# Patient Record
Sex: FEMALE | Race: OTHER | NOT HISPANIC OR LATINO
[De-identification: names, ages, dates, MRNs, and addresses within clinical notes are randomized per-mention and may not be internally consistent; named-entity substitution may affect disease eponyms.]

---

## 2017-01-12 ENCOUNTER — APPOINTMENT (OUTPATIENT)
Dept: SURGERY | Facility: CLINIC | Age: 77
End: 2017-01-12

## 2017-07-13 ENCOUNTER — APPOINTMENT (OUTPATIENT)
Dept: SURGERY | Facility: CLINIC | Age: 77
End: 2017-07-13

## 2018-02-15 ENCOUNTER — APPOINTMENT (OUTPATIENT)
Dept: SURGERY | Facility: CLINIC | Age: 78
End: 2018-02-15
Payer: MEDICARE

## 2018-02-15 PROCEDURE — 99213 OFFICE O/P EST LOW 20 MIN: CPT

## 2018-08-07 ENCOUNTER — APPOINTMENT (OUTPATIENT)
Dept: SURGERY | Facility: CLINIC | Age: 78
End: 2018-08-07
Payer: MEDICARE

## 2018-08-07 PROCEDURE — 99213 OFFICE O/P EST LOW 20 MIN: CPT

## 2019-02-07 ENCOUNTER — APPOINTMENT (OUTPATIENT)
Dept: SURGERY | Facility: CLINIC | Age: 79
End: 2019-02-07
Payer: MEDICARE

## 2019-02-07 PROCEDURE — 99213 OFFICE O/P EST LOW 20 MIN: CPT

## 2019-02-07 NOTE — HISTORY OF PRESENT ILLNESS
[de-identified] : 20  1/2 years s/p partial glossectomy for malignancy, s/p resection right cheek BCC. denies pain, bleeding, dysphagia, hoarseness or new lesions. no changes medically since last visit

## 2019-02-07 NOTE — ASSESSMENT
[FreeTextEntry1] : will observe.  to return earlier if any change. encouraged to see dermatologist.  no indication for any studies at this time

## 2019-02-07 NOTE — PHYSICAL EXAM
[de-identified] : no palpable thyroid nodules [Laryngoscopy Performed] : laryngoscopy was performed, see procedure section for findings [Midline] : located in midline position [de-identified] : no palpable masses.  changes from prior surgery [Normal] : orientation to person, place, and time: normal [FreeTextEntry2] : no skin lesions noted

## 2019-08-08 ENCOUNTER — APPOINTMENT (OUTPATIENT)
Dept: SURGERY | Facility: CLINIC | Age: 79
End: 2019-08-08
Payer: MEDICARE

## 2019-08-08 PROCEDURE — 99214 OFFICE O/P EST MOD 30 MIN: CPT

## 2019-08-08 NOTE — HISTORY OF PRESENT ILLNESS
[de-identified] : 21  years s/p partial glossectomy for malignancy, s/p resection right cheek BCC. denies pain, bleeding, dysphagia, hoarseness or new lesions. no changes medically since last visit.  new scalp lesion seen by hairdresser - occasional bleeding

## 2019-08-08 NOTE — ASSESSMENT
[FreeTextEntry1] : discussed options for excision of scalp lesion as ambulatory procedure. risks, benefits and alternatives discussed at length. potential for scarring, recurrence, alopecia discussed. to be scheduled at [facility]

## 2019-08-08 NOTE — PHYSICAL EXAM
[de-identified] : no palpable thyroid nodules [Laryngoscopy Performed] : laryngoscopy was performed, see procedure section for findings [Midline] : located in midline position [de-identified] : no palpable masses.  changes from prior surgery [FreeTextEntry2] : well healed right cheek scar.  1.3 cm right vertex raised scalp lesion with elevated borders and small scab posteriorly, mobile [Normal] : orientation to person, place, and time: normal

## 2019-09-10 ENCOUNTER — OUTPATIENT (OUTPATIENT)
Dept: OUTPATIENT SERVICES | Facility: HOSPITAL | Age: 79
LOS: 1 days | End: 2019-09-10
Payer: MEDICARE

## 2019-09-10 VITALS
SYSTOLIC BLOOD PRESSURE: 120 MMHG | OXYGEN SATURATION: 99 % | TEMPERATURE: 96 F | DIASTOLIC BLOOD PRESSURE: 60 MMHG | RESPIRATION RATE: 16 BRPM | HEART RATE: 51 BPM | HEIGHT: 63 IN | WEIGHT: 126.1 LBS

## 2019-09-10 DIAGNOSIS — E11.9 TYPE 2 DIABETES MELLITUS WITHOUT COMPLICATIONS: ICD-10-CM

## 2019-09-10 DIAGNOSIS — C44.91 BASAL CELL CARCINOMA OF SKIN, UNSPECIFIED: ICD-10-CM

## 2019-09-10 DIAGNOSIS — C02.9 MALIGNANT NEOPLASM OF TONGUE, UNSPECIFIED: Chronic | ICD-10-CM

## 2019-09-10 DIAGNOSIS — C44.90 UNSPECIFIED MALIGNANT NEOPLASM OF SKIN, UNSPECIFIED: ICD-10-CM

## 2019-09-10 LAB
ANION GAP SERPL CALC-SCNC: 12 MMO/L — SIGNIFICANT CHANGE UP (ref 7–14)
BUN SERPL-MCNC: 35 MG/DL — HIGH (ref 7–23)
CALCIUM SERPL-MCNC: 10.2 MG/DL — SIGNIFICANT CHANGE UP (ref 8.4–10.5)
CHLORIDE SERPL-SCNC: 110 MMOL/L — HIGH (ref 98–107)
CO2 SERPL-SCNC: 22 MMOL/L — SIGNIFICANT CHANGE UP (ref 22–31)
CREAT SERPL-MCNC: 1.69 MG/DL — HIGH (ref 0.5–1.3)
GLUCOSE SERPL-MCNC: 67 MG/DL — LOW (ref 70–99)
HBA1C BLD-MCNC: 7.4 % — HIGH (ref 4–5.6)
HCT VFR BLD CALC: 31.3 % — LOW (ref 34.5–45)
HGB BLD-MCNC: 9.5 G/DL — LOW (ref 11.5–15.5)
MCHC RBC-ENTMCNC: 30.4 % — LOW (ref 32–36)
MCHC RBC-ENTMCNC: 32.1 PG — SIGNIFICANT CHANGE UP (ref 27–34)
MCV RBC AUTO: 105.7 FL — HIGH (ref 80–100)
NRBC # FLD: 0 K/UL — SIGNIFICANT CHANGE UP (ref 0–0)
PLATELET # BLD AUTO: 272 K/UL — SIGNIFICANT CHANGE UP (ref 150–400)
PMV BLD: 11.1 FL — SIGNIFICANT CHANGE UP (ref 7–13)
POTASSIUM SERPL-MCNC: 4 MMOL/L — SIGNIFICANT CHANGE UP (ref 3.5–5.3)
POTASSIUM SERPL-SCNC: 4 MMOL/L — SIGNIFICANT CHANGE UP (ref 3.5–5.3)
RBC # BLD: 2.96 M/UL — LOW (ref 3.8–5.2)
RBC # FLD: 13.1 % — SIGNIFICANT CHANGE UP (ref 10.3–14.5)
SODIUM SERPL-SCNC: 144 MMOL/L — SIGNIFICANT CHANGE UP (ref 135–145)
WBC # BLD: 8.05 K/UL — SIGNIFICANT CHANGE UP (ref 3.8–10.5)
WBC # FLD AUTO: 8.05 K/UL — SIGNIFICANT CHANGE UP (ref 3.8–10.5)

## 2019-09-10 PROCEDURE — 93010 ELECTROCARDIOGRAM REPORT: CPT

## 2019-09-10 RX ORDER — INSULIN GLARGINE 100 [IU]/ML
35 INJECTION, SOLUTION SUBCUTANEOUS
Qty: 0 | Refills: 0 | DISCHARGE

## 2019-09-10 NOTE — H&P PST ADULT - OTHER CARE PROVIDERS
Dr Adkins endocrinologist                                          DR Landis nephrologist [ [phone]

## 2019-09-10 NOTE — H&P PST ADULT - HISTORY OF PRESENT ILLNESS
Pt is an 79 y.o. female ;h/o tongue cancer ; tx surgically 20 years ago Pt f/u with surgeon every year ;  pt states she recently felt"  something something on her head" ; pt to surgeon ; pt now presents for Excision Skin Cancer Right Vertex Scalp Pt is an 79 y.o. female ;h/o tongue cancer ; tx surgically 20 years ago Pt f/u with surgeon every year ;  pt states she recently felt"  something something on her head" ; pt to surgeon ; pt now presents for Excision Skin Cancer Right Vertex Scalp     Pt is a fair historian

## 2019-09-10 NOTE — H&P PST ADULT - NEGATIVE NEUROLOGICAL SYMPTOMS
no syncope/no focal seizures/no paresthesias/no generalized seizures/no transient paralysis/no weakness

## 2019-09-10 NOTE — H&P PST ADULT - NSICDXPROBLEM_GEN_ALL_CORE_FT
PROBLEM DIAGNOSES  Problem: Skin cancer  Assessment and Plan: Excision Skin cancer Right Vertex Scalp  Pre op instructions reviewed with pt ; pt verbalized good understanding of pre op instructions    Problem: DM (diabetes mellitus)  Assessment and Plan: BMP, HGBA1c done 9/10/19  Pt to decrease Basaglar by 20 % evening prior to surgery  Pt to hold Glimipiride dos  Pt to review with endocrinologist pre op  FS dos PROBLEM DIAGNOSES  Problem: Skin cancer  Assessment and Plan: Excision Skin cancer Right Vertex Scalp  Pre op instructions reviewed with pt ; pt verbalized good understanding of pre op instructions  Pt to Dr Lockhart for pre op medical evaluation   Pt is a fair historian ; multiple attempt to clarify medication and dosage with pt and spouse     Problem: DM (diabetes mellitus)  Assessment and Plan: BMP, HGBA1c done 9/10/19  Pt to decrease Basaglar by 20 % evening prior to surgery  Pt to hold Glimipiride dos  Pt to review with endocrinologist pre op ; nned to clarify medications   FS dos PROBLEM DIAGNOSES  Problem: Skin cancer  Assessment and Plan: Excision Skin cancer Right Vertex Scalp  Pre op instructions reviewed with pt ; pt verbalized good understanding of pre op instructions  Pt to Dr Lockhart for pre op medical evaluation   regarding asa ; pt unclear ; call to surgeon ; message left pt to review with pcp   Pt is a fair historian ; multiple attempt to clarify medication and dosage with pt and spouse     Problem: DM (diabetes mellitus)  Assessment and Plan: BMP, HGBA1c done 9/10/19  Pt to decrease Basaglar by 20 % evening prior to surgery  Pt to hold Glimipiride dos  Pt to review with endocrinologist pre op ; nned to clarify medications   FS dos

## 2019-09-10 NOTE — H&P PST ADULT - NSICDXPASTMEDICALHX_GEN_ALL_CORE_FT
PAST MEDICAL HISTORY:  Anxiety     Chronic kidney disease (CKD)     Depression     Diabetes mellitus     Glaucoma     Hypercholesterolemia     Hypertension     Hyperuricemia     Osteopenia     Tongue cancer tx surgically > 20 years ago

## 2019-09-10 NOTE — H&P PST ADULT - LYMPHATIC
supraclavicular L/supraclavicular R/posterior cervical R/posterior cervical L/anterior cervical R/anterior cervical L

## 2019-09-16 RX ORDER — ASPIRIN/CALCIUM CARB/MAGNESIUM 324 MG
0 TABLET ORAL
Qty: 0 | Refills: 0 | DISCHARGE

## 2019-09-19 ENCOUNTER — TRANSCRIPTION ENCOUNTER (OUTPATIENT)
Age: 79
End: 2019-09-19

## 2019-09-19 PROBLEM — E79.0 HYPERURICEMIA WITHOUT SIGNS OF INFLAMMATORY ARTHRITIS AND TOPHACEOUS DISEASE: Chronic | Status: ACTIVE | Noted: 2019-09-10

## 2019-09-19 PROBLEM — I10 ESSENTIAL (PRIMARY) HYPERTENSION: Chronic | Status: ACTIVE | Noted: 2019-09-10

## 2019-09-19 PROBLEM — E11.9 TYPE 2 DIABETES MELLITUS WITHOUT COMPLICATIONS: Chronic | Status: ACTIVE | Noted: 2019-09-10

## 2019-09-19 PROBLEM — E78.00 PURE HYPERCHOLESTEROLEMIA, UNSPECIFIED: Chronic | Status: ACTIVE | Noted: 2019-09-10

## 2019-09-19 PROBLEM — F32.9 MAJOR DEPRESSIVE DISORDER, SINGLE EPISODE, UNSPECIFIED: Chronic | Status: ACTIVE | Noted: 2019-09-10

## 2019-09-19 PROBLEM — H40.9 UNSPECIFIED GLAUCOMA: Chronic | Status: ACTIVE | Noted: 2019-09-10

## 2019-09-19 PROBLEM — C02.9 MALIGNANT NEOPLASM OF TONGUE, UNSPECIFIED: Chronic | Status: ACTIVE | Noted: 2019-09-10

## 2019-09-19 PROBLEM — N18.9 CHRONIC KIDNEY DISEASE, UNSPECIFIED: Chronic | Status: ACTIVE | Noted: 2019-09-10

## 2019-09-19 PROBLEM — M85.80 OTHER SPECIFIED DISORDERS OF BONE DENSITY AND STRUCTURE, UNSPECIFIED SITE: Chronic | Status: ACTIVE | Noted: 2019-09-10

## 2019-09-19 PROBLEM — F41.9 ANXIETY DISORDER, UNSPECIFIED: Chronic | Status: ACTIVE | Noted: 2019-09-10

## 2019-09-19 RX ORDER — ASPIRIN/CALCIUM CARB/MAGNESIUM 324 MG
0 TABLET ORAL
Qty: 0 | Refills: 0 | DISCHARGE

## 2019-09-20 ENCOUNTER — APPOINTMENT (OUTPATIENT)
Dept: SURGERY | Facility: HOSPITAL | Age: 79
End: 2019-09-20

## 2019-09-20 ENCOUNTER — OTHER (OUTPATIENT)
Age: 79
End: 2019-09-20

## 2019-09-20 ENCOUNTER — RESULT REVIEW (OUTPATIENT)
Age: 79
End: 2019-09-20

## 2019-09-20 ENCOUNTER — OUTPATIENT (OUTPATIENT)
Dept: OUTPATIENT SERVICES | Facility: HOSPITAL | Age: 79
LOS: 1 days | Discharge: HOME | End: 2019-09-20
Payer: MEDICARE

## 2019-09-20 VITALS
OXYGEN SATURATION: 99 % | HEART RATE: 58 BPM | HEIGHT: 63 IN | TEMPERATURE: 98 F | DIASTOLIC BLOOD PRESSURE: 48 MMHG | SYSTOLIC BLOOD PRESSURE: 145 MMHG | WEIGHT: 126.1 LBS | RESPIRATION RATE: 17 BRPM

## 2019-09-20 VITALS
DIASTOLIC BLOOD PRESSURE: 56 MMHG | TEMPERATURE: 98 F | SYSTOLIC BLOOD PRESSURE: 144 MMHG | OXYGEN SATURATION: 100 % | HEART RATE: 58 BPM

## 2019-09-20 DIAGNOSIS — C44.91 BASAL CELL CARCINOMA OF SKIN, UNSPECIFIED: ICD-10-CM

## 2019-09-20 DIAGNOSIS — C02.9 MALIGNANT NEOPLASM OF TONGUE, UNSPECIFIED: Chronic | ICD-10-CM

## 2019-09-20 LAB — GLUCOSE BLDC GLUCOMTR-MCNC: 72 MG/DL — SIGNIFICANT CHANGE UP (ref 70–99)

## 2019-09-20 PROCEDURE — 11623 EXC S/N/H/F/G MAL+MRG 2.1-3: CPT

## 2019-09-20 PROCEDURE — 88305 TISSUE EXAM BY PATHOLOGIST: CPT | Mod: 26

## 2019-09-20 NOTE — BRIEF OPERATIVE NOTE - NSICDXBRIEFPROCEDURE_GEN_ALL_CORE_FT
PROCEDURES:  Excision of malignant lesion of scalp, 1.1 to 1.5cm 20-Sep-2019 11:00:28  Mary Anne Benson

## 2019-09-20 NOTE — ASU DISCHARGE PLAN (ADULT/PEDIATRIC) - CALL YOUR DOCTOR IF YOU HAVE ANY OF THE FOLLOWING:
Swelling that gets worse/Pain not relieved by Medications/Bleeding that does not stop Swelling that gets worse/Fever greater than (need to indicate Fahrenheit or Celsius)/Bleeding that does not stop/Pain not relieved by Medications

## 2019-09-20 NOTE — ASU DISCHARGE PLAN (ADULT/PEDIATRIC) - CARE PROVIDER_API CALL
Miguel Doan)  Plastic Surgery; Surgery  [address], [address]  [place] [address]  Phone: [phone]  Fax: [phone]  Follow Up Time:

## 2019-09-27 LAB — SURGICAL PATHOLOGY STUDY: SIGNIFICANT CHANGE UP

## 2019-10-08 ENCOUNTER — APPOINTMENT (OUTPATIENT)
Dept: SURGERY | Facility: CLINIC | Age: 79
End: 2019-10-08
Payer: MEDICARE

## 2019-10-08 PROCEDURE — 99213 OFFICE O/P EST LOW 20 MIN: CPT

## 2019-10-08 NOTE — HISTORY OF PRESENT ILLNESS
[de-identified] : Pt 2 weeks s/p excision scalp BCC doing well c/o sensitivity of tongue for 1 months

## 2019-10-08 NOTE — ASSESSMENT
[FreeTextEntry1] : s/p excision scalp BCC\par Pt s/w Dr Doan CT scan neck r/o Recurrent Tongue malignancy\par suture removed\par daily care\par f/u 4 months

## 2019-10-09 NOTE — H&P PST ADULT - BP NONINVASIVE SYSTOLIC (MM HG)
You can access the FollowMyHealth Patient Portal offered by [facility] by registering at the following website: [email or web address]. By joining [employer]’s FollowMyHealth portal, you will also be able to view your health information using other applications (apps) compatible with our system.
120

## 2019-10-29 ENCOUNTER — OTHER (OUTPATIENT)
Age: 79
End: 2019-10-29

## 2019-11-14 ENCOUNTER — LABORATORY RESULT (OUTPATIENT)
Age: 79
End: 2019-11-14

## 2019-11-14 ENCOUNTER — APPOINTMENT (OUTPATIENT)
Dept: SURGERY | Facility: CLINIC | Age: 79
End: 2019-11-14
Payer: MEDICARE

## 2019-11-14 PROCEDURE — 40808 BIOPSY OF MOUTH LESION: CPT

## 2019-11-14 PROCEDURE — 99213 OFFICE O/P EST LOW 20 MIN: CPT | Mod: 25

## 2019-11-14 NOTE — PHYSICAL EXAM
[de-identified] : no palpable thyroid nodules [Laryngoscopy Performed] : laryngoscopy was performed, see procedure section for findings [de-identified] : no palpable masses.  changes from prior surgery [Midline] : located in midline position [de-identified] : 5 mm friable right soft palate lesion [FreeTextEntry2] : well healed right cheek scar.  well healed right scalp excision site [Normal] : orientation to person, place, and time: normal

## 2019-11-14 NOTE — HISTORY OF PRESENT ILLNESS
[de-identified] : 21 1/2  years s/p partial glossectomy for malignancy, s/p resection right cheek  and right scalp  BCC. denies pain, bleeding, dysphagia, hoarseness or new lesions. no changes medically since last visit.

## 2019-11-19 ENCOUNTER — OTHER (OUTPATIENT)
Age: 79
End: 2019-11-19

## 2019-11-20 ENCOUNTER — OTHER (OUTPATIENT)
Age: 79
End: 2019-11-20

## 2019-11-21 ENCOUNTER — APPOINTMENT (OUTPATIENT)
Dept: RADIATION ONCOLOGY | Facility: CLINIC | Age: 79
End: 2019-11-21
Payer: MEDICARE

## 2019-11-21 VITALS — WEIGHT: 126 LBS | HEIGHT: 64 IN | RESPIRATION RATE: 16 BRPM | BODY MASS INDEX: 21.51 KG/M2

## 2019-11-21 VITALS — SYSTOLIC BLOOD PRESSURE: 144 MMHG | DIASTOLIC BLOOD PRESSURE: 65 MMHG | HEART RATE: 60 BPM

## 2019-11-21 DIAGNOSIS — I10 ESSENTIAL (PRIMARY) HYPERTENSION: ICD-10-CM

## 2019-11-21 DIAGNOSIS — E78.5 HYPERLIPIDEMIA, UNSPECIFIED: ICD-10-CM

## 2019-11-21 DIAGNOSIS — Z85.810 PERSONAL HISTORY OF MALIGNANT NEOPLASM OF TONGUE: ICD-10-CM

## 2019-11-21 DIAGNOSIS — I25.10 ATHEROSCLEROTIC HEART DISEASE OF NATIVE CORONARY ARTERY W/OUT ANGINA PECTORIS: ICD-10-CM

## 2019-11-21 PROCEDURE — 99205 OFFICE O/P NEW HI 60 MIN: CPT | Mod: 25,GC

## 2019-11-21 RX ORDER — 70%ISOPROPYL ALCOHOL 0.7 ML/ML
70 SWAB TOPICAL
Refills: 0 | Status: ACTIVE | COMMUNITY

## 2019-11-25 NOTE — REVIEW OF SYSTEMS
[Negative] : Allergic/Immunologic [FreeTextEntry5] : CAD, HTN, HLD [FreeTextEntry4] : h/o glossectomy for cancer

## 2019-11-25 NOTE — HISTORY OF PRESENT ILLNESS
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 Other notable medical history includes diabetes, hypertension and hyperlipidemia. \par \par Patient reported tongue sensitivity in 09/2019 and was evaluated by Dr. Doan/GINNA Benson. \par \par CT 10/23/19 showed apparent lesion involving the left palatine tonsil/glossotonsillar sulcus. Multiple thyroid nodules measuring up to 1.3cm were also noted. On physical exam on 11/15/19, a 5mm friable right soft palate lesion was noted. Larynogoscopy showed no other abnormalities.\par \par Biopsy of the right soft palate on 11/14/19 showed severe epithelial dysplasia, adjacent to ulcerated mucosa. Dysplastic change involves salivary exretory duct. Dr. Doan spoke with the patient and thinks surgery or laser therapy would be too morbid\par \par Patient continues to have tongue sensitivity with certain type of foods. Otherwise denies oral pain, xerostomia, dysguesia, or dysphagia. No problems with speech.  She lives with her . She was in a good state of health until summer 2018 when she developed pneumonia. Since her pneumonia she has noticed more problems with balance and walking. She recently had a fall because of imbalance. She had to stop bowling and stop volunteering because of it. Symptoms are stable since a year ago and patient regularly follows with her PCP (Dr. Underwood). No prior history of RT.\par

## 2019-11-25 NOTE — PHYSICAL EXAM
[Abdomen Soft] : soft [Nondistended] : nondistended [Normal] : oriented to person, place and time, the affect was normal, the mood was normal and not anxious [de-identified] : upper dentures. small ulcerated mucosa in the R soft palate/tonsils

## 2019-11-25 NOTE — END OF VISIT
[] : Resident [>50% of Time Spent on Counseling and Coordination of Care for  ___] : Greater than 50% of the encounter time was spent on counseling and coordination of care for [unfilled] [Time Spent: ___ minutes] : I have spent [unfilled] minutes of face to face time with the patient [FreeTextEntry3] : Agree with assessment and plan. WIll plan after TB discussion.

## 2019-12-13 ENCOUNTER — APPOINTMENT (OUTPATIENT)
Dept: RADIATION ONCOLOGY | Facility: CLINIC | Age: 79
End: 2019-12-13
Payer: MEDICARE

## 2019-12-13 VITALS
DIASTOLIC BLOOD PRESSURE: 69 MMHG | RESPIRATION RATE: 12 BRPM | BODY MASS INDEX: 22.19 KG/M2 | OXYGEN SATURATION: 100 % | WEIGHT: 129.96 LBS | SYSTOLIC BLOOD PRESSURE: 165 MMHG | HEART RATE: 59 BPM | TEMPERATURE: 98.24 F | HEIGHT: 64 IN

## 2019-12-13 PROCEDURE — 99214 OFFICE O/P EST MOD 30 MIN: CPT | Mod: 25,GC

## 2019-12-13 NOTE — PHYSICAL EXAM
[Sclera] : the sclera and conjunctiva were normal [Outer Ear] : the ears and nose were normal in appearance [] : no respiratory distress [Arterial Pulses Normal] : the arterial pulses were normal [Abdomen Soft] : soft [Nondistended] : nondistended [Normal] : oriented to person, place and time, the affect was normal, the mood was normal and not anxious

## 2019-12-13 NOTE — HISTORY OF PRESENT ILLNESS
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 Other notable medical history includes diabetes, hypertension and hyperlipidemia. \par \par Patient reported tongue sensitivity in 09/2019 and was evaluated by Dr. Doan/GINNA Benson. \par \par CT 10/23/19 showed apparent lesion involving the left palatine tonsil/glossotonsillar sulcus. Multiple thyroid nodules measuring up to 1.3cm were also noted. On physical exam on 11/15/19, a 5mm friable right soft palate lesion was noted. Larynogoscopy showed no other abnormalities.\par \par Biopsy of the right soft palate on 11/14/19 showed severe epithelial dysplasia, adjacent to ulcerated mucosa. Dysplastic change involves salivary exretory duct. Dr. Doan spoke with the patient and thinks surgery or laser therapy would be too morbid\par \par Patient continues to have tongue sensitivity with certain type of foods. Otherwise denies oral pain, xerostomia, dysguesia, or dysphagia. No problems with speech.  She lives with her . She was in a good state of health until summer 2018 when she developed pneumonia. Since her pneumonia she has noticed more problems with balance and walking. She recently had a fall because of imbalance. She had to stop bowling and stop volunteering because of it. Symptoms are stable since a year ago and patient regularly follows with her PCP (Dr. Underwood). No prior history of RT.\par \par Patient returns for discussion of next steps. She is anxious but doing well.

## 2019-12-13 NOTE — REVIEW OF SYSTEMS
[Negative] : Allergic/Immunologic [Dysphagia: Grade 0] : Dysphagia: Grade 0 [Tinnitus - Grade 0] : Tinnitus - Grade 0 [Mucositis Oral: Grade 0] : Mucositis Oral: Grade 0  [Blurred Vision: Grade 0] : Blurred Vision: Grade 0 [Salivary duct inflammation: Grade 0] : Salivary duct inflammation: Grade 0 [Xerostomia: Grade 0] : Xerostomia: Grade 0 [Oral Pain: Grade 0] : Oral Pain: Grade 0 [Dysgeusia: Grade 0] : Dysgeusia: Grade 0 [Hoarseness: Grade 0] : Hoarseness: Grade 0 [Pruritus: Grade 0] : Pruritus: Grade 0 [Alopecia: Grade 0] : Alopecia: Grade 0 [Skin Hyperpigmentation: Grade 0] : Skin Hyperpigmentation: Grade 0 [Skin Induration: Grade 0] : Skin Induration: Grade 0 [Skin Atrophy: Grade 0] : Skin Atrophy: Grade 0 [Dermatitis Radiation: Grade 0] : Dermatitis Radiation: Grade 0 [FreeTextEntry4] : h/o tongue surgery for cancer

## 2019-12-13 NOTE — VITALS
[Least Pain Intensity: 0/10] : 0/10 [Maximal Pain Intensity: 2/10] : 2/10 [Pain Location: ___] : Pain Location: [unfilled] [80: Normal activity with effort; some signs or symptoms of disease.] : 80: Normal activity with effort; some signs or symptoms of disease.  [ECOG Performance Status: 1 - Restricted in physically strenuous activity but ambulatory and able to carry out work of a light or sedentary nature] : Performance Status: 1 - Restricted in physically strenuous activity but ambulatory and able to carry out work of a light or sedentary nature, e.g., light house work, office work

## 2019-12-17 ENCOUNTER — OTHER (OUTPATIENT)
Age: 79
End: 2019-12-17

## 2020-01-02 ENCOUNTER — APPOINTMENT (OUTPATIENT)
Dept: SURGERY | Facility: CLINIC | Age: 80
End: 2020-01-02
Payer: MEDICARE

## 2020-01-02 ENCOUNTER — OUTPATIENT (OUTPATIENT)
Dept: OUTPATIENT SERVICES | Facility: HOSPITAL | Age: 80
LOS: 1 days | Discharge: HOME | End: 2020-01-02
Payer: MEDICARE

## 2020-01-02 DIAGNOSIS — C02.9 MALIGNANT NEOPLASM OF TONGUE, UNSPECIFIED: Chronic | ICD-10-CM

## 2020-01-02 PROCEDURE — 99214 OFFICE O/P EST MOD 30 MIN: CPT

## 2020-01-02 PROCEDURE — 77263 THER RADIOLOGY TX PLNG CPLX: CPT

## 2020-01-02 NOTE — PHYSICAL EXAM
[de-identified] : no palpable thyroid nodules [Laryngoscopy Performed] : laryngoscopy was performed, see procedure section for findings [Midline] : located in midline position [de-identified] : no palpable masses.  changes from prior surgery [de-identified] : 5 mm  right soft palate lesion and white lesion junction soft palate and tonsillar pillar [Normal] : orientation to person, place, and time: normal [FreeTextEntry2] : well healed right cheek scar.  well healed right scalp excision site

## 2020-01-02 NOTE — HISTORY OF PRESENT ILLNESS
[de-identified] : 21 1/2  years s/p partial glossectomy for malignancy, s/p resection right cheek  and right scalp  BCC. denies pain, bleeding, dysphagia, hoarseness or new lesions. no changes medically since last visit.    has new biopsy proven CIS right soft palate.

## 2020-01-02 NOTE — ASSESSMENT
[FreeTextEntry1] : lengthy discussion regarding options for management including surgery vs RT. still feel that due to indistinct borders, location with surgery would result in worse disability with VPI and for this reason localized RT to area would offer same cure with better function. she and her family concur.  d/w dr garber who concurs. treatment to be delayed due to 's upcoming AAA repair. to return earlier if any change

## 2020-01-10 PROCEDURE — 77334 RADIATION TREATMENT AID(S): CPT | Mod: 26

## 2020-01-23 ENCOUNTER — APPOINTMENT (OUTPATIENT)
Dept: SPEECH THERAPY | Facility: CLINIC | Age: 80
End: 2020-01-23
Payer: MEDICARE

## 2020-01-23 PROCEDURE — 92610 EVALUATE SWALLOWING FUNCTION: CPT | Mod: GN

## 2020-01-30 PROCEDURE — 77300 RADIATION THERAPY DOSE PLAN: CPT | Mod: 26

## 2020-01-30 PROCEDURE — 77338 DESIGN MLC DEVICE FOR IMRT: CPT | Mod: 26

## 2020-01-30 PROCEDURE — 77301 RADIOTHERAPY DOSE PLAN IMRT: CPT | Mod: 26

## 2020-02-18 ENCOUNTER — APPOINTMENT (OUTPATIENT)
Dept: SURGERY | Facility: CLINIC | Age: 80
End: 2020-02-18
Payer: MEDICARE

## 2020-02-18 DIAGNOSIS — K13.79 OTHER LESIONS OF ORAL MUCOSA: ICD-10-CM

## 2020-02-18 PROCEDURE — 99213 OFFICE O/P EST LOW 20 MIN: CPT

## 2020-02-18 NOTE — HISTORY OF PRESENT ILLNESS
[de-identified] : 21 1/2  years s/p partial glossectomy for malignancy, s/p resection right cheek  and right scalp  BCC. denies pain, bleeding, dysphagia, hoarseness or new lesions. no changes medically since last visit.    has new biopsy proven CIS right soft palate  started RT last week

## 2020-02-18 NOTE — PHYSICAL EXAM
[de-identified] : no palpable thyroid nodules [Laryngoscopy Performed] : laryngoscopy was performed, see procedure section for findings [Midline] : located in midline position [de-identified] : no palpable masses.  changes from prior surgery [de-identified] : 1 cm  right soft palate lesion  white lesion junction soft palate and tonsillar pillar [Normal] : orientation to person, place, and time: normal [FreeTextEntry2] : well healed right cheek scar.  well healed right scalp excision site

## 2020-02-19 VITALS
HEIGHT: 64 IN | SYSTOLIC BLOOD PRESSURE: 176 MMHG | BODY MASS INDEX: 22.55 KG/M2 | WEIGHT: 132.06 LBS | OXYGEN SATURATION: 100 % | HEART RATE: 56 BPM | DIASTOLIC BLOOD PRESSURE: 76 MMHG

## 2020-02-19 PROCEDURE — 77427 RADIATION TX MANAGEMENT X5: CPT

## 2020-02-19 PROCEDURE — 77387B: CUSTOM | Mod: 26

## 2020-02-19 NOTE — REVIEW OF SYSTEMS
[Dysphagia: Grade 0] : Dysphagia: Grade 0 [Tinnitus - Grade 0] : Tinnitus - Grade 0 [Mucositis Oral: Grade 0] : Mucositis Oral: Grade 0  [Blurred Vision: Grade 0] : Blurred Vision: Grade 0 [Xerostomia: Grade 0] : Xerostomia: Grade 0 [Salivary duct inflammation: Grade 0] : Salivary duct inflammation: Grade 0 [Oral Pain: Grade 0] : Oral Pain: Grade 0 [Dysgeusia: Grade 0] : Dysgeusia: Grade 0 [Hoarseness: Grade 0] : Hoarseness: Grade 0 [Alopecia: Grade 0] : Alopecia: Grade 0 [Pruritus: Grade 0] : Pruritus: Grade 0 [Skin Atrophy: Grade 0] : Skin Atrophy: Grade 0 [Skin Hyperpigmentation: Grade 0] : Skin Hyperpigmentation: Grade 0 [Dermatitis Radiation: Grade 0] : Dermatitis Radiation: Grade 0 [Skin Induration: Grade 0] : Skin Induration: Grade 0

## 2020-02-20 PROCEDURE — 77387B: CUSTOM | Mod: 26

## 2020-02-21 PROCEDURE — 77387B: CUSTOM | Mod: 26

## 2020-02-24 PROCEDURE — 77014: CPT | Mod: 26

## 2020-02-25 PROCEDURE — 77387B: CUSTOM | Mod: 26

## 2020-02-26 VITALS — RESPIRATION RATE: 16 BRPM | WEIGHT: 131 LBS | HEIGHT: 64 IN | BODY MASS INDEX: 22.36 KG/M2

## 2020-02-26 PROCEDURE — 77387B: CUSTOM | Mod: 26

## 2020-02-26 PROCEDURE — 77427 RADIATION TX MANAGEMENT X5: CPT

## 2020-02-26 NOTE — REVIEW OF SYSTEMS
[Dysphagia: Grade 0] : Dysphagia: Grade 0 [Tinnitus - Grade 0] : Tinnitus - Grade 0 [Blurred Vision: Grade 0] : Blurred Vision: Grade 0 [Mucositis Oral: Grade 0] : Mucositis Oral: Grade 0  [Xerostomia: Grade 0] : Xerostomia: Grade 0 [Oral Pain: Grade 0] : Oral Pain: Grade 0 [Salivary duct inflammation: Grade 0] : Salivary duct inflammation: Grade 0 [Dysgeusia: Grade 0] : Dysgeusia: Grade 0 [Hoarseness: Grade 0] : Hoarseness: Grade 0 [Alopecia: Grade 0] : Alopecia: Grade 0 [Pruritus: Grade 0] : Pruritus: Grade 0 [Skin Atrophy: Grade 0] : Skin Atrophy: Grade 0 [Skin Hyperpigmentation: Grade 0] : Skin Hyperpigmentation: Grade 0 [Skin Induration: Grade 0] : Skin Induration: Grade 0 [Dermatitis Radiation: Grade 0] : Dermatitis Radiation: Grade 0

## 2020-02-27 PROCEDURE — 77387B: CUSTOM | Mod: 26

## 2020-02-27 NOTE — HISTORY OF PRESENT ILLNESS
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 Other notable medical history includes diabetes, hypertension and hyperlipidemia. \par \par 1/35 fractions of radiation treatment today. No dysphagia, dry mouth, throat pain, neck pain, skin changes.

## 2020-02-27 NOTE — DISEASE MANAGEMENT
[Clinical] : TNM Stage: c [I] : I [TTNM] : 1 [NTNM] : 0 [de-identified] : 70 Gy [MTNM] : 0 [de-identified] : soft palate

## 2020-02-28 PROCEDURE — 77387B: CUSTOM | Mod: 26

## 2020-03-02 PROCEDURE — 77014: CPT | Mod: 26

## 2020-03-03 PROCEDURE — 77387B: CUSTOM | Mod: 26

## 2020-03-04 VITALS — HEIGHT: 64 IN | BODY MASS INDEX: 22.2 KG/M2 | WEIGHT: 130 LBS | RESPIRATION RATE: 16 BRPM

## 2020-03-04 LAB
ALBUMIN SERPL ELPH-MCNC: 3.9 G/DL
ALP BLD-CCNC: 88 U/L
ALT SERPL-CCNC: 42 U/L
ANION GAP SERPL CALC-SCNC: 11 MMOL/L
AST SERPL-CCNC: 26 U/L
BASOPHILS # BLD AUTO: 0.08 K/UL
BASOPHILS NFR BLD AUTO: 0.9 %
BILIRUB SERPL-MCNC: 0.4 MG/DL
BUN SERPL-MCNC: 31 MG/DL
CALCIUM SERPL-MCNC: 10.6 MG/DL
CHLORIDE SERPL-SCNC: 103 MMOL/L
CO2 SERPL-SCNC: 29 MMOL/L
CREAT SERPL-MCNC: 1.65 MG/DL
EOSINOPHIL # BLD AUTO: 0.26 K/UL
EOSINOPHIL NFR BLD AUTO: 3.1 %
GLUCOSE SERPL-MCNC: 147 MG/DL
HCT VFR BLD CALC: 33.6 %
HGB BLD-MCNC: 10.7 G/DL
IMM GRANULOCYTES NFR BLD AUTO: 0.4 %
LYMPHOCYTES # BLD AUTO: 1.91 K/UL
LYMPHOCYTES NFR BLD AUTO: 22.5 %
MAN DIFF?: NORMAL
MCHC RBC-ENTMCNC: 31.6 PG
MCHC RBC-ENTMCNC: 31.8 GM/DL
MCV RBC AUTO: 99.1 FL
MONOCYTES # BLD AUTO: 0.73 K/UL
MONOCYTES NFR BLD AUTO: 8.6 %
NEUTROPHILS # BLD AUTO: 5.49 K/UL
NEUTROPHILS NFR BLD AUTO: 64.5 %
PLATELET # BLD AUTO: 274 K/UL
POTASSIUM SERPL-SCNC: 4.9 MMOL/L
PROT SERPL-MCNC: 6.2 G/DL
RBC # BLD: 3.39 M/UL
RBC # FLD: 11.5 %
SODIUM SERPL-SCNC: 144 MMOL/L
WBC # FLD AUTO: 8.5 K/UL

## 2020-03-04 PROCEDURE — 77387B: CUSTOM | Mod: 26

## 2020-03-04 PROCEDURE — 77427 RADIATION TX MANAGEMENT X5: CPT

## 2020-03-04 NOTE — REVIEW OF SYSTEMS
[Dysphagia: Grade 0] : Dysphagia: Grade 0 [Tinnitus - Grade 0] : Tinnitus - Grade 0 [Blurred Vision: Grade 0] : Blurred Vision: Grade 0 [Xerostomia: Grade 0] : Xerostomia: Grade 0 [Mucositis Oral: Grade 0] : Mucositis Oral: Grade 0  [Oral Pain: Grade 0] : Oral Pain: Grade 0 [Salivary duct inflammation: Grade 0] : Salivary duct inflammation: Grade 0 [Dysgeusia: Grade 0] : Dysgeusia: Grade 0 [Alopecia: Grade 0] : Alopecia: Grade 0 [Hoarseness: Grade 0] : Hoarseness: Grade 0 [Skin Atrophy: Grade 0] : Skin Atrophy: Grade 0 [Pruritus: Grade 0] : Pruritus: Grade 0 [Skin Hyperpigmentation: Grade 0] : Skin Hyperpigmentation: Grade 0 [Dermatitis Radiation: Grade 0] : Dermatitis Radiation: Grade 0 [Skin Induration: Grade 0] : Skin Induration: Grade 0

## 2020-03-04 NOTE — HISTORY OF PRESENT ILLNESS
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 Other notable medical history includes diabetes, hypertension and hyperlipidemia. \par \par 11/35 fractions of radiation treatment today. No dysphagia, dry mouth, throat pain, neck pain, skin changes. Blood test done once a week

## 2020-03-04 NOTE — DISEASE MANAGEMENT
[Clinical] : TNM Stage: c [I] : I [TTNM] : 1 [NTNM] : 0 [MTNM] : 0 [de-identified] : 70 Gy [de-identified] : soft palate

## 2020-03-05 PROCEDURE — 77387B: CUSTOM | Mod: 26

## 2020-03-06 PROCEDURE — 77387B: CUSTOM | Mod: 26

## 2020-03-09 PROCEDURE — 77014: CPT | Mod: 26

## 2020-03-09 NOTE — ASSESSMENT
[FreeTextEntry1] : CLINICAL DYSPHAGIA EVALUATION\par  \par  \par Date of Report: 2019\par Date of Evaluation: 2019\par Patient Name: Torrie Burdick\par : 1940\par Primary Diagnosis: Oropharyngeal Dysphagia (R13.12)\par Treatment Diagnosis: Oropharyngeal Dysphagia (R13.12)\par Referring Physician: Dr. Brianna Knowles, Radiation Oncology\par Procedure Code: 52127\par   \par REASON FOR REFERRAL: Torrie Burdick is an 79 year-old female who was seen for a comprehensive Clinical Dysphagia Evaluation at the [facility] upon the referral of her radiation oncologist, Dr. Brianna Knowles. 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 Burdick presented to today’s session accompanied by her . The patient reported history of head and neck cancer consistent with the aforementioned information. The patient reported an upcoming dental appointment in mid- February to have some teeth extracted prior to initiating radiation treatment. Upon further clinician questioning, the patient denied difficulty swallowing, however she did state preference for softer food items due to decreased dentition status. She also reported history of pneumonia approximately one year ago, as well as an approximate 15 pound weight loss last summer which she attributed to depression. \par \par CURRENT NUTRITIONAL INTAKE:\par Solids:  Soft Textures\par Liquids:  Thin Liquids\par  	 \par MEDICAL HISTORY:\par Active Problems\par Abnormal EKG (794.31) (R94.31)\par Basal cell carcinoma of skin (173.91) (C44.91)\par Benign hypertensive heart disease without CHF (402.10) (I11.9)\par Cold hands and feet (782.9) (R20.9)\par Controlled diabetes mellitus (250.00) (E11.9)\par Diminished pulses in lower extremity (785.9) (R09.89)\par Lesion of soft palate (528.9) (K13.79)\par Mild hyperlipidemia (272.4) (E78.5)\par Tongue malignant neoplasm (141.9) (C02.9)\par \par Past Medical History\par History of Cardiovascular disease (429.2) (I25.10)\par History of tongue cancer (V10.01) (Z85.810)\par History of Hyperlipidemia (272.4) (E78.5)\par History of Hypertension (401.9) (I10)\par \par Social History\par Never a smoker\par Never smoker\par No alcohol use\par No drug use\par \par Current Meds\par Alcohol Prep 70 % Pad\par Allopurinol 100 MG Oral Tablet; TAKE 1 TABLET DAILY\par amLODIPine Besy-Benazepril HCl - 5-20 MG Oral Capsule; TAKE 1 CAPSULE DAILY\par Amoxicillin-Pot Clavulanate 875-125 MG Oral Tablet\par Atelvia 35 MG Oral Tablet Delayed Release\par Carvedilol 25 MG Oral Tablet; TAKE 1 TABLET TWICE DAILY\par cloNIDine HCl - 0.1 MG Oral Tablet\par Dorzolamide HCl - 2 % Ophthalmic Solution\par Dorzolamide HCl-Timolol Mal 22.3-6.8 MG/ML Ophthalmic Solution\par Dorzolamide HCl-Timolol Mal PF 22.3-6.8 MG/ML SOLN\par Doxazosin Mesylate 2 MG Oral Tablet\par Furosemide 20 MG Oral Tablet\par Glimepiride 1 MG Oral Tablet\par Glimepiride 2 MG Oral Tablet\par Lantus SoloStar 100 UNIT/ML Subcutaneous Solution Pen-injector\par LORazepam 0.5 MG Oral Tablet\par Lumigan 0.01 % Ophthalmic Solution\par Mirtazapine 15 MG Oral Tablet; TAKE 1 TABLET AT BEDTIME\par Mirtazapine 30 MG Oral Tablet\par Mirtazapine 45 MG Oral Tablet\par OneTouch Ultra Blue In Vitro Strip; TEST BID\par Phospholine Iodide 0.125 % Ophthalmic Solution Reconstituted\par Pravastatin Sodium 20 MG Oral Tablet; TAKE 1 TABLET DAILY\par Preferred Plus Unifine Pentips 31G X 8 MM\par Timolol Maleate 0.5 % Ophthalmic Solution\par Triamterene-HCTZ 37.5-25 MG Oral Tablet\par \par CLINICAL FINDINGS\par Oral Peripheral Assessment:\par Structures:    	WFLs\par Symmetry:    	WFLs\par Dentition:     	Upper dentures; Lower essentially edentulous\par Soft Palate:   	WFLs\par Labial: 	WFLs\par Lingual:	Mildly reduced strength and range\par Mandibular: 	WFLs\par Buccal:	WFLs\par Secretions:  	Adequate\par Volitional Swallow: 	Adequate\par Volitional Cough: 	Adequate\par Voice:		Unremarkable\par Cognition/Communication: WFLs\par Motor Speech: WFLs\par  \par Consistencies Administered: \par Solids:  __X_ Regular   __ Soft   _X_Puree	\par Liquids:  _X__ Thin   __  Nectar Thick   __Honey Thick\par _X   via single sips             _X_ via consecutive sips\par \par Oral Stage: Mrs. Burdick demonstrated a Mild Oral Dysphagia characterized by slow mastication, decreased bolus collection and slow/weak lingual motion with delayed anterior-posterior transfer for regular solid textures which was further exacerbated by decreased dentition status aforementioned. Functional bolus collection, manipulation and transfer were noted for puree, soft solids and thin liquid textures. There was no evidence of anterior loss, buccal pocketing or oral residue noted across all trials. \par  \par Pharyngeal Stage: Mrs. Burdick demonstrated a Functional Pharyngeal Stage characterized by timely initiation of the swallow onset, palpable hyolaryngeal elevation/excursion, and no overt, clinical signs/symptoms of laryngeal penetration and/or aspiration across PO trials of puree, regular solids, and thin liquids. \par \par IMPRESSIONS: Mild Oral Dysphagia and Functional Pharyngeal Stage\par  \par PROGNOSIS: Good with therapeutic intervention\par  \par RECOMMENDATIONS:\par 1)	Continue Soft Solids with Thin Liquids, as tolerated \par 2)	Dysphagia Precautions: Upright position (90 degrees) during/after eating for 30 minutes; Slow rate of intake; Small bites; Single cup sips; No straws.\par 3)	Patient would benefit from Swallow Therapy to maximize the swallow mechanism / prevent further decline in the setting of radiation sequelae\par 4)	Follow up with referring physician as directed\par  \par \par EDUCATION:  The patient and her spouse were educated at length regarding the aforementioned recommendations, as well as potential for developing dysphagia symptoms associated with radiation sequelae. Written swallow exercises were provided. The patient verbalized good understanding and plans to contact this center to schedule swallow therapy after determining treatment start date. Contact information was provided.\par \par \par Alyson Dunn M.S., CCC-SLP\par Speech-Language Pathologist\par [facility]\par [phone]\par

## 2020-03-10 PROCEDURE — 77387B: CUSTOM | Mod: 26

## 2020-03-11 VITALS — BODY MASS INDEX: 22.2 KG/M2 | RESPIRATION RATE: 16 BRPM | WEIGHT: 130 LBS | HEIGHT: 64 IN

## 2020-03-11 LAB
ALBUMIN SERPL ELPH-MCNC: 3.9 G/DL
ALP BLD-CCNC: 86 U/L
ALT SERPL-CCNC: 32 U/L
ANION GAP SERPL CALC-SCNC: 12 MMOL/L
AST SERPL-CCNC: 27 U/L
BASOPHILS # BLD AUTO: 0.08 K/UL
BASOPHILS NFR BLD AUTO: 0.9 %
BILIRUB SERPL-MCNC: 0.4 MG/DL
BUN SERPL-MCNC: 36 MG/DL
CALCIUM SERPL-MCNC: 10.4 MG/DL
CHLORIDE SERPL-SCNC: 103 MMOL/L
CO2 SERPL-SCNC: 29 MMOL/L
CREAT SERPL-MCNC: 1.86 MG/DL
EOSINOPHIL # BLD AUTO: 0.29 K/UL
EOSINOPHIL NFR BLD AUTO: 3.4 %
GLUCOSE SERPL-MCNC: 99 MG/DL
HCT VFR BLD CALC: 34.2 %
HGB BLD-MCNC: 10.8 G/DL
IMM GRANULOCYTES NFR BLD AUTO: 0.1 %
LYMPHOCYTES # BLD AUTO: 1.76 K/UL
LYMPHOCYTES NFR BLD AUTO: 20.8 %
MAN DIFF?: NORMAL
MCHC RBC-ENTMCNC: 31.5 PG
MCHC RBC-ENTMCNC: 31.6 GM/DL
MCV RBC AUTO: 99.7 FL
MONOCYTES # BLD AUTO: 0.89 K/UL
MONOCYTES NFR BLD AUTO: 10.5 %
NEUTROPHILS # BLD AUTO: 5.43 K/UL
NEUTROPHILS NFR BLD AUTO: 64.3 %
PLATELET # BLD AUTO: 293 K/UL
POTASSIUM SERPL-SCNC: 5 MMOL/L
PROT SERPL-MCNC: 6.4 G/DL
RBC # BLD: 3.43 M/UL
RBC # FLD: 11.5 %
SODIUM SERPL-SCNC: 144 MMOL/L
WBC # FLD AUTO: 8.46 K/UL

## 2020-03-11 PROCEDURE — 77427 RADIATION TX MANAGEMENT X5: CPT

## 2020-03-11 PROCEDURE — 77387B: CUSTOM | Mod: 26

## 2020-03-11 NOTE — DISEASE MANAGEMENT
[Clinical] : TNM Stage: c [I] : I [TTNM] : 1 [NTNM] : 0 [MTNM] : 0 [de-identified] : 70 Gy [de-identified] : soft palate

## 2020-03-11 NOTE — REVIEW OF SYSTEMS
[Dysphagia: Grade 0] : Dysphagia: Grade 0 [Tinnitus - Grade 0] : Tinnitus - Grade 0 [Blurred Vision: Grade 0] : Blurred Vision: Grade 0 [Mucositis Oral: Grade 0] : Mucositis Oral: Grade 0  [Xerostomia: Grade 1 - Symptomatic (e.g., dry or thick saliva) without significant dietary alteration; unstimulated saliva flow >0.2 ml/min] : Xerostomia: Grade 1 - Symptomatic (e.g., dry or thick saliva) without significant dietary alteration; unstimulated saliva flow >0.2 ml/min [Oral Pain: Grade 1 - Mild pain] : Oral Pain: Grade 1 - Mild pain [Salivary duct inflammation: Grade 0] : Salivary duct inflammation: Grade 0 [Dysgeusia: Grade 1- Altered taste but no change in diet] : Dysgeusia: Grade 1 - Altered taste but no change in diet [Hoarseness: Grade 0] : Hoarseness: Grade 0 [Alopecia: Grade 0] : Alopecia: Grade 0 [Pruritus: Grade 0] : Pruritus: Grade 0 [Skin Atrophy: Grade 0] : Skin Atrophy: Grade 0 [Skin Hyperpigmentation: Grade 0] : Skin Hyperpigmentation: Grade 0 [Skin Induration: Grade 0] : Skin Induration: Grade 0 [Dermatitis Radiation: Grade 0] : Dermatitis Radiation: Grade 0

## 2020-03-11 NOTE — HISTORY OF PRESENT ILLNESS
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 Other notable medical history includes diabetes, hypertension and hyperlipidemia. \par \par 6/35 fractions of radiation treatment today. No dysphagia, dry mouth, throat pain, neck pain, skin changes. Blood test will be done next week

## 2020-03-12 PROCEDURE — 77387B: CUSTOM | Mod: 26

## 2020-03-13 LAB
ALBUMIN SERPL ELPH-MCNC: 4 G/DL
ALP BLD-CCNC: 93 U/L
ALT SERPL-CCNC: 26 U/L
ANION GAP SERPL CALC-SCNC: 12 MMOL/L
AST SERPL-CCNC: 21 U/L
BASOPHILS # BLD AUTO: 0.07 K/UL
BASOPHILS NFR BLD AUTO: 0.9 %
BILIRUB SERPL-MCNC: 0.4 MG/DL
BUN SERPL-MCNC: 36 MG/DL
CALCIUM SERPL-MCNC: 10.4 MG/DL
CHLORIDE SERPL-SCNC: 102 MMOL/L
CO2 SERPL-SCNC: 28 MMOL/L
CREAT SERPL-MCNC: 1.87 MG/DL
EOSINOPHIL # BLD AUTO: 0.16 K/UL
EOSINOPHIL NFR BLD AUTO: 2 %
GLUCOSE SERPL-MCNC: 292 MG/DL
HCT VFR BLD CALC: 36.9 %
HGB BLD-MCNC: 11.2 G/DL
IMM GRANULOCYTES NFR BLD AUTO: 0.6 %
LYMPHOCYTES # BLD AUTO: 1.31 K/UL
LYMPHOCYTES NFR BLD AUTO: 16.1 %
MAN DIFF?: NORMAL
MCHC RBC-ENTMCNC: 30.4 GM/DL
MCHC RBC-ENTMCNC: 31.2 PG
MCV RBC AUTO: 102.8 FL
MONOCYTES # BLD AUTO: 0.67 K/UL
MONOCYTES NFR BLD AUTO: 8.2 %
NEUTROPHILS # BLD AUTO: 5.89 K/UL
NEUTROPHILS NFR BLD AUTO: 72.2 %
PLATELET # BLD AUTO: 344 K/UL
POTASSIUM SERPL-SCNC: 5.3 MMOL/L
PROT SERPL-MCNC: 6.7 G/DL
RBC # BLD: 3.59 M/UL
RBC # FLD: 11.3 %
SODIUM SERPL-SCNC: 142 MMOL/L
WBC # FLD AUTO: 8.15 K/UL

## 2020-03-13 PROCEDURE — 77387B: CUSTOM | Mod: 26

## 2020-03-16 PROCEDURE — 77014: CPT | Mod: 26

## 2020-03-17 PROCEDURE — 77387B: CUSTOM | Mod: 26

## 2020-03-18 VITALS — HEIGHT: 64 IN | BODY MASS INDEX: 22.02 KG/M2 | WEIGHT: 129 LBS | RESPIRATION RATE: 16 BRPM

## 2020-03-18 PROCEDURE — 77387B: CUSTOM | Mod: 26

## 2020-03-18 PROCEDURE — 77427 RADIATION TX MANAGEMENT X5: CPT

## 2020-03-19 PROCEDURE — 77387B: CUSTOM | Mod: 26

## 2020-03-20 PROCEDURE — 77387B: CUSTOM | Mod: 26

## 2020-03-23 PROCEDURE — 77014: CPT | Mod: 26

## 2020-03-24 PROCEDURE — 77387B: CUSTOM | Mod: 26

## 2020-03-25 VITALS — HEIGHT: 64 IN | RESPIRATION RATE: 18 BRPM | BODY MASS INDEX: 21.85 KG/M2 | WEIGHT: 128 LBS

## 2020-03-25 LAB
ALBUMIN SERPL ELPH-MCNC: 3.8 G/DL
ALP BLD-CCNC: 86 U/L
ALT SERPL-CCNC: 15 U/L
ANION GAP SERPL CALC-SCNC: 13 MMOL/L
AST SERPL-CCNC: 14 U/L
BASOPHILS # BLD AUTO: 0.09 K/UL
BASOPHILS NFR BLD AUTO: 0.9 %
BILIRUB SERPL-MCNC: 0.4 MG/DL
BUN SERPL-MCNC: 34 MG/DL
CALCIUM SERPL-MCNC: 10.3 MG/DL
CHLORIDE SERPL-SCNC: 102 MMOL/L
CO2 SERPL-SCNC: 27 MMOL/L
CREAT SERPL-MCNC: 1.95 MG/DL
EOSINOPHIL # BLD AUTO: 0.32 K/UL
EOSINOPHIL NFR BLD AUTO: 3.1 %
GLUCOSE SERPL-MCNC: 157 MG/DL
HCT VFR BLD CALC: 34.3 %
HGB BLD-MCNC: 10.6 G/DL
IMM GRANULOCYTES NFR BLD AUTO: 0.4 %
LYMPHOCYTES # BLD AUTO: 1.65 K/UL
LYMPHOCYTES NFR BLD AUTO: 16.2 %
MAN DIFF?: NORMAL
MCHC RBC-ENTMCNC: 30.9 GM/DL
MCHC RBC-ENTMCNC: 31.1 PG
MCV RBC AUTO: 100.6 FL
MONOCYTES # BLD AUTO: 0.88 K/UL
MONOCYTES NFR BLD AUTO: 8.6 %
NEUTROPHILS # BLD AUTO: 7.22 K/UL
NEUTROPHILS NFR BLD AUTO: 70.8 %
PLATELET # BLD AUTO: 316 K/UL
POTASSIUM SERPL-SCNC: 4.8 MMOL/L
PROT SERPL-MCNC: 6.5 G/DL
RBC # BLD: 3.41 M/UL
RBC # FLD: 11.2 %
SODIUM SERPL-SCNC: 142 MMOL/L
WBC # FLD AUTO: 10.2 K/UL

## 2020-03-25 PROCEDURE — 77427 RADIATION TX MANAGEMENT X5: CPT

## 2020-03-25 PROCEDURE — 77387B: CUSTOM | Mod: 26

## 2020-03-25 NOTE — VITALS
[Maximal Pain Intensity: 3/10] : 3/10 [Least Pain Intensity: 0/10] : 0/10 [Pain Description/Quality: ___] : Pain description/quality: [unfilled] [Pain Duration: ___] : Pain duration: [unfilled] [Pain Location: ___] : Pain Location: [unfilled] [Pain Interferes with ADLs] : Pain interferes with activities of daily living. [Adjuvant (neuroleptics)] : adjuvant (neuroleptics) [80: Normal activity with effort; some signs or symptoms of disease.] : 80: Normal activity with effort; some signs or symptoms of disease.  [ECOG Performance Status: 1 - Restricted in physically strenuous activity but ambulatory and able to carry out work of a light or sedentary nature] : Performance Status: 1 - Restricted in physically strenuous activity but ambulatory and able to carry out work of a light or sedentary nature, e.g., light house work, office work

## 2020-03-26 PROCEDURE — 77387B: CUSTOM | Mod: 26

## 2020-03-27 PROCEDURE — 77387B: CUSTOM | Mod: 26

## 2020-03-30 PROCEDURE — 77014: CPT | Mod: 26

## 2020-03-31 PROCEDURE — 77387B: CUSTOM | Mod: 26

## 2020-04-01 VITALS — RESPIRATION RATE: 18 BRPM | WEIGHT: 127.43 LBS | BODY MASS INDEX: 21.75 KG/M2 | HEIGHT: 64 IN

## 2020-04-01 PROCEDURE — 77387B: CUSTOM | Mod: 26

## 2020-04-01 PROCEDURE — 77427 RADIATION TX MANAGEMENT X5: CPT

## 2020-04-02 PROCEDURE — 77387B: CUSTOM | Mod: 26

## 2020-04-03 PROCEDURE — 77387B: CUSTOM | Mod: 26

## 2020-04-06 PROCEDURE — 77014: CPT | Mod: 26

## 2020-04-07 PROCEDURE — 77387B: CUSTOM | Mod: 26

## 2020-04-08 LAB
ALBUMIN SERPL ELPH-MCNC: 3.7 G/DL
ALP BLD-CCNC: 78 U/L
ALT SERPL-CCNC: 19 U/L
ANION GAP SERPL CALC-SCNC: 10 MMOL/L
AST SERPL-CCNC: 21 U/L
BASOPHILS # BLD AUTO: 0.1 K/UL
BASOPHILS NFR BLD AUTO: 1.2 %
BILIRUB SERPL-MCNC: 0.4 MG/DL
BUN SERPL-MCNC: 33 MG/DL
CALCIUM SERPL-MCNC: 10.7 MG/DL
CHLORIDE SERPL-SCNC: 103 MMOL/L
CO2 SERPL-SCNC: 29 MMOL/L
CREAT SERPL-MCNC: 1.93 MG/DL
EOSINOPHIL # BLD AUTO: 0.34 K/UL
EOSINOPHIL NFR BLD AUTO: 4.1 %
GLUCOSE SERPL-MCNC: 80 MG/DL
HCT VFR BLD CALC: 33.1 %
HGB BLD-MCNC: 10.5 G/DL
IMM GRANULOCYTES NFR BLD AUTO: 0.1 %
LYMPHOCYTES # BLD AUTO: 1.27 K/UL
LYMPHOCYTES NFR BLD AUTO: 15.3 %
MAN DIFF?: NORMAL
MCHC RBC-ENTMCNC: 31.6 PG
MCHC RBC-ENTMCNC: 31.7 GM/DL
MCV RBC AUTO: 99.7 FL
MONOCYTES # BLD AUTO: 0.78 K/UL
MONOCYTES NFR BLD AUTO: 9.4 %
NEUTROPHILS # BLD AUTO: 5.8 K/UL
NEUTROPHILS NFR BLD AUTO: 69.9 %
PLATELET # BLD AUTO: 308 K/UL
POTASSIUM SERPL-SCNC: 4.9 MMOL/L
PROT SERPL-MCNC: 6.2 G/DL
RBC # BLD: 3.32 M/UL
RBC # FLD: 11.4 %
SODIUM SERPL-SCNC: 143 MMOL/L
WBC # FLD AUTO: 8.3 K/UL

## 2020-04-08 NOTE — HISTORY OF PRESENT ILLNESS
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 Other notable medical history includes diabetes, hypertension and hyperlipidemia. \par \par 26/35 fractions of radiation treatment today. c/o mouth pain.  No dysphagia, neck pain, skin changes. Blood test done once a week. Rx lidociane mouthwash given

## 2020-04-08 NOTE — DISEASE MANAGEMENT
[Clinical] : TNM Stage: c [I] : I [TTNM] : 1 [NTNM] : 0 [MTNM] : 0 [de-identified] : 70 Gy [de-identified] : soft palate

## 2020-04-08 NOTE — HISTORY OF PRESENT ILLNESS
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 Other notable medical history includes diabetes, hypertension and hyperlipidemia. \par \par 31/35 fractions of radiation treatment today. c/o mouth pain.  No dysphagia, neck pain, skin changes. Blood test done once a week. Rx lidociane mouthwash given

## 2020-04-08 NOTE — DISEASE MANAGEMENT
[Clinical] : TNM Stage: c [I] : I [TTNM] : 1 [NTNM] : 0 [MTNM] : 0 [de-identified] : 70 Gy [de-identified] : soft palate

## 2020-04-08 NOTE — DISEASE MANAGEMENT
[Clinical] : TNM Stage: c [I] : I [TTNM] : 1 [NTNM] : 0 [MTNM] : 0 [de-identified] : 70 Gy [de-identified] : soft palate

## 2020-04-08 NOTE — HISTORY OF PRESENT ILLNESS
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 Other notable medical history includes diabetes, hypertension and hyperlipidemia. \par \par 21/35 fractions of radiation treatment today. c/o mouth pain. Saw dentist for gum pain. No dysphagia, throat pain, neck pain, skin changes. Blood test done once a week. Rx lidociane mouthwash given

## 2020-04-08 NOTE — HISTORY OF PRESENT ILLNESS
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 Other notable medical history includes diabetes, hypertension and hyperlipidemia. \par \par 16/35 fractions of radiation treatment today. c/o mouth pain. Saw dentist yesterday for gum pain. No dysphagia, throat pain, neck pain, skin changes. Blood test done once a week. Rx lidociane mouthwash given

## 2020-04-08 NOTE — DISEASE MANAGEMENT
[Clinical] : TNM Stage: c [I] : I [TTNM] : 1 [NTNM] : 0 [MTNM] : 0 [de-identified] : 70 Gy [de-identified] : soft palate

## 2020-04-09 ENCOUNTER — APPOINTMENT (OUTPATIENT)
Dept: SURGERY | Facility: CLINIC | Age: 80
End: 2020-04-09
Payer: MEDICARE

## 2020-04-09 PROCEDURE — 99214 OFFICE O/P EST MOD 30 MIN: CPT

## 2020-04-09 RX ORDER — PEN NEEDLE, DIABETIC 32GX 5/32"
33G X 6 MM NEEDLE, DISPOSABLE MISCELLANEOUS
Qty: 100 | Refills: 0 | Status: ACTIVE | COMMUNITY
Start: 2020-03-06

## 2020-04-09 NOTE — ASSESSMENT
[FreeTextEntry1] : will observe. no indication for any studies at this time.   to return earlier if any change

## 2020-04-09 NOTE — HISTORY OF PRESENT ILLNESS
[de-identified] : 21 1/2  years s/p partial glossectomy for malignancy, s/p resection right cheek  and right scalp  BCC. denies pain, bleeding, dysphagia, hoarseness or new lesions. no changes medically since last visit.   completed RT to new CIS soft palate 2 days ago. weight stable.

## 2020-04-09 NOTE — PHYSICAL EXAM
[de-identified] : no palpable thyroid nodules [Laryngoscopy Performed] : laryngoscopy was performed, see procedure section for findings [Midline] : located in midline position [de-identified] : no palpable masses.  changes from prior surgery [de-identified] : erythema of palate and tonsillar pillar [Normal] : orientation to person, place, and time: normal [FreeTextEntry2] : well healed right cheek scar.  well healed right scalp excision site

## 2020-05-15 ENCOUNTER — APPOINTMENT (OUTPATIENT)
Dept: RADIATION ONCOLOGY | Facility: CLINIC | Age: 80
End: 2020-05-15
Payer: MEDICARE

## 2020-05-15 PROCEDURE — 99024 POSTOP FOLLOW-UP VISIT: CPT | Mod: GC

## 2020-05-15 NOTE — REVIEW OF SYSTEMS
[Dysphagia: Grade 0] : Dysphagia: Grade 0 [Tinnitus - Grade 0] : Tinnitus - Grade 0 [Blurred Vision: Grade 0] : Blurred Vision: Grade 0 [Mucositis Oral: Grade 0] : Mucositis Oral: Grade 0  [Xerostomia: Grade 1 - Symptomatic (e.g., dry or thick saliva) without significant dietary alteration; unstimulated saliva flow >0.2 ml/min] : Xerostomia: Grade 1 - Symptomatic (e.g., dry or thick saliva) without significant dietary alteration; unstimulated saliva flow >0.2 ml/min [Oral Pain: Grade 0] : Oral Pain: Grade 0 [Salivary duct inflammation: Grade 0] : Salivary duct inflammation: Grade 0 [Dysgeusia: Grade 1- Altered taste but no change in diet] : Dysgeusia: Grade 1 - Altered taste but no change in diet [Hoarseness: Grade 0] : Hoarseness: Grade 0 [Alopecia: Grade 0] : Alopecia: Grade 0 [Pruritus: Grade 0] : Pruritus: Grade 0 [Skin Atrophy: Grade 0] : Skin Atrophy: Grade 0 [Skin Hyperpigmentation: Grade 1 - Hyperpigmentation covering <10% BSA; no psychosocial impact] : Skin Hyperpigmentation: Grade 1 - Hyperpigmentation covering <10% BSA; no psychosocial impact [Skin Induration: Grade 0] : Skin Induration: Grade 0 [Dermatitis Radiation: Grade 0] : Dermatitis Radiation: Grade 0

## 2020-05-15 NOTE — VITALS
[Least Pain Intensity: 0/10] : 0/10 [Maximal Pain Intensity: 0/10] : 0/10 [ECOG Performance Status: 2 - Ambulatory and capable of all self care but unable to carry out any work activities] : Performance Status: 2 - Ambulatory and capable of all self care but unable to carry out any work activities. Up and about more than 50% of waking hours [80: Normal activity with effort; some signs or symptoms of disease.] : 80: Normal activity with effort; some signs or symptoms of disease.

## 2020-06-14 NOTE — DISEASE MANAGEMENT
[Clinical] : TNM Stage: c [I] : I [TTNM] : 1 [NTNM] : 0 [MTNM] : 0 [de-identified] : 70 Gy [de-identified] : soft palate

## 2020-06-14 NOTE — HISTORY OF PRESENT ILLNESS
[Verbal consent obtained from patient] : the patient, [unfilled] [Home] : at home, [unfilled] , at the time of the visit. [Medical Office: ([facility])___] : at the medical office located in  [FreeTextEntry1] : 78 yo woman with history of tongue cancer s/p partial glossectomy ~20 years ago, recent excision of scalp basal cell carcinoma on 9/20/18 presenting with in situ SCC of the R soft palate. Completed 70 Gy in 4/2020. \par \par Today the patient is called for PTE.  Today, the patient states the dry mouth has resolved.  Taste is slowly improving, but not back to baseline. The swallowing is slowly improving, but not back to baseline.  The skin has returned to her baseline color.  She has decreased energy level. She endorses unintentional weight loss from not eating enough.\par \par

## 2020-06-19 ENCOUNTER — APPOINTMENT (OUTPATIENT)
Dept: SURGERY | Facility: CLINIC | Age: 80
End: 2020-06-19
Payer: MEDICARE

## 2020-06-19 PROCEDURE — 99214 OFFICE O/P EST MOD 30 MIN: CPT

## 2020-06-19 NOTE — PHYSICAL EXAM
[de-identified] : no palpable thyroid nodules [Laryngoscopy Performed] : laryngoscopy was performed, see procedure section for findings [Midline] : located in midline position [de-identified] : no palpable masses.  changes from prior surgery [de-identified] : no lesions noted [Normal] : orientation to person, place, and time: normal [FreeTextEntry2] : well healed right cheek scar.  well healed right scalp excision site

## 2020-06-19 NOTE — HISTORY OF PRESENT ILLNESS
[de-identified] : 21 1/2  years s/p partial glossectomy for malignancy, s/p resection right cheek  and right scalp  BCC. denies pain, bleeding, dysphagia, hoarseness or new lesions. no changes medically since last visit.   completed RT to new CIS soft palate 2 1/2 months ago. weight stable.

## 2020-08-24 ENCOUNTER — TRANSCRIPTION ENCOUNTER (OUTPATIENT)
Age: 80
End: 2020-08-24

## 2020-08-25 ENCOUNTER — INPATIENT (INPATIENT)
Facility: HOSPITAL | Age: 80
LOS: 6 days | Discharge: HOME HEALTH | End: 2020-09-01
Attending: INTERNAL MEDICINE | Admitting: INTERNAL MEDICINE
Payer: MEDICARE

## 2020-08-25 VITALS
SYSTOLIC BLOOD PRESSURE: 93 MMHG | HEIGHT: 64 IN | RESPIRATION RATE: 16 BRPM | DIASTOLIC BLOOD PRESSURE: 48 MMHG | TEMPERATURE: 98 F | WEIGHT: 110.89 LBS | HEART RATE: 62 BPM | OXYGEN SATURATION: 98 %

## 2020-08-25 DIAGNOSIS — E11.9 TYPE 2 DIABETES MELLITUS WITHOUT COMPLICATIONS: ICD-10-CM

## 2020-08-25 DIAGNOSIS — F41.9 ANXIETY DISORDER, UNSPECIFIED: ICD-10-CM

## 2020-08-25 DIAGNOSIS — C02.9 MALIGNANT NEOPLASM OF TONGUE, UNSPECIFIED: Chronic | ICD-10-CM

## 2020-08-25 DIAGNOSIS — N17.9 ACUTE KIDNEY FAILURE, UNSPECIFIED: ICD-10-CM

## 2020-08-25 DIAGNOSIS — F32.9 MAJOR DEPRESSIVE DISORDER, SINGLE EPISODE, UNSPECIFIED: ICD-10-CM

## 2020-08-25 DIAGNOSIS — E78.00 PURE HYPERCHOLESTEROLEMIA, UNSPECIFIED: ICD-10-CM

## 2020-08-25 DIAGNOSIS — S01.312A LACERATION WITHOUT FOREIGN BODY OF LEFT EAR, INITIAL ENCOUNTER: ICD-10-CM

## 2020-08-25 DIAGNOSIS — K52.9 NONINFECTIVE GASTROENTERITIS AND COLITIS, UNSPECIFIED: ICD-10-CM

## 2020-08-25 DIAGNOSIS — I10 ESSENTIAL (PRIMARY) HYPERTENSION: ICD-10-CM

## 2020-08-25 LAB
ALBUMIN SERPL ELPH-MCNC: 3.1 G/DL — LOW (ref 3.3–5)
ALP SERPL-CCNC: 70 U/L — SIGNIFICANT CHANGE UP (ref 40–120)
ALT FLD-CCNC: 31 U/L — SIGNIFICANT CHANGE UP (ref 12–78)
ANION GAP SERPL CALC-SCNC: 7 MMOL/L — SIGNIFICANT CHANGE UP (ref 5–17)
APPEARANCE UR: CLEAR — SIGNIFICANT CHANGE UP
AST SERPL-CCNC: 43 U/L — HIGH (ref 15–37)
BACTERIA # UR AUTO: ABNORMAL
BASOPHILS # BLD AUTO: 0.07 K/UL — SIGNIFICANT CHANGE UP (ref 0–0.2)
BASOPHILS NFR BLD AUTO: 0.5 % — SIGNIFICANT CHANGE UP (ref 0–2)
BILIRUB SERPL-MCNC: 0.8 MG/DL — SIGNIFICANT CHANGE UP (ref 0.2–1.2)
BILIRUB UR-MCNC: NEGATIVE — SIGNIFICANT CHANGE UP
BUN SERPL-MCNC: 38 MG/DL — HIGH (ref 7–23)
CALCIUM SERPL-MCNC: 9.8 MG/DL — SIGNIFICANT CHANGE UP (ref 8.5–10.1)
CHLORIDE SERPL-SCNC: 113 MMOL/L — HIGH (ref 96–108)
CO2 SERPL-SCNC: 25 MMOL/L — SIGNIFICANT CHANGE UP (ref 22–31)
COLOR SPEC: YELLOW — SIGNIFICANT CHANGE UP
CREAT SERPL-MCNC: 2.29 MG/DL — HIGH (ref 0.5–1.3)
DIFF PNL FLD: ABNORMAL
EOSINOPHIL # BLD AUTO: 0.05 K/UL — SIGNIFICANT CHANGE UP (ref 0–0.5)
EOSINOPHIL NFR BLD AUTO: 0.3 % — SIGNIFICANT CHANGE UP (ref 0–6)
EPI CELLS # UR: SIGNIFICANT CHANGE UP
GLUCOSE BLDC GLUCOMTR-MCNC: 169 MG/DL — HIGH (ref 70–99)
GLUCOSE BLDC GLUCOMTR-MCNC: 56 MG/DL — LOW (ref 70–99)
GLUCOSE BLDC GLUCOMTR-MCNC: 58 MG/DL — LOW (ref 70–99)
GLUCOSE SERPL-MCNC: 88 MG/DL — SIGNIFICANT CHANGE UP (ref 70–99)
GLUCOSE UR QL: 1000 MG/DL
HCT VFR BLD CALC: 35.4 % — SIGNIFICANT CHANGE UP (ref 34.5–45)
HGB BLD-MCNC: 11.4 G/DL — LOW (ref 11.5–15.5)
IMM GRANULOCYTES NFR BLD AUTO: 0.6 % — SIGNIFICANT CHANGE UP (ref 0–1.5)
KETONES UR-MCNC: NEGATIVE — SIGNIFICANT CHANGE UP
LACTATE SERPL-SCNC: 1.7 MMOL/L — SIGNIFICANT CHANGE UP (ref 0.7–2)
LEUKOCYTE ESTERASE UR-ACNC: ABNORMAL
LIDOCAIN IGE QN: 87 U/L — SIGNIFICANT CHANGE UP (ref 73–393)
LYMPHOCYTES # BLD AUTO: 0.6 K/UL — LOW (ref 1–3.3)
LYMPHOCYTES # BLD AUTO: 4.1 % — LOW (ref 13–44)
MCHC RBC-ENTMCNC: 32.2 GM/DL — SIGNIFICANT CHANGE UP (ref 32–36)
MCHC RBC-ENTMCNC: 32.3 PG — SIGNIFICANT CHANGE UP (ref 27–34)
MCV RBC AUTO: 100.3 FL — HIGH (ref 80–100)
MONOCYTES # BLD AUTO: 1.02 K/UL — HIGH (ref 0–0.9)
MONOCYTES NFR BLD AUTO: 6.9 % — SIGNIFICANT CHANGE UP (ref 2–14)
NEUTROPHILS # BLD AUTO: 12.85 K/UL — HIGH (ref 1.8–7.4)
NEUTROPHILS NFR BLD AUTO: 87.6 % — HIGH (ref 43–77)
NITRITE UR-MCNC: NEGATIVE — SIGNIFICANT CHANGE UP
NRBC # BLD: 0 /100 WBCS — SIGNIFICANT CHANGE UP (ref 0–0)
PH UR: 5 — SIGNIFICANT CHANGE UP (ref 5–8)
PLATELET # BLD AUTO: 227 K/UL — SIGNIFICANT CHANGE UP (ref 150–400)
POTASSIUM SERPL-MCNC: 5.7 MMOL/L — HIGH (ref 3.5–5.3)
POTASSIUM SERPL-SCNC: 5.7 MMOL/L — HIGH (ref 3.5–5.3)
PROT SERPL-MCNC: 6.7 GM/DL — SIGNIFICANT CHANGE UP (ref 6–8.3)
PROT UR-MCNC: 15 MG/DL
RBC # BLD: 3.53 M/UL — LOW (ref 3.8–5.2)
RBC # FLD: 11.8 % — SIGNIFICANT CHANGE UP (ref 10.3–14.5)
RBC CASTS # UR COMP ASSIST: SIGNIFICANT CHANGE UP /HPF (ref 0–4)
SARS-COV-2 RNA SPEC QL NAA+PROBE: SIGNIFICANT CHANGE UP
SODIUM SERPL-SCNC: 145 MMOL/L — SIGNIFICANT CHANGE UP (ref 135–145)
SP GR SPEC: 1 — LOW (ref 1.01–1.02)
UROBILINOGEN FLD QL: NEGATIVE MG/DL — SIGNIFICANT CHANGE UP
WBC # BLD: 14.68 K/UL — HIGH (ref 3.8–10.5)
WBC # FLD AUTO: 14.68 K/UL — HIGH (ref 3.8–10.5)
WBC UR QL: SIGNIFICANT CHANGE UP

## 2020-08-25 PROCEDURE — 70450 CT HEAD/BRAIN W/O DYE: CPT | Mod: 26

## 2020-08-25 PROCEDURE — 93010 ELECTROCARDIOGRAM REPORT: CPT

## 2020-08-25 PROCEDURE — 71045 X-RAY EXAM CHEST 1 VIEW: CPT | Mod: 26

## 2020-08-25 PROCEDURE — 99223 1ST HOSP IP/OBS HIGH 75: CPT | Mod: AI

## 2020-08-25 PROCEDURE — 74176 CT ABD & PELVIS W/O CONTRAST: CPT | Mod: 26

## 2020-08-25 PROCEDURE — 99285 EMERGENCY DEPT VISIT HI MDM: CPT | Mod: CS

## 2020-08-25 PROCEDURE — 72125 CT NECK SPINE W/O DYE: CPT | Mod: 26

## 2020-08-25 RX ORDER — SODIUM CHLORIDE 9 MG/ML
1000 INJECTION INTRAMUSCULAR; INTRAVENOUS; SUBCUTANEOUS ONCE
Refills: 0 | Status: COMPLETED | OUTPATIENT
Start: 2020-08-25 | End: 2020-08-25

## 2020-08-25 RX ORDER — MIRTAZAPINE 45 MG/1
30 TABLET, ORALLY DISINTEGRATING ORAL AT BEDTIME
Refills: 0 | Status: DISCONTINUED | OUTPATIENT
Start: 2020-08-25 | End: 2020-09-01

## 2020-08-25 RX ORDER — INSULIN LISPRO 100/ML
VIAL (ML) SUBCUTANEOUS AT BEDTIME
Refills: 0 | Status: DISCONTINUED | OUTPATIENT
Start: 2020-08-25 | End: 2020-09-01

## 2020-08-25 RX ORDER — CIPROFLOXACIN LACTATE 400MG/40ML
400 VIAL (ML) INTRAVENOUS EVERY 12 HOURS
Refills: 0 | Status: DISCONTINUED | OUTPATIENT
Start: 2020-08-25 | End: 2020-09-01

## 2020-08-25 RX ORDER — INSULIN LISPRO 100/ML
VIAL (ML) SUBCUTANEOUS
Refills: 0 | Status: DISCONTINUED | OUTPATIENT
Start: 2020-08-25 | End: 2020-09-01

## 2020-08-25 RX ORDER — ONDANSETRON 8 MG/1
4 TABLET, FILM COATED ORAL EVERY 6 HOURS
Refills: 0 | Status: DISCONTINUED | OUTPATIENT
Start: 2020-08-25 | End: 2020-09-01

## 2020-08-25 RX ORDER — METRONIDAZOLE 500 MG
500 TABLET ORAL EVERY 8 HOURS
Refills: 0 | Status: DISCONTINUED | OUTPATIENT
Start: 2020-08-25 | End: 2020-09-01

## 2020-08-25 RX ORDER — MIRTAZAPINE 45 MG/1
1 TABLET, ORALLY DISINTEGRATING ORAL
Qty: 0 | Refills: 0 | DISCHARGE

## 2020-08-25 RX ORDER — DEXTROSE 50 % IN WATER 50 %
25 SYRINGE (ML) INTRAVENOUS ONCE
Refills: 0 | Status: DISCONTINUED | OUTPATIENT
Start: 2020-08-25 | End: 2020-09-01

## 2020-08-25 RX ORDER — SODIUM CHLORIDE 9 MG/ML
1000 INJECTION, SOLUTION INTRAVENOUS
Refills: 0 | Status: DISCONTINUED | OUTPATIENT
Start: 2020-08-25 | End: 2020-09-01

## 2020-08-25 RX ORDER — GLUCAGON INJECTION, SOLUTION 0.5 MG/.1ML
1 INJECTION, SOLUTION SUBCUTANEOUS ONCE
Refills: 0 | Status: DISCONTINUED | OUTPATIENT
Start: 2020-08-25 | End: 2020-09-01

## 2020-08-25 RX ORDER — CIPROFLOXACIN LACTATE 400MG/40ML
400 VIAL (ML) INTRAVENOUS ONCE
Refills: 0 | Status: COMPLETED | OUTPATIENT
Start: 2020-08-25 | End: 2020-08-25

## 2020-08-25 RX ORDER — DOXAZOSIN MESYLATE 4 MG
2 TABLET ORAL AT BEDTIME
Refills: 0 | Status: DISCONTINUED | OUTPATIENT
Start: 2020-08-25 | End: 2020-08-25

## 2020-08-25 RX ORDER — CIPROFLOXACIN LACTATE 400MG/40ML
200 VIAL (ML) INTRAVENOUS DAILY
Refills: 0 | Status: DISCONTINUED | OUTPATIENT
Start: 2020-08-25 | End: 2020-08-25

## 2020-08-25 RX ORDER — DEXTROSE 50 % IN WATER 50 %
15 SYRINGE (ML) INTRAVENOUS ONCE
Refills: 0 | Status: DISCONTINUED | OUTPATIENT
Start: 2020-08-25 | End: 2020-09-01

## 2020-08-25 RX ORDER — AMLODIPINE BESYLATE 2.5 MG/1
5 TABLET ORAL DAILY
Refills: 0 | Status: DISCONTINUED | OUTPATIENT
Start: 2020-08-25 | End: 2020-09-01

## 2020-08-25 RX ORDER — DEXTROSE 50 % IN WATER 50 %
12.5 SYRINGE (ML) INTRAVENOUS ONCE
Refills: 0 | Status: DISCONTINUED | OUTPATIENT
Start: 2020-08-25 | End: 2020-09-01

## 2020-08-25 RX ORDER — ASPIRIN/CALCIUM CARB/MAGNESIUM 324 MG
81 TABLET ORAL DAILY
Refills: 0 | Status: DISCONTINUED | OUTPATIENT
Start: 2020-08-25 | End: 2020-09-01

## 2020-08-25 RX ORDER — LISINOPRIL 2.5 MG/1
10 TABLET ORAL DAILY
Refills: 0 | Status: DISCONTINUED | OUTPATIENT
Start: 2020-08-25 | End: 2020-08-25

## 2020-08-25 RX ORDER — SODIUM ZIRCONIUM CYCLOSILICATE 10 G/10G
5 POWDER, FOR SUSPENSION ORAL EVERY 8 HOURS
Refills: 0 | Status: COMPLETED | OUTPATIENT
Start: 2020-08-25 | End: 2020-08-26

## 2020-08-25 RX ORDER — METRONIDAZOLE 500 MG
500 TABLET ORAL ONCE
Refills: 0 | Status: COMPLETED | OUTPATIENT
Start: 2020-08-25 | End: 2020-08-25

## 2020-08-25 RX ORDER — TETANUS TOXOID, REDUCED DIPHTHERIA TOXOID AND ACELLULAR PERTUSSIS VACCINE, ADSORBED 5; 2.5; 8; 8; 2.5 [IU]/.5ML; [IU]/.5ML; UG/.5ML; UG/.5ML; UG/.5ML
0.5 SUSPENSION INTRAMUSCULAR ONCE
Refills: 0 | Status: COMPLETED | OUTPATIENT
Start: 2020-08-25 | End: 2020-08-25

## 2020-08-25 RX ORDER — DEXTROSE 50 % IN WATER 50 %
25 SYRINGE (ML) INTRAVENOUS ONCE
Refills: 0 | Status: COMPLETED | OUTPATIENT
Start: 2020-08-25 | End: 2020-08-25

## 2020-08-25 RX ORDER — CARVEDILOL PHOSPHATE 80 MG/1
25 CAPSULE, EXTENDED RELEASE ORAL EVERY 12 HOURS
Refills: 0 | Status: DISCONTINUED | OUTPATIENT
Start: 2020-08-25 | End: 2020-09-01

## 2020-08-25 RX ORDER — SODIUM CHLORIDE 9 MG/ML
1000 INJECTION INTRAMUSCULAR; INTRAVENOUS; SUBCUTANEOUS
Refills: 0 | Status: DISCONTINUED | OUTPATIENT
Start: 2020-08-25 | End: 2020-08-28

## 2020-08-25 RX ADMIN — Medication 1 TABLET(S): at 14:05

## 2020-08-25 RX ADMIN — TETANUS TOXOID, REDUCED DIPHTHERIA TOXOID AND ACELLULAR PERTUSSIS VACCINE, ADSORBED 0.5 MILLILITER(S): 5; 2.5; 8; 8; 2.5 SUSPENSION INTRAMUSCULAR at 13:55

## 2020-08-25 RX ADMIN — SODIUM CHLORIDE 125 MILLILITER(S): 9 INJECTION INTRAMUSCULAR; INTRAVENOUS; SUBCUTANEOUS at 20:41

## 2020-08-25 RX ADMIN — MIRTAZAPINE 30 MILLIGRAM(S): 45 TABLET, ORALLY DISINTEGRATING ORAL at 20:56

## 2020-08-25 RX ADMIN — SODIUM CHLORIDE 125 MILLILITER(S): 9 INJECTION INTRAMUSCULAR; INTRAVENOUS; SUBCUTANEOUS at 16:59

## 2020-08-25 RX ADMIN — Medication 100 MILLIGRAM(S): at 18:13

## 2020-08-25 RX ADMIN — SODIUM CHLORIDE 1000 MILLILITER(S): 9 INJECTION INTRAMUSCULAR; INTRAVENOUS; SUBCUTANEOUS at 13:54

## 2020-08-25 RX ADMIN — CARVEDILOL PHOSPHATE 25 MILLIGRAM(S): 80 CAPSULE, EXTENDED RELEASE ORAL at 20:19

## 2020-08-25 RX ADMIN — Medication 100 MILLIGRAM(S): at 21:44

## 2020-08-25 RX ADMIN — Medication 25 GRAM(S): at 21:34

## 2020-08-25 RX ADMIN — Medication 200 MILLIGRAM(S): at 16:57

## 2020-08-25 NOTE — ED ADULT NURSE NOTE - PMH
Anxiety    Chronic kidney disease (CKD)    Depression    Diabetes mellitus    Glaucoma    Hypercholesterolemia    Hypertension    Hyperuricemia    Osteopenia    Tongue cancer  tx surgically > 20 years ago

## 2020-08-25 NOTE — ED ADULT NURSE NOTE - NSIMPLEMENTINTERV_GEN_ALL_ED
Implemented All Fall Risk Interventions:  [place] to call system. Call bell, personal items and telephone within reach. Instruct patient to call for assistance. Room bathroom lighting operational. Non-slip footwear when patient is off stretcher. Physically safe environment: no spills, clutter or unnecessary equipment. Stretcher in lowest position, wheels locked, appropriate side rails in place. Provide visual cue, wrist band, yellow gown, etc. Monitor gait and stability. Monitor for mental status changes and reorient to person, place, and time. Review medications for side effects contributing to fall risk. Reinforce activity limits and safety measures with patient and family.

## 2020-08-25 NOTE — ED PROVIDER NOTE - CLINICAL SUMMARY MEDICAL DECISION MAKING FREE TEXT BOX
Pt with colitis, and dehydration. will admit. LAc repaired by Dr Newby, who recommended 3 days of abx.

## 2020-08-25 NOTE — ED ADULT NURSE NOTE - OBJECTIVE STATEMENT
diarrhea started 7 am today, weak, and had a mechanical trip hit head on chair. given fluids bp went up. Left side ear avulsion. #18 R ac. Pt received to bed 7 A&Ox3 accompanied by . pt co of diarrhea started 7 am today, weak, and had a mechanical trip hit head on chair. given fluids bp went up. Left side ear avulsion. Received a liter of fluid from  EMS on #18 R ac. Pt co of rectal pain 6/10. Denies hitting head, trauma, LOC, headache, dizziness.

## 2020-08-25 NOTE — H&P ADULT - NSHPPHYSICALEXAM_GEN_ALL_CORE
ICU Vital Signs Last 24 Hrs  T(C): 36.7 (25 Aug 2020 15:56), Max: 36.7 (25 Aug 2020 15:56)  T(F): 98 (25 Aug 2020 15:56), Max: 98 (25 Aug 2020 15:56)  HR: 81 (25 Aug 2020 15:57) (62 - 83)  BP: 153/65 (25 Aug 2020 15:57) (93/48 - 153/65)  BP(mean): --  ABP: --  ABP(mean): --  RR: 18 (25 Aug 2020 15:57) (16 - 18)  SpO2: 99% (25 Aug 2020 15:57) (97% - 99%)  GENERAL: NAD well-developed  HEAD:  Atraumatic, Normocephalic  EYES: EOMI, PERRLA, conjunctiva and sclera clear  ENMT: No tonsillar erythema, exudates, or enlargement; Moist mucous membranes, Good dentition, No lesions  NECK: Supple, No JVD, Normal thyroid  NERVOUS SYSTEM:  Alert & Oriented X3, Good concentration; Motor Strength 5/5 B/L upper and lower extremities; DTRs 2+ intact and symmetric  CHEST/LUNG: Clear to percussion bilaterally; No rales, rhonchi, wheezing, or rubs  HEART: Regular rate and rhythm; No murmurs, rubs, or gallops  ABDOMEN: Soft, Nontender, Nondistended; Bowel sounds present  EXTREMITIES:  2+ Peripheral Pulses, No clubbing, cyanosis, or edema  LYMPH: No lymphadenopathy   SKIN: No rashes or lesions ICU Vital Signs Last 24 Hrs  T(C): 36.7 (25 Aug 2020 15:56), Max: 36.7 (25 Aug 2020 15:56)  T(F): 98 (25 Aug 2020 15:56), Max: 98 (25 Aug 2020 15:56)  HR: 81 (25 Aug 2020 15:57) (62 - 83)  BP: 153/65 (25 Aug 2020 15:57) (93/48 - 153/65)  BP(mean): --  ABP: --  ABP(mean): --  RR: 18 (25 Aug 2020 15:57) (16 - 18)  SpO2: 99% (25 Aug 2020 15:57) (97% - 99%)  GENERAL: NAD well-developed  HEAD:  Atraumatic, Normocephalic  EYES: EOMI, PERRLA, conjunctiva and sclera clear injury on left ear   ENMT: No tonsillar erythema, exudates, or enlargement; Moist mucous membranes, Good dentition, No lesions  NECK: Supple, No JVD, Normal thyroid  NERVOUS SYSTEM:  Alert & Oriented X3, Good concentration; Motor Strength 5/5 B/L upper and lower extremities; DTRs 2+ intact and symmetric  CHEST/LUNG: Clear to percussion bilaterally; No rales, rhonchi, wheezing, or rubs  HEART: Regular rate and rhythm; No murmurs, rubs, or gallops  ABDOMEN: Soft, Nontender, Nondistended; Bowel sounds present  EXTREMITIES:  2+ Peripheral Pulses, No clubbing, cyanosis, or edema  LYMPH: No lymphadenopathy   SKIN: No rashes or lesions

## 2020-08-25 NOTE — CONSULT NOTE ADULT - SUBJECTIVE AND OBJECTIVE BOX
See dictated note  tolerated repair of left ear multiple lacs  Augmentin or Keflex for 3  days  f/u next wk.
HPI:  78yo female with pmh HTN, DM, presents with diarrhea, NV, and fall. PT reports she was in [facility] and without illness last night. THis am, woke up with multiple diarrhea, NV x 2. Pt feeling very weak and lightheaded and fell. Pt states her legs gave way, and hit hear with laceration. Family came home and found her on ground and states she was really weak when she tried to stand up.- dooes not think she passed out  PT without dizziness at rest. denies headache. + some abd pain. denies fever, recent abx, unusual food, black/bloody BM, cp, sob, palpitaitons. 	No fever/chills, No photophobia/eye pain/changes in vision, No ear pain/sore throat/dysphagia, No chest pain/palpitations, no SOB/cough, no wheeze/stridor, +abdominal pain, +N/V/D, no dysuria/frequency/discharge, No neck/back pain, no rash, + dizzy (25 Aug 2020 16:46)  ------------------- As Above  ----------------------  Patient is a poor historian. Presents with diarrhea. Patient unsure when it started. No N/V or bloody BMs. No fever., abdominal pain. Denies having a colonoscopy.       PAST MEDICAL & SURGICAL HISTORY:  Diabetes mellitus  Osteopenia  Hypercholesterolemia  Glaucoma  Chronic kidney disease (CKD)  Hypertension  Hyperuricemia  Depression  Anxiety  Tongue cancer: tx surgically &gt; 20 years ago  Tongue cancer: tx surgically      MEDICATIONS  (STANDING):  amLODIPine   Tablet 5 milliGRAM(s) Oral daily  aspirin enteric coated 81 milliGRAM(s) Oral daily  carvedilol 25 milliGRAM(s) Oral every 12 hours  ciprofloxacin   IVPB 400 milliGRAM(s) IV Intermittent every 12 hours  dextrose 5%. 1000 milliLiter(s) (50 mL/Hr) IV Continuous <Continuous>  dextrose 50% Injectable 25 Gram(s) IV Push once  dextrose 50% Injectable 12.5 Gram(s) IV Push once  dextrose 50% Injectable 25 Gram(s) IV Push once  dextrose 50% Injectable 25 Gram(s) IV Push once  insulin lispro (HumaLOG) corrective regimen sliding scale   SubCutaneous three times a day before meals  insulin lispro (HumaLOG) corrective regimen sliding scale   SubCutaneous at bedtime  metroNIDAZOLE  IVPB 500 milliGRAM(s) IV Intermittent every 8 hours  mirtazapine 30 milliGRAM(s) Oral at bedtime  sodium chloride 0.9%. 1000 milliLiter(s) (125 mL/Hr) IV Continuous <Continuous>  sodium zirconium cyclosilicate 5 Gram(s) Oral every 8 hours    MEDICATIONS  (PRN):  dextrose 40% Gel 15 Gram(s) Oral once PRN Blood Glucose LESS THAN 70 milliGRAM(s)/deciliter  dextrose 40% Gel 15 Gram(s) Oral once PRN Blood Glucose LESS THAN 70 milliGRAM(s)/deciliter  glucagon  Injectable 1 milliGRAM(s) IntraMuscular once PRN Glucose LESS THAN 70 milligrams/deciliter  LORazepam     Tablet 0.5 milliGRAM(s) Oral two times a day PRN Anxiety      Allergies    No Known Allergies    Intolerances        FAMILY HISTORY:  FH: dementia  Family history of Alzheimer's disease      REVIEW OF SYSTEMS:    CONSTITUTIONAL: No fever, weight loss,   EYES: No eye pain, visual disturbances, or discharge  ENMT:  No difficulty hearing, tinnitus, vertigo; No sinus or throat pain  NECK: No pain or stiffness  BREASTS: No pain, masses, or nipple discharge  RESPIRATORY: No cough, wheezing, chills or hemoptysis; No shortness of breath  CARDIOVASCULAR: No chest pain, palpitations, dizziness, or leg swelling  GASTROINTESTINAL: See above  GENITOURINARY: No dysuria, frequency, hematuria, or incontinence  NEUROLOGICAL: No headaches, loss of strength, numbness, or tremors  SKIN: No itching, burning, rashes, or lesions   LYMPH NODES: No enlarged glands  ENDOCRINE: No heat or cold intolerance; No hair loss  MUSCULOSKELETAL: No joint pain or swelling; No muscle, back, or extremity pain  PSYCHIATRIC: No depression, anxiety, mood swings, or difficulty sleeping  HEME/LYMPH: No easy bruising, or bleeding gums  ALLERGY AND IMMUNOLOGIC: No hives or eczema          SOCIAL HISTORY:    FAMILY HISTORY:  FH: dementia  Family history of Alzheimer's disease      Vital Signs Last 24 Hrs  T(C): 36.7 (25 Aug 2020 20:19), Max: 36.8 (25 Aug 2020 18:37)  T(F): 98.1 (25 Aug 2020 20:19), Max: 98.2 (25 Aug 2020 18:37)  HR: 84 (25 Aug 2020 20:19) (62 - 84)  BP: 162/83 (25 Aug 2020 20:19) (93/48 - 162/83)  BP(mean): --  RR: 17 (25 Aug 2020 20:19) (16 - 19)  SpO2: 97% (25 Aug 2020 20:19) (97% - 100%)    PHYSICAL EXAM:    GENERAL: NAD, well-groomed, well-developed  HEAD:  Atraumatic, Normocephalic  EYES: EOMI, PERRLA, conjunctiva and sclera clear  ENMT: No tonsillar erythema, exudates, or enlargement; Moist mucous membranes, Good dentition, No lesions  NECK: Supple, No JVD, Normal thyroid  NERVOUS SYSTEM:  Alert & Oriented X3, Good concentration; Motor Strength 5/5 B/L upper and lower extremities; DTRs 2+ intact and symmetric  CHEST/LUNG: Clear to percussion bilaterally; No rales, rhonchi, wheezing, or rubs  HEART: Regular rate and rhythm; No murmurs, rubs, or gallops  ABDOMEN: Soft, RLQ > LLQ tenderness, Nondistended; Bowel sounds present  EXTREMITIES:  2+ Peripheral Pulses, No clubbing, cyanosis, or edema  LYMPH: No lymphadenopathy noted   RECTAL:  Patient refused   SKIN: No rashes or lesions    LABS:                        11.4   14.68 )-----------( 227      ( 25 Aug 2020 13:56 )             35.4       CBC:   @ 13:56  WBC  14.68  HGB 11.4  HCT 35.4 Plate 227  .3           25 Aug 2020 13:56    145    |  113    |  38     ----------------------------<  88     5.7     |  25     |  2.29     Ca    9.8        25 Aug 2020 13:56    TPro  6.7    /  Alb  3.1    /  TBili  0.8    /  DBili  x      /  AST  43     /  ALT  31     /  AlkPhos  70     25 Aug 2020 13:56      Urinalysis Basic - ( 25 Aug 2020 17:01 )    Color: Yellow / Appearance: Clear / S.005 / pH: x  Gluc: x / Ketone: Negative  / Bili: Negative / Urobili: Negative mg/dL   Blood: x / Protein: 15 mg/dL / Nitrite: Negative   Leuk Esterase: Small / RBC: 0-2 /HPF / WBC 0-2   Sq Epi: x / Non Sq Epi: Few / Bacteria: Few          RADIOLOGY & ADDITIONAL STUDIES:  < from: CT Abdomen and Pelvis No Cont (20 @ 15:42) >    EXAM:  CT ABDOMEN AND PELVIS                            PROCEDURE DATE:  2020          INTERPRETATION:  CLINICAL INFORMATION: Diarrhea    COMPARISON: None.    PROCEDURE:  CT of the Abdomen and Pelvis was performed without intravenous contrast.  Intravenous contrast: None.  Oral contrast: None.  Sagittal and coronal reformats were performed.    FINDINGS:  Coronary artery calcification. Trace pericardial effusion.  Calcified gallstone. No biliary dilatation. Unenhanced liver pancreas spleennot remarkable.  There is a 2.4 cm indeterminate left adrenal nodule statistically most likely representing a cortical adenoma. MR can be obtained for further evaluation.  No hydronephrosis. Multiple bilateral punctate nonobstructive intrarenal calculi.  Nonaneurysmal aorta. Left-sided infrarenal inferior vena cava.  There are no suspicious adenopathy.  There is mural thickening with stranding distal transverse colon and the descending colon consistent with acute colitis. Consider inflammatory/infectious as well as nonocclusive ischemic etiology. Colonic diverticula  no definite diverticulitis. No bowel obstruction.  No extraluminal fluid or gas.  Uterus adnexa normal for age. Bladder not remarkable.  No acute or aggressive skeletal pathology.    Impression:    Acute colitis transverse and descending colon. Consider inflammatory/infectious as well as  nonocclusive ischemic etiology.  Diverticulosis coli without definite diverticulitis.  Bilateral nonobstructive nephrolithiasis.  Additional findings as discussed              MABEL EDWARDS M.D., ATTENDING RADIOLOGIST  This document has been electronically signed. Aug 25 2020  4:02PM                < end of copied text >
[facility] NEPHROLOGY SERVICES, [facility]  NEPHROLOGY AND HYPERTENSION  [address]  [address]  [place] [address]  [phone]    MD BEENA STOCK MD ANDREY GONCHARUK, MD MADHU KORRAPATI, MD YELENA ROSENBERG, MD BINNY KOSHY, MD CHRISTOPHER CAPUTO, MD EDWARD BOVER, MD      Information from chart:  "Patient is a 79y old  Female who presents with a chief complaint of diarrhea and fall (25 Aug 2020 16:46)    HPI:  80yo female with pmh HTN, DM, presents with diarrhea, NV, and fall. PT reports she was in [facility] and without illness last night. THis am, woke up with multiple diarrhea, NV x 2. Pt feeling very weak and lightheaded and fell. Pt states her legs gave way, and hit hear with laceration. Family came home and found her on ground and states she was really weak when she tried to stand up.- dooes not think she passed out  PT without dizziness at rest. denies headache. + some abd pain. denies fever, recent abx, unusual food, black/bloody BM, cp, sob, palpitaitons. 	No fever/chills, No photophobia/eye pain/changes in vision, No ear pain/sore throat/dysphagia, No chest pain/palpitations, no SOB/cough, no wheeze/stridor, +abdominal pain, +N/V/D, no dysuria/frequency/discharge, No neck/back pain, no rash, + dizzy (25 Aug 2020 16:46)   "    Hx of CKD 3; Cr 1.7 in [place]2019;   Seen in my office in the past; HTN, labile at times   baseline Cr 1.8-1.9      PAST MEDICAL & SURGICAL HISTORY:  Diabetes mellitus  Osteopenia  Hypercholesterolemia  Glaucoma  Chronic kidney disease (CKD)  Hypertension  Hyperuricemia  Depression  Anxiety  Tongue cancer: tx surgically &gt; 20 years ago  Tongue cancer: tx surgically    FAMILY HISTORY:  FH: dementia  Family history of Alzheimer's disease    Allergies    No Known Allergies    Intolerances      Home Medications:  Alphagan P 0.1% ophthalmic solution: 1 drop(s) to each affected eye 2 times a day ( right eye) (25 Aug 2020 17:40)  amlodipine-benazepril 5 mg-20 mg oral capsule: 1 cap(s) orally once a day (25 Aug 2020 17:40)  aspirin: 1  orally once a day; as per pt and spouse (25 Aug 2020 17:40)  Coreg 25 mg oral tablet: 1 tab(s) orally 2 times a day (25 Aug 2020 17:40)  doxazosin 2 mg oral tablet: 1 tab(s) orally once a day (25 Aug 2020 17:40)  Farxiga 5 mg oral tablet: 1 tab(s) orally once a day (25 Aug 2020 17:40)  fluconazole 150 mg oral tablet: 1 tab(s) orally once (25 Aug 2020 17:40)  Glyxambi 25 mg-5 mg oral tablet: 1 tab(s) orally once a day (in the morning) (25 Aug 2020 17:40)  LORazepam 0.5 mg oral tablet: 1 tab(s) orally 2 times a day (25 Aug 2020 17:40)  mirtazapine 30 mg oral tablet: 1 tab(s) orally once a day (at bedtime) (25 Aug 2020 17:40)  ONETOUCH ULTRA GLUCOMETER:  (25 Aug 2020 17:40)  Soliqua 100/33 subcutaneous solution: 20 unit(s) subcutaneous once a day (25 Aug 2020 17:40)  travoprost 0.004% ophthalmic solution: 1 drop(s) to each affected eye once a day (at bedtime) (right eye) (25 Aug 2020 17:40)    MEDICATIONS  (STANDING):  amLODIPine   Tablet 5 milliGRAM(s) Oral daily  aspirin enteric coated 81 milliGRAM(s) Oral daily  carvedilol 25 milliGRAM(s) Oral every 12 hours  ciprofloxacin   IVPB 400 milliGRAM(s) IV Intermittent every 12 hours  dextrose 5%. 1000 milliLiter(s) (50 mL/Hr) IV Continuous <Continuous>  dextrose 50% Injectable 12.5 Gram(s) IV Push once  dextrose 50% Injectable 25 Gram(s) IV Push once  dextrose 50% Injectable 25 Gram(s) IV Push once  doxazosin 2 milliGRAM(s) Oral at bedtime  insulin lispro (HumaLOG) corrective regimen sliding scale   SubCutaneous three times a day before meals  insulin lispro (HumaLOG) corrective regimen sliding scale   SubCutaneous at bedtime  lisinopril 10 milliGRAM(s) Oral daily  metroNIDAZOLE  IVPB 500 milliGRAM(s) IV Intermittent every 8 hours  mirtazapine 30 milliGRAM(s) Oral at bedtime  sodium chloride 0.9%. 1000 milliLiter(s) (125 mL/Hr) IV Continuous <Continuous>  sodium zirconium cyclosilicate 5 Gram(s) Oral every 8 hours    MEDICATIONS  (PRN):  dextrose 40% Gel 15 Gram(s) Oral once PRN Blood Glucose LESS THAN 70 milliGRAM(s)/deciliter  glucagon  Injectable 1 milliGRAM(s) IntraMuscular once PRN Glucose LESS THAN 70 milligrams/deciliter  LORazepam     Tablet 0.5 milliGRAM(s) Oral two times a day PRN Anxiety    Vital Signs Last 24 Hrs  T(C): 36.8 (25 Aug 2020 18:37), Max: 36.8 (25 Aug 2020 18:37)  T(F): 98.2 (25 Aug 2020 18:37), Max: 98.2 (25 Aug 2020 18:37)  HR: 82 (25 Aug 2020 18:37) (62 - 83)  BP: 133/61 (25 Aug 2020 18:37) (93/48 - 153/65)  BP(mean): --  RR: 16 (25 Aug 2020 18:37) (16 - 19)  SpO2: 99% (25 Aug 2020 18:37) (97% - 100%)    Daily Height in cm: 162.56 (25 Aug 2020 13:22)    Daily     CAPILLARY BLOOD GLUCOSE        PHYSICAL EXAM:      T(C): 36.8 (20 @ 18:37), Max: 36.8 (20 @ 18:37)  HR: 82 (20 @ 18:37) (62 - 83)  BP: 133/61 (20 @ 18:37) (93/48 - 153/65)  RR: 16 (20 @ 18:37) (16 - 19)  SpO2: 99% (20 @ 18:37) (97% - 100%)  Wt(kg): --  Lungs clear  Heart S1S2  Abd soft NT ND  Extremities:   tr edema                  145  |  113<H>  |  38<H>  ----------------------------<  88  5.7<H>   |  25  |  2.29<H>    Ca    9.8      25 Aug 2020 13:56    TPro  6.7  /  Alb  3.1<L>  /  TBili  0.8  /  DBili  x   /  AST  43<H>  /  ALT  31  /  AlkPhos  70                            11.4   14.68 )-----------( 227      ( 25 Aug 2020 13:56 )             35.4     Creatinine Trend: 2.29<--  Urinalysis Basic - ( 25 Aug 2020 17:01 )    Color: Yellow / Appearance: Clear / S.005 / pH: x  Gluc: x / Ketone: Negative  / Bili: Negative / Urobili: Negative mg/dL   Blood: x / Protein: 15 mg/dL / Nitrite: Negative   Leuk Esterase: Small / RBC: 0-2 /HPF / WBC 0-2   Sq Epi: x / Non Sq Epi: Few / Bacteria: Few            Assessment   NICK CKD 3-4 diarrhea, suspected pre renal azotemia with resultant hyperkalemia  r/o infectious diarrhea;     Plan    IVF NS for now at 125ml/hr  Hold Lisinopril and Doxazosin;   Will follow.    Luis Pedraza MD

## 2020-08-25 NOTE — ED PROVIDER NOTE - OBJECTIVE STATEMENT
80yo female with pmh HTN, DM, presents with diarrhea, NV, and fall. PT reports she was in [facility] and without illness last night. THis am, woke up with multiple diarrhea, NV x 2. Pt feeling very weak and lightheaded and fell. Pt states her legs gave way, and hit hear with laceration. Family came home and found her on ground and states she was really weak when she tried to stand up. PT without dizziness at rest. denies headache. + some abd pain. denies fever, recent abx, unusual food, black/bloody BM, cp, sob, palpitaitons.     No fever/chills, No photophobia/eye pain/changes in vision, No ear pain/sore throat/dysphagia, No chest pain/palpitations, no SOB/cough, no wheeze/stridor, +abdominal pain, +N/V/D, no dysuria/frequency/discharge, No neck/back pain, no rash, + dizzy

## 2020-08-25 NOTE — H&P ADULT - ASSESSMENT
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 injury       IMPROVE VTE Individual Risk Assessment          RISK                                                          Points  [  ] Previous VTE                                                3  [  ] Thrombophilia                                             2  [  ] Lower limb paralysis                                   2        (unable to hold up >15 seconds)    [  ] Current Cancer                                             2         (within 6 months)  [  ] Immobilization > 24 hrs                              1  [  ] ICU/CCU stay > 24 hours                             1  [  ] Age > 60                                                         1    IMPROVE VTE Score: 2

## 2020-08-25 NOTE — ED PROVIDER NOTE - CARE PLAN
Principal Discharge DX:	Colitis  Secondary Diagnosis:	NICK (acute kidney injury)  Secondary Diagnosis:	Head injury  Secondary Diagnosis:	Laceration of ear

## 2020-08-25 NOTE — H&P ADULT - HISTORY OF PRESENT ILLNESS
80yo female with pmh HTN, DM, presents with diarrhea, NV, and fall. PT reports she was in [facility] and without illness last night. THis am, woke up with multiple diarrhea, NV x 2. Pt feeling very weak and lightheaded and fell. Pt states her legs gave way, and hit hear with laceration. Family came home and found her on ground and states she was really weak when she tried to stand up. PT without dizziness at rest. denies headache. + some abd pain. denies fever, recent abx, unusual food, black/bloody BM, cp, sob, palpitaitons. 	No fever/chills, No photophobia/eye pain/changes in vision, No ear pain/sore throat/dysphagia, No chest pain/palpitations, no SOB/cough, no wheeze/stridor, +abdominal pain, +N/V/D, no dysuria/frequency/discharge, No neck/back pain, no rash, + dizzy 80yo female with pmh HTN, DM, presents with diarrhea, NV, and fall. PT reports she was in [facility] and without illness last night. THis am, woke up with multiple diarrhea, NV x 2. Pt feeling very weak and lightheaded and fell. Pt states her legs gave way, and hit hear with laceration. Family came home and found her on ground and states she was really weak when she tried to stand up.- dooes not think she passed out  PT without dizziness at rest. denies headache. + some abd pain. denies fever, recent abx, unusual food, black/bloody BM, cp, sob, palpitaitons. 	No fever/chills, No photophobia/eye pain/changes in vision, No ear pain/sore throat/dysphagia, No chest pain/palpitations, no SOB/cough, no wheeze/stridor, +abdominal pain, +N/V/D, no dysuria/frequency/discharge, No neck/back pain, no rash, + dizzy

## 2020-08-25 NOTE — PHARMACY COMMUNICATION NOTE - COMMENTS
Spoke to MD and agreed to give Cipro 400mg q 12h per [facility] Infectious Disease Pharmacotherapy reccomendations. Patient has colitis per MD

## 2020-08-25 NOTE — ED PROVIDER NOTE - PHYSICAL EXAMINATION
Gen: Alert, Well appearing. NAD  , + poor skin turgor, Dry MM  Head: NC, AT, PERRL, normal lids/conjunctiva   ENT: Bilateral TM WNL, patent oropharynx without erythema/exudate, uvula midline  Neck: supple, no tenderness/meningismus  Pulm: Bilateral clear BS, normal resp effort  CV: RRR, no M/R/G, +dist pulses   Abd: soft, NT/ND, +BS, no guarding/rebound tenderness  Mskel: extremities x4 with normal ROM. no ctl spine ttp. no edema/erythema/cyanosis   Skin: no rash, no bruising, +  laceratino post ear with cartilage noted  Neuro: AAOx3, no sensory/motor deficits, CN 2-12 intact. No pronator drift. Normal finger to nose, heel to shin. No dysdiadochokinesis. Full power and sensation intact. Gen: Alert, Well appearing. NAD  , + poor skin turgor, Dry MM  Head: NC, AT, PERRL, normal lids/conjunctiva   ENT: Bilateral TM WNL, patent oropharynx without erythema/exudate, uvula midline  Neck: supple, no tenderness/meningismus  Pulm: Bilateral clear BS, normal resp effort  CV: RRR, no M/R/G, +dist pulses   Abd: soft, NT/ND, +BS, no guarding/rebound tenderness  Mskel: extremities x4 with normal ROM. no ctl spine ttp. no edema/erythema/cyanosis   Skin: no rash, no bruising, +  + avulsion/laceration 3cm Lac post ear with cartilage exposed and medial inferior to that another 1cm linear lac  Neuro: AAOx3, no sensory/motor deficits, CN 2-12 intact. No pronator drift. Normal finger to nose, heel to shin. No dysdiadochokinesis. Full power and sensation intact.

## 2020-08-25 NOTE — CONSULT NOTE ADULT - ASSESSMENT
HPI:  78yo female with pmh HTN, DM, presents with diarrhea, NV, and fall. PT reports she was in [facility] and without illness last night. THis am, woke up with multiple diarrhea, NV x 2. Pt feeling very weak and lightheaded and fell. Pt states her legs gave way, and hit hear with laceration. Family came home and found her on ground and states she was really weak when she tried to stand up.- dooes not think she passed out  PT without dizziness at rest. denies headache. + some abd pain. denies fever, recent abx, unusual food, black/bloody BM, cp, sob, palpitaitons. 	No fever/chills, No photophobia/eye pain/changes in vision, No ear pain/sore throat/dysphagia, No chest pain/palpitations, no SOB/cough, no wheeze/stridor, +abdominal pain, +N/V/D, no dysuria/frequency/discharge, No neck/back pain, no rash, + dizzy (25 Aug 2020 16:46)  ------------------- As Above  ----------------------  Patient is a poor historian. Presents with diarrhea. Patient unsure when it started. No N/V or bloody BMs. No fever., abdominal pain. Denies having a colonoscopy.     Colitis - Infectious VS Ischemic - doubt IBD - 1) stool culture / C Diff / O&P  2) CRP 3) Broad spectrum antibiotic 4) clear liquid diet 5) cardiology consult

## 2020-08-25 NOTE — ED ADULT TRIAGE NOTE - CHIEF COMPLAINT QUOTE
80 y/o female with PMH of T2DM, HTN, Anxiety. BIBA with C/C of trip and fall about an hour ago. As per ems since 7am this morning pt has been having continuos diarrhea and while on her way to the bathroom she began feeling dizzy and trip and fall hitting the left ear on a wooden chair. pt denies any LOC, blood thinners use.

## 2020-08-26 LAB
A1C WITH ESTIMATED AVERAGE GLUCOSE RESULT: 6.8 % — HIGH (ref 4–5.6)
ANION GAP SERPL CALC-SCNC: 5 MMOL/L — SIGNIFICANT CHANGE UP (ref 5–17)
BUN SERPL-MCNC: 31 MG/DL — HIGH (ref 7–23)
CALCIUM SERPL-MCNC: 7.7 MG/DL — LOW (ref 8.5–10.1)
CHLORIDE SERPL-SCNC: 115 MMOL/L — HIGH (ref 96–108)
CO2 SERPL-SCNC: 23 MMOL/L — SIGNIFICANT CHANGE UP (ref 22–31)
CREAT SERPL-MCNC: 1.53 MG/DL — HIGH (ref 0.5–1.3)
CRP SERPL-MCNC: 8.19 MG/DL — HIGH (ref 0–0.4)
ESTIMATED AVERAGE GLUCOSE: 148 MG/DL — HIGH (ref 68–114)
FOLATE SERPL-MCNC: 5.1 NG/ML — SIGNIFICANT CHANGE UP
GLUCOSE BLDC GLUCOMTR-MCNC: 224 MG/DL — HIGH (ref 70–99)
GLUCOSE BLDC GLUCOMTR-MCNC: 281 MG/DL — HIGH (ref 70–99)
GLUCOSE BLDC GLUCOMTR-MCNC: 61 MG/DL — LOW (ref 70–99)
GLUCOSE BLDC GLUCOMTR-MCNC: 68 MG/DL — LOW (ref 70–99)
GLUCOSE BLDC GLUCOMTR-MCNC: 91 MG/DL — SIGNIFICANT CHANGE UP (ref 70–99)
GLUCOSE SERPL-MCNC: 53 MG/DL — LOW (ref 70–99)
HCT VFR BLD CALC: 31.3 % — LOW (ref 34.5–45)
HGB BLD-MCNC: 9.8 G/DL — LOW (ref 11.5–15.5)
MCHC RBC-ENTMCNC: 31.3 GM/DL — LOW (ref 32–36)
MCHC RBC-ENTMCNC: 32 PG — SIGNIFICANT CHANGE UP (ref 27–34)
MCV RBC AUTO: 102.3 FL — HIGH (ref 80–100)
NRBC # BLD: 0 /100 WBCS — SIGNIFICANT CHANGE UP (ref 0–0)
PLATELET # BLD AUTO: 189 K/UL — SIGNIFICANT CHANGE UP (ref 150–400)
POTASSIUM SERPL-MCNC: 3.7 MMOL/L — SIGNIFICANT CHANGE UP (ref 3.5–5.3)
POTASSIUM SERPL-SCNC: 3.7 MMOL/L — SIGNIFICANT CHANGE UP (ref 3.5–5.3)
RBC # BLD: 3.06 M/UL — LOW (ref 3.8–5.2)
RBC # FLD: 11.7 % — SIGNIFICANT CHANGE UP (ref 10.3–14.5)
SARS-COV-2 IGG SERPL QL IA: NEGATIVE — SIGNIFICANT CHANGE UP
SARS-COV-2 IGM SERPL IA-ACNC: 0.08 INDEX — SIGNIFICANT CHANGE UP
SODIUM SERPL-SCNC: 143 MMOL/L — SIGNIFICANT CHANGE UP (ref 135–145)
TSH SERPL-MCNC: 0.9 UIU/ML — SIGNIFICANT CHANGE UP (ref 0.36–3.74)
VIT B12 SERPL-MCNC: 289 PG/ML — SIGNIFICANT CHANGE UP (ref 232–1245)
WBC # BLD: 16.09 K/UL — HIGH (ref 3.8–10.5)
WBC # FLD AUTO: 16.09 K/UL — HIGH (ref 3.8–10.5)

## 2020-08-26 PROCEDURE — 99233 SBSQ HOSP IP/OBS HIGH 50: CPT

## 2020-08-26 RX ORDER — DEXTROSE 50 % IN WATER 50 %
12.5 SYRINGE (ML) INTRAVENOUS ONCE
Refills: 0 | Status: COMPLETED | OUTPATIENT
Start: 2020-08-26 | End: 2020-08-26

## 2020-08-26 RX ADMIN — Medication 200 MILLIGRAM(S): at 05:27

## 2020-08-26 RX ADMIN — Medication 81 MILLIGRAM(S): at 13:37

## 2020-08-26 RX ADMIN — SODIUM CHLORIDE 125 MILLILITER(S): 9 INJECTION INTRAMUSCULAR; INTRAVENOUS; SUBCUTANEOUS at 05:39

## 2020-08-26 RX ADMIN — SODIUM ZIRCONIUM CYCLOSILICATE 5 GRAM(S): 10 POWDER, FOR SUSPENSION ORAL at 14:32

## 2020-08-26 RX ADMIN — Medication 100 MILLIGRAM(S): at 22:19

## 2020-08-26 RX ADMIN — Medication 200 MILLIGRAM(S): at 18:41

## 2020-08-26 RX ADMIN — Medication 12.5 GRAM(S): at 08:40

## 2020-08-26 RX ADMIN — CARVEDILOL PHOSPHATE 25 MILLIGRAM(S): 80 CAPSULE, EXTENDED RELEASE ORAL at 18:43

## 2020-08-26 RX ADMIN — Medication 1: at 22:20

## 2020-08-26 RX ADMIN — CARVEDILOL PHOSPHATE 25 MILLIGRAM(S): 80 CAPSULE, EXTENDED RELEASE ORAL at 07:24

## 2020-08-26 RX ADMIN — SODIUM CHLORIDE 75 MILLILITER(S): 9 INJECTION INTRAMUSCULAR; INTRAVENOUS; SUBCUTANEOUS at 18:54

## 2020-08-26 RX ADMIN — SODIUM ZIRCONIUM CYCLOSILICATE 5 GRAM(S): 10 POWDER, FOR SUSPENSION ORAL at 05:27

## 2020-08-26 RX ADMIN — AMLODIPINE BESYLATE 5 MILLIGRAM(S): 2.5 TABLET ORAL at 07:24

## 2020-08-26 RX ADMIN — Medication 2: at 17:30

## 2020-08-26 RX ADMIN — Medication 100 MILLIGRAM(S): at 05:27

## 2020-08-26 RX ADMIN — MIRTAZAPINE 30 MILLIGRAM(S): 45 TABLET, ORALLY DISINTEGRATING ORAL at 22:20

## 2020-08-26 RX ADMIN — Medication 100 MILLIGRAM(S): at 13:37

## 2020-08-26 NOTE — INPATIENT CERTIFICATION FOR MEDICARE PATIENTS - RISKS OF ADVERSE EVENTS
Concern for renal deterioration/Concern for delay in diagnosis and treatment/Concern for worsening infectious process

## 2020-08-26 NOTE — PROGRESS NOTE ADULT - PROBLEM SELECTOR PLAN 4
Normally takes Farxiga vs Glyxambi (Empagliflozin/Linagliptin) and Insulin  HbA1c = 6.8  While in-house, use SSI; add Lantus if needed  Has been mildly hypoglycemic so far

## 2020-08-26 NOTE — PROGRESS NOTE ADULT - PROBLEM SELECTOR PLAN 5
Normally takes Amlodipine/Benazepril, Coreg, Doxazosin  Currently on Amlodipine and Coreg, w/ adequate control

## 2020-08-26 NOTE — PROGRESS NOTE ADULT - SUBJECTIVE AND OBJECTIVE BOX
Patient is a 80y old  Female who presents with a chief complaint of diarrhea and fall (25 Aug 2020 21:33)      HPI:  78yo female with pmh HTN, DM, presents with diarrhea, NV, and fall. PT reports she was in [facility] and without illness last night. THis am, woke up with multiple diarrhea, NV x 2. Pt feeling very weak and lightheaded and fell. Pt states her legs gave way, and hit hear with laceration. Family came home and found her on ground and states she was really weak when she tried to stand up.- dooes not think she passed out  PT without dizziness at rest. denies headache. + some abd pain. denies fever, recent abx, unusual food, black/bloody BM, cp, sob, palpitaitons. 	No fever/chills, No photophobia/eye pain/changes in vision, No ear pain/sore throat/dysphagia, No chest pain/palpitations, no SOB/cough, no wheeze/stridor, +abdominal pain, +N/V/D, no dysuria/frequency/discharge, No neck/back pain, no rash, + dizzy (25 Aug 2020 16:46)      INTERVAL HPI/OVERNIGHT EVENTS:  Diarrhea better. Denies abdominal pain, N/V    MEDICATIONS  (STANDING):  amLODIPine   Tablet 5 milliGRAM(s) Oral daily  aspirin enteric coated 81 milliGRAM(s) Oral daily  carvedilol 25 milliGRAM(s) Oral every 12 hours  ciprofloxacin   IVPB 400 milliGRAM(s) IV Intermittent every 12 hours  dextrose 5%. 1000 milliLiter(s) (50 mL/Hr) IV Continuous <Continuous>  dextrose 50% Injectable 12.5 Gram(s) IV Push once  dextrose 50% Injectable 12.5 Gram(s) IV Push once  dextrose 50% Injectable 25 Gram(s) IV Push once  dextrose 50% Injectable 25 Gram(s) IV Push once  insulin lispro (HumaLOG) corrective regimen sliding scale   SubCutaneous three times a day before meals  insulin lispro (HumaLOG) corrective regimen sliding scale   SubCutaneous at bedtime  metroNIDAZOLE  IVPB 500 milliGRAM(s) IV Intermittent every 8 hours  mirtazapine 30 milliGRAM(s) Oral at bedtime  sodium chloride 0.9%. 1000 milliLiter(s) (125 mL/Hr) IV Continuous <Continuous>  sodium zirconium cyclosilicate 5 Gram(s) Oral every 8 hours    MEDICATIONS  (PRN):  dextrose 40% Gel 15 Gram(s) Oral once PRN Blood Glucose LESS THAN 70 milliGRAM(s)/deciliter  dextrose 40% Gel 15 Gram(s) Oral once PRN Blood Glucose LESS THAN 70 milliGRAM(s)/deciliter  glucagon  Injectable 1 milliGRAM(s) IntraMuscular once PRN Glucose LESS THAN 70 milligrams/deciliter  LORazepam     Tablet 0.5 milliGRAM(s) Oral two times a day PRN Anxiety  ondansetron Injectable 4 milliGRAM(s) IV Push every 6 hours PRN Nausea and/or Vomiting      FAMILY HISTORY:  FH: dementia  Family history of Alzheimer's disease      Allergies    No Known Allergies    Intolerances        PMH/PSH:  Diabetes mellitus  Osteopenia  Hypercholesterolemia  Glaucoma  Chronic kidney disease (CKD)  Hypertension  Hyperuricemia  Depression  Anxiety  Tongue cancer  Tongue cancer        REVIEW OF SYSTEMS:  CONSTITUTIONAL: No fever, weight loss, or fatigue  EYES: No eye pain, visual disturbances, or discharge  ENMT:  No difficulty hearing, tinnitus, vertigo; No sinus or throat pain  NECK: No pain or stiffness  BREASTS: No pain, masses, or nipple discharge  RESPIRATORY: No cough, wheezing, chills or hemoptysis; No shortness of breath  CARDIOVASCULAR: No chest pain, palpitations, dizziness, or leg swelling  GASTROINTESTINAL:  See above.  GENITOURINARY: No dysuria, frequency, hematuria, or incontinence  NEUROLOGICAL: No headaches, memory loss, loss of strength, numbness, or tremors  SKIN: No itching, burning, rashes, or lesions   LYMPH NODES: No enlarged glands  ENDOCRINE: No heat or cold intolerance; No hair loss  MUSCULOSKELETAL: No joint pain or swelling; No muscle, back, or extremity pain  PSYCHIATRIC: No depression, anxiety, mood swings, or difficulty sleeping  HEME/LYMPH: No easy bruising, or bleeding gums  ALLERGY AND IMMUNOLOGIC: No hives or eczema    Vital Signs Last 24 Hrs  T(C): 37.3 (26 Aug 2020 05:48), Max: 37.3 (26 Aug 2020 05:48)  T(F): 99.2 (26 Aug 2020 05:48), Max: 99.2 (26 Aug 2020 05:48)  HR: 75 (26 Aug 2020 05:48) (62 - 84)  BP: 145/51 (26 Aug 2020 05:48) (93/48 - 162/83)  BP(mean): --  RR: 16 (26 Aug 2020 05:48) (16 - 19)  SpO2: 100% (26 Aug 2020 05:48) (97% - 100%)    PHYSICAL EXAM:  GENERAL: NAD, well-groomed, well-developed  HEAD:  Atraumatic, Normocephalic  EYES: EOMI, PERRLA, conjunctiva and sclera clear  NECK: Supple, No JVD, Normal thyroid  NERVOUS SYSTEM:  Alert & Oriented X3, Good concentration;   CHEST/LUNG: Clear to percussion bilaterally; No rales, rhonchi, wheezing, or rubs  HEART: Regular rate and rhythm; No murmurs, rubs, or gallops  ABDOMEN: Soft, Nontender, Nondistended; Bowel sounds present  EXTREMITIES:  2+ Peripheral Pulses, No clubbing, cyanosis, or edema  LYMPH: No lymphadenopathy noted  SKIN: No rashes or lesions    LAB   @ 13:56  amylase --   lipase 87                           9.8    16.09 )-----------( 189      ( 26 Aug 2020 06:35 )             31.3       CBC:   @ 06:35  WBC 16.09   Hgb 9.8   Hct 31.3   Plts 189  .3   @ 13:56  WBC 14.68   Hgb 11.4   Hct 35.4   Plts 227  .3      Chemistry:   @ 06:35  Na+ 143  K+ 3.7  Cl- 115  CO2 23  BUN 31  Cr 1.53      @ 13:56  Na+ 145  K+ 5.7  Cl- 113  CO2 25  BUN 38  Cr 2.29         Glucose, Serum: 53 mg/dL ( @ 06:35)  Glucose, Serum: 88 mg/dL ( @ 13:56)      26 Aug 2020 06:35    143    |  115    |  31     ----------------------------<  53     3.7     |  23     |  1.53   25 Aug 2020 13:56    145    |  113    |  38     ----------------------------<  88     5.7     |  25     |  2.29     Ca    7.7        26 Aug 2020 06:35  Ca    9.8        25 Aug 2020 13:56    TPro  6.7    /  Alb  3.1    /  TBili  0.8    /  DBili  x      /  AST  43     /  ALT  31     /  AlkPhos  70     25 Aug 2020 13:56          Urinalysis Basic - ( 25 Aug 2020 17:01 )    Color: Yellow / Appearance: Clear / S.005 / pH: x  Gluc: x / Ketone: Negative  / Bili: Negative / Urobili: Negative mg/dL   Blood: x / Protein: 15 mg/dL / Nitrite: Negative   Leuk Esterase: Small / RBC: 0-2 /HPF / WBC 0-2   Sq Epi: x / Non Sq Epi: Few / Bacteria: Few        CAPILLARY BLOOD GLUCOSE      POCT Blood Glucose.: 91 mg/dL (26 Aug 2020 09:59)  POCT Blood Glucose.: 68 mg/dL (26 Aug 2020 08:38)  POCT Blood Glucose.: 169 mg/dL (25 Aug 2020 21:51)  POCT Blood Glucose.: 56 mg/dL (25 Aug 2020 21:17)  POCT Blood Glucose.: 58 mg/dL (25 Aug 2020 21:16)      C-Reactive Protein, Serum: 8.19 mg/dL ( @ 09:50)      RADIOLOGY & ADDITIONAL TESTS:    Imaging Personally Reviewed:  [ ] YES  [ ] NO    Consultant(s) Notes Reviewed:  [ ] YES  [ ] NO    Care Discussed with Consultants/Other Providers [ ] YES  [ ] NO

## 2020-08-26 NOTE — PROGRESS NOTE ADULT - SUBJECTIVE AND OBJECTIVE BOX
Patient is a 80y old  Female who presents with a chief complaint of diarrhea and fall (26 Aug 2020 10:50)      INTERVAL HPI/OVERNIGHT EVENTS:    Had what appeared to be a blood BM this afternoon, but flushed it before notifying RN. Had crampy abdominal pain at the time which has now resolved.     REVIEW OF SYSTEMS:   Remaining ROS negative    MEDICATIONS  (STANDING):  amLODIPine   Tablet 5 milliGRAM(s) Oral daily  aspirin enteric coated 81 milliGRAM(s) Oral daily  carvedilol 25 milliGRAM(s) Oral every 12 hours  ciprofloxacin   IVPB 400 milliGRAM(s) IV Intermittent every 12 hours  dextrose 5%. 1000 milliLiter(s) (50 mL/Hr) IV Continuous <Continuous>  dextrose 50% Injectable 12.5 Gram(s) IV Push once  dextrose 50% Injectable 12.5 Gram(s) IV Push once  dextrose 50% Injectable 25 Gram(s) IV Push once  dextrose 50% Injectable 25 Gram(s) IV Push once  insulin lispro (HumaLOG) corrective regimen sliding scale   SubCutaneous three times a day before meals  insulin lispro (HumaLOG) corrective regimen sliding scale   SubCutaneous at bedtime  metroNIDAZOLE  IVPB 500 milliGRAM(s) IV Intermittent every 8 hours  mirtazapine 30 milliGRAM(s) Oral at bedtime  sodium chloride 0.9%. 1000 milliLiter(s) (125 mL/Hr) IV Continuous <Continuous>  sodium zirconium cyclosilicate 5 Gram(s) Oral every 8 hours    MEDICATIONS  (PRN):  dextrose 40% Gel 15 Gram(s) Oral once PRN Blood Glucose LESS THAN 70 milliGRAM(s)/deciliter  dextrose 40% Gel 15 Gram(s) Oral once PRN Blood Glucose LESS THAN 70 milliGRAM(s)/deciliter  glucagon  Injectable 1 milliGRAM(s) IntraMuscular once PRN Glucose LESS THAN 70 milligrams/deciliter  LORazepam     Tablet 0.5 milliGRAM(s) Oral two times a day PRN Anxiety  ondansetron Injectable 4 milliGRAM(s) IV Push every 6 hours PRN Nausea and/or Vomiting      Allergies    No Known Allergies    Intolerances        Vital Signs Last 24 Hrs  T(C): 37 (26 Aug 2020 17:10), Max: 37.3 (26 Aug 2020 05:48)  T(F): 98.6 (26 Aug 2020 17:10), Max: 99.2 (26 Aug 2020 05:48)  HR: 75 (26 Aug 2020 17:10) (65 - 84)  BP: 142/52 (26 Aug 2020 17:10) (133/52 - 162/83)  BP(mean): --  RR: 17 (26 Aug 2020 17:10) (16 - 17)  SpO2: 100% (26 Aug 2020 17:10) (97% - 100%)    PHYSICAL EXAM:  GENERAL: NAD  HEAD:  Atraumatic, Normocephalic  EYES: EOMI, PERRLA, conjunctiva and sclera clear  ENT: O/P Clear  NECK: Supple, No JVD  NERVOUS SYSTEM:  No focal deficits  CHEST/LUNG: Clear to percussion bilaterally; No rales, rhonchi, wheezing  HEART: Regular rate and rhythm; No murmurs, rubs, or gallops  ABDOMEN: Soft, Nontender, Nondistended; Bowel sounds present - mildly hyperactive  EXTREMITIES:  2+ Peripheral Pulses, No clubbing, cyanosis, or edema  SKIN: No rashes or lesions    LABS:                        9.8    16.09 )-----------( 189      ( 26 Aug 2020 06:35 )             31.3     08-26    143  |  115<H>  |  31<H>  ----------------------------<  53<L>  3.7   |  23  |  1.53<H>    Ca    7.7<L>      26 Aug 2020 06:35    TPro  6.7  /  Alb  3.1<L>  /  TBili  0.8  /  DBili  x   /  AST  43<H>  /  ALT  31  /  AlkPhos  70  08-25      Urinalysis Basic - ( 25 Aug 2020 17:01 )    Color: Yellow / Appearance: Clear / S.005 / pH: x  Gluc: x / Ketone: Negative  / Bili: Negative / Urobili: Negative mg/dL   Blood: x / Protein: 15 mg/dL / Nitrite: Negative   Leuk Esterase: Small / RBC: 0-2 /HPF / WBC 0-2   Sq Epi: x / Non Sq Epi: Few / Bacteria: Few      CAPILLARY BLOOD GLUCOSE      POCT Blood Glucose.: 224 mg/dL (26 Aug 2020 17:07)  POCT Blood Glucose.: 61 mg/dL (26 Aug 2020 11:49)  POCT Blood Glucose.: 91 mg/dL (26 Aug 2020 09:59)  POCT Blood Glucose.: 68 mg/dL (26 Aug 2020 08:38)  POCT Blood Glucose.: 169 mg/dL (25 Aug 2020 21:51)  POCT Blood Glucose.: 56 mg/dL (25 Aug 2020 21:17)  POCT Blood Glucose.: 58 mg/dL (25 Aug 2020 21:16)      RADIOLOGY & ADDITIONAL TESTS:    Imaging Personally Reviewed:  [ ] YES  [ ] NO    Consultant(s) Notes Reviewed:  [ ] YES  [ ] NO    Care Discussed with Consultants/Other Providers [ ] YES  [ ] NO

## 2020-08-26 NOTE — PROGRESS NOTE ADULT - ASSESSMENT
HPI:  78yo female with pmh HTN, DM, presents with diarrhea, NV, and fall. PT reports she was in USOH and without illness last night. THis am, woke up with multiple diarrhea, NV x 2. Pt feeling very weak and lightheaded and fell. Pt states her legs gave way, and hit hear with laceration. Family came home and found her on ground and states she was really weak when she tried to stand up.- dooes not think she passed out  PT without dizziness at rest. denies headache. + some abd pain. denies fever, recent abx, unusual food, black/bloody BM, cp, sob, palpitaitons. 	No fever/chills, No photophobia/eye pain/changes in vision, No ear pain/sore throat/dysphagia, No chest pain/palpitations, no SOB/cough, no wheeze/stridor, +abdominal pain, +N/V/D, no dysuria/frequency/discharge, No neck/back pain, no rash, + dizzy (25 Aug 2020 16:46)  ------------------- As Above  ----------------------  Patient is a poor historian. Presents with diarrhea. Patient unsure when it started. No N/V or bloody BMs. No fever., abdominal pain. Denies having a colonoscopy.     Colitis - Infectious VS Ischemic - doubt IBD - 1) stool culture / C Diff / O&P  2) CRP 3) Broad spectrum antibiotic 4) clear liquid diet 5) cardiology consult HPI:  78yo female with pmh HTN, DM, presents with diarrhea, NV, and fall. PT reports she was in USOH and without illness last night. THis am, woke up with multiple diarrhea, NV x 2. Pt feeling very weak and lightheaded and fell. Pt states her legs gave way, and hit hear with laceration. Family came home and found her on ground and states she was really weak when she tried to stand up.- dooes not think she passed out  PT without dizziness at rest. denies headache. + some abd pain. denies fever, recent abx, unusual food, black/bloody BM, cp, sob, palpitaitons. 	No fever/chills, No photophobia/eye pain/changes in vision, No ear pain/sore throat/dysphagia, No chest pain/palpitations, no SOB/cough, no wheeze/stridor, +abdominal pain, +N/V/D, no dysuria/frequency/discharge, No neck/back pain, no rash, + dizzy (25 Aug 2020 16:46)  ------------------- As Above  ----------------------  Patient is a poor historian. Presents with diarrhea. Patient unsure when it started. No N/V or bloody BMs. No fever., abdominal pain. Denies having a colonoscopy.     Colitis - Infectious VS Ischemic - CRP 8.19.BC 16.09  doubt IBD - 1) stool culture / C Diff / O&P  2) CRP f/u  3) Broad spectrum antibiotic 4) clear liquid diet 5) cardiology consult == attempted to speak with family

## 2020-08-26 NOTE — PROGRESS NOTE ADULT - PROBLEM SELECTOR PLAN 1
Not yet clear if ischemic vs infectious  C. diff ordered/pending  Gastroenterologist consulted - ([place])  On empiric cipro /flagyl Not yet clear if ischemic vs infectious  C. diff ordered/pending  Gastroenterologist consulted - ([place])  On empiric cipro /flagyl  Due to possibility of ischemic colitis, will get echo and d/w cardio

## 2020-08-26 NOTE — PROGRESS NOTE ADULT - SUBJECTIVE AND OBJECTIVE BOX
[facility] NEPHROLOGY SERVICES, [facility]  NEPHROLOGY AND HYPERTENSION  [address]  [address]  [place] [address]  [phone]    MD BEENA STOCK MD ANDREY GONCHARUK, MD MADHU KORRAPATI, MD YELENA ROSENBERG, MD BINNY KOSHY, MD CHRISTOPHER CAPUTO, MD EDWARD BOVER, MD          Patient events noted  + blood bowel movement    MEDICATIONS  (STANDING):  amLODIPine   Tablet 5 milliGRAM(s) Oral daily  aspirin enteric coated 81 milliGRAM(s) Oral daily  carvedilol 25 milliGRAM(s) Oral every 12 hours  ciprofloxacin   IVPB 400 milliGRAM(s) IV Intermittent every 12 hours  dextrose 5%. 1000 milliLiter(s) (50 mL/Hr) IV Continuous <Continuous>  dextrose 50% Injectable 12.5 Gram(s) IV Push once  dextrose 50% Injectable 12.5 Gram(s) IV Push once  dextrose 50% Injectable 25 Gram(s) IV Push once  dextrose 50% Injectable 25 Gram(s) IV Push once  insulin lispro (HumaLOG) corrective regimen sliding scale   SubCutaneous three times a day before meals  insulin lispro (HumaLOG) corrective regimen sliding scale   SubCutaneous at bedtime  metroNIDAZOLE  IVPB 500 milliGRAM(s) IV Intermittent every 8 hours  mirtazapine 30 milliGRAM(s) Oral at bedtime  sodium chloride 0.9%. 1000 milliLiter(s) (125 mL/Hr) IV Continuous <Continuous>  sodium zirconium cyclosilicate 5 Gram(s) Oral every 8 hours    MEDICATIONS  (PRN):  dextrose 40% Gel 15 Gram(s) Oral once PRN Blood Glucose LESS THAN 70 milliGRAM(s)/deciliter  dextrose 40% Gel 15 Gram(s) Oral once PRN Blood Glucose LESS THAN 70 milliGRAM(s)/deciliter  glucagon  Injectable 1 milliGRAM(s) IntraMuscular once PRN Glucose LESS THAN 70 milligrams/deciliter  LORazepam     Tablet 0.5 milliGRAM(s) Oral two times a day PRN Anxiety  ondansetron Injectable 4 milliGRAM(s) IV Push every 6 hours PRN Nausea and/or Vomiting      08-25-20 @ 07:01  -  08-26-20 @ 07:00  --------------------------------------------------------  IN: 1425 mL / OUT: 0 mL / NET: 1425 mL      PHYSICAL EXAM:      T(C): 37 (08-26-20 @ 17:10), Max: 37.3 (08-26-20 @ 05:48)  HR: 75 (08-26-20 @ 17:10) (65 - 84)  BP: 142/52 (08-26-20 @ 17:10) (133/52 - 162/83)  RR: 17 (08-26-20 @ 17:10) (16 - 17)  SpO2: 100% (08-26-20 @ 17:10) (97% - 100%)  Wt(kg): --  Lungs clear  Heart S1S2  Abd soft NT ND  Extremities:   tr edema                                    9.8    16.09 )-----------( 189      ( 26 Aug 2020 06:35 )             31.3     08-26    143  |  115<H>  |  31<H>  ----------------------------<  53<L>  3.7   |  23  |  1.53<H>    Ca    7.7<L>      26 Aug 2020 06:35    TPro  6.7  /  Alb  3.1<L>  /  TBili  0.8  /  DBili  x   /  AST  43<H>  /  ALT  31  /  AlkPhos  70  08-25      LIVER FUNCTIONS - ( 25 Aug 2020 13:56 )  Alb: 3.1 g/dL / Pro: 6.7 gm/dL / ALK PHOS: 70 U/L / ALT: 31 U/L / AST: 43 U/L / GGT: x           Creatinine Trend: 1.53<--, 2.29<--        Assessment   NICK 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    Luis Pedraza MD

## 2020-08-27 DIAGNOSIS — N18.3 CHRONIC KIDNEY DISEASE, STAGE 3 (MODERATE): ICD-10-CM

## 2020-08-27 LAB
ANION GAP SERPL CALC-SCNC: 9 MMOL/L — SIGNIFICANT CHANGE UP (ref 5–17)
APPEARANCE UR: CLEAR — SIGNIFICANT CHANGE UP
BACTERIA # UR AUTO: ABNORMAL
BILIRUB UR-MCNC: NEGATIVE — SIGNIFICANT CHANGE UP
BUN SERPL-MCNC: 26 MG/DL — HIGH (ref 7–23)
C DIFF BY PCR RESULT: SIGNIFICANT CHANGE UP
C DIFF TOX GENS STL QL NAA+PROBE: SIGNIFICANT CHANGE UP
CALCIUM SERPL-MCNC: 7.6 MG/DL — LOW (ref 8.5–10.1)
CHLORIDE SERPL-SCNC: 110 MMOL/L — HIGH (ref 96–108)
CHOLEST SERPL-MCNC: 77 MG/DL — SIGNIFICANT CHANGE UP (ref 10–199)
CO2 SERPL-SCNC: 20 MMOL/L — LOW (ref 22–31)
COLOR SPEC: YELLOW — SIGNIFICANT CHANGE UP
COMMENT - URINE: SIGNIFICANT CHANGE UP
CREAT SERPL-MCNC: 1.73 MG/DL — HIGH (ref 0.5–1.3)
CULTURE RESULTS: SIGNIFICANT CHANGE UP
DIFF PNL FLD: NEGATIVE — SIGNIFICANT CHANGE UP
EPI CELLS # UR: ABNORMAL
GLUCOSE BLDC GLUCOMTR-MCNC: 203 MG/DL — HIGH (ref 70–99)
GLUCOSE BLDC GLUCOMTR-MCNC: 284 MG/DL — HIGH (ref 70–99)
GLUCOSE BLDC GLUCOMTR-MCNC: 304 MG/DL — HIGH (ref 70–99)
GLUCOSE BLDC GLUCOMTR-MCNC: 323 MG/DL — HIGH (ref 70–99)
GLUCOSE SERPL-MCNC: 252 MG/DL — HIGH (ref 70–99)
GLUCOSE UR QL: 1000 MG/DL
HCT VFR BLD CALC: 29.7 % — LOW (ref 34.5–45)
HDLC SERPL-MCNC: 28 MG/DL — LOW
HGB BLD-MCNC: 9.5 G/DL — LOW (ref 11.5–15.5)
KETONES UR-MCNC: NEGATIVE — SIGNIFICANT CHANGE UP
LEUKOCYTE ESTERASE UR-ACNC: ABNORMAL
LIPID PNL WITH DIRECT LDL SERPL: 31 MG/DL — SIGNIFICANT CHANGE UP
MAGNESIUM SERPL-MCNC: 2 MG/DL — SIGNIFICANT CHANGE UP (ref 1.6–2.6)
MCHC RBC-ENTMCNC: 32 GM/DL — SIGNIFICANT CHANGE UP (ref 32–36)
MCHC RBC-ENTMCNC: 32 PG — SIGNIFICANT CHANGE UP (ref 27–34)
MCV RBC AUTO: 100 FL — SIGNIFICANT CHANGE UP (ref 80–100)
NITRITE UR-MCNC: NEGATIVE — SIGNIFICANT CHANGE UP
NRBC # BLD: 0 /100 WBCS — SIGNIFICANT CHANGE UP (ref 0–0)
PH UR: 5 — SIGNIFICANT CHANGE UP (ref 5–8)
PHOSPHATE SERPL-MCNC: 2.6 MG/DL — SIGNIFICANT CHANGE UP (ref 2.5–4.5)
PLATELET # BLD AUTO: 157 K/UL — SIGNIFICANT CHANGE UP (ref 150–400)
POTASSIUM SERPL-MCNC: 3.7 MMOL/L — SIGNIFICANT CHANGE UP (ref 3.5–5.3)
POTASSIUM SERPL-SCNC: 3.7 MMOL/L — SIGNIFICANT CHANGE UP (ref 3.5–5.3)
PROT UR-MCNC: 30 MG/DL
RBC # BLD: 2.97 M/UL — LOW (ref 3.8–5.2)
RBC # FLD: 11.9 % — SIGNIFICANT CHANGE UP (ref 10.3–14.5)
RBC CASTS # UR COMP ASSIST: NEGATIVE /HPF — SIGNIFICANT CHANGE UP (ref 0–4)
SODIUM SERPL-SCNC: 139 MMOL/L — SIGNIFICANT CHANGE UP (ref 135–145)
SP GR SPEC: 1.01 — SIGNIFICANT CHANGE UP (ref 1.01–1.02)
SPECIMEN SOURCE: SIGNIFICANT CHANGE UP
TOTAL CHOLESTEROL/HDL RATIO MEASUREMENT: 2.7 RATIO — LOW (ref 3.3–7.1)
TRIGL SERPL-MCNC: 89 MG/DL — SIGNIFICANT CHANGE UP (ref 10–149)
UROBILINOGEN FLD QL: NEGATIVE MG/DL — SIGNIFICANT CHANGE UP
WBC # BLD: 15.4 K/UL — HIGH (ref 3.8–10.5)
WBC # FLD AUTO: 15.4 K/UL — HIGH (ref 3.8–10.5)
WBC UR QL: SIGNIFICANT CHANGE UP

## 2020-08-27 PROCEDURE — 93880 EXTRACRANIAL BILAT STUDY: CPT | Mod: 26

## 2020-08-27 PROCEDURE — 99233 SBSQ HOSP IP/OBS HIGH 50: CPT

## 2020-08-27 RX ORDER — INSULIN GLARGINE 100 [IU]/ML
8 INJECTION, SOLUTION SUBCUTANEOUS AT BEDTIME
Refills: 0 | Status: DISCONTINUED | OUTPATIENT
Start: 2020-08-27 | End: 2020-08-28

## 2020-08-27 RX ADMIN — CARVEDILOL PHOSPHATE 25 MILLIGRAM(S): 80 CAPSULE, EXTENDED RELEASE ORAL at 18:43

## 2020-08-27 RX ADMIN — Medication 100 MILLIGRAM(S): at 05:59

## 2020-08-27 RX ADMIN — Medication 3: at 16:45

## 2020-08-27 RX ADMIN — CARVEDILOL PHOSPHATE 25 MILLIGRAM(S): 80 CAPSULE, EXTENDED RELEASE ORAL at 06:01

## 2020-08-27 RX ADMIN — Medication 2: at 12:41

## 2020-08-27 RX ADMIN — Medication 2: at 22:01

## 2020-08-27 RX ADMIN — Medication 81 MILLIGRAM(S): at 12:43

## 2020-08-27 RX ADMIN — Medication 200 MILLIGRAM(S): at 06:00

## 2020-08-27 RX ADMIN — Medication 4: at 07:42

## 2020-08-27 RX ADMIN — SODIUM CHLORIDE 75 MILLILITER(S): 9 INJECTION INTRAMUSCULAR; INTRAVENOUS; SUBCUTANEOUS at 19:37

## 2020-08-27 RX ADMIN — AMLODIPINE BESYLATE 5 MILLIGRAM(S): 2.5 TABLET ORAL at 06:01

## 2020-08-27 RX ADMIN — Medication 100 MILLIGRAM(S): at 14:31

## 2020-08-27 RX ADMIN — MIRTAZAPINE 30 MILLIGRAM(S): 45 TABLET, ORALLY DISINTEGRATING ORAL at 21:57

## 2020-08-27 RX ADMIN — INSULIN GLARGINE 8 UNIT(S): 100 INJECTION, SOLUTION SUBCUTANEOUS at 22:01

## 2020-08-27 RX ADMIN — Medication 100 MILLIGRAM(S): at 21:56

## 2020-08-27 RX ADMIN — Medication 200 MILLIGRAM(S): at 18:43

## 2020-08-27 NOTE — DIETITIAN INITIAL EVALUATION ADULT. - PERTINENT LABORATORY DATA
08-27 Na139 mmol/L Glu 252 mg/dL<H> K+ 3.7 mmol/L Cr  1.73 mg/dL<H> BUN 26 mg/dL<H> WBC 15.40 K/uL<H> 08-27 Phos 2.6 mg/dL 08-25 Alb 3.1 g/dL<L> 08-27 Chol 77 mg/dL LDL 31 mg/dL HDL 28 mg/dL<L> Trig 89 mg/dL

## 2020-08-27 NOTE — DIETITIAN INITIAL EVALUATION ADULT. - ETIOLOGY
Inadequate energy/protein intake related to cancer Inadequate energy/protein intake related to cancer, DM, CKD

## 2020-08-27 NOTE — DIETITIAN INITIAL EVALUATION ADULT. - MALNUTRITION
Severe Malnutrition in context of chronic illness Moderate Malnutrition in context of chronic illness

## 2020-08-27 NOTE — DIETITIAN INITIAL EVALUATION ADULT. - PERTINENT MEDS FT
MEDICATIONS  (STANDING):  amLODIPine   Tablet 5 milliGRAM(s) Oral daily  aspirin enteric coated 81 milliGRAM(s) Oral daily  carvedilol 25 milliGRAM(s) Oral every 12 hours  ciprofloxacin   IVPB 400 milliGRAM(s) IV Intermittent every 12 hours  dextrose 5%. 1000 milliLiter(s) (50 mL/Hr) IV Continuous <Continuous>  dextrose 50% Injectable 12.5 Gram(s) IV Push once  dextrose 50% Injectable 25 Gram(s) IV Push once  dextrose 50% Injectable 25 Gram(s) IV Push once  insulin glargine Injectable (LANTUS) 8 Unit(s) SubCutaneous at bedtime  insulin lispro (HumaLOG) corrective regimen sliding scale   SubCutaneous three times a day before meals  insulin lispro (HumaLOG) corrective regimen sliding scale   SubCutaneous at bedtime  metroNIDAZOLE  IVPB 500 milliGRAM(s) IV Intermittent every 8 hours  mirtazapine 30 milliGRAM(s) Oral at bedtime  sodium chloride 0.9%. 1000 milliLiter(s) (75 mL/Hr) IV Continuous <Continuous>    MEDICATIONS  (PRN):  dextrose 40% Gel 15 Gram(s) Oral once PRN Blood Glucose LESS THAN 70 milliGRAM(s)/deciliter  dextrose 40% Gel 15 Gram(s) Oral once PRN Blood Glucose LESS THAN 70 milliGRAM(s)/deciliter  glucagon  Injectable 1 milliGRAM(s) IntraMuscular once PRN Glucose LESS THAN 70 milligrams/deciliter  LORazepam     Tablet 0.5 milliGRAM(s) Oral two times a day PRN Anxiety  ondansetron Injectable 4 milliGRAM(s) IV Push every 6 hours PRN Nausea and/or Vomiting

## 2020-08-27 NOTE — PROGRESS NOTE ADULT - SUBJECTIVE AND OBJECTIVE BOX
Patient is a 80y old  Female who presents with a chief complaint of diarrhea and fall (26 Aug 2020 17:52)      INTERVAL HPI/OVERNIGHT EVENTS:      REVIEW OF SYSTEMS:   Remaining ROS negative    MEDICATIONS  (STANDING):  amLODIPine   Tablet 5 milliGRAM(s) Oral daily  aspirin enteric coated 81 milliGRAM(s) Oral daily  carvedilol 25 milliGRAM(s) Oral every 12 hours  ciprofloxacin   IVPB 400 milliGRAM(s) IV Intermittent every 12 hours  dextrose 5%. 1000 milliLiter(s) (50 mL/Hr) IV Continuous <Continuous>  dextrose 50% Injectable 12.5 Gram(s) IV Push once  dextrose 50% Injectable 25 Gram(s) IV Push once  dextrose 50% Injectable 25 Gram(s) IV Push once  insulin lispro (HumaLOG) corrective regimen sliding scale   SubCutaneous three times a day before meals  insulin lispro (HumaLOG) corrective regimen sliding scale   SubCutaneous at bedtime  metroNIDAZOLE  IVPB 500 milliGRAM(s) IV Intermittent every 8 hours  mirtazapine 30 milliGRAM(s) Oral at bedtime  sodium chloride 0.9%. 1000 milliLiter(s) (75 mL/Hr) IV Continuous <Continuous>    MEDICATIONS  (PRN):  dextrose 40% Gel 15 Gram(s) Oral once PRN Blood Glucose LESS THAN 70 milliGRAM(s)/deciliter  dextrose 40% Gel 15 Gram(s) Oral once PRN Blood Glucose LESS THAN 70 milliGRAM(s)/deciliter  glucagon  Injectable 1 milliGRAM(s) IntraMuscular once PRN Glucose LESS THAN 70 milligrams/deciliter  LORazepam     Tablet 0.5 milliGRAM(s) Oral two times a day PRN Anxiety  ondansetron Injectable 4 milliGRAM(s) IV Push every 6 hours PRN Nausea and/or Vomiting      Allergies    No Known Allergies    Intolerances        Vital Signs Last 24 Hrs  T(C): 36.6 (27 Aug 2020 05:15), Max: 37 (26 Aug 2020 17:10)  T(F): 97.8 (27 Aug 2020 05:15), Max: 98.6 (26 Aug 2020 17:10)  HR: 79 (27 Aug 2020 05:15) (65 - 96)  BP: 152/52 (27 Aug 2020 05:15) (133/52 - 152/52)  BP(mean): --  RR: 18 (27 Aug 2020 05:15) (17 - 18)  SpO2: 97% (27 Aug 2020 05:15) (97% - 100%)    PHYSICAL EXAM:  GENERAL: NAD  HEAD:  Atraumatic, Normocephalic  EYES: EOMI, PERRLA, conjunctiva and sclera clear  ENT: O/P Clear  NECK: Supple, No JVD  NERVOUS SYSTEM:  No focal deficits  CHEST/LUNG: Clear to percussion bilaterally; No rales, rhonchi, wheezing  HEART: Regular rate and rhythm; No murmurs, rubs, or gallops  ABDOMEN: Soft, Nontender, Nondistended; Bowel sounds present  EXTREMITIES:  2+ Peripheral Pulses, No clubbing, cyanosis, or edema  SKIN: No rashes or lesions    LABS:                        9.5    15.40 )-----------( 157      ( 27 Aug 2020 06:38 )             29.7     08-27    139  |  110<H>  |  26<H>  ----------------------------<  252<H>  3.7   |  20<L>  |  1.73<H>    Ca    7.6<L>      27 Aug 2020 06:38  Phos  2.6     08-  Mg     2.0     08-27    TPro  6.7  /  Alb  3.1<L>  /  TBili  0.8  /  DBili  x   /  AST  43<H>  /  ALT  31  /  AlkPhos  70  08-25      Urinalysis Basic - ( 25 Aug 2020 17:01 )    Color: Yellow / Appearance: Clear / S.005 / pH: x  Gluc: x / Ketone: Negative  / Bili: Negative / Urobili: Negative mg/dL   Blood: x / Protein: 15 mg/dL / Nitrite: Negative   Leuk Esterase: Small / RBC: 0-2 /HPF / WBC 0-2   Sq Epi: x / Non Sq Epi: Few / Bacteria: Few      CAPILLARY BLOOD GLUCOSE      POCT Blood Glucose.: 323 mg/dL (27 Aug 2020 07:41)  POCT Blood Glucose.: 281 mg/dL (26 Aug 2020 22:18)  POCT Blood Glucose.: 224 mg/dL (26 Aug 2020 17:07)  POCT Blood Glucose.: 61 mg/dL (26 Aug 2020 11:49)  POCT Blood Glucose.: 91 mg/dL (26 Aug 2020 09:59)  POCT Blood Glucose.: 68 mg/dL (26 Aug 2020 08:38)      RADIOLOGY & ADDITIONAL TESTS:    Imaging Personally Reviewed:  [ ] YES  [ ] NO    Consultant(s) Notes Reviewed:  [ ] YES  [ ] NO    Care Discussed with Consultants/Other Providers [ ] YES  [ ] NO Patient is a 80y old  Female who presents with a chief complaint of diarrhea and fall (26 Aug 2020 17:52)      INTERVAL HPI/OVERNIGHT EVENTS:    Several bouts of very loose diarrhea today; somewhat reddish in color; 2 of them landed on the floor; the 3rd was captured by RN and sent to lab.    REVIEW OF SYSTEMS:   Remaining ROS negative    MEDICATIONS  (STANDING):  amLODIPine   Tablet 5 milliGRAM(s) Oral daily  aspirin enteric coated 81 milliGRAM(s) Oral daily  carvedilol 25 milliGRAM(s) Oral every 12 hours  ciprofloxacin   IVPB 400 milliGRAM(s) IV Intermittent every 12 hours  dextrose 5%. 1000 milliLiter(s) (50 mL/Hr) IV Continuous <Continuous>  dextrose 50% Injectable 12.5 Gram(s) IV Push once  dextrose 50% Injectable 25 Gram(s) IV Push once  dextrose 50% Injectable 25 Gram(s) IV Push once  insulin lispro (HumaLOG) corrective regimen sliding scale   SubCutaneous three times a day before meals  insulin lispro (HumaLOG) corrective regimen sliding scale   SubCutaneous at bedtime  metroNIDAZOLE  IVPB 500 milliGRAM(s) IV Intermittent every 8 hours  mirtazapine 30 milliGRAM(s) Oral at bedtime  sodium chloride 0.9%. 1000 milliLiter(s) (75 mL/Hr) IV Continuous <Continuous>    MEDICATIONS  (PRN):  dextrose 40% Gel 15 Gram(s) Oral once PRN Blood Glucose LESS THAN 70 milliGRAM(s)/deciliter  dextrose 40% Gel 15 Gram(s) Oral once PRN Blood Glucose LESS THAN 70 milliGRAM(s)/deciliter  glucagon  Injectable 1 milliGRAM(s) IntraMuscular once PRN Glucose LESS THAN 70 milligrams/deciliter  LORazepam     Tablet 0.5 milliGRAM(s) Oral two times a day PRN Anxiety  ondansetron Injectable 4 milliGRAM(s) IV Push every 6 hours PRN Nausea and/or Vomiting      Allergies    No Known Allergies    Intolerances        Vital Signs Last 24 Hrs  T(C): 36.6 (27 Aug 2020 05:15), Max: 37 (26 Aug 2020 17:10)  T(F): 97.8 (27 Aug 2020 05:15), Max: 98.6 (26 Aug 2020 17:10)  HR: 79 (27 Aug 2020 05:15) (65 - 96)  BP: 152/52 (27 Aug 2020 05:15) (133/52 - 152/52)  BP(mean): --  RR: 18 (27 Aug 2020 05:15) (17 - 18)  SpO2: 97% (27 Aug 2020 05:15) (97% - 100%)    PHYSICAL EXAM:  GENERAL: NAD  HEAD:  Atraumatic, Normocephalic  EYES: EOMI, PERRLA, conjunctiva and sclera clear  ENT: O/P Clear  NECK: Supple, No JVD  NERVOUS SYSTEM:  No focal deficits  CHEST/LUNG: Clear to percussion bilaterally; No rales, rhonchi, wheezing  HEART: Regular rate and rhythm; No murmurs, rubs, or gallops  ABDOMEN: Soft, Nontender, Nondistended; Bowel sounds present  EXTREMITIES:  2+ Peripheral Pulses, No clubbing, cyanosis, or edema  SKIN: No rashes or lesions    LABS:                        9.5    15.40 )-----------( 157      ( 27 Aug 2020 06:38 )             29.7     08-27    139  |  110<H>  |  26<H>  ----------------------------<  252<H>  3.7   |  20<L>  |  1.73<H>    Ca    7.6<L>      27 Aug 2020 06:38  Phos  2.6     08-  Mg     2.0     08-27    TPro  6.7  /  Alb  3.1<L>  /  TBili  0.8  /  DBili  x   /  AST  43<H>  /  ALT  31  /  AlkPhos  70  08-25      Urinalysis Basic - ( 25 Aug 2020 17:01 )    Color: Yellow / Appearance: Clear / S.005 / pH: x  Gluc: x / Ketone: Negative  / Bili: Negative / Urobili: Negative mg/dL   Blood: x / Protein: 15 mg/dL / Nitrite: Negative   Leuk Esterase: Small / RBC: 0-2 /HPF / WBC 0-2   Sq Epi: x / Non Sq Epi: Few / Bacteria: Few      CAPILLARY BLOOD GLUCOSE      POCT Blood Glucose.: 323 mg/dL (27 Aug 2020 07:41)  POCT Blood Glucose.: 281 mg/dL (26 Aug 2020 22:18)  POCT Blood Glucose.: 224 mg/dL (26 Aug 2020 17:07)  POCT Blood Glucose.: 61 mg/dL (26 Aug 2020 11:49)  POCT Blood Glucose.: 91 mg/dL (26 Aug 2020 09:59)  POCT Blood Glucose.: 68 mg/dL (26 Aug 2020 08:38)      RADIOLOGY & ADDITIONAL TESTS:    Imaging Personally Reviewed:  [ ] YES  [ ] NO    Consultant(s) Notes Reviewed:  [ ] YES  [ ] NO    Care Discussed with Consultants/Other Providers [ ] YES  [ ] NO

## 2020-08-27 NOTE — PROGRESS NOTE ADULT - ASSESSMENT
HPI:  78yo female with pmh HTN, DM, presents with diarrhea, NV, and fall. PT reports she was in [facility] and without illness last night. THis am, woke up with multiple diarrhea, NV x 2. Pt feeling very weak and lightheaded and fell. Pt states her legs gave way, and hit hear with laceration. Family came home and found her on ground and states she was really weak when she tried to stand up.- dooes not think she passed out  PT without dizziness at rest. denies headache. + some abd pain. denies fever, recent abx, unusual food, black/bloody BM, cp, sob, palpitaitons. 	No fever/chills, No photophobia/eye pain/changes in vision, No ear pain/sore throat/dysphagia, No chest pain/palpitations, no SOB/cough, no wheeze/stridor, +abdominal pain, +N/V/D, no dysuria/frequency/discharge, No neck/back pain, no rash, + dizzy (25 Aug 2020 16:46)  ------------------- As Above  ----------------------  Patient is a poor historian. Presents with diarrhea. Patient unsure when it started. No N/V or bloody BMs. No fever., abdominal pain. Denies having a colonoscopy.     Colitis - Infectious VS Ischemic - CRP 8.19. WBC 16.09 --> 15.40 C Diff negative  doubt IBD - 1) stool culture / O&P  2) CRP f/u  3) Broad spectrum antibiotic 4) clear liquid diet 5) cardiology consult == Discussed with  HPI:  78yo female with pmh HTN, DM, presents with diarrhea, NV, and fall. PT reports she was in USOH and without illness last night. THis am, woke up with multiple diarrhea, NV x 2. Pt feeling very weak and lightheaded and fell. Pt states her legs gave way, and hit hear with laceration. Family came home and found her on ground and states she was really weak when she tried to stand up.- dooes not think she passed out  PT without dizziness at rest. denies headache. + some abd pain. denies fever, recent abx, unusual food, black/bloody BM, cp, sob, palpitaitons. 	No fever/chills, No photophobia/eye pain/changes in vision, No ear pain/sore throat/dysphagia, No chest pain/palpitations, no SOB/cough, no wheeze/stridor, +abdominal pain, +N/V/D, no dysuria/frequency/discharge, No neck/back pain, no rash, + dizzy (25 Aug 2020 16:46)  ------------------- As Above  ----------------------  Patient is a poor historian. Presents with diarrhea. Patient unsure when it started. No N/V or bloody BMs. No fever., abdominal pain. Denies having a colonoscopy.     Colitis - Infectious VS Ischemic - CRP 8.19. WBC 16.09 --> 15.40, afebrile,  C Diff negative  doubt IBD - 1) stool culture / O&P  2) CRP f/u  3) Broad spectrum antibiotic 4) clear liquid diet 5) cardiology consult == Discussed with

## 2020-08-27 NOTE — DIETITIAN INITIAL EVALUATION ADULT. - DIET TYPE
Soft, Consistent Carbohydrate (with evening snack), Low Sodium, Fiber/Residue Restricted, Lactose Restricted diet + Glucerna 2x daily (440 kcal & 20 gm protein) Recommend advance diet as tolerated to Full Liquid to Soft, Consistent Carbohydrate (with evening snack), Low Sodium, Fiber/Residue Restricted, Lactose Restricted diet + Glucerna 2x daily (440 kcal & 20 gm protein) when medically feasible

## 2020-08-27 NOTE — DIETITIAN INITIAL EVALUATION ADULT. - PHYSICAL APPEARANCE
BMI: 19.0 (underweight for age); No edema; Pt with visible muscle wasting/fat loss in following regions: orbital(moderate), temporal(moderate/severe), buccal(moderate), triceps(severe), clavicle(moderate) BMI: 19.0 (underweight, especially for age); No edema; Pt with visible muscle wasting/fat loss in following regions: orbital(moderate), temporal(moderate/severe), buccal(moderate), triceps(severe), clavicle(moderate)

## 2020-08-27 NOTE — PROGRESS NOTE ADULT - SUBJECTIVE AND OBJECTIVE BOX
Subjective: c/o "red urine"      MEDICATIONS  (STANDING):  amLODIPine   Tablet 5 milliGRAM(s) Oral daily  aspirin enteric coated 81 milliGRAM(s) Oral daily  carvedilol 25 milliGRAM(s) Oral every 12 hours  ciprofloxacin   IVPB 400 milliGRAM(s) IV Intermittent every 12 hours  dextrose 5%. 1000 milliLiter(s) (50 mL/Hr) IV Continuous <Continuous>  dextrose 50% Injectable 12.5 Gram(s) IV Push once  dextrose 50% Injectable 25 Gram(s) IV Push once  dextrose 50% Injectable 25 Gram(s) IV Push once  insulin lispro (HumaLOG) corrective regimen sliding scale   SubCutaneous three times a day before meals  insulin lispro (HumaLOG) corrective regimen sliding scale   SubCutaneous at bedtime  metroNIDAZOLE  IVPB 500 milliGRAM(s) IV Intermittent every 8 hours  mirtazapine 30 milliGRAM(s) Oral at bedtime  sodium chloride 0.9%. 1000 milliLiter(s) (75 mL/Hr) IV Continuous <Continuous>    MEDICATIONS  (PRN):  dextrose 40% Gel 15 Gram(s) Oral once PRN Blood Glucose LESS THAN 70 milliGRAM(s)/deciliter  dextrose 40% Gel 15 Gram(s) Oral once PRN Blood Glucose LESS THAN 70 milliGRAM(s)/deciliter  glucagon  Injectable 1 milliGRAM(s) IntraMuscular once PRN Glucose LESS THAN 70 milligrams/deciliter  LORazepam     Tablet 0.5 milliGRAM(s) Oral two times a day PRN Anxiety  ondansetron Injectable 4 milliGRAM(s) IV Push every 6 hours PRN Nausea and/or Vomiting          T(C): 36.6 (20 @ 05:15), Max: 37 (20 @ 17:10)  HR: 79 (20 @ 05:15) (65 - 96)  BP: 152/52 (20 @ 05:15) (133/52 - 152/52)  RR: 18 (20 @ 05:15) (17 - 18)  SpO2: 97% (20 @ 05:15) (97% - 100%)  Wt(kg): --        I&O's Detail    26 Aug 2020 07:01  -  27 Aug 2020 07:00  --------------------------------------------------------  IN:    Oral Fluid: 240 mL    Solution: 200 mL    Solution: 200 mL  Total IN: 640 mL    OUT:  Total OUT: 0 mL    Total NET: 640 mL               PHYSICAL EXAM:    GENERAL: NAD  NECK: Supple, no inc in JVP  CHEST/LUNG: Clear  HEART: S1S2  ABDOMEN: Soft, Nontender, Nondistended; Bowel sounds present  EXTREMITIES:  no edema  NEURO: no asterixis      LABS:  CBC Full  -  ( 27 Aug 2020 06:38 )  WBC Count : 15.40 K/uL  RBC Count : 2.97 M/uL  Hemoglobin : 9.5 g/dL  Hematocrit : 29.7 %  Platelet Count - Automated : 157 K/uL  Mean Cell Volume : 100.0 fl  Mean Cell Hemoglobin : 32.0 pg  Mean Cell Hemoglobin Concentration : 32.0 gm/dL  Auto Neutrophil # : x  Auto Lymphocyte # : x  Auto Monocyte # : x  Auto Eosinophil # : x  Auto Basophil # : x  Auto Neutrophil % : x  Auto Lymphocyte % : x  Auto Monocyte % : x  Auto Eosinophil % : x  Auto Basophil % : x    08    139  |  110<H>  |  26<H>  ----------------------------<  252<H>  3.7   |  20<L>  |  1.73<H>    Ca    7.6<L>      27 Aug 2020 06:38  Phos  2.6       Mg     2.0         TPro  6.7  /  Alb  3.1<L>  /  TBili  0.8  /  DBili  x   /  AST  43<H>  /  ALT  31  /  AlkPhos  70  08-25      Urinalysis Basic - ( 25 Aug 2020 17:01 )    Color: Yellow / Appearance: Clear / S.005 / pH: x  Gluc: x / Ketone: Negative  / Bili: Negative / Urobili: Negative mg/dL   Blood: x / Protein: 15 mg/dL / Nitrite: Negative   Leuk Esterase: Small / RBC: 0-2 /HPF / WBC 0-2   Sq Epi: x / Non Sq Epi: Few / Bacteria: Few      Culture Results:   >=3 organisms. Probable collection contamination. ( @ 00:52)        Impression:  NICK -- suspected pre-renal azotemia  CKD3-4    Recommendations:   Dec IVFs to 50cc/h  Cont to hold Lisinopril and Doxazosin;   BMP in am

## 2020-08-27 NOTE — PROGRESS NOTE ADULT - PROBLEM SELECTOR PLAN 5
Normally takes Farxiga vs Glyxambi (Empagliflozin/Linagliptin) and Insulin  HbA1c = 6.8  While in-house, use SSI; add Lantus if needed  Has been mildly hypoglycemic so far Normally takes Farxiga vs Glyxambi (Empagliflozin/Linagliptin) and Soliqua (Glargine / lixisenatide) 20 units daily  HbA1c = 6.8  While in-house, use SSI  Had been mildly hypoglycemic, but now sugars rising  Will start low dose Lantus Normally takes Glyxambi (Empagliflozin/Linagliptin) and Soliqua (Glargine / lixisenatide) 30 units daily (dose recently increased)  HbA1c = 6.8  While in-house, use SSI  Had been mildly hypoglycemic, but now sugars rising  Will start low dose Lantus

## 2020-08-27 NOTE — DIETITIAN INITIAL EVALUATION ADULT. - ENERGY NEEDS
Ht: 5'4", Wt: 50.3 kg, IBW: 54.5 kg +/- 10%, UBW: 63.5 kg, %IBW: 92.3%, %UBW: 79.2% Ht: 5'4", Wt: 50.3 kg, IBW: 54.5 kg +/- 10%, UBW: 57.1 kg, %IBW: 92.3%, %UBW: 88.1%

## 2020-08-27 NOTE — DIETITIAN INITIAL EVALUATION ADULT. - OTHER INFO
Pt with HTN, hypercholesterolemia, DM, CKD, anxiety/depression, oral cancer, skin cancer - recent tx of cancer (pt reported she was receiving chemo/radiation until 04/2020). Pt lives at home with spouse who does food shopping/cooking. Pt reported she was not restricting her intake of CHO or salt at home due to depressed appetite x several months due to recent tx for cancer & did not want to lose too much weight. Pt reported appetite has improved recently and hopes to gain some wt back. Pt's # x 1 year ago; currently 110#. Pt reported some difficulty chewing/swallowing if food is too chewy, tough; will recommend soft diet. Pt with PMHx HTN, hypercholesterolemia, DM, CKD, anxiety/depression, oral cancer, skin cancer - pt reported recent tx of cancer (reported she was receiving chemo/radiation until 04/2020). Pt appeared to be poor historian. Pt lives at home with spouse who does food shopping/cooking. Pt reported she was not restricting her intake of CHO or salt at home due to depressed appetite x several months & did not want to lose too much weight. Pt reported appetite has improved recently, hopes to gain some wt back. Per chart review, pt weighed 126# x 1 year ago; currently 110#. Pt reported some difficulty chewing/swallowing if food is too chewy, tough; will recommend soft diet.

## 2020-08-27 NOTE — PROGRESS NOTE ADULT - SUBJECTIVE AND OBJECTIVE BOX
Patient is a 80y old  Female who presents with a chief complaint of diarrhea and fall (27 Aug 2020 10:53)      HPI:  78yo female with pmh HTN, DM, presents with diarrhea, NV, and fall. PT reports she was in USOH and without illness last night. THis am, woke up with multiple diarrhea, NV x 2. Pt feeling very weak and lightheaded and fell. Pt states her legs gave way, and hit hear with laceration. Family came home and found her on ground and states she was really weak when she tried to stand up.- dooes not think she passed out  PT without dizziness at rest. denies headache. + some abd pain. denies fever, recent abx, unusual food, black/bloody BM, cp, sob, palpitaitons. 	No fever/chills, No photophobia/eye pain/changes in vision, No ear pain/sore throat/dysphagia, No chest pain/palpitations, no SOB/cough, no wheeze/stridor, +abdominal pain, +N/V/D, no dysuria/frequency/discharge, No neck/back pain, no rash, + dizzy (25 Aug 2020 16:46)      INTERVAL HPI/OVERNIGHT EVENTS:  Patient had 3 bouts of bloody diarrhea earlier today. Denies abdominal pain     MEDICATIONS  (STANDING):  amLODIPine   Tablet 5 milliGRAM(s) Oral daily  aspirin enteric coated 81 milliGRAM(s) Oral daily  carvedilol 25 milliGRAM(s) Oral every 12 hours  ciprofloxacin   IVPB 400 milliGRAM(s) IV Intermittent every 12 hours  dextrose 5%. 1000 milliLiter(s) (50 mL/Hr) IV Continuous <Continuous>  dextrose 50% Injectable 12.5 Gram(s) IV Push once  dextrose 50% Injectable 25 Gram(s) IV Push once  dextrose 50% Injectable 25 Gram(s) IV Push once  insulin glargine Injectable (LANTUS) 8 Unit(s) SubCutaneous at bedtime  insulin lispro (HumaLOG) corrective regimen sliding scale   SubCutaneous three times a day before meals  insulin lispro (HumaLOG) corrective regimen sliding scale   SubCutaneous at bedtime  metroNIDAZOLE  IVPB 500 milliGRAM(s) IV Intermittent every 8 hours  mirtazapine 30 milliGRAM(s) Oral at bedtime  sodium chloride 0.9%. 1000 milliLiter(s) (75 mL/Hr) IV Continuous <Continuous>    MEDICATIONS  (PRN):  dextrose 40% Gel 15 Gram(s) Oral once PRN Blood Glucose LESS THAN 70 milliGRAM(s)/deciliter  dextrose 40% Gel 15 Gram(s) Oral once PRN Blood Glucose LESS THAN 70 milliGRAM(s)/deciliter  glucagon  Injectable 1 milliGRAM(s) IntraMuscular once PRN Glucose LESS THAN 70 milligrams/deciliter  LORazepam     Tablet 0.5 milliGRAM(s) Oral two times a day PRN Anxiety  ondansetron Injectable 4 milliGRAM(s) IV Push every 6 hours PRN Nausea and/or Vomiting      FAMILY HISTORY:  FH: dementia  Family history of Alzheimer's disease      Allergies    No Known Allergies    Intolerances        PMH/PSH:  Diabetes mellitus  Osteopenia  Hypercholesterolemia  Glaucoma  Chronic kidney disease (CKD)  Hypertension  Hyperuricemia  Depression  Anxiety  Tongue cancer  Tongue cancer        REVIEW OF SYSTEMS:  CONSTITUTIONAL: No fever, weight loss,  EYES: No eye pain, visual disturbances, or discharge  ENMT:  No difficulty hearing, tinnitus, vertigo; No sinus or throat pain  NECK: No pain or stiffness  BREASTS: No pain, masses, or nipple discharge  RESPIRATORY: No cough, wheezing, chills or hemoptysis; No shortness of breath  CARDIOVASCULAR: No chest pain, palpitations, dizziness, or leg swelling  GASTROINTESTINAL: See above  GENITOURINARY: No dysuria, frequency, hematuria, or incontinence  NEUROLOGICAL: No headaches, , numbness, or tremors  SKIN: No itching, burning, rashes, or lesions   LYMPH NODES: No enlarged glands  ENDOCRINE: No heat or cold intolerance; No hair loss  MUSCULOSKELETAL: No joint pain or swelling; No muscle, back, or extremity pain  PSYCHIATRIC: No depression, anxiety, mood swings, or difficulty sleeping  HEME/LYMPH: No easy bruising, or bleeding gums  ALLERGY AND IMMUNOLOGIC: No hives or eczema    Vital Signs Last 24 Hrs  T(C): 36.6 (27 Aug 2020 17:02), Max: 36.8 (26 Aug 2020 23:03)  T(F): 97.8 (27 Aug 2020 17:02), Max: 98.2 (26 Aug 2020 23:03)  HR: 65 (27 Aug 2020 17:02) (65 - 96)  BP: 131/50 (27 Aug 2020 17:02) (131/50 - 152/52)  BP(mean): --  RR: 17 (27 Aug 2020 17:02) (17 - 18)  SpO2: 99% (27 Aug 2020 17:02) (97% - 100%)    PHYSICAL EXAM:  GENERAL: NAD, well-groomed, well-developed  HEAD:  Atraumatic, Normocephalic  EYES: EOMI, PERRLA, conjunctiva and sclera clear  NECK: Supple, No JVD, Normal thyroid  NERVOUS SYSTEM:  Alert & Oriented X3, Good concentration; Motor Strength 5/5 B/L upper and lower extremities;   CHEST/LUNG: Clear to percussion bilaterally; No rales, rhonchi, wheezing, or rubs  HEART: Regular rate and rhythm; No murmurs, rubs, or gallops  ABDOMEN: Soft, Nontender, Nondistended; Bowel sounds present  EXTREMITIES:  2+ Peripheral Pulses, No clubbing, cyanosis, or edema  LYMPH: No lymphadenopathy noted  SKIN: No rashes or lesions    LAB  08-25 @ 13:56  amylase --   lipase 87                           9.5    15.40 )-----------( 157      ( 27 Aug 2020 06:38 )             29.7       CBC:  08-27 @ 06:38  WBC 15.40   Hgb 9.5   Hct 29.7   Plts 157  .0  08-26 @ 06:35  WBC 16.09   Hgb 9.8   Hct 31.3   Plts 189  .3  08-25 @ 13:56  WBC 14.68   Hgb 11.4   Hct 35.4   Plts 227  .3      Chemistry:  08-27 @ 06:38  Na+ 139  K+ 3.7  Cl- 110  CO2 20  BUN 26  Cr 1.73     08-26 @ 06:35  Na+ 143  K+ 3.7  Cl- 115  CO2 23  BUN 31  Cr 1.53     08-25 @ 13:56  Na+ 145  K+ 5.7  Cl- 113  CO2 25  BUN 38  Cr 2.29         Glucose, Serum: 252 mg/dL (08-27 @ 06:38)  Glucose, Serum: 53 mg/dL (08-26 @ 06:35)  Glucose, Serum: 88 mg/dL (08-25 @ 13:56)      27 Aug 2020 06:38    139    |  110    |  26     ----------------------------<  252    3.7     |  20     |  1.73   26 Aug 2020 06:35    143    |  115    |  31     ----------------------------<  53     3.7     |  23     |  1.53   25 Aug 2020 13:56    145    |  113    |  38     ----------------------------<  88     5.7     |  25     |  2.29     Ca    7.6        27 Aug 2020 06:38  Ca    7.7        26 Aug 2020 06:35  Ca    9.8        25 Aug 2020 13:56  Phos  2.6       27 Aug 2020 06:38  Mg     2.0       27 Aug 2020 06:38    TPro  6.7    /  Alb  3.1    /  TBili  0.8    /  DBili  x      /  AST  43     /  ALT  31     /  AlkPhos  70     25 Aug 2020 13:56              CAPILLARY BLOOD GLUCOSE      POCT Blood Glucose.: 284 mg/dL (27 Aug 2020 16:44)  POCT Blood Glucose.: 203 mg/dL (27 Aug 2020 11:51)  POCT Blood Glucose.: 323 mg/dL (27 Aug 2020 07:41)  POCT Blood Glucose.: 281 mg/dL (26 Aug 2020 22:18)      C-Reactive Protein, Serum: 8.19 mg/dL (08-26 @ 09:50)      RADIOLOGY & ADDITIONAL TESTS:    Imaging Personally Reviewed:  [ ] YES  [ ] NO    Consultant(s) Notes Reviewed:  [ ] YES  [ ] NO    Care Discussed with Consultants/Other Providers [ ] YES  [ ] NO

## 2020-08-27 NOTE — DIETITIAN INITIAL EVALUATION ADULT. - SIGNS/SYMPTOMS
Physical signs of severe muscle wasting/fat loss; 21% wt loss x 1 year Physical signs of moderate muscle wasting/fat loss as noted above; 12% wt loss x 1 year

## 2020-08-27 NOTE — PROGRESS NOTE ADULT - PROBLEM SELECTOR PLAN 1
Not yet clear if ischemic vs infectious  C. diff ordered/pending  Gastroenterologist consulted - ([place])  On empiric cipro / flagyl  Due to possibility of ischemic colitis, will get echo, lipids and d/w cardio Not yet clear if ischemic vs infectious  C. diff ordered/pending  Gastroenterologist consulted - ([place])  On empiric cipro / flagyl  Due to possibility of ischemic colitis, will get echo, lipids (both pending)  Discussed w/ cardio who recommends outpatient stress test, but not further inpatient testing unless (a) pt develops chest pain or (b) surgery planned. Not yet clear if ischemic vs infectious  C. diff negative x 1  Stool culture pending  Gastroenterologist consulted - ([place])  On empiric cipro / flagyl  Due to possibility of ischemic colitis, getting a brief vascular workup:   Echo done; report pending...   Carotid dopplers show "moderate placquing" but no stenoses   Lipids very low overall (TC = 77; LDL=32; HDL=28)   Discussed w/ cardio who recommends outpatient stress test, but no further inpatient testing unless (a) pt develops chest pain or (b) ischemic colitis confirmed and surgery planned.

## 2020-08-27 NOTE — CHART NOTE - NSCHARTNOTEFT_GEN_A_CORE
Upon Nutritional Assessment by the Registered Dietitian your patient was determined to meet criteria / has evidence of the following diagnosis/diagnoses:          [ ]  Mild Protein Calorie Malnutrition        [x]  Moderate, Chronic Protein Calorie Malnutrition        [ ] Severe Protein Calorie Malnutrition        [ ] Unspecified Protein Calorie Malnutrition        [x] Underweight / BMI = 19        [ ] Morbid Obesity / BMI > 40      Findings as based on:  •  Comprehensive nutrition assessment and consultation  •  Calorie counts (nutrient intake analysis)  •  Food acceptance and intake status from observations by staff  •  Follow up  •  Patient education  •  Intervention secondary to interdisciplinary rounds  •   concerns      Treatment:    The following diet has been recommended:  Recommend advance diet as tolerated to Full Liquid to Soft, Consistent Carbohydrate (with evening snack), Low Sodium, Fiber/Residue Restricted, Lactose Restricted diet + Glucerna 2x daily (440 kcal & 20 gm protein) when medically feasible    PROVIDER Section:     By signing this assessment you are acknowledging and agree with the diagnosis/diagnoses assigned by the Registered Dietitian    Comments:

## 2020-08-28 LAB
ANION GAP SERPL CALC-SCNC: 7 MMOL/L — SIGNIFICANT CHANGE UP (ref 5–17)
BUN SERPL-MCNC: 22 MG/DL — SIGNIFICANT CHANGE UP (ref 7–23)
CALCIUM SERPL-MCNC: 7.7 MG/DL — LOW (ref 8.5–10.1)
CHLORIDE SERPL-SCNC: 112 MMOL/L — HIGH (ref 96–108)
CO2 SERPL-SCNC: 20 MMOL/L — LOW (ref 22–31)
CREAT SERPL-MCNC: 1.51 MG/DL — HIGH (ref 0.5–1.3)
CRP SERPL-MCNC: 15.14 MG/DL — HIGH (ref 0–0.4)
GLUCOSE BLDC GLUCOMTR-MCNC: 170 MG/DL — HIGH (ref 70–99)
GLUCOSE BLDC GLUCOMTR-MCNC: 213 MG/DL — HIGH (ref 70–99)
GLUCOSE BLDC GLUCOMTR-MCNC: 241 MG/DL — HIGH (ref 70–99)
GLUCOSE BLDC GLUCOMTR-MCNC: 255 MG/DL — HIGH (ref 70–99)
GLUCOSE SERPL-MCNC: 221 MG/DL — HIGH (ref 70–99)
HCT VFR BLD CALC: 31 % — LOW (ref 34.5–45)
HGB BLD-MCNC: 9.6 G/DL — LOW (ref 11.5–15.5)
MCHC RBC-ENTMCNC: 31 GM/DL — LOW (ref 32–36)
MCHC RBC-ENTMCNC: 31.5 PG — SIGNIFICANT CHANGE UP (ref 27–34)
MCV RBC AUTO: 101.6 FL — HIGH (ref 80–100)
NRBC # BLD: 0 /100 WBCS — SIGNIFICANT CHANGE UP (ref 0–0)
PLATELET # BLD AUTO: 186 K/UL — SIGNIFICANT CHANGE UP (ref 150–400)
POTASSIUM SERPL-MCNC: 3.1 MMOL/L — LOW (ref 3.5–5.3)
POTASSIUM SERPL-SCNC: 3.1 MMOL/L — LOW (ref 3.5–5.3)
RBC # BLD: 3.05 M/UL — LOW (ref 3.8–5.2)
RBC # FLD: 12 % — SIGNIFICANT CHANGE UP (ref 10.3–14.5)
SODIUM SERPL-SCNC: 139 MMOL/L — SIGNIFICANT CHANGE UP (ref 135–145)
WBC # BLD: 15.01 K/UL — HIGH (ref 3.8–10.5)
WBC # FLD AUTO: 15.01 K/UL — HIGH (ref 3.8–10.5)

## 2020-08-28 PROCEDURE — 99233 SBSQ HOSP IP/OBS HIGH 50: CPT

## 2020-08-28 RX ORDER — DEXTROSE MONOHYDRATE, SODIUM CHLORIDE, AND POTASSIUM CHLORIDE 50; .745; 4.5 G/1000ML; G/1000ML; G/1000ML
1000 INJECTION, SOLUTION INTRAVENOUS
Refills: 0 | Status: DISCONTINUED | OUTPATIENT
Start: 2020-08-28 | End: 2020-08-28

## 2020-08-28 RX ORDER — DOXAZOSIN MESYLATE 4 MG
1 TABLET ORAL AT BEDTIME
Refills: 0 | Status: DISCONTINUED | OUTPATIENT
Start: 2020-08-28 | End: 2020-08-30

## 2020-08-28 RX ORDER — INSULIN GLARGINE 100 [IU]/ML
12 INJECTION, SOLUTION SUBCUTANEOUS AT BEDTIME
Refills: 0 | Status: DISCONTINUED | OUTPATIENT
Start: 2020-08-28 | End: 2020-08-29

## 2020-08-28 RX ORDER — POTASSIUM CHLORIDE 20 MEQ
10 PACKET (EA) ORAL THREE TIMES A DAY
Refills: 0 | Status: DISCONTINUED | OUTPATIENT
Start: 2020-08-28 | End: 2020-09-01

## 2020-08-28 RX ORDER — POTASSIUM CHLORIDE 20 MEQ
20 PACKET (EA) ORAL ONCE
Refills: 0 | Status: COMPLETED | OUTPATIENT
Start: 2020-08-28 | End: 2020-08-28

## 2020-08-28 RX ADMIN — Medication 81 MILLIGRAM(S): at 11:25

## 2020-08-28 RX ADMIN — Medication 100 MILLIGRAM(S): at 23:06

## 2020-08-28 RX ADMIN — Medication 200 MILLIGRAM(S): at 17:10

## 2020-08-28 RX ADMIN — INSULIN GLARGINE 12 UNIT(S): 100 INJECTION, SOLUTION SUBCUTANEOUS at 23:06

## 2020-08-28 RX ADMIN — Medication 100 MILLIGRAM(S): at 06:18

## 2020-08-28 RX ADMIN — Medication 1: at 16:27

## 2020-08-28 RX ADMIN — Medication 1 MILLIGRAM(S): at 23:04

## 2020-08-28 RX ADMIN — Medication 2: at 11:23

## 2020-08-28 RX ADMIN — AMLODIPINE BESYLATE 5 MILLIGRAM(S): 2.5 TABLET ORAL at 06:21

## 2020-08-28 RX ADMIN — Medication 200 MILLIGRAM(S): at 06:19

## 2020-08-28 RX ADMIN — Medication 20 MILLIEQUIVALENT(S): at 09:07

## 2020-08-28 RX ADMIN — CARVEDILOL PHOSPHATE 25 MILLIGRAM(S): 80 CAPSULE, EXTENDED RELEASE ORAL at 17:10

## 2020-08-28 RX ADMIN — Medication 3: at 07:44

## 2020-08-28 RX ADMIN — Medication 100 MILLIGRAM(S): at 16:05

## 2020-08-28 RX ADMIN — CARVEDILOL PHOSPHATE 25 MILLIGRAM(S): 80 CAPSULE, EXTENDED RELEASE ORAL at 06:21

## 2020-08-28 RX ADMIN — MIRTAZAPINE 30 MILLIGRAM(S): 45 TABLET, ORALLY DISINTEGRATING ORAL at 23:06

## 2020-08-28 RX ADMIN — DEXTROSE MONOHYDRATE, SODIUM CHLORIDE, AND POTASSIUM CHLORIDE 50 MILLILITER(S): 50; .745; 4.5 INJECTION, SOLUTION INTRAVENOUS at 09:13

## 2020-08-28 RX ADMIN — Medication 10 MILLIEQUIVALENT(S): at 23:04

## 2020-08-28 NOTE — PROGRESS NOTE ADULT - SUBJECTIVE AND OBJECTIVE BOX
Patient is a 80y old  Female who presents with a chief complaint of diarrhea and fall (28 Aug 2020 13:38)      HPI:  80yo female with pmh HTN, DM, presents with diarrhea, NV, and fall. PT reports she was in [facility] and without illness last night. THis am, woke up with multiple diarrhea, NV x 2. Pt feeling very weak and lightheaded and fell. Pt states her legs gave way, and hit hear with laceration. Family came home and found her on ground and states she was really weak when she tried to stand up.- dooes not think she passed out  PT without dizziness at rest. denies headache. + some abd pain. denies fever, recent abx, unusual food, black/bloody BM, cp, sob, palpitaitons. 	No fever/chills, No photophobia/eye pain/changes in vision, No ear pain/sore throat/dysphagia, No chest pain/palpitations, no SOB/cough, no wheeze/stridor, +abdominal pain, +N/V/D, no dysuria/frequency/discharge, No neck/back pain, no rash, + dizzy (25 Aug 2020 16:46)      INTERVAL HPI/OVERNIGHT EVENTS:  Diarrhea still present but is now non bloody.  The patient / nurse denies melena, hematochezia, hematemesis, nausea, vomiting, abdominal pain, constipation, diarrhea, or change in bowel movements Tolerating clear liquids. Stool specimens still pending    MEDICATIONS  (STANDING):  amLODIPine   Tablet 5 milliGRAM(s) Oral daily  aspirin enteric coated 81 milliGRAM(s) Oral daily  carvedilol 25 milliGRAM(s) Oral every 12 hours  ciprofloxacin   IVPB 400 milliGRAM(s) IV Intermittent every 12 hours  dextrose 5%. 1000 milliLiter(s) (50 mL/Hr) IV Continuous <Continuous>  dextrose 50% Injectable 12.5 Gram(s) IV Push once  dextrose 50% Injectable 25 Gram(s) IV Push once  dextrose 50% Injectable 25 Gram(s) IV Push once  insulin glargine Injectable (LANTUS) 8 Unit(s) SubCutaneous at bedtime  insulin lispro (HumaLOG) corrective regimen sliding scale   SubCutaneous three times a day before meals  insulin lispro (HumaLOG) corrective regimen sliding scale   SubCutaneous at bedtime  metroNIDAZOLE  IVPB 500 milliGRAM(s) IV Intermittent every 8 hours  mirtazapine 30 milliGRAM(s) Oral at bedtime  sodium chloride 0.9% with potassium chloride 20 mEq/L 1000 milliLiter(s) (50 mL/Hr) IV Continuous <Continuous>    MEDICATIONS  (PRN):  dextrose 40% Gel 15 Gram(s) Oral once PRN Blood Glucose LESS THAN 70 milliGRAM(s)/deciliter  dextrose 40% Gel 15 Gram(s) Oral once PRN Blood Glucose LESS THAN 70 milliGRAM(s)/deciliter  glucagon  Injectable 1 milliGRAM(s) IntraMuscular once PRN Glucose LESS THAN 70 milligrams/deciliter  LORazepam     Tablet 0.5 milliGRAM(s) Oral two times a day PRN Anxiety  ondansetron Injectable 4 milliGRAM(s) IV Push every 6 hours PRN Nausea and/or Vomiting      FAMILY HISTORY:  FH: dementia  Family history of Alzheimer's disease      Allergies    No Known Allergies    Intolerances        PMH/PSH:  Diabetes mellitus  Osteopenia  Hypercholesterolemia  Glaucoma  Chronic kidney disease (CKD)  Hypertension  Hyperuricemia  Depression  Anxiety  Tongue cancer  Tongue cancer        REVIEW OF SYSTEMS:  CONSTITUTIONAL: No fever, weight loss,   EYES: No eye pain, visual disturbances, or discharge  ENMT:  No difficulty hearing, tinnitus, vertigo; No sinus or throat pain  NECK: No pain or stiffness  BREASTS: No pain, masses, or nipple discharge  RESPIRATORY: No cough, wheezing, chills or hemoptysis; No shortness of breath  CARDIOVASCULAR: No chest pain, palpitations, dizziness, or leg swelling  GASTROINTESTINAL:  see above  GENITOURINARY: No dysuria, frequency, hematuria, or incontinence  NEUROLOGICAL: No headaches, memory loss, loss of strength, numbness, or tremors  SKIN: No itching, burning, rashes, or lesions   LYMPH NODES: No enlarged glands  ENDOCRINE: No heat or cold intolerance; No hair loss  MUSCULOSKELETAL: No joint pain or swelling; No muscle, back, or extremity pain  PSYCHIATRIC: No depression, anxiety, mood swings, or difficulty sleeping  HEME/LYMPH: No easy bruising, or bleeding gums  ALLERGY AND IMMUNOLOGIC: No hives or eczema    Vital Signs Last 24 Hrs  T(C): 36.7 (28 Aug 2020 12:12), Max: 36.7 (28 Aug 2020 06:33)  T(F): 98 (28 Aug 2020 12:12), Max: 98.1 (28 Aug 2020 06:33)  HR: 62 (28 Aug 2020 12:12) (62 - 70)  BP: 147/57 (28 Aug 2020 12:12) (131/50 - 150/58)  BP(mean): --  RR: 17 (28 Aug 2020 12:12) (17 - 18)  SpO2: 97% (28 Aug 2020 12:12) (97% - 99%)    PHYSICAL EXAM:  GENERAL: NAD, well-groomed, well-developed  HEAD:  Atraumatic, Normocephalic  EYES: EOMI, PERRLA, conjunctiva and sclera clear  ENMT: No tonsillar erythema, exudates, or enlargement; Moist mucous membranes, Good dentition, No lesions  NECK: Supple, No JVD, Normal thyroid  NERVOUS SYSTEM:  Alert & Oriented  Good concentration;   CHEST/LUNG: Clear to percussion bilaterally; No rales, rhonchi, wheezing, or rubs  HEART: Regular rate and rhythm; No murmurs, rubs, or gallops  ABDOMEN: Soft, Nontender, Nondistended; Bowel sounds present  EXTREMITIES:  2+ Peripheral Pulses, No clubbing, cyanosis, or edema  LYMPH: No lymphadenopathy noted  SKIN: No rashes or lesions    LAB   @ 13:56  amylase --   lipase 87                           9.6    15.01 )-----------( 186      ( 28 Aug 2020 06:28 )             31.0       CBC:   @ 06:28  WBC 15.01   Hgb 9.6   Hct 31.0   Plts 186  .6   @ 06:38  WBC 15.40   Hgb 9.5   Hct 29.7   Plts 157  .0   @ 06:35  WBC 16.09   Hgb 9.8   Hct 31.3   Plts 189  .3   @ 13:56  WBC 14.68   Hgb 11.4   Hct 35.4   Plts 227  .3      Chemistry:   @ 06:28  Na+ 139  K+ 3.1  Cl- 112  CO2 20  BUN 22  Cr 1.51      @ 06:38  Na+ 139  K+ 3.7  Cl- 110  CO2 20  BUN 26  Cr 1.73      @ 06:35  Na+ 143  K+ 3.7  Cl- 115  CO2 23  BUN 31  Cr 1.53      @ 13:56  Na+ 145  K+ 5.7  Cl- 113  CO2 25  BUN 38  Cr 2.29         Glucose, Serum: 221 mg/dL ( @ 06:28)  Glucose, Serum: 252 mg/dL ( @ 06:38)  Glucose, Serum: 53 mg/dL ( @ 06:35)  Glucose, Serum: 88 mg/dL ( @ 13:56)      28 Aug 2020 06:28    139    |  112    |  22     ----------------------------<  221    3.1     |  20     |  1.51   27 Aug 2020 06:38    139    |  110    |  26     ----------------------------<  252    3.7     |  20     |  1.73   26 Aug 2020 06:35    143    |  115    |  31     ----------------------------<  53     3.7     |  23     |  1.53   25 Aug 2020 13:56    145    |  113    |  38     ----------------------------<  88     5.7     |  25     |  2.29     Ca    7.7        28 Aug 2020 06:28  Ca    7.6        27 Aug 2020 06:38  Ca    7.7        26 Aug 2020 06:35  Ca    9.8        25 Aug 2020 13:56  Phos  2.6       27 Aug 2020 06:38  Mg     2.0       27 Aug 2020 06:38    TPro  6.7    /  Alb  3.1    /  TBili  0.8    /  DBili  x      /  AST  43     /  ALT  31     /  AlkPhos  70     25 Aug 2020 13:56          Urinalysis Basic - ( 27 Aug 2020 21:20 )    Color: Yellow / Appearance: Clear / S.015 / pH: x  Gluc: x / Ketone: Negative  / Bili: Negative / Urobili: Negative mg/dL   Blood: x / Protein: 30 mg/dL / Nitrite: Negative   Leuk Esterase: Trace / RBC: Negative /HPF / WBC 3-5   Sq Epi: x / Non Sq Epi: Moderate / Bacteria: Few        CAPILLARY BLOOD GLUCOSE      POCT Blood Glucose.: 241 mg/dL (28 Aug 2020 11:21)  POCT Blood Glucose.: 255 mg/dL (28 Aug 2020 07:42)  POCT Blood Glucose.: 304 mg/dL (27 Aug 2020 21:59)  POCT Blood Glucose.: 284 mg/dL (27 Aug 2020 16:44)      C-Reactive Protein, Serum: 15.14 mg/dL ( @ 08:44)  C-Reactive Protein, Serum: 8.19 mg/dL ( @ 09:50)      RADIOLOGY & ADDITIONAL TESTS:    Imaging Personally Reviewed:  [ ] YES  [ ] NO    Consultant(s) Notes Reviewed:  [ ] YES  [ ] NO    Care Discussed with Consultants/Other Providers [ ] YES  [ ] NO

## 2020-08-28 NOTE — PROGRESS NOTE ADULT - PROBLEM SELECTOR PLAN 1
Not yet clear if ischemic vs infectious  C. diff negative x 1  Stool culture still pending - considerable difficulty getting sample due to urgency of stools  Gastroenterologist consulted - ([place])  On empiric cipro / flagyl  Due to possibility of ischemic colitis, getting a brief vascular workup:   Echo done; report pending...   Carotid dopplers show "moderate placquing" but no stenoses   Lipids very low overall (TC = 77; LDL=32; HDL=28)   Discussed w/ cardio who recommends outpatient stress test, but no further inpatient testing unless (a) pt develops chest pain or (b) ischemic colitis confirmed and surgery planned.

## 2020-08-28 NOTE — PROGRESS NOTE ADULT - SUBJECTIVE AND OBJECTIVE BOX
Patient is a 80y old  Female who presents with a chief complaint of diarrhea and fall (28 Aug 2020 15:42)      INTERVAL HPI/OVERNIGHT EVENTS:    Feels a little better; diarrhea not quite as profuse and urgent    REVIEW OF SYSTEMS:   Remaining ROS negative    MEDICATIONS  (STANDING):  amLODIPine   Tablet 5 milliGRAM(s) Oral daily  aspirin enteric coated 81 milliGRAM(s) Oral daily  carvedilol 25 milliGRAM(s) Oral every 12 hours  ciprofloxacin   IVPB 400 milliGRAM(s) IV Intermittent every 12 hours  dextrose 5%. 1000 milliLiter(s) (50 mL/Hr) IV Continuous <Continuous>  dextrose 50% Injectable 12.5 Gram(s) IV Push once  dextrose 50% Injectable 25 Gram(s) IV Push once  dextrose 50% Injectable 25 Gram(s) IV Push once  insulin glargine Injectable (LANTUS) 8 Unit(s) SubCutaneous at bedtime  insulin lispro (HumaLOG) corrective regimen sliding scale   SubCutaneous three times a day before meals  insulin lispro (HumaLOG) corrective regimen sliding scale   SubCutaneous at bedtime  metroNIDAZOLE  IVPB 500 milliGRAM(s) IV Intermittent every 8 hours  mirtazapine 30 milliGRAM(s) Oral at bedtime  sodium chloride 0.9% with potassium chloride 20 mEq/L 1000 milliLiter(s) (50 mL/Hr) IV Continuous <Continuous>    MEDICATIONS  (PRN):  dextrose 40% Gel 15 Gram(s) Oral once PRN Blood Glucose LESS THAN 70 milliGRAM(s)/deciliter  dextrose 40% Gel 15 Gram(s) Oral once PRN Blood Glucose LESS THAN 70 milliGRAM(s)/deciliter  glucagon  Injectable 1 milliGRAM(s) IntraMuscular once PRN Glucose LESS THAN 70 milligrams/deciliter  LORazepam     Tablet 0.5 milliGRAM(s) Oral two times a day PRN Anxiety  ondansetron Injectable 4 milliGRAM(s) IV Push every 6 hours PRN Nausea and/or Vomiting      Allergies    No Known Allergies    Intolerances        Vital Signs Last 24 Hrs  T(C): 36.6 (28 Aug 2020 17:26), Max: 36.7 (28 Aug 2020 06:33)  T(F): 97.9 (28 Aug 2020 17:26), Max: 98.1 (28 Aug 2020 06:33)  HR: 64 (28 Aug 2020 17:26) (62 - 70)  BP: 160/62 (28 Aug 2020 17:26) (141/50 - 160/62)  BP(mean): --  RR: 16 (28 Aug 2020 17:26) (16 - 18)  SpO2: 98% (28 Aug 2020 17:26) (97% - 99%)    PHYSICAL EXAM:  GENERAL: NAD  HEAD:  Atraumatic, Normocephalic  EYES: EOMI, PERRLA, conjunctiva and sclera clear  ENT: O/P Clear  NECK: Supple, No JVD  NERVOUS SYSTEM:  No focal deficits  CHEST/LUNG: Clear to percussion bilaterally; No rales, rhonchi, wheezing  HEART: Regular rate and rhythm; No murmurs, rubs, or gallops  ABDOMEN: Soft, Nontender, Nondistended; Bowel sounds present  EXTREMITIES:  2+ Peripheral Pulses, No clubbing, cyanosis, or edema  SKIN: No rashes or lesions    LABS:                        9.6    15.01 )-----------( 186      ( 28 Aug 2020 06:28 )             31.0     08-28    139  |  112<H>  |  22  ----------------------------<  221<H>  3.1<L>   |  20<L>  |  1.51<H>    Ca    7.7<L>      28 Aug 2020 06:28  Phos  2.6     08-27  Mg     2.0     08-27        Urinalysis Basic - ( 27 Aug 2020 21:20 )    Color: Yellow / Appearance: Clear / S.015 / pH: x  Gluc: x / Ketone: Negative  / Bili: Negative / Urobili: Negative mg/dL   Blood: x / Protein: 30 mg/dL / Nitrite: Negative   Leuk Esterase: Trace / RBC: Negative /HPF / WBC 3-5   Sq Epi: x / Non Sq Epi: Moderate / Bacteria: Few      CAPILLARY BLOOD GLUCOSE      POCT Blood Glucose.: 170 mg/dL (28 Aug 2020 16:25)  POCT Blood Glucose.: 241 mg/dL (28 Aug 2020 11:21)  POCT Blood Glucose.: 255 mg/dL (28 Aug 2020 07:42)  POCT Blood Glucose.: 304 mg/dL (27 Aug 2020 21:59)      RADIOLOGY & ADDITIONAL TESTS:    Imaging Personally Reviewed:  [ ] YES  [ ] NO    Consultant(s) Notes Reviewed:  [ ] YES  [ ] NO    Care Discussed with Consultants/Other Providers [ ] YES  [ ] NO

## 2020-08-28 NOTE — PROGRESS NOTE ADULT - PROBLEM SELECTOR PLAN 5
Normally takes Glyxambi (Empagliflozin/Linagliptin) and Soliqua (Glargine / lixisenatide) 30 units daily (dose recently increased)  HbA1c = 6.8  While in-house, use SSI  Had been mildly hypoglycemic, but now sugars rising  Starting low dose Lantus  At discharge, will restart her Soliqua, but probably at lower dose - 25 units?

## 2020-08-28 NOTE — PROGRESS NOTE ADULT - PROBLEM SELECTOR PLAN 6
Normally takes Amlodipine/Benazepril, Coreg, Doxazosin  Currently on Amlodipine and Coreg, w/ less-than-ideal control  Will restart low-dose Doxazosin

## 2020-08-28 NOTE — PROGRESS NOTE ADULT - SUBJECTIVE AND OBJECTIVE BOX
NEPHROLOGY PROGRESS NOTE    CHIEF COMPLAINT:  NICK    HPI:  Renal function better.      EXAM:  T(F): 98 (20 @ 12:12)  HR: 62 (20 @ 12:12)  BP: 147/57 (20 @ 12:12)  RR: 17 (20 @ 12:12)  SpO2: 97% (20 @ 12:12)    Conversant, in no apparent distress  Normal respiratory effort, lungs clear bilaterally  Heart RRR with no murmur, no peripheral edema    LABS                             9.6    15.01 )-----------( 186      ( 28 Aug 2020 06:28 )             31.0              139  |  112<H>  |  22  ----------------------------<  221<H>  3.1<L>   |  20<L>  |  1.51<H>    Ca    7.7<L>      28 Aug 2020 06:28  Phos  2.6       Mg     2.0           Urinalysis Basic - ( 27 Aug 2020 21:20 )  Color: Yellow / Appearance: Clear / S.015 / pH: x  Gluc: x / Ketone: Negative  / Bili: Negative / Urobili: Negative mg/dL   Blood: x / Protein: 30 mg/dL / Nitrite: Negative   Leuk Esterase: Trace / RBC: Negative /HPF / WBC 3-5   Sq Epi: x / Non Sq Epi: Moderate / Bacteria: Few        Impression:  NICK -- suspected pre-renal azotemia, resolved  CKD3-4    Recommendations:   May discontinue IV fluid and supplement KCl orally  Cont to hold ACE inhibitor

## 2020-08-29 LAB
ANION GAP SERPL CALC-SCNC: 8 MMOL/L — SIGNIFICANT CHANGE UP (ref 5–17)
BUN SERPL-MCNC: 15 MG/DL — SIGNIFICANT CHANGE UP (ref 7–23)
CALCIUM SERPL-MCNC: 7.9 MG/DL — LOW (ref 8.5–10.1)
CHLORIDE SERPL-SCNC: 112 MMOL/L — HIGH (ref 96–108)
CO2 SERPL-SCNC: 20 MMOL/L — LOW (ref 22–31)
CREAT SERPL-MCNC: 1.24 MG/DL — SIGNIFICANT CHANGE UP (ref 0.5–1.3)
GLUCOSE BLDC GLUCOMTR-MCNC: 156 MG/DL — HIGH (ref 70–99)
GLUCOSE BLDC GLUCOMTR-MCNC: 223 MG/DL — HIGH (ref 70–99)
GLUCOSE BLDC GLUCOMTR-MCNC: 259 MG/DL — HIGH (ref 70–99)
GLUCOSE BLDC GLUCOMTR-MCNC: 288 MG/DL — HIGH (ref 70–99)
GLUCOSE SERPL-MCNC: 193 MG/DL — HIGH (ref 70–99)
HCT VFR BLD CALC: 30.7 % — LOW (ref 34.5–45)
HGB BLD-MCNC: 9.9 G/DL — LOW (ref 11.5–15.5)
MAGNESIUM SERPL-MCNC: 1.8 MG/DL — SIGNIFICANT CHANGE UP (ref 1.6–2.6)
MCHC RBC-ENTMCNC: 31.8 PG — SIGNIFICANT CHANGE UP (ref 27–34)
MCHC RBC-ENTMCNC: 32.2 GM/DL — SIGNIFICANT CHANGE UP (ref 32–36)
MCV RBC AUTO: 98.7 FL — SIGNIFICANT CHANGE UP (ref 80–100)
NRBC # BLD: 0 /100 WBCS — SIGNIFICANT CHANGE UP (ref 0–0)
PHOSPHATE SERPL-MCNC: 1.2 MG/DL — LOW (ref 2.5–4.5)
PLATELET # BLD AUTO: 197 K/UL — SIGNIFICANT CHANGE UP (ref 150–400)
POTASSIUM SERPL-MCNC: 3.7 MMOL/L — SIGNIFICANT CHANGE UP (ref 3.5–5.3)
POTASSIUM SERPL-SCNC: 3.7 MMOL/L — SIGNIFICANT CHANGE UP (ref 3.5–5.3)
RBC # BLD: 3.11 M/UL — LOW (ref 3.8–5.2)
RBC # FLD: 11.9 % — SIGNIFICANT CHANGE UP (ref 10.3–14.5)
SODIUM SERPL-SCNC: 140 MMOL/L — SIGNIFICANT CHANGE UP (ref 135–145)
WBC # BLD: 9.89 K/UL — SIGNIFICANT CHANGE UP (ref 3.8–10.5)
WBC # FLD AUTO: 9.89 K/UL — SIGNIFICANT CHANGE UP (ref 3.8–10.5)

## 2020-08-29 PROCEDURE — 99232 SBSQ HOSP IP/OBS MODERATE 35: CPT

## 2020-08-29 RX ORDER — SODIUM,POTASSIUM PHOSPHATES 278-250MG
1 POWDER IN PACKET (EA) ORAL
Refills: 0 | Status: COMPLETED | OUTPATIENT
Start: 2020-08-29 | End: 2020-08-31

## 2020-08-29 RX ORDER — INSULIN GLARGINE 100 [IU]/ML
15 INJECTION, SOLUTION SUBCUTANEOUS AT BEDTIME
Refills: 0 | Status: DISCONTINUED | OUTPATIENT
Start: 2020-08-29 | End: 2020-09-01

## 2020-08-29 RX ADMIN — INSULIN GLARGINE 15 UNIT(S): 100 INJECTION, SOLUTION SUBCUTANEOUS at 22:02

## 2020-08-29 RX ADMIN — CARVEDILOL PHOSPHATE 25 MILLIGRAM(S): 80 CAPSULE, EXTENDED RELEASE ORAL at 17:06

## 2020-08-29 RX ADMIN — Medication 10 MILLIEQUIVALENT(S): at 05:33

## 2020-08-29 RX ADMIN — Medication 10 MILLIEQUIVALENT(S): at 22:02

## 2020-08-29 RX ADMIN — AMLODIPINE BESYLATE 5 MILLIGRAM(S): 2.5 TABLET ORAL at 05:33

## 2020-08-29 RX ADMIN — Medication 1 PACKET(S): at 17:01

## 2020-08-29 RX ADMIN — Medication 200 MILLIGRAM(S): at 17:00

## 2020-08-29 RX ADMIN — Medication 100 MILLIGRAM(S): at 22:02

## 2020-08-29 RX ADMIN — Medication 81 MILLIGRAM(S): at 11:42

## 2020-08-29 RX ADMIN — Medication 2: at 08:03

## 2020-08-29 RX ADMIN — Medication 100 MILLIGRAM(S): at 05:33

## 2020-08-29 RX ADMIN — CARVEDILOL PHOSPHATE 25 MILLIGRAM(S): 80 CAPSULE, EXTENDED RELEASE ORAL at 05:32

## 2020-08-29 RX ADMIN — Medication 1 MILLIGRAM(S): at 22:02

## 2020-08-29 RX ADMIN — Medication 3: at 11:42

## 2020-08-29 RX ADMIN — MIRTAZAPINE 30 MILLIGRAM(S): 45 TABLET, ORALLY DISINTEGRATING ORAL at 22:02

## 2020-08-29 RX ADMIN — Medication 200 MILLIGRAM(S): at 05:33

## 2020-08-29 RX ADMIN — Medication 100 MILLIGRAM(S): at 14:08

## 2020-08-29 RX ADMIN — Medication 10 MILLIEQUIVALENT(S): at 14:08

## 2020-08-29 RX ADMIN — Medication 3: at 17:00

## 2020-08-29 NOTE — PROGRESS NOTE ADULT - SUBJECTIVE AND OBJECTIVE BOX
Subjective: loose stool.       MEDICATIONS  (STANDING):  amLODIPine   Tablet 5 milliGRAM(s) Oral daily  aspirin enteric coated 81 milliGRAM(s) Oral daily  carvedilol 25 milliGRAM(s) Oral every 12 hours  ciprofloxacin   IVPB 400 milliGRAM(s) IV Intermittent every 12 hours  dextrose 5%. 1000 milliLiter(s) (50 mL/Hr) IV Continuous <Continuous>  dextrose 50% Injectable 12.5 Gram(s) IV Push once  dextrose 50% Injectable 25 Gram(s) IV Push once  dextrose 50% Injectable 25 Gram(s) IV Push once  doxazosin 1 milliGRAM(s) Oral at bedtime  insulin glargine Injectable (LANTUS) 12 Unit(s) SubCutaneous at bedtime  insulin lispro (HumaLOG) corrective regimen sliding scale   SubCutaneous three times a day before meals  insulin lispro (HumaLOG) corrective regimen sliding scale   SubCutaneous at bedtime  metroNIDAZOLE  IVPB 500 milliGRAM(s) IV Intermittent every 8 hours  mirtazapine 30 milliGRAM(s) Oral at bedtime  potassium chloride    Tablet ER 10 milliEquivalent(s) Oral three times a day  potassium phosphate / sodium phosphate Powder (PHOS-NaK) 1 Packet(s) Oral two times a day with meals    MEDICATIONS  (PRN):  dextrose 40% Gel 15 Gram(s) Oral once PRN Blood Glucose LESS THAN 70 milliGRAM(s)/deciliter  dextrose 40% Gel 15 Gram(s) Oral once PRN Blood Glucose LESS THAN 70 milliGRAM(s)/deciliter  glucagon  Injectable 1 milliGRAM(s) IntraMuscular once PRN Glucose LESS THAN 70 milligrams/deciliter  LORazepam     Tablet 0.5 milliGRAM(s) Oral two times a day PRN Anxiety  ondansetron Injectable 4 milliGRAM(s) IV Push every 6 hours PRN Nausea and/or Vomiting          T(C): 36.7 (20 @ 06:42), Max: 36.7 (20 @ 12:12)  HR: 62 (20 @ 06:42) (62 - 64)  BP: 158/55 (20 @ 06:42) (122/62 - 160/62)  RR: 16 (20 @ 06:42) (16 - 17)  SpO2: 98% (08-29-20 @ 06:42) (97% - 98%)  Wt(kg): --        I&O's Detail    28 Aug 2020 07:01  -  29 Aug 2020 07:00  --------------------------------------------------------  IN:    Oral Fluid: 280 mL    Solution: 200 mL    Solution: 200 mL  Total IN: 680 mL    OUT:  Total OUT: 0 mL    Total NET: 680 mL               PHYSICAL EXAM:    GENERAL: NAD  NECK: Supple, no inc in JVP  CHEST/LUNG: Clear  HEART: S1S2  ABDOMEN: Soft, Nontender, Nondistended; Bowel sounds present  EXTREMITIES:  no edema  NEURO: no asterixis      LABS:  CBC Full  -  ( 29 Aug 2020 06:48 )  WBC Count : 9.89 K/uL  RBC Count : 3.11 M/uL  Hemoglobin : 9.9 g/dL  Hematocrit : 30.7 %  Platelet Count - Automated : 197 K/uL  Mean Cell Volume : 98.7 fl  Mean Cell Hemoglobin : 31.8 pg  Mean Cell Hemoglobin Concentration : 32.2 gm/dL  Auto Neutrophil # : x  Auto Lymphocyte # : x  Auto Monocyte # : x  Auto Eosinophil # : x  Auto Basophil # : x  Auto Neutrophil % : x  Auto Lymphocyte % : x  Auto Monocyte % : x  Auto Eosinophil % : x  Auto Basophil % : x    08-    140  |  112<H>  |  15  ----------------------------<  193<H>  3.7   |  20<L>  |  1.24    Ca    7.9<L>      29 Aug 2020 06:48  Phos  1.2     08-  Mg     1.8     08-        Urinalysis Basic - ( 27 Aug 2020 21:20 )    Color: Yellow / Appearance: Clear / S.015 / pH: x  Gluc: x / Ketone: Negative  / Bili: Negative / Urobili: Negative mg/dL   Blood: x / Protein: 30 mg/dL / Nitrite: Negative   Leuk Esterase: Trace / RBC: Negative /HPF / WBC 3-5   Sq Epi: x / Non Sq Epi: Moderate / Bacteria: Few            Impression:  NICK -- pre-renal azotemia. Resolved  CKD3    Recommendations:   Monitor off IVFs  Cont to hold ACE inhibitor

## 2020-08-29 NOTE — PROGRESS NOTE ADULT - SUBJECTIVE AND OBJECTIVE BOX
Patient is a 80y old  Female who presents with a chief complaint of diarrhea and fall (29 Aug 2020 09:10)      INTERVAL HPI/OVERNIGHT EVENTS:    When going to restroom, typically drops a few dollops of stool along the way; looks "dark red", but had a BM this morning that did not look bloody.    REVIEW OF SYSTEMS:   Remaining ROS negative    MEDICATIONS  (STANDING):  amLODIPine   Tablet 5 milliGRAM(s) Oral daily  aspirin enteric coated 81 milliGRAM(s) Oral daily  carvedilol 25 milliGRAM(s) Oral every 12 hours  ciprofloxacin   IVPB 400 milliGRAM(s) IV Intermittent every 12 hours  dextrose 5%. 1000 milliLiter(s) (50 mL/Hr) IV Continuous <Continuous>  dextrose 50% Injectable 12.5 Gram(s) IV Push once  dextrose 50% Injectable 25 Gram(s) IV Push once  dextrose 50% Injectable 25 Gram(s) IV Push once  doxazosin 1 milliGRAM(s) Oral at bedtime  insulin glargine Injectable (LANTUS) 12 Unit(s) SubCutaneous at bedtime  insulin lispro (HumaLOG) corrective regimen sliding scale   SubCutaneous three times a day before meals  insulin lispro (HumaLOG) corrective regimen sliding scale   SubCutaneous at bedtime  metroNIDAZOLE  IVPB 500 milliGRAM(s) IV Intermittent every 8 hours  mirtazapine 30 milliGRAM(s) Oral at bedtime  potassium chloride    Tablet ER 10 milliEquivalent(s) Oral three times a day  potassium phosphate / sodium phosphate Powder (PHOS-NaK) 1 Packet(s) Oral two times a day with meals    MEDICATIONS  (PRN):  dextrose 40% Gel 15 Gram(s) Oral once PRN Blood Glucose LESS THAN 70 milliGRAM(s)/deciliter  dextrose 40% Gel 15 Gram(s) Oral once PRN Blood Glucose LESS THAN 70 milliGRAM(s)/deciliter  glucagon  Injectable 1 milliGRAM(s) IntraMuscular once PRN Glucose LESS THAN 70 milligrams/deciliter  LORazepam     Tablet 0.5 milliGRAM(s) Oral two times a day PRN Anxiety  ondansetron Injectable 4 milliGRAM(s) IV Push every 6 hours PRN Nausea and/or Vomiting      Allergies    No Known Allergies    Intolerances        Vital Signs Last 24 Hrs  T(C): 37.1 (29 Aug 2020 11:38), Max: 37.1 (29 Aug 2020 11:38)  T(F): 98.8 (29 Aug 2020 11:38), Max: 98.8 (29 Aug 2020 11:38)  HR: 64 (29 Aug 2020 11:38) (62 - 64)  BP: 163/64 (29 Aug 2020 11:38) (122/62 - 163/64)  BP(mean): --  RR: 18 (29 Aug 2020 11:38) (16 - 18)  SpO2: 100% (29 Aug 2020 11:38) (97% - 100%)    PHYSICAL EXAM:  GENERAL: NAD  HEAD:  Atraumatic, Normocephalic  EYES: EOMI, PERRLA, conjunctiva and sclera clear  ENT: O/P Clear  NECK: Supple, No JVD  NERVOUS SYSTEM:  No focal deficits  CHEST/LUNG: Clear to percussion bilaterally; No rales, rhonchi, wheezing  HEART: Regular rate and rhythm; No murmurs, rubs, or gallops  ABDOMEN: Soft, Nontender, Nondistended; Bowel sounds present  EXTREMITIES:  2+ Peripheral Pulses, No clubbing, cyanosis, or edema  SKIN: No rashes or lesions    LABS:                        9.9    9.89  )-----------( 197      ( 29 Aug 2020 06:48 )             30.7     08-29    140  |  112<H>  |  15  ----------------------------<  193<H>  3.7   |  20<L>  |  1.24    Ca    7.9<L>      29 Aug 2020 06:48  Phos  1.2     08-29  Mg     1.8     08-29        Urinalysis Basic - ( 27 Aug 2020 21:20 )    Color: Yellow / Appearance: Clear / S.015 / pH: x  Gluc: x / Ketone: Negative  / Bili: Negative / Urobili: Negative mg/dL   Blood: x / Protein: 30 mg/dL / Nitrite: Negative   Leuk Esterase: Trace / RBC: Negative /HPF / WBC 3-5   Sq Epi: x / Non Sq Epi: Moderate / Bacteria: Few      CAPILLARY BLOOD GLUCOSE      POCT Blood Glucose.: 288 mg/dL (29 Aug 2020 11:32)  POCT Blood Glucose.: 223 mg/dL (29 Aug 2020 07:36)  POCT Blood Glucose.: 213 mg/dL (28 Aug 2020 23:02)  POCT Blood Glucose.: 170 mg/dL (28 Aug 2020 16:25)      RADIOLOGY & ADDITIONAL TESTS:    Imaging Personally Reviewed:  [ ] YES  [ ] NO    Consultant(s) Notes Reviewed:  [ ] YES  [ ] NO    Care Discussed with Consultants/Other Providers [ ] YES  [ ] NO

## 2020-08-29 NOTE — PROGRESS NOTE ADULT - PROBLEM SELECTOR PLAN 1
Not yet clear if ischemic vs infectious  C. diff negative x 1  Stool culture still pending - considerable difficulty getting sample due to urgency of stools  Gastroenterologist consulted - ([place])  On empiric cipro / flagyl  Due to possibility of ischemic colitis, getting a brief vascular workup:   Echo done shows normal EF; some LVH; some borderline pHTN   Carotid dopplers show "moderate placquing" but no stenoses   Lipids very low overall (TC = 77; LDL=32; HDL=28)   Discussed w/ cardio who recommends outpatient stress test, but no further inpatient testing unless (a) pt develops chest pain or (b) ischemic colitis confirmed and surgery planned.

## 2020-08-30 LAB
ANION GAP SERPL CALC-SCNC: 8 MMOL/L — SIGNIFICANT CHANGE UP (ref 5–17)
BUN SERPL-MCNC: 12 MG/DL — SIGNIFICANT CHANGE UP (ref 7–23)
CALCIUM SERPL-MCNC: 8 MG/DL — LOW (ref 8.5–10.1)
CHLORIDE SERPL-SCNC: 113 MMOL/L — HIGH (ref 96–108)
CO2 SERPL-SCNC: 22 MMOL/L — SIGNIFICANT CHANGE UP (ref 22–31)
CREAT SERPL-MCNC: 1.05 MG/DL — SIGNIFICANT CHANGE UP (ref 0.5–1.3)
GLUCOSE BLDC GLUCOMTR-MCNC: 126 MG/DL — HIGH (ref 70–99)
GLUCOSE BLDC GLUCOMTR-MCNC: 155 MG/DL — HIGH (ref 70–99)
GLUCOSE BLDC GLUCOMTR-MCNC: 175 MG/DL — HIGH (ref 70–99)
GLUCOSE BLDC GLUCOMTR-MCNC: 224 MG/DL — HIGH (ref 70–99)
GLUCOSE SERPL-MCNC: 119 MG/DL — HIGH (ref 70–99)
PHOSPHATE SERPL-MCNC: 1.8 MG/DL — LOW (ref 2.5–4.5)
POTASSIUM SERPL-MCNC: 3.5 MMOL/L — SIGNIFICANT CHANGE UP (ref 3.5–5.3)
POTASSIUM SERPL-SCNC: 3.5 MMOL/L — SIGNIFICANT CHANGE UP (ref 3.5–5.3)
SODIUM SERPL-SCNC: 143 MMOL/L — SIGNIFICANT CHANGE UP (ref 135–145)

## 2020-08-30 PROCEDURE — 99232 SBSQ HOSP IP/OBS MODERATE 35: CPT

## 2020-08-30 RX ORDER — DOXAZOSIN MESYLATE 4 MG
2 TABLET ORAL AT BEDTIME
Refills: 0 | Status: DISCONTINUED | OUTPATIENT
Start: 2020-08-30 | End: 2020-09-01

## 2020-08-30 RX ADMIN — Medication 2 MILLIGRAM(S): at 21:45

## 2020-08-30 RX ADMIN — Medication 1 PACKET(S): at 07:52

## 2020-08-30 RX ADMIN — Medication 200 MILLIGRAM(S): at 06:49

## 2020-08-30 RX ADMIN — AMLODIPINE BESYLATE 5 MILLIGRAM(S): 2.5 TABLET ORAL at 06:49

## 2020-08-30 RX ADMIN — Medication 1: at 11:42

## 2020-08-30 RX ADMIN — CARVEDILOL PHOSPHATE 25 MILLIGRAM(S): 80 CAPSULE, EXTENDED RELEASE ORAL at 06:49

## 2020-08-30 RX ADMIN — Medication 10 MILLIEQUIVALENT(S): at 13:10

## 2020-08-30 RX ADMIN — Medication 100 MILLIGRAM(S): at 21:46

## 2020-08-30 RX ADMIN — Medication 81 MILLIGRAM(S): at 11:43

## 2020-08-30 RX ADMIN — Medication 200 MILLIGRAM(S): at 17:17

## 2020-08-30 RX ADMIN — Medication 10 MILLIEQUIVALENT(S): at 21:46

## 2020-08-30 RX ADMIN — Medication 1 PACKET(S): at 16:34

## 2020-08-30 RX ADMIN — Medication 10 MILLIEQUIVALENT(S): at 06:49

## 2020-08-30 RX ADMIN — Medication 100 MILLIGRAM(S): at 06:49

## 2020-08-30 RX ADMIN — CARVEDILOL PHOSPHATE 25 MILLIGRAM(S): 80 CAPSULE, EXTENDED RELEASE ORAL at 17:17

## 2020-08-30 RX ADMIN — INSULIN GLARGINE 15 UNIT(S): 100 INJECTION, SOLUTION SUBCUTANEOUS at 21:47

## 2020-08-30 RX ADMIN — Medication 2: at 16:34

## 2020-08-30 RX ADMIN — MIRTAZAPINE 30 MILLIGRAM(S): 45 TABLET, ORALLY DISINTEGRATING ORAL at 21:46

## 2020-08-30 RX ADMIN — Medication 100 MILLIGRAM(S): at 13:10

## 2020-08-30 NOTE — PROGRESS NOTE ADULT - PROBLEM SELECTOR PLAN 1
Not yet clear if ischemic vs infectious  C. diff negative x 1  Stool culture still pending - considerable difficulty getting sample due to urgency of stools  Gastroenterologist consulted - ([place])  On empiric cipro / flagyl  Due to possibility of ischemic colitis, brief vascular workup done:   Echo shows normal EF; some LVH; some borderline pHTN   Carotid dopplers show "moderate placquing" but no stenoses   Lipids very low overall (TC = 77; LDL=32; HDL=28)   Discussed w/ cardio who recommends outpatient stress test, but no further inpatient testing unless (a) pt develops chest pain or (b) ischemic colitis confirmed and surgery planned. Not yet clear if ischemic vs infectious  C. diff negative x 1  Stool culture still pending - considerable difficulty getting sample due to urgency of stools  Gastroenterologist consulted - ([place])  On empiric cipro / flagyl  Due to possibility of ischemic colitis, brief vascular workup done:   Echo shows normal EF; some LVH; some borderline pHTN   Carotid dopplers show "moderate placquing" but no stenoses   Lipids very low overall (TC = 77; LDL=32; HDL=28)   Discussed w/ cardio who recommends outpatient stress test, but no further inpatient testing unless (a) pt develops chest pain or (b) ischemic colitis confirmed and surgery planned.  Will discuss w/ GI regarding possibly advancing diet...

## 2020-08-30 NOTE — PROGRESS NOTE ADULT - PROBLEM SELECTOR PLAN 6
Normally takes Amlodipine/Benazepril, Coreg, Doxazosin  Currently on Amlodipine and Coreg, w/ less-than-ideal control  Will restart low-dose Doxazosin Normally takes Amlodipine/Benazepril, Coreg, Doxazosin  Currently on Amlodipine and Coreg, w/ less-than-ideal control  Restarted low-dose Doxazosin; will increase back to her usual dose

## 2020-08-30 NOTE — PROGRESS NOTE ADULT - PROBLEM SELECTOR PLAN 4
plastic surgery appreciated (Katheryn)  Given one dose of Augmentin in ER  Has also been on Cipro / Flagyl for GI plastic surgery appreciated (Katheryn)  Given one dose of Augmentin in ER  Has also been on Cipro / Flagyl for GI  Removed outer dressing today (8/30); left steristrips intact; healing adequately

## 2020-08-30 NOTE — PROGRESS NOTE ADULT - SUBJECTIVE AND OBJECTIVE BOX
Patient is a 80y old  Female who presents with a chief complaint of diarrhea and fall (29 Aug 2020 11:51)      INTERVAL HPI/OVERNIGHT EVENTS:      REVIEW OF SYSTEMS:   Remaining ROS negative    MEDICATIONS  (STANDING):  amLODIPine   Tablet 5 milliGRAM(s) Oral daily  aspirin enteric coated 81 milliGRAM(s) Oral daily  carvedilol 25 milliGRAM(s) Oral every 12 hours  ciprofloxacin   IVPB 400 milliGRAM(s) IV Intermittent every 12 hours  dextrose 5%. 1000 milliLiter(s) (50 mL/Hr) IV Continuous <Continuous>  dextrose 50% Injectable 12.5 Gram(s) IV Push once  dextrose 50% Injectable 25 Gram(s) IV Push once  dextrose 50% Injectable 25 Gram(s) IV Push once  doxazosin 1 milliGRAM(s) Oral at bedtime  insulin glargine Injectable (LANTUS) 15 Unit(s) SubCutaneous at bedtime  insulin lispro (HumaLOG) corrective regimen sliding scale   SubCutaneous three times a day before meals  insulin lispro (HumaLOG) corrective regimen sliding scale   SubCutaneous at bedtime  metroNIDAZOLE  IVPB 500 milliGRAM(s) IV Intermittent every 8 hours  mirtazapine 30 milliGRAM(s) Oral at bedtime  potassium chloride    Tablet ER 10 milliEquivalent(s) Oral three times a day  potassium phosphate / sodium phosphate Powder (PHOS-NaK) 1 Packet(s) Oral two times a day with meals    MEDICATIONS  (PRN):  dextrose 40% Gel 15 Gram(s) Oral once PRN Blood Glucose LESS THAN 70 milliGRAM(s)/deciliter  dextrose 40% Gel 15 Gram(s) Oral once PRN Blood Glucose LESS THAN 70 milliGRAM(s)/deciliter  glucagon  Injectable 1 milliGRAM(s) IntraMuscular once PRN Glucose LESS THAN 70 milligrams/deciliter  LORazepam     Tablet 0.5 milliGRAM(s) Oral two times a day PRN Anxiety  ondansetron Injectable 4 milliGRAM(s) IV Push every 6 hours PRN Nausea and/or Vomiting      Allergies    No Known Allergies    Intolerances        Vital Signs Last 24 Hrs  T(C): 36.6 (30 Aug 2020 05:19), Max: 36.9 (29 Aug 2020 23:23)  T(F): 97.9 (30 Aug 2020 05:19), Max: 98.4 (29 Aug 2020 23:23)  HR: 67 (30 Aug 2020 05:19) (65 - 67)  BP: 146/67 (30 Aug 2020 05:19) (146/67 - 164/56)  BP(mean): --  RR: 18 (30 Aug 2020 05:19) (17 - 18)  SpO2: 99% (30 Aug 2020 05:19) (98% - 100%)    PHYSICAL EXAM:  GENERAL: NAD  HEAD:  Atraumatic, Normocephalic  EYES: EOMI, PERRLA, conjunctiva and sclera clear  ENT: O/P Clear  NECK: Supple, No JVD  NERVOUS SYSTEM:  No focal deficits  CHEST/LUNG: Clear to percussion bilaterally; No rales, rhonchi, wheezing  HEART: Regular rate and rhythm; No murmurs, rubs, or gallops  ABDOMEN: Soft, Nontender, Nondistended; Bowel sounds present  EXTREMITIES:  2+ Peripheral Pulses, No clubbing, cyanosis, or edema  SKIN: No rashes or lesions    LABS:                        9.9    9.89  )-----------( 197      ( 29 Aug 2020 06:48 )             30.7     08-30    143  |  113<H>  |  12  ----------------------------<  119<H>  3.5   |  22  |  1.05    Ca    8.0<L>      30 Aug 2020 06:56  Phos  1.8     08-30  Mg     1.8     08-29          CAPILLARY BLOOD GLUCOSE      POCT Blood Glucose.: 175 mg/dL (30 Aug 2020 11:31)  POCT Blood Glucose.: 126 mg/dL (30 Aug 2020 07:26)  POCT Blood Glucose.: 156 mg/dL (29 Aug 2020 21:55)  POCT Blood Glucose.: 259 mg/dL (29 Aug 2020 16:40)      RADIOLOGY & ADDITIONAL TESTS:    Imaging Personally Reviewed:  [ ] YES  [ ] NO    Consultant(s) Notes Reviewed:  [ ] YES  [ ] NO    Care Discussed with Consultants/Other Providers [ ] YES  [ ] NO Patient is a 80y old  Female who presents with a chief complaint of diarrhea and fall (29 Aug 2020 11:51)      INTERVAL HPI/OVERNIGHT EVENTS:    Stools still soft, but not watery. Did not see any blood in this AM's BM. Asking about advancing diet.     REVIEW OF SYSTEMS:   Remaining ROS negative    MEDICATIONS  (STANDING):  amLODIPine   Tablet 5 milliGRAM(s) Oral daily  aspirin enteric coated 81 milliGRAM(s) Oral daily  carvedilol 25 milliGRAM(s) Oral every 12 hours  ciprofloxacin   IVPB 400 milliGRAM(s) IV Intermittent every 12 hours  dextrose 5%. 1000 milliLiter(s) (50 mL/Hr) IV Continuous <Continuous>  dextrose 50% Injectable 12.5 Gram(s) IV Push once  dextrose 50% Injectable 25 Gram(s) IV Push once  dextrose 50% Injectable 25 Gram(s) IV Push once  doxazosin 1 milliGRAM(s) Oral at bedtime  insulin glargine Injectable (LANTUS) 15 Unit(s) SubCutaneous at bedtime  insulin lispro (HumaLOG) corrective regimen sliding scale   SubCutaneous three times a day before meals  insulin lispro (HumaLOG) corrective regimen sliding scale   SubCutaneous at bedtime  metroNIDAZOLE  IVPB 500 milliGRAM(s) IV Intermittent every 8 hours  mirtazapine 30 milliGRAM(s) Oral at bedtime  potassium chloride    Tablet ER 10 milliEquivalent(s) Oral three times a day  potassium phosphate / sodium phosphate Powder (PHOS-NaK) 1 Packet(s) Oral two times a day with meals    MEDICATIONS  (PRN):  dextrose 40% Gel 15 Gram(s) Oral once PRN Blood Glucose LESS THAN 70 milliGRAM(s)/deciliter  dextrose 40% Gel 15 Gram(s) Oral once PRN Blood Glucose LESS THAN 70 milliGRAM(s)/deciliter  glucagon  Injectable 1 milliGRAM(s) IntraMuscular once PRN Glucose LESS THAN 70 milligrams/deciliter  LORazepam     Tablet 0.5 milliGRAM(s) Oral two times a day PRN Anxiety  ondansetron Injectable 4 milliGRAM(s) IV Push every 6 hours PRN Nausea and/or Vomiting      Allergies    No Known Allergies    Intolerances        Vital Signs Last 24 Hrs  T(C): 36.6 (30 Aug 2020 05:19), Max: 36.9 (29 Aug 2020 23:23)  T(F): 97.9 (30 Aug 2020 05:19), Max: 98.4 (29 Aug 2020 23:23)  HR: 67 (30 Aug 2020 05:19) (65 - 67)  BP: 146/67 (30 Aug 2020 05:19) (146/67 - 164/56)  BP(mean): --  RR: 18 (30 Aug 2020 05:19) (17 - 18)  SpO2: 99% (30 Aug 2020 05:19) (98% - 100%)    PHYSICAL EXAM:  GENERAL: NAD  HEAD:  Atraumatic, Normocephalic  EYES: EOMI, PERRLA, conjunctiva and sclera clear  ENT: O/P Clear  NECK: Supple, No JVD  NERVOUS SYSTEM:  No focal deficits  CHEST/LUNG: Clear to percussion bilaterally; No rales, rhonchi, wheezing  HEART: Regular rate and rhythm; No murmurs, rubs, or gallops  ABDOMEN: Soft, Nontender, Nondistended; Bowel sounds present  EXTREMITIES:  2+ Peripheral Pulses, No clubbing, cyanosis, or edema  SKIN: No rashes or lesions    LABS:                        9.9    9.89  )-----------( 197      ( 29 Aug 2020 06:48 )             30.7     08-30    143  |  113<H>  |  12  ----------------------------<  119<H>  3.5   |  22  |  1.05    Ca    8.0<L>      30 Aug 2020 06:56  Phos  1.8     08-30  Mg     1.8     08-29          CAPILLARY BLOOD GLUCOSE      POCT Blood Glucose.: 175 mg/dL (30 Aug 2020 11:31)  POCT Blood Glucose.: 126 mg/dL (30 Aug 2020 07:26)  POCT Blood Glucose.: 156 mg/dL (29 Aug 2020 21:55)  POCT Blood Glucose.: 259 mg/dL (29 Aug 2020 16:40)      RADIOLOGY & ADDITIONAL TESTS:    Imaging Personally Reviewed:  [ ] YES  [ ] NO    Consultant(s) Notes Reviewed:  [ ] YES  [ ] NO    Care Discussed with Consultants/Other Providers [ ] YES  [ ] NO

## 2020-08-31 DIAGNOSIS — E83.42 HYPOMAGNESEMIA: ICD-10-CM

## 2020-08-31 DIAGNOSIS — E44.0 MODERATE PROTEIN-CALORIE MALNUTRITION: ICD-10-CM

## 2020-08-31 DIAGNOSIS — E83.39 OTHER DISORDERS OF PHOSPHORUS METABOLISM: ICD-10-CM

## 2020-08-31 LAB
ANION GAP SERPL CALC-SCNC: 4 MMOL/L — LOW (ref 5–17)
BUN SERPL-MCNC: 9 MG/DL — SIGNIFICANT CHANGE UP (ref 7–23)
CALCIUM SERPL-MCNC: 8.1 MG/DL — LOW (ref 8.5–10.1)
CHLORIDE SERPL-SCNC: 112 MMOL/L — HIGH (ref 96–108)
CO2 SERPL-SCNC: 23 MMOL/L — SIGNIFICANT CHANGE UP (ref 22–31)
CREAT SERPL-MCNC: 1.12 MG/DL — SIGNIFICANT CHANGE UP (ref 0.5–1.3)
GLUCOSE BLDC GLUCOMTR-MCNC: 135 MG/DL — HIGH (ref 70–99)
GLUCOSE BLDC GLUCOMTR-MCNC: 214 MG/DL — HIGH (ref 70–99)
GLUCOSE BLDC GLUCOMTR-MCNC: 214 MG/DL — HIGH (ref 70–99)
GLUCOSE BLDC GLUCOMTR-MCNC: 218 MG/DL — HIGH (ref 70–99)
GLUCOSE SERPL-MCNC: 135 MG/DL — HIGH (ref 70–99)
HCT VFR BLD CALC: 30.8 % — LOW (ref 34.5–45)
HGB BLD-MCNC: 9.9 G/DL — LOW (ref 11.5–15.5)
MAGNESIUM SERPL-MCNC: 1.6 MG/DL — SIGNIFICANT CHANGE UP (ref 1.6–2.6)
MCHC RBC-ENTMCNC: 31.6 PG — SIGNIFICANT CHANGE UP (ref 27–34)
MCHC RBC-ENTMCNC: 32.1 GM/DL — SIGNIFICANT CHANGE UP (ref 32–36)
MCV RBC AUTO: 98.4 FL — SIGNIFICANT CHANGE UP (ref 80–100)
NRBC # BLD: 0 /100 WBCS — SIGNIFICANT CHANGE UP (ref 0–0)
PHOSPHATE SERPL-MCNC: 2 MG/DL — LOW (ref 2.5–4.5)
PLATELET # BLD AUTO: 214 K/UL — SIGNIFICANT CHANGE UP (ref 150–400)
POTASSIUM SERPL-MCNC: 3.8 MMOL/L — SIGNIFICANT CHANGE UP (ref 3.5–5.3)
POTASSIUM SERPL-SCNC: 3.8 MMOL/L — SIGNIFICANT CHANGE UP (ref 3.5–5.3)
RBC # BLD: 3.13 M/UL — LOW (ref 3.8–5.2)
RBC # FLD: 11.8 % — SIGNIFICANT CHANGE UP (ref 10.3–14.5)
SODIUM SERPL-SCNC: 139 MMOL/L — SIGNIFICANT CHANGE UP (ref 135–145)
WBC # BLD: 6.96 K/UL — SIGNIFICANT CHANGE UP (ref 3.8–10.5)
WBC # FLD AUTO: 6.96 K/UL — SIGNIFICANT CHANGE UP (ref 3.8–10.5)

## 2020-08-31 PROCEDURE — 99233 SBSQ HOSP IP/OBS HIGH 50: CPT

## 2020-08-31 RX ORDER — MAGNESIUM SULFATE 500 MG/ML
1 VIAL (ML) INJECTION ONCE
Refills: 0 | Status: COMPLETED | OUTPATIENT
Start: 2020-08-31 | End: 2020-08-31

## 2020-08-31 RX ADMIN — Medication 200 MILLIGRAM(S): at 18:59

## 2020-08-31 RX ADMIN — Medication 100 MILLIGRAM(S): at 21:12

## 2020-08-31 RX ADMIN — Medication 10 MILLIEQUIVALENT(S): at 21:14

## 2020-08-31 RX ADMIN — Medication 100 GRAM(S): at 13:26

## 2020-08-31 RX ADMIN — Medication 200 MILLIGRAM(S): at 06:44

## 2020-08-31 RX ADMIN — CARVEDILOL PHOSPHATE 25 MILLIGRAM(S): 80 CAPSULE, EXTENDED RELEASE ORAL at 05:47

## 2020-08-31 RX ADMIN — Medication 10 MILLIEQUIVALENT(S): at 05:48

## 2020-08-31 RX ADMIN — Medication 81 MILLIGRAM(S): at 11:48

## 2020-08-31 RX ADMIN — AMLODIPINE BESYLATE 5 MILLIGRAM(S): 2.5 TABLET ORAL at 05:47

## 2020-08-31 RX ADMIN — Medication 100 MILLIGRAM(S): at 15:10

## 2020-08-31 RX ADMIN — Medication 2: at 11:46

## 2020-08-31 RX ADMIN — MIRTAZAPINE 30 MILLIGRAM(S): 45 TABLET, ORALLY DISINTEGRATING ORAL at 21:14

## 2020-08-31 RX ADMIN — Medication 100 MILLIGRAM(S): at 05:48

## 2020-08-31 RX ADMIN — Medication 2: at 17:00

## 2020-08-31 RX ADMIN — Medication 10 MILLIEQUIVALENT(S): at 15:10

## 2020-08-31 RX ADMIN — Medication 1 PACKET(S): at 08:10

## 2020-08-31 RX ADMIN — Medication 2 MILLIGRAM(S): at 21:12

## 2020-08-31 RX ADMIN — CARVEDILOL PHOSPHATE 25 MILLIGRAM(S): 80 CAPSULE, EXTENDED RELEASE ORAL at 18:56

## 2020-08-31 RX ADMIN — Medication 62.5 MILLIMOLE(S): at 14:49

## 2020-08-31 RX ADMIN — INSULIN GLARGINE 15 UNIT(S): 100 INJECTION, SOLUTION SUBCUTANEOUS at 21:15

## 2020-08-31 NOTE — PROGRESS NOTE ADULT - ASSESSMENT
HPI:  78yo female with pmh HTN, DM, presents with diarrhea, NV, and fall. PT reports she was in [facility] and without illness last night. THis am, woke up with multiple diarrhea, NV x 2. Pt feeling very weak and lightheaded and fell. Pt states her legs gave way, and hit hear with laceration. Family came home and found her on ground and states she was really weak when she tried to stand up.- dooes not think she passed out  PT without dizziness at rest. denies headache. + some abd pain. denies fever, recent abx, unusual food, black/bloody BM, cp, sob, palpitaitons. 	No fever/chills, No photophobia/eye pain/changes in vision, No ear pain/sore throat/dysphagia, No chest pain/palpitations, no SOB/cough, no wheeze/stridor, +abdominal pain, +N/V/D, no dysuria/frequency/discharge, No neck/back pain, no rash, + dizzy (25 Aug 2020 16:46)  ------------------- As Above  ----------------------  Patient is a poor historian. Presents with diarrhea. Patient unsure when it started. No N/V or bloody BMs. No fever., abdominal pain. Denies having a colonoscopy.     Colitis - Infectious VS Ischemic - CRP 8.19 --> 15.14 . WBC 16.09 --> 15.40 --> 15.01--> 6.96 , afebrile,  C Diff negative  doubt IBD - 1) stool culture / O&P still open Clinically better ( Now non bloody diarrhea )   2) CRP f/u  3) Broad spectrum antibiotic 4) clear liquid diet 5) cardiology consult == Discussed with  yesterday HPI:  80yo female with pmh HTN, DM, presents with diarrhea, NV, and fall. PT reports she was in [facility] and without illness last night. THis am, woke up with multiple diarrhea, NV x 2. Pt feeling very weak and lightheaded and fell. Pt states her legs gave way, and hit hear with laceration. Family came home and found her on ground and states she was really weak when she tried to stand up.- dooes not think she passed out  PT without dizziness at rest. denies headache. + some abd pain. denies fever, recent abx, unusual food, black/bloody BM, cp, sob, palpitaitons. 	No fever/chills, No photophobia/eye pain/changes in vision, No ear pain/sore throat/dysphagia, No chest pain/palpitations, no SOB/cough, no wheeze/stridor, +abdominal pain, +N/V/D, no dysuria/frequency/discharge, No neck/back pain, no rash, + dizzy (25 Aug 2020 16:46)  ------------------- As Above  ----------------------  Patient is a poor historian. Presents with diarrhea. Patient unsure when it started. No N/V or bloody BMs. No fever., abdominal pain. Denies having a colonoscopy.     Colitis - Infectious VS Ischemic - CRP 8.19 --> 15.14 . WBC 16.09 --> 15.40 --> 15.01--> 6.96 , afebrile,  C Diff negative  doubt IBD - 1) stool culture / O&P still open Clinically better ( Now non bloody diarrhea )   2) CRP f/u  3) Broad spectrum antibiotic 4) Advance diet as tolerated.

## 2020-08-31 NOTE — PROGRESS NOTE ADULT - PROBLEM SELECTOR PLAN 6
plastic surgery appreciated (Katheryn)  Given one dose of Augmentin in ER  Has also been on Cipro / Flagyl for GI  left Steri-strips intact; healing adequately

## 2020-08-31 NOTE — PROGRESS NOTE ADULT - PROBLEM SELECTOR PLAN 8
Normally takes Amlodipine/Benazepril, Coreg, Doxazosin  Currently on Amlodipine and Coreg, w/ less-than-ideal control  Restarted low-dose Doxazosin; will increase back to her usual dose

## 2020-08-31 NOTE — PROGRESS NOTE ADULT - PROBLEM SELECTOR PLAN 3
or possibly CKD4; Cr currently close to her baseline
or possibly CKD4; Cr currently close to her baseline
Cr currently LOWER than her previous baseline
antibiotics   plastic surgery appreciated (Katheryn)
electrolyte replaced, monitor
or possibly CKD4; Cr currently close to her baseline

## 2020-08-31 NOTE — PROGRESS NOTE ADULT - PROBLEM SELECTOR PROBLEM 7
Type 2 diabetes mellitus without complication, without long-term current use of insulin
Moderate protein-calorie malnutrition

## 2020-08-31 NOTE — PROGRESS NOTE ADULT - PROBLEM SELECTOR PROBLEM 3
CKD (chronic kidney disease) stage 3, GFR 30-59 ml/min
Hypomagnesemia
Laceration of left ear, initial encounter

## 2020-08-31 NOTE — PROGRESS NOTE ADULT - PROBLEM SELECTOR PROBLEM 4
Laceration of left ear, initial encounter
Laceration of left ear, initial encounter
Hypophosphatemia
Laceration of left ear, initial encounter
Laceration of left ear, initial encounter
Type 2 diabetes mellitus without complication, without long-term current use of insulin

## 2020-08-31 NOTE — HISTORY OF PRESENT ILLNESS
[FreeTextEntry1] : PAULA BLANCO. 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 Completed 70 Gy in 4/2020. \par \par After RT she saw her surgeon Dr. Doan in 6/19/20. No imaging has been done since RT.\par

## 2020-08-31 NOTE — PROGRESS NOTE ADULT - PROBLEM SELECTOR PLAN 7
Normally takes Glyxambi (Empagliflozin/Linagliptin) and Soliqua (Glargine / lixisenatide) 30 units daily (dose recently increased)  HbA1c = 6.8  While in-house, use SSI  Had been mildly hypoglycemic, but now sugars rising  Started on low dose Lantus  At discharge, will restart her Soliqua, but probably at lower dose - 25 units?
Advancing diet slowly

## 2020-08-31 NOTE — PROGRESS NOTE ADULT - SUBJECTIVE AND OBJECTIVE BOX
Patient is a 80y old  Female who presents with a chief complaint of diarrhea and fall (31 Aug 2020 12:33)      HPI:  78yo female with pmh HTN, DM, presents with diarrhea, NV, and fall. PT reports she was in [facility] and without illness last night. THis am, woke up with multiple diarrhea, NV x 2. Pt feeling very weak and lightheaded and fell. Pt states her legs gave way, and hit hear with laceration. Family came home and found her on ground and states she was really weak when she tried to stand up.- dooes not think she passed out  PT without dizziness at rest. denies headache. + some abd pain. denies fever, recent abx, unusual food, black/bloody BM, cp, sob, palpitaitons. 	No fever/chills, No photophobia/eye pain/changes in vision, No ear pain/sore throat/dysphagia, No chest pain/palpitations, no SOB/cough, no wheeze/stridor, +abdominal pain, +N/V/D, no dysuria/frequency/discharge, No neck/back pain, no rash, + dizzy (25 Aug 2020 16:46)      INTERVAL HPI/OVERNIGHT EVENTS: patient seen earlier today  The nurse denies melena, hematochezia, hematemesis, nausea, vomiting, abdominal pain, constipation, diarrhea, or change in bowel movements Tolerating full liquids. One soft BM / 24 hours      MEDICATIONS  (STANDING):  amLODIPine   Tablet 5 milliGRAM(s) Oral daily  aspirin enteric coated 81 milliGRAM(s) Oral daily  carvedilol 25 milliGRAM(s) Oral every 12 hours  ciprofloxacin   IVPB 400 milliGRAM(s) IV Intermittent every 12 hours  dextrose 5%. 1000 milliLiter(s) (50 mL/Hr) IV Continuous <Continuous>  dextrose 50% Injectable 12.5 Gram(s) IV Push once  dextrose 50% Injectable 25 Gram(s) IV Push once  dextrose 50% Injectable 25 Gram(s) IV Push once  doxazosin 2 milliGRAM(s) Oral at bedtime  insulin glargine Injectable (LANTUS) 15 Unit(s) SubCutaneous at bedtime  insulin lispro (HumaLOG) corrective regimen sliding scale   SubCutaneous three times a day before meals  insulin lispro (HumaLOG) corrective regimen sliding scale   SubCutaneous at bedtime  metroNIDAZOLE  IVPB 500 milliGRAM(s) IV Intermittent every 8 hours  mirtazapine 30 milliGRAM(s) Oral at bedtime  potassium chloride    Tablet ER 10 milliEquivalent(s) Oral three times a day    MEDICATIONS  (PRN):  dextrose 40% Gel 15 Gram(s) Oral once PRN Blood Glucose LESS THAN 70 milliGRAM(s)/deciliter  dextrose 40% Gel 15 Gram(s) Oral once PRN Blood Glucose LESS THAN 70 milliGRAM(s)/deciliter  glucagon  Injectable 1 milliGRAM(s) IntraMuscular once PRN Glucose LESS THAN 70 milligrams/deciliter  LORazepam     Tablet 0.5 milliGRAM(s) Oral two times a day PRN Anxiety  ondansetron Injectable 4 milliGRAM(s) IV Push every 6 hours PRN Nausea and/or Vomiting      FAMILY HISTORY:  FH: dementia  Family history of Alzheimer's disease      Allergies    No Known Allergies    Intolerances        PMH/PSH:  Diabetes mellitus  Osteopenia  Hypercholesterolemia  Glaucoma  Chronic kidney disease (CKD)  Hypertension  Hyperuricemia  Depression  Anxiety  Tongue cancer  Tongue cancer        REVIEW OF SYSTEMS:  CONSTITUTIONAL: No fever, weight loss, or fatigue  EYES: No eye pain, visual disturbances, or discharge  ENMT:  No difficulty hearing, tinnitus, vertigo; No sinus or throat pain  NECK: No pain or stiffness  BREASTS: No pain, masses, or nipple discharge  RESPIRATORY: No cough, wheezing, chills or hemoptysis; No shortness of breath  CARDIOVASCULAR: No chest pain, palpitations, dizziness, or leg swelling  GASTROINTESTINAL: See above  GENITOURINARY: No dysuria, frequency, hematuria, or incontinence  NEUROLOGICAL: No headaches, memory loss, loss of strength, numbness, or tremors  SKIN: No itching, burning, rashes, or lesions   LYMPH NODES: No enlarged glands  ENDOCRINE: No heat or cold intolerance; No hair loss  MUSCULOSKELETAL: No joint pain or swelling; No muscle, back, or extremity pain  PSYCHIATRIC: No depression, anxiety, mood swings, or difficulty sleeping  HEME/LYMPH: No easy bruising, or bleeding gums  ALLERGY AND IMMUNOLOGIC: No hives or eczema    Vital Signs Last 24 Hrs  T(C): 36.7 (31 Aug 2020 12:28), Max: 36.7 (31 Aug 2020 12:28)  T(F): 98 (31 Aug 2020 12:28), Max: 98 (31 Aug 2020 12:28)  HR: 65 (31 Aug 2020 12:28) (65 - 72)  BP: 137/62 (31 Aug 2020 12:28) (137/62 - 185/65)  BP(mean): --  RR: 18 (31 Aug 2020 12:28) (17 - 18)  SpO2: 100% (31 Aug 2020 12:28) (99% - 100%)    PHYSICAL EXAM:  GENERAL: NAD, well-groomed, well-developed  HEAD:  Atraumatic, Normocephalic  EYES: EOMI, PERRLA, conjunctiva and sclera clear  NECK: Supple, No JVD, Normal thyroid  NERVOUS SYSTEM:  Alert & Oriented X3, Good concentration; Motor Strength 5/5 B/L upper and lower extremities;  CHEST/LUNG: Clear to percussion bilaterally; No rales, rhonchi, wheezing, or rubs  HEART: Regular rate and rhythm; No murmurs, rubs, or gallops  ABDOMEN: Soft, Nontender, Nondistended; Bowel sounds present  EXTREMITIES:  2+ Peripheral Pulses, No clubbing, cyanosis, or edema  LYMPH: No lymphadenopathy noted  SKIN: No rashes or lesions    LAB                          9.9    6.96  )-----------( 214      ( 31 Aug 2020 06:21 )             30.8       CBC:  08-31 @ 06:21  WBC 6.96   Hgb 9.9   Hct 30.8   Plts 214  MCV 98.4  08-29 @ 06:48  WBC 9.89   Hgb 9.9   Hct 30.7   Plts 197  MCV 98.7  08-28 @ 06:28  WBC 15.01   Hgb 9.6   Hct 31.0   Plts 186  .6  08-27 @ 06:38  WBC 15.40   Hgb 9.5   Hct 29.7   Plts 157  .0  08-26 @ 06:35  WBC 16.09   Hgb 9.8   Hct 31.3   Plts 189  .3  08-25 @ 13:56  WBC 14.68   Hgb 11.4   Hct 35.4   Plts 227  .3      Chemistry:  08-31 @ 06:21  Na+ 139  K+ 3.8  Cl- 112  CO2 23  BUN 9  Cr 1.12     08-30 @ 06:56  Na+ 143  K+ 3.5  Cl- 113  CO2 22  BUN 12  Cr 1.05     08-29 @ 06:48  Na+ 140  K+ 3.7  Cl- 112  CO2 20  BUN 15  Cr 1.24     08-28 @ 06:28  Na+ 139  K+ 3.1  Cl- 112  CO2 20  BUN 22  Cr 1.51     08-27 @ 06:38  Na+ 139  K+ 3.7  Cl- 110  CO2 20  BUN 26  Cr 1.73     08-26 @ 06:35  Na+ 143  K+ 3.7  Cl- 115  CO2 23  BUN 31  Cr 1.53     08-25 @ 13:56  Na+ 145  K+ 5.7  Cl- 113  CO2 25  BUN 38  Cr 2.29         Glucose, Serum: 135 mg/dL (08-31 @ 06:21)  Glucose, Serum: 119 mg/dL (08-30 @ 06:56)  Glucose, Serum: 193 mg/dL (08-29 @ 06:48)  Glucose, Serum: 221 mg/dL (08-28 @ 06:28)  Glucose, Serum: 252 mg/dL (08-27 @ 06:38)  Glucose, Serum: 53 mg/dL (08-26 @ 06:35)  Glucose, Serum: 88 mg/dL (08-25 @ 13:56)      31 Aug 2020 06:21    139    |  112    |  9      ----------------------------<  135    3.8     |  23     |  1.12   30 Aug 2020 06:56    143    |  113    |  12     ----------------------------<  119    3.5     |  22     |  1.05   29 Aug 2020 06:48    140    |  112    |  15     ----------------------------<  193    3.7     |  20     |  1.24   28 Aug 2020 06:28    139    |  112    |  22     ----------------------------<  221    3.1     |  20     |  1.51   27 Aug 2020 06:38    139    |  110    |  26     ----------------------------<  252    3.7     |  20     |  1.73   26 Aug 2020 06:35    143    |  115    |  31     ----------------------------<  53     3.7     |  23     |  1.53   25 Aug 2020 13:56    145    |  113    |  38     ----------------------------<  88     5.7     |  25     |  2.29     Ca    8.1        31 Aug 2020 06:21  Ca    8.0        30 Aug 2020 06:56  Ca    7.9        29 Aug 2020 06:48  Ca    7.7        28 Aug 2020 06:28  Ca    7.6        27 Aug 2020 06:38  Ca    7.7        26 Aug 2020 06:35  Ca    9.8        25 Aug 2020 13:56  Phos  2.0       31 Aug 2020 06:21  Phos  1.8       30 Aug 2020 06:56  Phos  1.2       29 Aug 2020 06:48  Phos  2.6       27 Aug 2020 06:38  Mg     1.6       31 Aug 2020 06:21  Mg     1.8       29 Aug 2020 06:48  Mg     2.0       27 Aug 2020 06:38    TPro  6.7    /  Alb  3.1    /  TBili  0.8    /  DBili  x      /  AST  43     /  ALT  31     /  AlkPhos  70     25 Aug 2020 13:56              CAPILLARY BLOOD GLUCOSE      POCT Blood Glucose.: 218 mg/dL (31 Aug 2020 16:58)  POCT Blood Glucose.: 214 mg/dL (31 Aug 2020 11:39)  POCT Blood Glucose.: 135 mg/dL (31 Aug 2020 07:35)  POCT Blood Glucose.: 155 mg/dL (30 Aug 2020 21:44)      C-Reactive Protein, Serum: 15.14 mg/dL (08-28 @ 08:44)  C-Reactive Protein, Serum: 8.19 mg/dL (08-26 @ 09:50)      RADIOLOGY & ADDITIONAL TESTS:    Imaging Personally Reviewed:  [ ] YES  [ ] NO    Consultant(s) Notes Reviewed:  [ ] YES  [ ] NO    Care Discussed with Consultants/Other Providers [ ] YES  [ ] NO

## 2020-08-31 NOTE — PROGRESS NOTE ADULT - PROBLEM SELECTOR PLAN 2
Likely pre-renal in origin  Nephro consulted (Keila )  Continue intravenous hydration
Likely pre-renal in origin  Nephro consulted (Keila )  Resolved w/ IV hydration
Likely pre-renal in origin  Nephro consulted (Keila )  Continue intravenous hydration
Likely pre-renal in origin  Nephro consulted (Keila )  Resolved w/ IV hydration
Pre-renal in origin  Nephro consulted (Keila )  Resolved w/ IV hydration
Pre-renal in origin  Nephro consulted (Keila )  Resolved w/ IV hydration

## 2020-08-31 NOTE — PROGRESS NOTE ADULT - PROBLEM SELECTOR PROBLEM 5
Type 2 diabetes mellitus without complication, without long-term current use of insulin
Type 2 diabetes mellitus without complication, without long-term current use of insulin
Essential hypertension
Type 2 diabetes mellitus without complication, without long-term current use of insulin
Type 2 diabetes mellitus without complication, without long-term current use of insulin
CKD (chronic kidney disease) stage 3, GFR 30-59 ml/min

## 2020-08-31 NOTE — PROGRESS NOTE ADULT - SUBJECTIVE AND OBJECTIVE BOX
Patient is a 80y old  Female who presents with a chief complaint of diarrhea and fall (30 Aug 2020 13:12)      OVERNIGHT EVENTS: none     REVIEW OF SYSTEMS: denies chest pain/SOB, diaphoresis, no F/C, cough, dizziness, headache, blurry vision, nausea, vomiting, abdominal pain. All others review of systems negative     MEDICATIONS  (STANDING):  amLODIPine   Tablet 5 milliGRAM(s) Oral daily  aspirin enteric coated 81 milliGRAM(s) Oral daily  carvedilol 25 milliGRAM(s) Oral every 12 hours  ciprofloxacin   IVPB 400 milliGRAM(s) IV Intermittent every 12 hours  dextrose 5%. 1000 milliLiter(s) (50 mL/Hr) IV Continuous <Continuous>  dextrose 50% Injectable 12.5 Gram(s) IV Push once  dextrose 50% Injectable 25 Gram(s) IV Push once  dextrose 50% Injectable 25 Gram(s) IV Push once  doxazosin 2 milliGRAM(s) Oral at bedtime  insulin glargine Injectable (LANTUS) 15 Unit(s) SubCutaneous at bedtime  insulin lispro (HumaLOG) corrective regimen sliding scale   SubCutaneous three times a day before meals  insulin lispro (HumaLOG) corrective regimen sliding scale   SubCutaneous at bedtime  magnesium sulfate  IVPB 1 Gram(s) IV Intermittent once  metroNIDAZOLE  IVPB 500 milliGRAM(s) IV Intermittent every 8 hours  mirtazapine 30 milliGRAM(s) Oral at bedtime  potassium chloride    Tablet ER 10 milliEquivalent(s) Oral three times a day  sodium phosphate IVPB 15 milliMole(s) IV Intermittent once    MEDICATIONS  (PRN):  dextrose 40% Gel 15 Gram(s) Oral once PRN Blood Glucose LESS THAN 70 milliGRAM(s)/deciliter  dextrose 40% Gel 15 Gram(s) Oral once PRN Blood Glucose LESS THAN 70 milliGRAM(s)/deciliter  glucagon  Injectable 1 milliGRAM(s) IntraMuscular once PRN Glucose LESS THAN 70 milligrams/deciliter  LORazepam     Tablet 0.5 milliGRAM(s) Oral two times a day PRN Anxiety  ondansetron Injectable 4 milliGRAM(s) IV Push every 6 hours PRN Nausea and/or Vomiting      Allergies    No Known Allergies    Intolerances        T(F): 98 (08-31-20 @ 12:28), Max: 98.5 (08-30-20 @ 13:27)  HR: 65 (08-31-20 @ 12:28) (64 - 72)  BP: 137/62 (08-31-20 @ 12:28) (137/62 - 185/65)  RR: 18 (08-31-20 @ 12:28) (17 - 18)  SpO2: 100% (08-31-20 @ 12:28) (99% - 100%)  Wt(kg): --    PHYSICAL EXAM:  GENERAL: NAD  HEAD:  Atraumatic, Normocephalic  EYES: PERRLA, conjunctiva and sclera clear  ENMT: Moist mucous membranes  NECK: Supple, No JVD, Normal thyroid  NERVOUS SYSTEM:  Alert & Awake  CHEST/LUNG: Clear to percussion bilaterally;   HEART: Regular rate and rhythm;   ABDOMEN: Soft, Nontender, Nondistended; Bowel sounds present  EXTREMITIES:  no edema BL LE  SKIN: moist    LABS:                        9.9    6.96  )-----------( 214      ( 31 Aug 2020 06:21 )             30.8     08-31    139  |  112<H>  |  9   ----------------------------<  135<H>  3.8   |  23  |  1.12    Ca    8.1<L>      31 Aug 2020 06:21  Phos  2.0     08-31  Mg     1.6     08-31          Cultures;   CAPILLARY BLOOD GLUCOSE      POCT Blood Glucose.: 214 mg/dL (31 Aug 2020 11:39)  POCT Blood Glucose.: 135 mg/dL (31 Aug 2020 07:35)  POCT Blood Glucose.: 155 mg/dL (30 Aug 2020 21:44)  POCT Blood Glucose.: 224 mg/dL (30 Aug 2020 16:24)    Lipid panel:           RADIOLOGY & ADDITIONAL TESTS:    Imaging Personally Reviewed:  [x ] YES      Consultant(s) Notes Reviewed:  [x ] YES     Care Discussed with [x ] Consultants [X ] Patient [ ] Family  [x ]    [x ]  Other; RN

## 2020-08-31 NOTE — PROGRESS NOTE ADULT - PROBLEM SELECTOR PLAN 1
Not yet clear if ischemic vs infectious  C. diff negative x 1  Stool culture still pending - considerable difficulty getting sample due to urgency of stools  Gastroenterologist - ([place])  On empiric cipro / flagyl  Due to possibility of ischemic colitis, brief vascular workup done:   Echo shows normal EF; some LVH; some borderline pHTN   Carotid dopplers show "moderate plaquing" but no stenoses   Lipids very low overall (TC = 77; LDL=32; HDL=28)   My colleague Discussed w/ cardio who recommends outpatient stress test, but no further inpatient testing unless (a) pt develops chest pain or (b) ischemic colitis confirmed and surgery planned.  Will discuss w/ GI regarding possibly advancing diet...

## 2020-08-31 NOTE — PROGRESS NOTE ADULT - PROBLEM SELECTOR PROBLEM 6
Essential hypertension
Laceration of left ear, initial encounter

## 2020-08-31 NOTE — PROGRESS NOTE ADULT - PROBLEM SELECTOR PROBLEM 2
NICK (acute kidney injury)

## 2020-09-01 ENCOUNTER — TRANSCRIPTION ENCOUNTER (OUTPATIENT)
Age: 80
End: 2020-09-01

## 2020-09-01 VITALS
DIASTOLIC BLOOD PRESSURE: 65 MMHG | OXYGEN SATURATION: 99 % | SYSTOLIC BLOOD PRESSURE: 154 MMHG | HEART RATE: 69 BPM | RESPIRATION RATE: 18 BRPM | TEMPERATURE: 98 F

## 2020-09-01 LAB
ANION GAP SERPL CALC-SCNC: 9 MMOL/L — SIGNIFICANT CHANGE UP (ref 5–17)
BUN SERPL-MCNC: 8 MG/DL — SIGNIFICANT CHANGE UP (ref 7–23)
CALCIUM SERPL-MCNC: 7.9 MG/DL — LOW (ref 8.5–10.1)
CHLORIDE SERPL-SCNC: 111 MMOL/L — HIGH (ref 96–108)
CO2 SERPL-SCNC: 23 MMOL/L — SIGNIFICANT CHANGE UP (ref 22–31)
CREAT SERPL-MCNC: 1.16 MG/DL — SIGNIFICANT CHANGE UP (ref 0.5–1.3)
CRP SERPL-MCNC: 3.22 MG/DL — HIGH (ref 0–0.4)
GLUCOSE BLDC GLUCOMTR-MCNC: 120 MG/DL — HIGH (ref 70–99)
GLUCOSE BLDC GLUCOMTR-MCNC: 156 MG/DL — HIGH (ref 70–99)
GLUCOSE BLDC GLUCOMTR-MCNC: 212 MG/DL — HIGH (ref 70–99)
GLUCOSE SERPL-MCNC: 121 MG/DL — HIGH (ref 70–99)
HCT VFR BLD CALC: 28.2 % — LOW (ref 34.5–45)
HGB BLD-MCNC: 9.3 G/DL — LOW (ref 11.5–15.5)
MAGNESIUM SERPL-MCNC: 1.8 MG/DL — SIGNIFICANT CHANGE UP (ref 1.6–2.6)
MCHC RBC-ENTMCNC: 31.7 PG — SIGNIFICANT CHANGE UP (ref 27–34)
MCHC RBC-ENTMCNC: 33 GM/DL — SIGNIFICANT CHANGE UP (ref 32–36)
MCV RBC AUTO: 96.2 FL — SIGNIFICANT CHANGE UP (ref 80–100)
NRBC # BLD: 0 /100 WBCS — SIGNIFICANT CHANGE UP (ref 0–0)
PHOSPHATE SERPL-MCNC: 2.9 MG/DL — SIGNIFICANT CHANGE UP (ref 2.5–4.5)
PLATELET # BLD AUTO: 228 K/UL — SIGNIFICANT CHANGE UP (ref 150–400)
POTASSIUM SERPL-MCNC: 4.1 MMOL/L — SIGNIFICANT CHANGE UP (ref 3.5–5.3)
POTASSIUM SERPL-SCNC: 4.1 MMOL/L — SIGNIFICANT CHANGE UP (ref 3.5–5.3)
RBC # BLD: 2.93 M/UL — LOW (ref 3.8–5.2)
RBC # FLD: 11.9 % — SIGNIFICANT CHANGE UP (ref 10.3–14.5)
SODIUM SERPL-SCNC: 143 MMOL/L — SIGNIFICANT CHANGE UP (ref 135–145)
WBC # BLD: 6.33 K/UL — SIGNIFICANT CHANGE UP (ref 3.8–10.5)
WBC # FLD AUTO: 6.33 K/UL — SIGNIFICANT CHANGE UP (ref 3.8–10.5)

## 2020-09-01 PROCEDURE — 99233 SBSQ HOSP IP/OBS HIGH 50: CPT

## 2020-09-01 RX ORDER — METRONIDAZOLE 500 MG
1 TABLET ORAL
Qty: 15 | Refills: 0
Start: 2020-09-01 | End: 2020-09-05

## 2020-09-01 RX ORDER — DOXAZOSIN MESYLATE 4 MG
1 TABLET ORAL
Qty: 0 | Refills: 0 | DISCHARGE
Start: 2020-09-01

## 2020-09-01 RX ORDER — MAGNESIUM SULFATE 500 MG/ML
1 VIAL (ML) INJECTION ONCE
Refills: 0 | Status: COMPLETED | OUTPATIENT
Start: 2020-09-01 | End: 2020-09-01

## 2020-09-01 RX ORDER — MIRTAZAPINE 45 MG/1
1 TABLET, ORALLY DISINTEGRATING ORAL
Qty: 0 | Refills: 0 | DISCHARGE

## 2020-09-01 RX ORDER — MIRTAZAPINE 45 MG/1
1 TABLET, ORALLY DISINTEGRATING ORAL
Qty: 0 | Refills: 0 | DISCHARGE
Start: 2020-09-01

## 2020-09-01 RX ORDER — CARVEDILOL PHOSPHATE 80 MG/1
1 CAPSULE, EXTENDED RELEASE ORAL
Qty: 0 | Refills: 0 | DISCHARGE

## 2020-09-01 RX ORDER — EMPAGLIFLOZIN AND LINAGLIPTIN 10; 5 MG/1; MG/1
1 TABLET, FILM COATED ORAL
Qty: 0 | Refills: 0 | DISCHARGE

## 2020-09-01 RX ORDER — DOXAZOSIN MESYLATE 4 MG
1 TABLET ORAL
Qty: 0 | Refills: 0 | DISCHARGE

## 2020-09-01 RX ORDER — CIPROFLOXACIN LACTATE 400MG/40ML
1 VIAL (ML) INTRAVENOUS
Qty: 10 | Refills: 0
Start: 2020-09-01 | End: 2020-09-05

## 2020-09-01 RX ORDER — INSULIN GLARGINE AND LIXISENATIDE 100; 33 U/ML; UG/ML
20 INJECTION, SOLUTION SUBCUTANEOUS
Qty: 0 | Refills: 0 | DISCHARGE

## 2020-09-01 RX ORDER — CARVEDILOL PHOSPHATE 80 MG/1
1 CAPSULE, EXTENDED RELEASE ORAL
Qty: 0 | Refills: 0 | DISCHARGE
Start: 2020-09-01

## 2020-09-01 RX ORDER — LISINOPRIL 2.5 MG/1
10 TABLET ORAL DAILY
Refills: 0 | Status: DISCONTINUED | OUTPATIENT
Start: 2020-09-01 | End: 2020-09-01

## 2020-09-01 RX ORDER — ASPIRIN/CALCIUM CARB/MAGNESIUM 324 MG
1 TABLET ORAL
Qty: 0 | Refills: 0 | DISCHARGE
Start: 2020-09-01

## 2020-09-01 RX ORDER — ASPIRIN/CALCIUM CARB/MAGNESIUM 324 MG
1 TABLET ORAL
Qty: 0 | Refills: 0 | DISCHARGE

## 2020-09-01 RX ORDER — FLUCONAZOLE 150 MG/1
1 TABLET ORAL
Qty: 0 | Refills: 0 | DISCHARGE

## 2020-09-01 RX ADMIN — AMLODIPINE BESYLATE 5 MILLIGRAM(S): 2.5 TABLET ORAL at 06:36

## 2020-09-01 RX ADMIN — Medication 100 MILLIGRAM(S): at 06:35

## 2020-09-01 RX ADMIN — Medication 2: at 16:50

## 2020-09-01 RX ADMIN — Medication 100 GRAM(S): at 10:19

## 2020-09-01 RX ADMIN — Medication 100 MILLIGRAM(S): at 14:30

## 2020-09-01 RX ADMIN — CARVEDILOL PHOSPHATE 25 MILLIGRAM(S): 80 CAPSULE, EXTENDED RELEASE ORAL at 06:36

## 2020-09-01 RX ADMIN — Medication 200 MILLIGRAM(S): at 06:35

## 2020-09-01 RX ADMIN — Medication 1: at 11:36

## 2020-09-01 RX ADMIN — Medication 81 MILLIGRAM(S): at 11:37

## 2020-09-01 RX ADMIN — Medication 10 MILLIEQUIVALENT(S): at 14:30

## 2020-09-01 NOTE — DISCHARGE NOTE PROVIDER - CARE PROVIDER_API CALL
Sunny Velasco  CARDIOLOGY  [address] [place] [address]  Phone: [phone]  Fax: [phone]  Follow Up Time:     [place] [address]  Phone: [phone]  Fax: [phone]  Follow Up Time:

## 2020-09-01 NOTE — PROGRESS NOTE ADULT - ASSESSMENT
HPI:  80yo female with pmh HTN, DM, presents with diarrhea, NV, and fall. PT reports she was in [facility] and without illness last night. THis am, woke up with multiple diarrhea, NV x 2. Pt feeling very weak and lightheaded and fell. Pt states her legs gave way, and hit hear with laceration. Family came home and found her on ground and states she was really weak when she tried to stand up.- dooes not think she passed out  PT without dizziness at rest. denies headache. + some abd pain. denies fever, recent abx, unusual food, black/bloody BM, cp, sob, palpitaitons. 	No fever/chills, No photophobia/eye pain/changes in vision, No ear pain/sore throat/dysphagia, No chest pain/palpitations, no SOB/cough, no wheeze/stridor, +abdominal pain, +N/V/D, no dysuria/frequency/discharge, No neck/back pain, no rash, + dizzy (25 Aug 2020 16:46)  ------------------- As Above  ----------------------  Patient is a poor historian. Presents with diarrhea. Patient unsure when it started. No N/V or bloody BMs. No fever., abdominal pain. Denies having a colonoscopy.     Colitis - Infectious VS Ischemic - CRP 8.19 --> 15.14 . WBC 16.09 --> 15.40 --> 15.01--> 6.96 --> 6.33 , afebrile,  C Diff negative  doubt IBD - 1) stool culture / O&P still open Clinically better ( Now non bloody soft BM  )   2) CRP f/u  3) Broad spectrum antibiotic

## 2020-09-01 NOTE — PHYSICAL THERAPY INITIAL EVALUATION ADULT - PLANNED THERAPY INTERVENTIONS, PT EVAL
yes stairs: GOAL: pt will be able to ascend/descend total of 17 steps with use of one rail via step to step pattern within 4 weeks/balance training/gait training/strengthening

## 2020-09-01 NOTE — DISCHARGE NOTE PROVIDER - CARE PROVIDERS DIRECT ADDRESSES
[email or web address]

## 2020-09-01 NOTE — DISCHARGE NOTE PROVIDER - NSDCMRMEDTOKEN_GEN_ALL_CORE_FT
Alphagan P 0.1% ophthalmic solution: 1 drop(s) to each affected eye 2 times a day ( right eye)  amlodipine-benazepril 5 mg-20 mg oral capsule: 1 cap(s) orally once a day  aspirin 81 mg oral delayed release tablet: 1 tab(s) orally once a day  carvedilol 25 mg oral tablet: 1 tab(s) orally every 12 hours  ciprofloxacin 100 mg oral tablet: 1 tab(s) orally every 12 hours  doxazosin 2 mg oral tablet: 1 tab(s) orally once a day (at bedtime)  Farxiga 5 mg oral tablet: 1 tab(s) orally once a day  Flagyl 500 mg oral tablet: 1 tab(s) orally 3 times a day  LORazepam 0.5 mg oral tablet: 1 tab(s) orally 2 times a day  mirtazapine 30 mg oral tablet: 1 tab(s) orally once a day (at bedtime)  Soliqua 100/33 subcutaneous solution: 15 unit(s) subcutaneous once a day  travoprost 0.004% ophthalmic solution: 1 drop(s) to each affected eye once a day (at bedtime) (right eye)

## 2020-09-01 NOTE — PROGRESS NOTE ADULT - REASON FOR ADMISSION
diarrhea and fall

## 2020-09-01 NOTE — PHYSICAL THERAPY INITIAL EVALUATION ADULT - BALANCE TRAINING, PT EVAL
GOAL: pt will be able to independently ambulate 300ft without device & without loss of balance within 4 weeks

## 2020-09-01 NOTE — DISCHARGE NOTE PROVIDER - NSDCFUSCHEDAPPT_GEN_ALL_CORE_FT
PAULA BLANCO ; 09/03/2020 ; NPP [facility] [address] [facility]  PAULA BLANCO ; 10/01/2020 ; NPP [place] [address] [facility] PAULA BLANCO ; 09/03/2020 ; NPP [facility] [address] [facility]  PAULA BLANCO ; 10/01/2020 ; NPP [place] [address] [facility] PAULA BLANCO ; 09/03/2020 ; NPP [facility] [address] [facility]  PAULA BLANCO ; 10/01/2020 ; NPP [place] [address] [facility]

## 2020-09-01 NOTE — DISCHARGE NOTE PROVIDER - HOSPITAL COURSE
78yo female with pmh HTN, DM, presents with diarrhea, NV, and fall. PT reports she was in [facility] and without illness last night. THis am, woke up with multiple diarrhea, NV x 2. Pt feeling very weak and lightheaded and fell. Pt states her legs gave way, and hit hear with laceration. Family came home and found her on ground and states she was really weak when she tried to stand up.- dooes not think she passed out  PT without dizziness at rest. denies headache. + some abd pain. denies fever, recent abx, unusual food, black/bloody BM, cp, sob, palpitaitons. No fever/chills, No photophobia/eye pain/changes in vision, No ear pain/sore throat/dysphagia, No chest pain/palpitations, no SOB/cough, no wheeze/stridor, +abdominal pain, +N/V/D, no dysuria/frequency/discharge, No neck/back pain, no rash, + dizzy. Patient is a poor historian. Presents with diarrhea. Patient unsure when it started. No N/V or bloody BMs. No fever., abdominal pain. Denies having a colonoscopy.             Problem/Plan - 1:    ·  Problem: Colitis.      C. diff negative x 1    On empiric cipro / flagyl    Due to possibility of ischemic colitis, brief vascular workup done:     Echo shows normal EF; some LVH; some borderline pHTN     Carotid dopplers show "moderate plaquing" but no stenoses     Lipids very low overall (TC = 77; LDL=32; HDL=28)     pt will follow up w/cardio for outpatient stress test    tolerating advancing diet...          Problem/Plan - 2:    ·  Problem: NICK (acute kidney injury).  Plan: Pre-renal in origin    Resolved w/ IV hydration.          Problem/Plan - 3:    ·  Problem: Hypomagnesemia.  Plan: electrolyte replaced, monitor /pcp.          Problem/Plan - 4:    ·  Problem: Hypophosphatemia.  Plan: electrolyte replaced, monitor w/pcp.          Problem/Plan - 5:    ·  Problem: CKD (chronic kidney disease) stage 3, GFR 30-59 ml/min.  Plan: Cr currently lower than her previous baseline.          Problem/Plan - 6:    Problem: Laceration of left ear, initial encounter. Plan: plastic surgery appreciated ([place])    Given one dose of Augmentin in ER    Has also been on Cipro / Flagyl for GI    left Steri-strips intact; healing adequately.         Problem/Plan - 7:    ·  Problem: Type 2 diabetes mellitus without complication, without long-term current use of insulin.  Plan: Normally takes Glyxambi (Empagliflozin/Linagliptin) and Soliqua (Glargine / lixisenatide) 30 units daily (dose recently increased)    HbA1c = 6.8    Started on low dose Lantus    At discharge, will restart her Soliqua, but probably at lower dose - 25 units?          Problem/Plan - 8:    ·  Problem: Essential hypertension.  Plan: Normally takes Amlodipine/Benazepril, Coreg, Doxazosin    Currently on Amlodipine and Coreg, w/ less-than-ideal control    Restarted low-dose Doxazosin;         stable for d/c today and follow up w/pcp, GI, cardio within 1 wk of d/c

## 2020-09-01 NOTE — PROGRESS NOTE ADULT - SUBJECTIVE AND OBJECTIVE BOX
Patient is a 80y old  Female who presents with a chief complaint of diarrhea and fall (01 Sep 2020 12:30)      HPI:  78yo female with pmh HTN, DM, presents with diarrhea, NV, and fall. PT reports she was in [facility] and without illness last night. THis am, woke up with multiple diarrhea, NV x 2. Pt feeling very weak and lightheaded and fell. Pt states her legs gave way, and hit hear with laceration. Family came home and found her on ground and states she was really weak when she tried to stand up.- dooes not think she passed out  PT without dizziness at rest. denies headache. + some abd pain. denies fever, recent abx, unusual food, black/bloody BM, cp, sob, palpitaitons. 	No fever/chills, No photophobia/eye pain/changes in vision, No ear pain/sore throat/dysphagia, No chest pain/palpitations, no SOB/cough, no wheeze/stridor, +abdominal pain, +N/V/D, no dysuria/frequency/discharge, No neck/back pain, no rash, + dizzy (25 Aug 2020 16:46)      INTERVAL HPI/OVERNIGHT EVENTS:  One soft non bloody BM over night. None today. The patient denies melena, hematochezia, hematemesis, nausea, vomiting, abdominal pain, constipation,  or change in bowel movements Patient started on solid diet.    MEDICATIONS  (STANDING):  amLODIPine   Tablet 5 milliGRAM(s) Oral daily  aspirin enteric coated 81 milliGRAM(s) Oral daily  carvedilol 25 milliGRAM(s) Oral every 12 hours  ciprofloxacin   IVPB 400 milliGRAM(s) IV Intermittent every 12 hours  dextrose 5%. 1000 milliLiter(s) (50 mL/Hr) IV Continuous <Continuous>  dextrose 50% Injectable 12.5 Gram(s) IV Push once  dextrose 50% Injectable 25 Gram(s) IV Push once  dextrose 50% Injectable 25 Gram(s) IV Push once  doxazosin 2 milliGRAM(s) Oral at bedtime  insulin glargine Injectable (LANTUS) 15 Unit(s) SubCutaneous at bedtime  insulin lispro (HumaLOG) corrective regimen sliding scale   SubCutaneous three times a day before meals  insulin lispro (HumaLOG) corrective regimen sliding scale   SubCutaneous at bedtime  metroNIDAZOLE  IVPB 500 milliGRAM(s) IV Intermittent every 8 hours  mirtazapine 30 milliGRAM(s) Oral at bedtime  potassium chloride    Tablet ER 10 milliEquivalent(s) Oral three times a day    MEDICATIONS  (PRN):  dextrose 40% Gel 15 Gram(s) Oral once PRN Blood Glucose LESS THAN 70 milliGRAM(s)/deciliter  dextrose 40% Gel 15 Gram(s) Oral once PRN Blood Glucose LESS THAN 70 milliGRAM(s)/deciliter  glucagon  Injectable 1 milliGRAM(s) IntraMuscular once PRN Glucose LESS THAN 70 milligrams/deciliter  LORazepam     Tablet 0.5 milliGRAM(s) Oral two times a day PRN Anxiety  ondansetron Injectable 4 milliGRAM(s) IV Push every 6 hours PRN Nausea and/or Vomiting      FAMILY HISTORY:  FH: dementia  Family history of Alzheimer's disease      Allergies    No Known Allergies    Intolerances        PMH/PSH:  Diabetes mellitus  Osteopenia  Hypercholesterolemia  Glaucoma  Chronic kidney disease (CKD)  Hypertension  Hyperuricemia  Depression  Anxiety  Tongue cancer  Tongue cancer        REVIEW OF SYSTEMS:  CONSTITUTIONAL: No fever, weight loss, or fatigue  EYES: No eye pain, visual disturbances, or discharge  ENMT:  No difficulty hearing, tinnitus, vertigo; No sinus or throat pain  NECK: No pain or stiffness  BREASTS: No pain, masses, or nipple discharge  RESPIRATORY: No cough, wheezing, chills or hemoptysis; No shortness of breath  CARDIOVASCULAR: No chest pain, palpitations, dizziness, or leg swelling  GASTROINTESTINAL: No abdominal or epigastric pain. No nausea, vomiting, or hematemesis; No diarrhea or constipation. No melena or hematochezia.  GENITOURINARY: No dysuria, frequency, hematuria, or incontinence  NEUROLOGICAL: No headaches, memory loss, loss of strength, numbness, or tremors  SKIN: No itching, burning, rashes, or lesions   LYMPH NODES: No enlarged glands  ENDOCRINE: No heat or cold intolerance; No hair loss  MUSCULOSKELETAL: No joint pain or swelling; No muscle, back, or extremity pain  PSYCHIATRIC: No depression, anxiety, mood swings, or difficulty sleeping  HEME/LYMPH: No easy bruising, or bleeding gums  ALLERGY AND IMMUNOLOGIC: No hives or eczema    Vital Signs Last 24 Hrs  T(C): 36.6 (01 Sep 2020 10:56), Max: 36.9 (31 Aug 2020 17:12)  T(F): 97.9 (01 Sep 2020 10:56), Max: 98.4 (31 Aug 2020 17:12)  HR: 70 (01 Sep 2020 13:30) (65 - 74)  BP: 160/66 (01 Sep 2020 13:30) (154/56 - 182/65)  BP(mean): --  RR: 18 (01 Sep 2020 13:30) (18 - 18)  SpO2: 98% (01 Sep 2020 13:30) (98% - 100%)    PHYSICAL EXAM:  GENERAL: NAD, well-groomed, well-developed  HEAD:  Atraumatic, Normocephalic  EYES: EOMI, PERRLA, conjunctiva and sclera clear  ENMT: No tonsillar erythema, exudates, or enlargement; Moist mucous membranes, Good dentition, No lesions  NECK: Supple, No JVD, Normal thyroid  NERVOUS SYSTEM:  Alert & Oriented X3, Good concentration; Motor Strength 5/5 B/L upper and lower extremities; DTRs 2+ intact and symmetric  CHEST/LUNG: Clear to percussion bilaterally; No rales, rhonchi, wheezing, or rubs  HEART: Regular rate and rhythm; No murmurs, rubs, or gallops  ABDOMEN: Soft, Nontender, Nondistended; Bowel sounds present  EXTREMITIES:  2+ Peripheral Pulses, No clubbing, cyanosis, or edema  LYMPH: No lymphadenopathy noted  SKIN: No rashes or lesions    LAB                          9.3    6.33  )-----------( 228      ( 01 Sep 2020 06:28 )             28.2       CBC:  09-01 @ 06:28  WBC 6.33   Hgb 9.3   Hct 28.2   Plts 228  MCV 96.2  08-31 @ 06:21  WBC 6.96   Hgb 9.9   Hct 30.8   Plts 214  MCV 98.4  08-29 @ 06:48  WBC 9.89   Hgb 9.9   Hct 30.7   Plts 197  MCV 98.7  08-28 @ 06:28  WBC 15.01   Hgb 9.6   Hct 31.0   Plts 186  .6  08-27 @ 06:38  WBC 15.40   Hgb 9.5   Hct 29.7   Plts 157  .0  08-26 @ 06:35  WBC 16.09   Hgb 9.8   Hct 31.3   Plts 189  .3      Chemistry:  09-01 @ 06:28  Na+ 143  K+ 4.1  Cl- 111  CO2 23  BUN 8  Cr 1.16     08-31 @ 06:21  Na+ 139  K+ 3.8  Cl- 112  CO2 23  BUN 9  Cr 1.12     08-30 @ 06:56  Na+ 143  K+ 3.5  Cl- 113  CO2 22  BUN 12  Cr 1.05     08-29 @ 06:48  Na+ 140  K+ 3.7  Cl- 112  CO2 20  BUN 15  Cr 1.24     08-28 @ 06:28  Na+ 139  K+ 3.1  Cl- 112  CO2 20  BUN 22  Cr 1.51     08-27 @ 06:38  Na+ 139  K+ 3.7  Cl- 110  CO2 20  BUN 26  Cr 1.73     08-26 @ 06:35  Na+ 143  K+ 3.7  Cl- 115  CO2 23  BUN 31  Cr 1.53         Glucose, Serum: 121 mg/dL (09-01 @ 06:28)  Glucose, Serum: 135 mg/dL (08-31 @ 06:21)  Glucose, Serum: 119 mg/dL (08-30 @ 06:56)  Glucose, Serum: 193 mg/dL (08-29 @ 06:48)  Glucose, Serum: 221 mg/dL (08-28 @ 06:28)  Glucose, Serum: 252 mg/dL (08-27 @ 06:38)  Glucose, Serum: 53 mg/dL (08-26 @ 06:35)      01 Sep 2020 06:28    143    |  111    |  8      ----------------------------<  121    4.1     |  23     |  1.16   31 Aug 2020 06:21    139    |  112    |  9      ----------------------------<  135    3.8     |  23     |  1.12   30 Aug 2020 06:56    143    |  113    |  12     ----------------------------<  119    3.5     |  22     |  1.05   29 Aug 2020 06:48    140    |  112    |  15     ----------------------------<  193    3.7     |  20     |  1.24   28 Aug 2020 06:28    139    |  112    |  22     ----------------------------<  221    3.1     |  20     |  1.51   27 Aug 2020 06:38    139    |  110    |  26     ----------------------------<  252    3.7     |  20     |  1.73   26 Aug 2020 06:35    143    |  115    |  31     ----------------------------<  53     3.7     |  23     |  1.53     Ca    7.9        01 Sep 2020 06:28  Ca    8.1        31 Aug 2020 06:21  Ca    8.0        30 Aug 2020 06:56  Ca    7.9        29 Aug 2020 06:48  Ca    7.7        28 Aug 2020 06:28  Ca    7.6        27 Aug 2020 06:38  Ca    7.7        26 Aug 2020 06:35  Phos  2.9       01 Sep 2020 06:28  Phos  2.0       31 Aug 2020 06:21  Phos  1.8       30 Aug 2020 06:56  Phos  1.2       29 Aug 2020 06:48  Phos  2.6       27 Aug 2020 06:38  Mg     1.8       01 Sep 2020 06:28  Mg     1.6       31 Aug 2020 06:21  Mg     1.8       29 Aug 2020 06:48  Mg     2.0       27 Aug 2020 06:38                CAPILLARY BLOOD GLUCOSE      POCT Blood Glucose.: 156 mg/dL (01 Sep 2020 11:26)  POCT Blood Glucose.: 120 mg/dL (01 Sep 2020 08:13)  POCT Blood Glucose.: 214 mg/dL (31 Aug 2020 21:05)  POCT Blood Glucose.: 218 mg/dL (31 Aug 2020 16:58)      C-Reactive Protein, Serum: 3.22 mg/dL (09-01 @ 11:40)  C-Reactive Protein, Serum: 15.14 mg/dL (08-28 @ 08:44)  C-Reactive Protein, Serum: 8.19 mg/dL (08-26 @ 09:50)      RADIOLOGY & ADDITIONAL TESTS:    Imaging Personally Reviewed:  [ ] YES  [ ] NO    Consultant(s) Notes Reviewed:  [ ] YES  [ ] NO    Care Discussed with Consultants/Other Providers [ ] YES  [ ] NO Patient is a 80y old  Female who presents with a chief complaint of diarrhea and fall (01 Sep 2020 12:30)      HPI:  80yo female with pmh HTN, DM, presents with diarrhea, NV, and fall. PT reports she was in [facility] and without illness last night. THis am, woke up with multiple diarrhea, NV x 2. Pt feeling very weak and lightheaded and fell. Pt states her legs gave way, and hit hear with laceration. Family came home and found her on ground and states she was really weak when she tried to stand up.- dooes not think she passed out  PT without dizziness at rest. denies headache. + some abd pain. denies fever, recent abx, unusual food, black/bloody BM, cp, sob, palpitaitons. 	No fever/chills, No photophobia/eye pain/changes in vision, No ear pain/sore throat/dysphagia, No chest pain/palpitations, no SOB/cough, no wheeze/stridor, +abdominal pain, +N/V/D, no dysuria/frequency/discharge, No neck/back pain, no rash, + dizzy (25 Aug 2020 16:46)      INTERVAL HPI/OVERNIGHT EVENTS:  One soft non bloody BM over night. None today. The patient denies melena, hematochezia, hematemesis, nausea, vomiting, abdominal pain, constipation,  or change in bowel movements Patient started on solid diet.    MEDICATIONS  (STANDING):  amLODIPine   Tablet 5 milliGRAM(s) Oral daily  aspirin enteric coated 81 milliGRAM(s) Oral daily  carvedilol 25 milliGRAM(s) Oral every 12 hours  ciprofloxacin   IVPB 400 milliGRAM(s) IV Intermittent every 12 hours  dextrose 5%. 1000 milliLiter(s) (50 mL/Hr) IV Continuous <Continuous>  dextrose 50% Injectable 12.5 Gram(s) IV Push once  dextrose 50% Injectable 25 Gram(s) IV Push once  dextrose 50% Injectable 25 Gram(s) IV Push once  doxazosin 2 milliGRAM(s) Oral at bedtime  insulin glargine Injectable (LANTUS) 15 Unit(s) SubCutaneous at bedtime  insulin lispro (HumaLOG) corrective regimen sliding scale   SubCutaneous three times a day before meals  insulin lispro (HumaLOG) corrective regimen sliding scale   SubCutaneous at bedtime  metroNIDAZOLE  IVPB 500 milliGRAM(s) IV Intermittent every 8 hours  mirtazapine 30 milliGRAM(s) Oral at bedtime  potassium chloride    Tablet ER 10 milliEquivalent(s) Oral three times a day    MEDICATIONS  (PRN):  dextrose 40% Gel 15 Gram(s) Oral once PRN Blood Glucose LESS THAN 70 milliGRAM(s)/deciliter  dextrose 40% Gel 15 Gram(s) Oral once PRN Blood Glucose LESS THAN 70 milliGRAM(s)/deciliter  glucagon  Injectable 1 milliGRAM(s) IntraMuscular once PRN Glucose LESS THAN 70 milligrams/deciliter  LORazepam     Tablet 0.5 milliGRAM(s) Oral two times a day PRN Anxiety  ondansetron Injectable 4 milliGRAM(s) IV Push every 6 hours PRN Nausea and/or Vomiting      FAMILY HISTORY:  FH: dementia  Family history of Alzheimer's disease      Allergies    No Known Allergies    Intolerances        PMH/PSH:  Diabetes mellitus  Osteopenia  Hypercholesterolemia  Glaucoma  Chronic kidney disease (CKD)  Hypertension  Hyperuricemia  Depression  Anxiety  Tongue cancer  Tongue cancer        REVIEW OF SYSTEMS:  CONSTITUTIONAL: No fever, weight loss, or fatigue  EYES: No eye pain, visual disturbances, or discharge  ENMT:  No difficulty hearing, tinnitus, vertigo; No sinus or throat pain  NECK: No pain or stiffness  BREASTS: No pain, masses, or nipple discharge  RESPIRATORY: No cough, wheezing, chills or hemoptysis; No shortness of breath  CARDIOVASCULAR: No chest pain, palpitations, dizziness, or leg swelling  GASTROINTESTINAL:  See above  GENITOURINARY: No dysuria, frequency, hematuria, or incontinence  NEUROLOGICAL: No headaches, memory loss, loss of strength, numbness, or tremors  SKIN: No itching, burning, rashes, or lesions   LYMPH NODES: No enlarged glands  ENDOCRINE: No heat or cold intolerance; No hair loss  MUSCULOSKELETAL: No joint pain or swelling; No muscle, back, or extremity pain  PSYCHIATRIC: No depression, anxiety, mood swings, or difficulty sleeping  HEME/LYMPH: No easy bruising, or bleeding gums  ALLERGY AND IMMUNOLOGIC: No hives or eczema    Vital Signs Last 24 Hrs  T(C): 36.6 (01 Sep 2020 10:56), Max: 36.9 (31 Aug 2020 17:12)  T(F): 97.9 (01 Sep 2020 10:56), Max: 98.4 (31 Aug 2020 17:12)  HR: 70 (01 Sep 2020 13:30) (65 - 74)  BP: 160/66 (01 Sep 2020 13:30) (154/56 - 182/65)  BP(mean): --  RR: 18 (01 Sep 2020 13:30) (18 - 18)  SpO2: 98% (01 Sep 2020 13:30) (98% - 100%)    PHYSICAL EXAM:  GENERAL: NAD, well-groomed, well-developed  HEAD:  Atraumatic, Normocephalic  EYES: EOMI, PERRLA, conjunctiva and sclera clear  NECK: Supple, No JVD, Normal thyroid  NERVOUS SYSTEM:  Alert & Oriented X3, Good concentration; Motor Strength 5/5 B/L upper and lower extremities;   CHEST/LUNG: Clear to percussion bilaterally; No rales, rhonchi, wheezing, or rubs  HEART: Regular rate and rhythm; No murmurs, rubs, or gallops  ABDOMEN: Soft, Nontender, Nondistended; Bowel sounds present  EXTREMITIES:  2+ Peripheral Pulses, No clubbing, cyanosis, or edema  LYMPH: No lymphadenopathy noted  SKIN: No rashes or lesions    LAB                          9.3    6.33  )-----------( 228      ( 01 Sep 2020 06:28 )             28.2       CBC:  09-01 @ 06:28  WBC 6.33   Hgb 9.3   Hct 28.2   Plts 228  MCV 96.2  08-31 @ 06:21  WBC 6.96   Hgb 9.9   Hct 30.8   Plts 214  MCV 98.4  08-29 @ 06:48  WBC 9.89   Hgb 9.9   Hct 30.7   Plts 197  MCV 98.7  08-28 @ 06:28  WBC 15.01   Hgb 9.6   Hct 31.0   Plts 186  .6  08-27 @ 06:38  WBC 15.40   Hgb 9.5   Hct 29.7   Plts 157  .0  08-26 @ 06:35  WBC 16.09   Hgb 9.8   Hct 31.3   Plts 189  .3      Chemistry:  09-01 @ 06:28  Na+ 143  K+ 4.1  Cl- 111  CO2 23  BUN 8  Cr 1.16     08-31 @ 06:21  Na+ 139  K+ 3.8  Cl- 112  CO2 23  BUN 9  Cr 1.12     08-30 @ 06:56  Na+ 143  K+ 3.5  Cl- 113  CO2 22  BUN 12  Cr 1.05     08-29 @ 06:48  Na+ 140  K+ 3.7  Cl- 112  CO2 20  BUN 15  Cr 1.24     08-28 @ 06:28  Na+ 139  K+ 3.1  Cl- 112  CO2 20  BUN 22  Cr 1.51     08-27 @ 06:38  Na+ 139  K+ 3.7  Cl- 110  CO2 20  BUN 26  Cr 1.73     08-26 @ 06:35  Na+ 143  K+ 3.7  Cl- 115  CO2 23  BUN 31  Cr 1.53         Glucose, Serum: 121 mg/dL (09-01 @ 06:28)  Glucose, Serum: 135 mg/dL (08-31 @ 06:21)  Glucose, Serum: 119 mg/dL (08-30 @ 06:56)  Glucose, Serum: 193 mg/dL (08-29 @ 06:48)  Glucose, Serum: 221 mg/dL (08-28 @ 06:28)  Glucose, Serum: 252 mg/dL (08-27 @ 06:38)  Glucose, Serum: 53 mg/dL (08-26 @ 06:35)      01 Sep 2020 06:28    143    |  111    |  8      ----------------------------<  121    4.1     |  23     |  1.16   31 Aug 2020 06:21    139    |  112    |  9      ----------------------------<  135    3.8     |  23     |  1.12   30 Aug 2020 06:56    143    |  113    |  12     ----------------------------<  119    3.5     |  22     |  1.05   29 Aug 2020 06:48    140    |  112    |  15     ----------------------------<  193    3.7     |  20     |  1.24   28 Aug 2020 06:28    139    |  112    |  22     ----------------------------<  221    3.1     |  20     |  1.51   27 Aug 2020 06:38    139    |  110    |  26     ----------------------------<  252    3.7     |  20     |  1.73   26 Aug 2020 06:35    143    |  115    |  31     ----------------------------<  53     3.7     |  23     |  1.53     Ca    7.9        01 Sep 2020 06:28  Ca    8.1        31 Aug 2020 06:21  Ca    8.0        30 Aug 2020 06:56  Ca    7.9        29 Aug 2020 06:48  Ca    7.7        28 Aug 2020 06:28  Ca    7.6        27 Aug 2020 06:38  Ca    7.7        26 Aug 2020 06:35  Phos  2.9       01 Sep 2020 06:28  Phos  2.0       31 Aug 2020 06:21  Phos  1.8       30 Aug 2020 06:56  Phos  1.2       29 Aug 2020 06:48  Phos  2.6       27 Aug 2020 06:38  Mg     1.8       01 Sep 2020 06:28  Mg     1.6       31 Aug 2020 06:21  Mg     1.8       29 Aug 2020 06:48  Mg     2.0       27 Aug 2020 06:38                CAPILLARY BLOOD GLUCOSE      POCT Blood Glucose.: 156 mg/dL (01 Sep 2020 11:26)  POCT Blood Glucose.: 120 mg/dL (01 Sep 2020 08:13)  POCT Blood Glucose.: 214 mg/dL (31 Aug 2020 21:05)  POCT Blood Glucose.: 218 mg/dL (31 Aug 2020 16:58)      C-Reactive Protein, Serum: 3.22 mg/dL (09-01 @ 11:40)  C-Reactive Protein, Serum: 15.14 mg/dL (08-28 @ 08:44)  C-Reactive Protein, Serum: 8.19 mg/dL (08-26 @ 09:50)      RADIOLOGY & ADDITIONAL TESTS:    Imaging Personally Reviewed:  [ ] YES  [ ] NO    Consultant(s) Notes Reviewed:  [ ] YES  [ ] NO    Care Discussed with Consultants/Other Providers [ ] YES  [ ] NO

## 2020-09-01 NOTE — DISCHARGE NOTE PROVIDER - NSDCCPCAREPLAN_GEN_ALL_CORE_FT
PRINCIPAL DISCHARGE DIAGNOSIS  Diagnosis: Colitis  Assessment and Plan of Treatment:       SECONDARY DISCHARGE DIAGNOSES  Diagnosis: Laceration of ear  Assessment and Plan of Treatment:     Diagnosis: Hypophosphatemia  Assessment and Plan of Treatment: Hypophosphatemia    Diagnosis: Moderate protein-calorie malnutrition  Assessment and Plan of Treatment: Moderate protein-calorie malnutrition    Diagnosis: Head injury  Assessment and Plan of Treatment:     Diagnosis: CKD (chronic kidney disease) stage 3, GFR 30-59 ml/min  Assessment and Plan of Treatment: CKD (chronic kidney disease) stage 3, GFR 30-59 ml/min    Diagnosis: Hypomagnesemia  Assessment and Plan of Treatment: Hypomagnesemia    Diagnosis: Essential hypertension  Assessment and Plan of Treatment: Essential hypertension    Diagnosis: Type 2 diabetes mellitus without complication, without long-term current use of insulin  Assessment and Plan of Treatment: Type 2 diabetes mellitus without complication, without long-term current use of insulin    Diagnosis: NICK (acute kidney injury)  Assessment and Plan of Treatment:

## 2020-09-01 NOTE — PROGRESS NOTE ADULT - PROVIDER SPECIALTY LIST ADULT
Gastroenterology
Hospitalist
Nephrology
Gastroenterology
Hospitalist
Hospitalist

## 2020-09-01 NOTE — PHYSICAL THERAPY INITIAL EVALUATION ADULT - ADDITIONAL COMMENTS
Pt states she lives with her  in a private home, 5 entry steps with B/L rails (close enough to reach simultaneously), 12 steps inside (+ rail). Pt states prior to admission pt was independent with all functional mobility including community ambulation without a device & ADL's. Pt states she is left hand dominant, wears eye glasses for reading, & is hard of hearing. Pt states her spouse checks her fingersticks (+ working glucometer at home). Pt has tub/shower combo, + grab bars, retractable shower, & standard toilet seat height. Pt endorses 2 falls in the last 6 months. Pt denies owning any DMEs. Goal of therapy: return home ASAP.

## 2020-09-03 ENCOUNTER — APPOINTMENT (OUTPATIENT)
Dept: RADIATION ONCOLOGY | Facility: CLINIC | Age: 80
End: 2020-09-03

## 2020-09-08 DIAGNOSIS — E44.0 MODERATE PROTEIN-CALORIE MALNUTRITION: ICD-10-CM

## 2020-09-08 DIAGNOSIS — Z79.4 LONG TERM (CURRENT) USE OF INSULIN: ICD-10-CM

## 2020-09-08 DIAGNOSIS — E83.39 OTHER DISORDERS OF PHOSPHORUS METABOLISM: ICD-10-CM

## 2020-09-08 DIAGNOSIS — E11.649 TYPE 2 DIABETES MELLITUS WITH HYPOGLYCEMIA WITHOUT COMA: ICD-10-CM

## 2020-09-08 DIAGNOSIS — K52.9 NONINFECTIVE GASTROENTERITIS AND COLITIS, UNSPECIFIED: ICD-10-CM

## 2020-09-08 DIAGNOSIS — N18.3 CHRONIC KIDNEY DISEASE, STAGE 3 (MODERATE): ICD-10-CM

## 2020-09-08 DIAGNOSIS — F41.8 OTHER SPECIFIED ANXIETY DISORDERS: ICD-10-CM

## 2020-09-08 DIAGNOSIS — N17.9 ACUTE KIDNEY FAILURE, UNSPECIFIED: ICD-10-CM
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
## 2020-09-08 DIAGNOSIS — E11.22 TYPE 2 DIABETES MELLITUS WITH DIABETIC CHRONIC KIDNEY DISEASE: ICD-10-CM

## 2020-09-08 DIAGNOSIS — Z79.82 LONG TERM (CURRENT) USE OF ASPIRIN: ICD-10-CM

## 2020-09-08 DIAGNOSIS — H40.9 UNSPECIFIED GLAUCOMA: ICD-10-CM

## 2020-09-08 DIAGNOSIS — E86.0 DEHYDRATION: ICD-10-CM

## 2020-09-08 DIAGNOSIS — E83.42 HYPOMAGNESEMIA: ICD-10-CM

## 2020-09-08 DIAGNOSIS — S01.312A LACERATION WITHOUT FOREIGN BODY OF LEFT EAR, INITIAL ENCOUNTER: ICD-10-CM

## 2020-09-08 DIAGNOSIS — I12.9 HYPERTENSIVE CHRONIC KIDNEY DISEASE WITH STAGE 1 THROUGH STAGE 4 CHRONIC KIDNEY DISEASE, OR UNSPECIFIED CHRONIC KIDNEY DISEASE: ICD-10-CM

## 2020-09-08 DIAGNOSIS — E87.5 HYPERKALEMIA: ICD-10-CM

## 2020-10-01 ENCOUNTER — APPOINTMENT (OUTPATIENT)
Dept: SURGERY | Facility: CLINIC | Age: 80
End: 2020-10-01
Payer: MEDICARE

## 2020-10-01 PROCEDURE — 99213 OFFICE O/P EST LOW 20 MIN: CPT

## 2020-10-01 NOTE — HISTORY OF PRESENT ILLNESS
[de-identified] : 21 3/4  years s/p partial glossectomy for malignancy, s/p resection right cheek  and right scalp  BCC. denies pain, bleeding, dysphagia, hoarseness or new lesions. no changes medically since last visit.   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 weight stable.

## 2020-10-01 NOTE — PHYSICAL EXAM
[de-identified] : no palpable thyroid nodules [Laryngoscopy Performed] : laryngoscopy was performed, see procedure section for findings [Midline] : located in midline position [de-identified] : no palpable masses.  changes from prior surgery [de-identified] : no lesions noted [Normal] : orientation to person, place, and time: normal [FreeTextEntry2] : well healed right cheek scar.  well healed right scalp excision site

## 2020-10-14 ENCOUNTER — NON-APPOINTMENT (OUTPATIENT)
Age: 80
End: 2020-10-14

## 2020-10-14 ENCOUNTER — APPOINTMENT (OUTPATIENT)
Dept: CARDIOLOGY | Facility: CLINIC | Age: 80
End: 2020-10-14
Payer: MEDICARE

## 2020-10-14 VITALS
SYSTOLIC BLOOD PRESSURE: 130 MMHG | TEMPERATURE: 206.96 F | HEART RATE: 73 BPM | DIASTOLIC BLOOD PRESSURE: 60 MMHG | BODY MASS INDEX: 18.78 KG/M2 | OXYGEN SATURATION: 99 % | HEIGHT: 64 IN | WEIGHT: 110 LBS

## 2020-10-14 DIAGNOSIS — R55 SYNCOPE AND COLLAPSE: ICD-10-CM

## 2020-10-14 DIAGNOSIS — Z23 ENCOUNTER FOR IMMUNIZATION: ICD-10-CM

## 2020-10-14 DIAGNOSIS — E11.9 TYPE 2 DIABETES MELLITUS W/OUT COMPLICATIONS: ICD-10-CM

## 2020-10-14 PROCEDURE — 90662 IIV NO PRSV INCREASED AG IM: CPT

## 2020-10-14 PROCEDURE — G0008: CPT

## 2020-10-14 RX ORDER — MIRTAZAPINE 30 MG/1
30 TABLET, FILM COATED ORAL
Qty: 30 | Refills: 0 | Status: DISCONTINUED | COMMUNITY
Start: 2017-01-03 | End: 2020-10-14

## 2020-10-14 NOTE — DISCUSSION/SUMMARY
[Guardian] : the guardian [With PCP] : with ~his/her~ primary care provider [FreeTextEntry1] : 80-year-old female status post syncopal episode possibly in the setting of colitis and hypovolemia.  On discharge from hospital, advised completion of re-stratification with outpatient stress testing.  Patient cannot ambulate (parkinsonian?),  Pharmacological stress test will be scheduled.  Patient referred back to neurology for evaluation of shuffling gait and tremor.  Her blood pressures appear adequately controlled but she is on multiple blood pressure medications with slowly elevating renal dysfunction.  Should likely be followed by nephrology as well, however, would continue her current medical regimen for now.  Encouraged increased p.o. fluid as tolerated, avoidance of NSAIDs.

## 2020-10-14 NOTE — PHYSICAL EXAM
[General Appearance - Well Developed] : well developed [Normal Appearance] : normal appearance [Well Groomed] : well groomed [General Appearance - Well Nourished] : well nourished [No Deformities] : no deformities [General Appearance - In No Acute Distress] : no acute distress [Normal Conjunctiva] : the conjunctiva exhibited no abnormalities [Normal Oral Mucosa] : normal oral mucosa [Eyelids - No Xanthelasma] : the eyelids demonstrated no xanthelasmas [No Oral Cyanosis] : no oral cyanosis [No Oral Pallor] : no oral pallor [Normal Jugular Venous A Waves Present] : normal jugular venous A waves present [Normal Jugular Venous V Waves Present] : normal jugular venous V waves present [No Jugular Venous Quarles A Waves] : no jugular venous quarles A waves [Respiration, Rhythm And Depth] : normal respiratory rhythm and effort [Exaggerated Use Of Accessory Muscles For Inspiration] : no accessory muscle use [Auscultation Breath Sounds / Voice Sounds] : lungs were clear to auscultation bilaterally [Abdomen Soft] : soft [Abdomen Tenderness] : non-tender [Abdomen Mass (___ Cm)] : no abdominal mass palpated [Gait - Sufficient For Exercise Testing] : the gait was sufficient for exercise testing [Abnormal Walk] : normal gait [Nail Clubbing] : no clubbing of the fingernails [Cyanosis, Localized] : no localized cyanosis [Petechial Hemorrhages (___cm)] : no petechial hemorrhages [Skin Color & Pigmentation] : normal skin color and pigmentation [] : no rash [No Venous Stasis] : no venous stasis [Skin Lesions] : no skin lesions [No Skin Ulcers] : no skin ulcer [Oriented To Time, Place, And Person] : oriented to person, place, and time [No Xanthoma] : no  xanthoma was observed [Affect] : the affect was normal [Mood] : the mood was normal [No Anxiety] : not feeling anxious [5th Left ICS - MCL] : palpated at the 5th LICS in the midclavicular line [No Precordial Heave] : no precordial heave was noted [Normal Rate] : normal [Not Palpable] : not palpable [Rhythm Regular] : regular [Normal S2] : normal S2 [No Gallop] : no gallop heard [I] : a grade 1 [1+] : right 1+ [2+] : left 2+ [0] : left 0 [No Abnormalities] : the abdominal aorta was not enlarged and no bruit was heard [No Pitting Edema] : no pitting edema present [Normal S1] : abnormal S1 [Right Carotid Bruit] : no bruit heard over the right carotid

## 2020-10-14 NOTE — HISTORY OF PRESENT ILLNESS
[FreeTextEntry1] : 80-year-old female referred for cardiac evaluation. \par \par She has a known history of long-standing diabetes, hypertension and hyperlipidemia.  Recently hospitalized status post syncopal episode at home and found to be hypotensive with evidence of colitis.  Patient had fluid repletion and antibiotics and improved significantly although she still complains of mild weakness and as per her , appears to be shuffling her feet and having a mildly worsened left hand tremor.  Medications were unchanged, her blood pressures appear adequately controlled at this time.  Discussion in the discharge summary mentioned need for ischemia evaluation post discharge.  She denies any postural complaints, no chest pain or shortness of breath, no palpitations.  Severely hard of hearing.  Nondrinker non-smoker.

## 2020-10-20 ENCOUNTER — MED ADMIN CHARGE (OUTPATIENT)
Age: 80
End: 2020-10-20

## 2020-10-20 ENCOUNTER — APPOINTMENT (OUTPATIENT)
Dept: CARDIOLOGY | Facility: CLINIC | Age: 80
End: 2020-10-20
Payer: MEDICARE

## 2020-10-20 PROCEDURE — 93015 CV STRESS TEST SUPVJ I&R: CPT

## 2020-10-20 PROCEDURE — 78452 HT MUSCLE IMAGE SPECT MULT: CPT

## 2020-10-20 PROCEDURE — A9500: CPT

## 2020-10-20 RX ORDER — REGADENOSON 0.08 MG/ML
0.4 INJECTION, SOLUTION INTRAVENOUS
Qty: 1 | Refills: 0 | Status: COMPLETED | OUTPATIENT
Start: 2020-10-20

## 2020-10-20 RX ADMIN — REGADENOSON 4 MG/5ML: 0.08 INJECTION, SOLUTION INTRAVENOUS at 00:00

## 2021-01-01 ENCOUNTER — OUTPATIENT (OUTPATIENT)
Dept: OUTPATIENT SERVICES | Facility: HOSPITAL | Age: 81
LOS: 1 days | End: 2021-01-01
Payer: MEDICARE

## 2021-01-01 ENCOUNTER — APPOINTMENT (OUTPATIENT)
Dept: HEMATOLOGY ONCOLOGY | Facility: CLINIC | Age: 81
End: 2021-01-01
Payer: MEDICARE

## 2021-01-01 ENCOUNTER — RESULT REVIEW (OUTPATIENT)
Age: 81
End: 2021-01-01

## 2021-01-01 ENCOUNTER — NON-APPOINTMENT (OUTPATIENT)
Age: 81
End: 2021-01-01

## 2021-01-01 ENCOUNTER — OUTPATIENT (OUTPATIENT)
Dept: OUTPATIENT SERVICES | Facility: HOSPITAL | Age: 81
LOS: 1 days | End: 2021-01-01

## 2021-01-01 ENCOUNTER — TRANSCRIPTION ENCOUNTER (OUTPATIENT)
Age: 81
End: 2021-01-01

## 2021-01-01 ENCOUNTER — APPOINTMENT (OUTPATIENT)
Dept: RADIATION ONCOLOGY | Facility: CLINIC | Age: 81
End: 2021-01-01
Payer: MEDICARE

## 2021-01-01 ENCOUNTER — APPOINTMENT (OUTPATIENT)
Dept: DISASTER EMERGENCY | Facility: CLINIC | Age: 81
End: 2021-01-01

## 2021-01-01 ENCOUNTER — APPOINTMENT (OUTPATIENT)
Dept: NUCLEAR MEDICINE | Facility: IMAGING CENTER | Age: 81
End: 2021-01-01
Payer: MEDICARE

## 2021-01-01 ENCOUNTER — APPOINTMENT (OUTPATIENT)
Dept: OTOLARYNGOLOGY | Facility: HOSPITAL | Age: 81
End: 2021-01-01

## 2021-01-01 ENCOUNTER — APPOINTMENT (OUTPATIENT)
Dept: CT IMAGING | Facility: CLINIC | Age: 81
End: 2021-01-01
Payer: MEDICARE

## 2021-01-01 ENCOUNTER — OUTPATIENT (OUTPATIENT)
Dept: OUTPATIENT SERVICES | Facility: HOSPITAL | Age: 81
LOS: 1 days | Discharge: HOME | End: 2021-01-01
Payer: OTHER MISCELLANEOUS

## 2021-01-01 ENCOUNTER — OUTPATIENT (OUTPATIENT)
Dept: OUTPATIENT SERVICES | Facility: HOSPITAL | Age: 81
LOS: 1 days | Discharge: HOME | End: 2021-01-01
Payer: MEDICARE

## 2021-01-01 ENCOUNTER — OUTPATIENT (OUTPATIENT)
Dept: OUTPATIENT SERVICES | Facility: HOSPITAL | Age: 81
LOS: 1 days | Discharge: HOME | End: 2021-01-01

## 2021-01-01 ENCOUNTER — INPATIENT (INPATIENT)
Facility: HOSPITAL | Age: 81
LOS: 9 days | Discharge: HOSPICE | End: 2021-11-29
Attending: HOSPITALIST | Admitting: HOSPITALIST
Payer: MEDICARE

## 2021-01-01 ENCOUNTER — APPOINTMENT (OUTPATIENT)
Dept: CARDIOLOGY | Facility: CLINIC | Age: 81
End: 2021-01-01
Payer: MEDICARE

## 2021-01-01 ENCOUNTER — APPOINTMENT (OUTPATIENT)
Dept: OTOLARYNGOLOGY | Facility: CLINIC | Age: 81
End: 2021-01-01
Payer: MEDICARE

## 2021-01-01 ENCOUNTER — APPOINTMENT (OUTPATIENT)
Dept: SURGERY | Facility: CLINIC | Age: 81
End: 2021-01-01
Payer: MEDICARE

## 2021-01-01 ENCOUNTER — INPATIENT (INPATIENT)
Facility: HOSPITAL | Age: 81
LOS: 7 days | Discharge: HOME HEALTH | End: 2021-11-06
Attending: INTERNAL MEDICINE | Admitting: INTERNAL MEDICINE
Payer: MEDICARE

## 2021-01-01 ENCOUNTER — APPOINTMENT (OUTPATIENT)
Dept: OTOLARYNGOLOGY | Facility: CLINIC | Age: 81
End: 2021-01-01

## 2021-01-01 VITALS
RESPIRATION RATE: 18 BRPM | OXYGEN SATURATION: 100 % | DIASTOLIC BLOOD PRESSURE: 60 MMHG | SYSTOLIC BLOOD PRESSURE: 148 MMHG | HEART RATE: 60 BPM | TEMPERATURE: 97 F

## 2021-01-01 VITALS
OXYGEN SATURATION: 100 % | RESPIRATION RATE: 18 BRPM | HEIGHT: 65 IN | SYSTOLIC BLOOD PRESSURE: 138 MMHG | DIASTOLIC BLOOD PRESSURE: 60 MMHG | HEART RATE: 65 BPM | TEMPERATURE: 98 F

## 2021-01-01 VITALS
HEIGHT: 64 IN | HEART RATE: 67 BPM | WEIGHT: 103 LBS | SYSTOLIC BLOOD PRESSURE: 111 MMHG | DIASTOLIC BLOOD PRESSURE: 47 MMHG | BODY MASS INDEX: 17.58 KG/M2

## 2021-01-01 VITALS
TEMPERATURE: 95.36 F | WEIGHT: 93.36 LBS | RESPIRATION RATE: 17 BRPM | DIASTOLIC BLOOD PRESSURE: 74 MMHG | HEART RATE: 82 BPM | OXYGEN SATURATION: 100 % | BODY MASS INDEX: 16.04 KG/M2 | SYSTOLIC BLOOD PRESSURE: 111 MMHG

## 2021-01-01 VITALS
OXYGEN SATURATION: 100 % | TEMPERATURE: 97.52 F | HEART RATE: 72 BPM | DIASTOLIC BLOOD PRESSURE: 73 MMHG | SYSTOLIC BLOOD PRESSURE: 131 MMHG | WEIGHT: 89.29 LBS | RESPIRATION RATE: 16 BRPM | BODY MASS INDEX: 15.34 KG/M2

## 2021-01-01 VITALS
HEIGHT: 64 IN | HEART RATE: 62 BPM | BODY MASS INDEX: 17.93 KG/M2 | DIASTOLIC BLOOD PRESSURE: 60 MMHG | SYSTOLIC BLOOD PRESSURE: 144 MMHG | WEIGHT: 105 LBS

## 2021-01-01 VITALS
BODY MASS INDEX: 16.71 KG/M2 | TEMPERATURE: 207.86 F | OXYGEN SATURATION: 100 % | SYSTOLIC BLOOD PRESSURE: 113 MMHG | HEART RATE: 62 BPM | DIASTOLIC BLOOD PRESSURE: 69 MMHG | WEIGHT: 97.86 LBS | HEIGHT: 63.98 IN | RESPIRATION RATE: 17 BRPM

## 2021-01-01 VITALS
HEART RATE: 79 BPM | DIASTOLIC BLOOD PRESSURE: 63 MMHG | TEMPERATURE: 207.86 F | RESPIRATION RATE: 18 BRPM | BODY MASS INDEX: 15.09 KG/M2 | WEIGHT: 88.38 LBS | SYSTOLIC BLOOD PRESSURE: 93 MMHG | OXYGEN SATURATION: 100 % | HEIGHT: 63.98 IN

## 2021-01-01 VITALS
TEMPERATURE: 97 F | RESPIRATION RATE: 15 BRPM | OXYGEN SATURATION: 98 % | SYSTOLIC BLOOD PRESSURE: 115 MMHG | DIASTOLIC BLOOD PRESSURE: 64 MMHG | HEART RATE: 59 BPM

## 2021-01-01 VITALS
TEMPERATURE: 98 F | DIASTOLIC BLOOD PRESSURE: 64 MMHG | OXYGEN SATURATION: 99 % | SYSTOLIC BLOOD PRESSURE: 124 MMHG | HEART RATE: 51 BPM | RESPIRATION RATE: 17 BRPM

## 2021-01-01 VITALS
TEMPERATURE: 98 F | DIASTOLIC BLOOD PRESSURE: 32 MMHG | HEIGHT: 65 IN | SYSTOLIC BLOOD PRESSURE: 122 MMHG | RESPIRATION RATE: 18 BRPM | OXYGEN SATURATION: 100 % | HEART RATE: 50 BPM | WEIGHT: 100.09 LBS

## 2021-01-01 VITALS
DIASTOLIC BLOOD PRESSURE: 60 MMHG | WEIGHT: 100.09 LBS | RESPIRATION RATE: 18 BRPM | SYSTOLIC BLOOD PRESSURE: 134 MMHG | TEMPERATURE: 97 F | OXYGEN SATURATION: 99 % | HEART RATE: 56 BPM | HEIGHT: 65 IN

## 2021-01-01 VITALS
BODY MASS INDEX: 17.58 KG/M2 | SYSTOLIC BLOOD PRESSURE: 140 MMHG | DIASTOLIC BLOOD PRESSURE: 55 MMHG | HEIGHT: 64 IN | WEIGHT: 103 LBS

## 2021-01-01 VITALS
SYSTOLIC BLOOD PRESSURE: 116 MMHG | RESPIRATION RATE: 16 BRPM | OXYGEN SATURATION: 99 % | HEART RATE: 60 BPM | DIASTOLIC BLOOD PRESSURE: 74 MMHG | TEMPERATURE: 98 F | HEIGHT: 65 IN

## 2021-01-01 DIAGNOSIS — E78.5 HYPERLIPIDEMIA, UNSPECIFIED: ICD-10-CM

## 2021-01-01 DIAGNOSIS — C02.9 MALIGNANT NEOPLASM OF TONGUE, UNSPECIFIED: ICD-10-CM

## 2021-01-01 DIAGNOSIS — C01 MALIGNANT NEOPLASM OF BASE OF TONGUE: ICD-10-CM

## 2021-01-01 DIAGNOSIS — C02.9 MALIGNANT NEOPLASM OF TONGUE, UNSPECIFIED: Chronic | ICD-10-CM

## 2021-01-01 DIAGNOSIS — L89.152 PRESSURE ULCER OF SACRAL REGION, STAGE 2: ICD-10-CM

## 2021-01-01 DIAGNOSIS — R13.10 DYSPHAGIA, UNSPECIFIED: ICD-10-CM

## 2021-01-01 DIAGNOSIS — N17.9 ACUTE KIDNEY FAILURE, UNSPECIFIED: ICD-10-CM

## 2021-01-01 DIAGNOSIS — Z29.9 ENCOUNTER FOR PROPHYLACTIC MEASURES, UNSPECIFIED: ICD-10-CM

## 2021-01-01 DIAGNOSIS — E11.9 TYPE 2 DIABETES MELLITUS WITHOUT COMPLICATIONS: ICD-10-CM

## 2021-01-01 DIAGNOSIS — C44.91 BASAL CELL CARCINOMA OF SKIN, UNSPECIFIED: ICD-10-CM

## 2021-01-01 DIAGNOSIS — Z98.890 OTHER SPECIFIED POSTPROCEDURAL STATES: Chronic | ICD-10-CM

## 2021-01-01 DIAGNOSIS — Z51.5 ENCOUNTER FOR PALLIATIVE CARE: ICD-10-CM

## 2021-01-01 DIAGNOSIS — I10 ESSENTIAL (PRIMARY) HYPERTENSION: ICD-10-CM

## 2021-01-01 DIAGNOSIS — E87.4 MIXED DISORDER OF ACID-BASE BALANCE: ICD-10-CM

## 2021-01-01 DIAGNOSIS — Z71.89 OTHER SPECIFIED COUNSELING: ICD-10-CM

## 2021-01-01 DIAGNOSIS — Z00.00 ENCOUNTER FOR GENERAL ADULT MEDICAL EXAMINATION W/OUT ABNORMAL FINDINGS: ICD-10-CM

## 2021-01-01 DIAGNOSIS — F41.9 ANXIETY DISORDER, UNSPECIFIED: ICD-10-CM

## 2021-01-01 DIAGNOSIS — I11.9 HYPERTENSIVE HEART DISEASE W/OUT HEART FAILURE: ICD-10-CM

## 2021-01-01 DIAGNOSIS — R94.31 ABNORMAL ELECTROCARDIOGRAM [ECG] [EKG]: ICD-10-CM

## 2021-01-01 DIAGNOSIS — Z01.818 ENCOUNTER FOR OTHER PREPROCEDURAL EXAMINATION: ICD-10-CM

## 2021-01-01 DIAGNOSIS — E87.2 ACIDOSIS: ICD-10-CM

## 2021-01-01 DIAGNOSIS — F32.9 MAJOR DEPRESSIVE DISORDER, SINGLE EPISODE, UNSPECIFIED: ICD-10-CM

## 2021-01-01 DIAGNOSIS — C10.9 MALIGNANT NEOPLASM OF OROPHARYNX, UNSPECIFIED: ICD-10-CM

## 2021-01-01 DIAGNOSIS — R62.7 ADULT FAILURE TO THRIVE: ICD-10-CM

## 2021-01-01 DIAGNOSIS — K59.00 CONSTIPATION, UNSPECIFIED: ICD-10-CM

## 2021-01-01 DIAGNOSIS — H40.9 UNSPECIFIED GLAUCOMA: ICD-10-CM

## 2021-01-01 DIAGNOSIS — C76.0 MALIGNANT NEOPLASM OF HEAD, FACE AND NECK: ICD-10-CM

## 2021-01-01 DIAGNOSIS — E43 UNSPECIFIED SEVERE PROTEIN-CALORIE MALNUTRITION: ICD-10-CM

## 2021-01-01 DIAGNOSIS — D64.9 ANEMIA, UNSPECIFIED: ICD-10-CM

## 2021-01-01 DIAGNOSIS — C05.1 MALIGNANT NEOPLASM OF SOFT PALATE: ICD-10-CM

## 2021-01-01 DIAGNOSIS — E83.39 OTHER DISORDERS OF PHOSPHORUS METABOLISM: ICD-10-CM

## 2021-01-01 LAB
24R-OH-CALCIDIOL SERPL-MCNC: 34.4 NG/ML — SIGNIFICANT CHANGE UP (ref 30–80)
A1C WITH ESTIMATED AVERAGE GLUCOSE RESULT: 5.6 % — SIGNIFICANT CHANGE UP (ref 4–5.6)
A1C WITH ESTIMATED AVERAGE GLUCOSE RESULT: 5.7 % — HIGH (ref 4–5.6)
A1C WITH ESTIMATED AVERAGE GLUCOSE RESULT: 6.1 % — HIGH (ref 4–5.6)
ALBUMIN SERPL ELPH-MCNC: 3 G/DL — LOW (ref 3.3–5)
ALBUMIN SERPL ELPH-MCNC: 3.3 G/DL — SIGNIFICANT CHANGE UP (ref 3.3–5)
ALBUMIN SERPL ELPH-MCNC: 3.5 G/DL — SIGNIFICANT CHANGE UP (ref 3.3–5)
ALBUMIN SERPL ELPH-MCNC: 3.6 G/DL — SIGNIFICANT CHANGE UP (ref 3.3–5)
ALBUMIN SERPL ELPH-MCNC: 3.6 G/DL — SIGNIFICANT CHANGE UP (ref 3.3–5)
ALBUMIN SERPL ELPH-MCNC: 3.7 G/DL
ALBUMIN SERPL ELPH-MCNC: 3.9 G/DL
ALBUMIN SERPL ELPH-MCNC: 4 G/DL — SIGNIFICANT CHANGE UP (ref 3.3–5)
ALP BLD-CCNC: 78 U/L
ALP BLD-CCNC: 89 U/L
ALP SERPL-CCNC: 61 U/L — SIGNIFICANT CHANGE UP (ref 40–120)
ALP SERPL-CCNC: 73 U/L — SIGNIFICANT CHANGE UP (ref 40–120)
ALP SERPL-CCNC: 83 U/L — SIGNIFICANT CHANGE UP (ref 40–120)
ALP SERPL-CCNC: 85 U/L — SIGNIFICANT CHANGE UP (ref 40–120)
ALP SERPL-CCNC: 91 U/L — SIGNIFICANT CHANGE UP (ref 40–120)
ALP SERPL-CCNC: 94 U/L — SIGNIFICANT CHANGE UP (ref 40–120)
ALT FLD-CCNC: 14 U/L — SIGNIFICANT CHANGE UP (ref 4–33)
ALT FLD-CCNC: 16 U/L — SIGNIFICANT CHANGE UP (ref 4–33)
ALT FLD-CCNC: 18 U/L — SIGNIFICANT CHANGE UP (ref 4–33)
ALT FLD-CCNC: 18 U/L — SIGNIFICANT CHANGE UP (ref 4–33)
ALT FLD-CCNC: 23 U/L — SIGNIFICANT CHANGE UP (ref 4–33)
ALT FLD-CCNC: 7 U/L — SIGNIFICANT CHANGE UP (ref 4–33)
ALT SERPL-CCNC: 15 U/L
ALT SERPL-CCNC: 8 U/L
ANION GAP SERPL CALC-SCNC: 10 MMOL/L — SIGNIFICANT CHANGE UP (ref 7–14)
ANION GAP SERPL CALC-SCNC: 11 MMOL/L — SIGNIFICANT CHANGE UP (ref 7–14)
ANION GAP SERPL CALC-SCNC: 12 MMOL/L — SIGNIFICANT CHANGE UP (ref 7–14)
ANION GAP SERPL CALC-SCNC: 13 MMOL/L
ANION GAP SERPL CALC-SCNC: 13 MMOL/L — SIGNIFICANT CHANGE UP (ref 7–14)
ANION GAP SERPL CALC-SCNC: 13 MMOL/L — SIGNIFICANT CHANGE UP (ref 7–14)
ANION GAP SERPL CALC-SCNC: 15 MMOL/L — HIGH (ref 7–14)
ANION GAP SERPL CALC-SCNC: 17 MMOL/L
ANION GAP SERPL CALC-SCNC: 17 MMOL/L — HIGH (ref 7–14)
ANION GAP SERPL CALC-SCNC: 6 MMOL/L — LOW (ref 7–14)
ANION GAP SERPL CALC-SCNC: 8 MMOL/L — SIGNIFICANT CHANGE UP (ref 7–14)
ANION GAP SERPL CALC-SCNC: 9 MMOL/L — SIGNIFICANT CHANGE UP (ref 7–14)
APPEARANCE UR: ABNORMAL
APPEARANCE UR: CLEAR — SIGNIFICANT CHANGE UP
AST SERPL-CCNC: 11 U/L
AST SERPL-CCNC: 14 U/L
AST SERPL-CCNC: 14 U/L — SIGNIFICANT CHANGE UP (ref 4–32)
AST SERPL-CCNC: 15 U/L — SIGNIFICANT CHANGE UP (ref 4–32)
AST SERPL-CCNC: 16 U/L — SIGNIFICANT CHANGE UP (ref 4–32)
AST SERPL-CCNC: 17 U/L — SIGNIFICANT CHANGE UP (ref 4–32)
AST SERPL-CCNC: 26 U/L — SIGNIFICANT CHANGE UP (ref 4–32)
AST SERPL-CCNC: 7 U/L — SIGNIFICANT CHANGE UP (ref 4–32)
BACTERIA # UR AUTO: ABNORMAL
BASE EXCESS BLDV CALC-SCNC: -4.1 MMOL/L — LOW (ref -2–3)
BASOPHILS # BLD AUTO: 0.04 K/UL — SIGNIFICANT CHANGE UP (ref 0–0.2)
BASOPHILS # BLD AUTO: 0.05 K/UL — SIGNIFICANT CHANGE UP (ref 0–0.2)
BASOPHILS # BLD AUTO: 0.05 K/UL — SIGNIFICANT CHANGE UP (ref 0–0.2)
BASOPHILS # BLD AUTO: 0.07 K/UL — SIGNIFICANT CHANGE UP (ref 0–0.2)
BASOPHILS # BLD AUTO: 0.07 K/UL — SIGNIFICANT CHANGE UP (ref 0–0.2)
BASOPHILS NFR BLD AUTO: 0.4 % — SIGNIFICANT CHANGE UP (ref 0–2)
BASOPHILS NFR BLD AUTO: 0.7 % — SIGNIFICANT CHANGE UP (ref 0–2)
BASOPHILS NFR BLD AUTO: 0.7 % — SIGNIFICANT CHANGE UP (ref 0–2)
BASOPHILS NFR BLD AUTO: 0.8 % — SIGNIFICANT CHANGE UP (ref 0–2)
BASOPHILS NFR BLD AUTO: 0.9 % — SIGNIFICANT CHANGE UP (ref 0–2)
BILIRUB SERPL-MCNC: 0.2 MG/DL — SIGNIFICANT CHANGE UP (ref 0.2–1.2)
BILIRUB SERPL-MCNC: 0.3 MG/DL — SIGNIFICANT CHANGE UP (ref 0.2–1.2)
BILIRUB SERPL-MCNC: 0.3 MG/DL — SIGNIFICANT CHANGE UP (ref 0.2–1.2)
BILIRUB SERPL-MCNC: 0.4 MG/DL
BILIRUB SERPL-MCNC: 0.4 MG/DL — SIGNIFICANT CHANGE UP (ref 0.2–1.2)
BILIRUB SERPL-MCNC: 0.4 MG/DL — SIGNIFICANT CHANGE UP (ref 0.2–1.2)
BILIRUB SERPL-MCNC: <0.2 MG/DL
BILIRUB SERPL-MCNC: <0.2 MG/DL — SIGNIFICANT CHANGE UP (ref 0.2–1.2)
BILIRUB UR-MCNC: NEGATIVE — SIGNIFICANT CHANGE UP
BILIRUB UR-MCNC: NEGATIVE — SIGNIFICANT CHANGE UP
BLD GP AB SCN SERPL QL: NEGATIVE — SIGNIFICANT CHANGE UP
BLOOD GAS VENOUS COMPREHENSIVE RESULT: SIGNIFICANT CHANGE UP
BLOOD GAS VENOUS COMPREHENSIVE RESULT: SIGNIFICANT CHANGE UP
BUN SERPL-MCNC: 14 MG/DL — SIGNIFICANT CHANGE UP (ref 7–23)
BUN SERPL-MCNC: 14 MG/DL — SIGNIFICANT CHANGE UP (ref 7–23)
BUN SERPL-MCNC: 16 MG/DL — SIGNIFICANT CHANGE UP (ref 7–23)
BUN SERPL-MCNC: 17 MG/DL — SIGNIFICANT CHANGE UP (ref 7–23)
BUN SERPL-MCNC: 17 MG/DL — SIGNIFICANT CHANGE UP (ref 7–23)
BUN SERPL-MCNC: 20 MG/DL — SIGNIFICANT CHANGE UP (ref 7–23)
BUN SERPL-MCNC: 20 MG/DL — SIGNIFICANT CHANGE UP (ref 7–23)
BUN SERPL-MCNC: 22 MG/DL — SIGNIFICANT CHANGE UP (ref 7–23)
BUN SERPL-MCNC: 25 MG/DL — HIGH (ref 7–23)
BUN SERPL-MCNC: 27 MG/DL — HIGH (ref 7–23)
BUN SERPL-MCNC: 34 MG/DL — HIGH (ref 7–23)
BUN SERPL-MCNC: 37 MG/DL — HIGH (ref 7–23)
BUN SERPL-MCNC: 39 MG/DL — HIGH (ref 7–23)
BUN SERPL-MCNC: 47 MG/DL
BUN SERPL-MCNC: 49 MG/DL — HIGH (ref 7–23)
BUN SERPL-MCNC: 50 MG/DL — HIGH (ref 7–23)
BUN SERPL-MCNC: 53 MG/DL — HIGH (ref 7–23)
BUN SERPL-MCNC: 65 MG/DL — HIGH (ref 7–23)
BUN SERPL-MCNC: 74 MG/DL — HIGH (ref 7–23)
BUN SERPL-MCNC: 85 MG/DL
BUN SERPL-MCNC: 88 MG/DL — HIGH (ref 7–23)
CALCIUM SERPL-MCNC: 10 MG/DL — SIGNIFICANT CHANGE UP (ref 8.4–10.5)
CALCIUM SERPL-MCNC: 10.1 MG/DL
CALCIUM SERPL-MCNC: 8.8 MG/DL — SIGNIFICANT CHANGE UP (ref 8.4–10.5)
CALCIUM SERPL-MCNC: 8.8 MG/DL — SIGNIFICANT CHANGE UP (ref 8.4–10.5)
CALCIUM SERPL-MCNC: 8.9 MG/DL — SIGNIFICANT CHANGE UP (ref 8.4–10.5)
CALCIUM SERPL-MCNC: 8.9 MG/DL — SIGNIFICANT CHANGE UP (ref 8.4–10.5)
CALCIUM SERPL-MCNC: 9 MG/DL — SIGNIFICANT CHANGE UP (ref 8.4–10.5)
CALCIUM SERPL-MCNC: 9.1 MG/DL — SIGNIFICANT CHANGE UP (ref 8.4–10.5)
CALCIUM SERPL-MCNC: 9.2 MG/DL — SIGNIFICANT CHANGE UP (ref 8.4–10.5)
CALCIUM SERPL-MCNC: 9.2 MG/DL — SIGNIFICANT CHANGE UP (ref 8.4–10.5)
CALCIUM SERPL-MCNC: 9.3 MG/DL — SIGNIFICANT CHANGE UP (ref 8.4–10.5)
CALCIUM SERPL-MCNC: 9.4 MG/DL — SIGNIFICANT CHANGE UP (ref 8.4–10.5)
CALCIUM SERPL-MCNC: 9.4 MG/DL — SIGNIFICANT CHANGE UP (ref 8.4–10.5)
CALCIUM SERPL-MCNC: 9.5 MG/DL
CALCIUM SERPL-MCNC: 9.7 MG/DL — SIGNIFICANT CHANGE UP (ref 8.4–10.5)
CALCIUM SERPL-MCNC: 9.8 MG/DL — SIGNIFICANT CHANGE UP (ref 8.4–10.5)
CALCIUM SERPL-MCNC: 9.9 MG/DL — SIGNIFICANT CHANGE UP (ref 8.4–10.5)
CHLORIDE BLDV-SCNC: 104 MMOL/L — SIGNIFICANT CHANGE UP (ref 96–108)
CHLORIDE SERPL-SCNC: 100 MMOL/L
CHLORIDE SERPL-SCNC: 100 MMOL/L — SIGNIFICANT CHANGE UP (ref 98–107)
CHLORIDE SERPL-SCNC: 102 MMOL/L — SIGNIFICANT CHANGE UP (ref 98–107)
CHLORIDE SERPL-SCNC: 103 MMOL/L — SIGNIFICANT CHANGE UP (ref 98–107)
CHLORIDE SERPL-SCNC: 104 MMOL/L — SIGNIFICANT CHANGE UP (ref 98–107)
CHLORIDE SERPL-SCNC: 104 MMOL/L — SIGNIFICANT CHANGE UP (ref 98–107)
CHLORIDE SERPL-SCNC: 105 MMOL/L
CHLORIDE SERPL-SCNC: 105 MMOL/L — SIGNIFICANT CHANGE UP (ref 98–107)
CHLORIDE SERPL-SCNC: 106 MMOL/L — SIGNIFICANT CHANGE UP (ref 98–107)
CHLORIDE SERPL-SCNC: 107 MMOL/L — SIGNIFICANT CHANGE UP (ref 98–107)
CHLORIDE SERPL-SCNC: 108 MMOL/L — HIGH (ref 98–107)
CHLORIDE UR-SCNC: 35 MMOL/L — SIGNIFICANT CHANGE UP
CHLORIDE UR-SCNC: 48 MMOL/L — SIGNIFICANT CHANGE UP
CHOLEST SERPL-MCNC: 114 MG/DL — SIGNIFICANT CHANGE UP
CHOLEST SERPL-MCNC: 134 MG/DL — SIGNIFICANT CHANGE UP
CO2 BLDV-SCNC: 24.1 MMOL/L — SIGNIFICANT CHANGE UP (ref 22–26)
CO2 SERPL-SCNC: 18 MMOL/L
CO2 SERPL-SCNC: 19 MMOL/L — LOW (ref 22–31)
CO2 SERPL-SCNC: 20 MMOL/L — LOW (ref 22–31)
CO2 SERPL-SCNC: 20 MMOL/L — LOW (ref 22–31)
CO2 SERPL-SCNC: 21 MMOL/L — LOW (ref 22–31)
CO2 SERPL-SCNC: 22 MMOL/L — SIGNIFICANT CHANGE UP (ref 22–31)
CO2 SERPL-SCNC: 23 MMOL/L — SIGNIFICANT CHANGE UP (ref 22–31)
CO2 SERPL-SCNC: 24 MMOL/L
CO2 SERPL-SCNC: 24 MMOL/L — SIGNIFICANT CHANGE UP (ref 22–31)
CO2 SERPL-SCNC: 25 MMOL/L — SIGNIFICANT CHANGE UP (ref 22–31)
CO2 SERPL-SCNC: 26 MMOL/L — SIGNIFICANT CHANGE UP (ref 22–31)
CO2 SERPL-SCNC: 27 MMOL/L — SIGNIFICANT CHANGE UP (ref 22–31)
COLOR SPEC: SIGNIFICANT CHANGE UP
COLOR SPEC: SIGNIFICANT CHANGE UP
COVID-19 SPIKE DOMAIN AB INTERP: POSITIVE
COVID-19 SPIKE DOMAIN ANTIBODY RESULT: 65.9 U/ML — HIGH
CREAT ?TM UR-MCNC: 40 MG/DL — SIGNIFICANT CHANGE UP
CREAT ?TM UR-MCNC: 64 MG/DL — SIGNIFICANT CHANGE UP
CREAT SERPL-MCNC: 1.24 MG/DL — SIGNIFICANT CHANGE UP (ref 0.5–1.3)
CREAT SERPL-MCNC: 1.32 MG/DL — HIGH (ref 0.5–1.3)
CREAT SERPL-MCNC: 1.39 MG/DL — HIGH (ref 0.5–1.3)
CREAT SERPL-MCNC: 1.42 MG/DL — HIGH (ref 0.5–1.3)
CREAT SERPL-MCNC: 1.45 MG/DL — HIGH (ref 0.5–1.3)
CREAT SERPL-MCNC: 1.45 MG/DL — HIGH (ref 0.5–1.3)
CREAT SERPL-MCNC: 1.48 MG/DL — HIGH (ref 0.5–1.3)
CREAT SERPL-MCNC: 1.51 MG/DL — HIGH (ref 0.5–1.3)
CREAT SERPL-MCNC: 1.52 MG/DL — HIGH (ref 0.5–1.3)
CREAT SERPL-MCNC: 1.66 MG/DL — HIGH (ref 0.5–1.3)
CREAT SERPL-MCNC: 1.91 MG/DL — HIGH (ref 0.5–1.3)
CREAT SERPL-MCNC: 1.93 MG/DL — HIGH (ref 0.5–1.3)
CREAT SERPL-MCNC: 1.98 MG/DL — HIGH (ref 0.5–1.3)
CREAT SERPL-MCNC: 2.08 MG/DL
CREAT SERPL-MCNC: 2.17 MG/DL — HIGH (ref 0.5–1.3)
CREAT SERPL-MCNC: 2.61 MG/DL — HIGH (ref 0.5–1.3)
CREAT SERPL-MCNC: 2.98 MG/DL — HIGH (ref 0.5–1.3)
CREAT SERPL-MCNC: 3.45 MG/DL — HIGH (ref 0.5–1.3)
CREAT SERPL-MCNC: 3.59 MG/DL
CULTURE RESULTS: SIGNIFICANT CHANGE UP
CULTURE RESULTS: SIGNIFICANT CHANGE UP
DIFF PNL FLD: NEGATIVE — SIGNIFICANT CHANGE UP
DIFF PNL FLD: NEGATIVE — SIGNIFICANT CHANGE UP
EOSINOPHIL # BLD AUTO: 0.04 K/UL — SIGNIFICANT CHANGE UP (ref 0–0.5)
EOSINOPHIL # BLD AUTO: 0.08 K/UL — SIGNIFICANT CHANGE UP (ref 0–0.5)
EOSINOPHIL # BLD AUTO: 0.08 K/UL — SIGNIFICANT CHANGE UP (ref 0–0.5)
EOSINOPHIL # BLD AUTO: 0.09 K/UL — SIGNIFICANT CHANGE UP (ref 0–0.5)
EOSINOPHIL # BLD AUTO: 0.13 K/UL — SIGNIFICANT CHANGE UP (ref 0–0.5)
EOSINOPHIL NFR BLD AUTO: 0.4 % — SIGNIFICANT CHANGE UP (ref 0–6)
EOSINOPHIL NFR BLD AUTO: 0.9 % — SIGNIFICANT CHANGE UP (ref 0–6)
EOSINOPHIL NFR BLD AUTO: 1 % — SIGNIFICANT CHANGE UP (ref 0–6)
EOSINOPHIL NFR BLD AUTO: 1.3 % — SIGNIFICANT CHANGE UP (ref 0–6)
EOSINOPHIL NFR BLD AUTO: 1.9 % — SIGNIFICANT CHANGE UP (ref 0–6)
EPI CELLS # UR: SIGNIFICANT CHANGE UP
ESTIMATED AVERAGE GLUCOSE: 114 — SIGNIFICANT CHANGE UP
ESTIMATED AVERAGE GLUCOSE: 117 — SIGNIFICANT CHANGE UP
ESTIMATED AVERAGE GLUCOSE: 128 — SIGNIFICANT CHANGE UP
FERRITIN SERPL-MCNC: 367 NG/ML — HIGH (ref 15–150)
FOLATE SERPL-MCNC: 7.5 NG/ML — SIGNIFICANT CHANGE UP (ref 3.1–17.5)
GAS PNL BLDV: 137 MMOL/L — SIGNIFICANT CHANGE UP (ref 136–145)
GLUCOSE BLDC GLUCOMTR-MCNC: 106 MG/DL — HIGH (ref 70–99)
GLUCOSE BLDC GLUCOMTR-MCNC: 106 MG/DL — HIGH (ref 70–99)
GLUCOSE BLDC GLUCOMTR-MCNC: 111 MG/DL — HIGH (ref 70–99)
GLUCOSE BLDC GLUCOMTR-MCNC: 115 MG/DL — HIGH (ref 70–99)
GLUCOSE BLDC GLUCOMTR-MCNC: 123 MG/DL — HIGH (ref 70–99)
GLUCOSE BLDC GLUCOMTR-MCNC: 124 MG/DL — HIGH (ref 70–99)
GLUCOSE BLDC GLUCOMTR-MCNC: 133 MG/DL — HIGH (ref 70–99)
GLUCOSE BLDC GLUCOMTR-MCNC: 133 MG/DL — HIGH (ref 70–99)
GLUCOSE BLDC GLUCOMTR-MCNC: 135 MG/DL — HIGH (ref 70–99)
GLUCOSE BLDC GLUCOMTR-MCNC: 136 MG/DL — HIGH (ref 70–99)
GLUCOSE BLDC GLUCOMTR-MCNC: 138 MG/DL — HIGH (ref 70–99)
GLUCOSE BLDC GLUCOMTR-MCNC: 139 MG/DL — HIGH (ref 70–99)
GLUCOSE BLDC GLUCOMTR-MCNC: 142 MG/DL — HIGH (ref 70–99)
GLUCOSE BLDC GLUCOMTR-MCNC: 144 MG/DL — HIGH (ref 70–99)
GLUCOSE BLDC GLUCOMTR-MCNC: 148 MG/DL — HIGH (ref 70–99)
GLUCOSE BLDC GLUCOMTR-MCNC: 148 MG/DL — HIGH (ref 70–99)
GLUCOSE BLDC GLUCOMTR-MCNC: 153 MG/DL — HIGH (ref 70–99)
GLUCOSE BLDC GLUCOMTR-MCNC: 154 MG/DL — HIGH (ref 70–99)
GLUCOSE BLDC GLUCOMTR-MCNC: 156 MG/DL — HIGH (ref 70–99)
GLUCOSE BLDC GLUCOMTR-MCNC: 161 MG/DL — HIGH (ref 70–99)
GLUCOSE BLDC GLUCOMTR-MCNC: 173 MG/DL — HIGH (ref 70–99)
GLUCOSE BLDC GLUCOMTR-MCNC: 175 MG/DL — HIGH (ref 70–99)
GLUCOSE BLDC GLUCOMTR-MCNC: 176 MG/DL — HIGH (ref 70–99)
GLUCOSE BLDC GLUCOMTR-MCNC: 176 MG/DL — HIGH (ref 70–99)
GLUCOSE BLDC GLUCOMTR-MCNC: 177 MG/DL — HIGH (ref 70–99)
GLUCOSE BLDC GLUCOMTR-MCNC: 184 MG/DL — HIGH (ref 70–99)
GLUCOSE BLDC GLUCOMTR-MCNC: 185 MG/DL — HIGH (ref 70–99)
GLUCOSE BLDC GLUCOMTR-MCNC: 186 MG/DL — HIGH (ref 70–99)
GLUCOSE BLDC GLUCOMTR-MCNC: 192 MG/DL — HIGH (ref 70–99)
GLUCOSE BLDC GLUCOMTR-MCNC: 192 MG/DL — HIGH (ref 70–99)
GLUCOSE BLDC GLUCOMTR-MCNC: 197 MG/DL — HIGH (ref 70–99)
GLUCOSE BLDC GLUCOMTR-MCNC: 200 MG/DL — HIGH (ref 70–99)
GLUCOSE BLDC GLUCOMTR-MCNC: 202 MG/DL — HIGH (ref 70–99)
GLUCOSE BLDC GLUCOMTR-MCNC: 202 MG/DL — HIGH (ref 70–99)
GLUCOSE BLDC GLUCOMTR-MCNC: 212 MG/DL — HIGH (ref 70–99)
GLUCOSE BLDC GLUCOMTR-MCNC: 216 MG/DL — HIGH (ref 70–99)
GLUCOSE BLDC GLUCOMTR-MCNC: 218 MG/DL — HIGH (ref 70–99)
GLUCOSE BLDC GLUCOMTR-MCNC: 220 MG/DL — HIGH (ref 70–99)
GLUCOSE BLDC GLUCOMTR-MCNC: 222 MG/DL — HIGH (ref 70–99)
GLUCOSE BLDC GLUCOMTR-MCNC: 223 MG/DL — HIGH (ref 70–99)
GLUCOSE BLDC GLUCOMTR-MCNC: 227 MG/DL — HIGH (ref 70–99)
GLUCOSE BLDC GLUCOMTR-MCNC: 236 MG/DL — HIGH (ref 70–99)
GLUCOSE BLDC GLUCOMTR-MCNC: 262 MG/DL — HIGH (ref 70–99)
GLUCOSE BLDC GLUCOMTR-MCNC: 48 MG/DL — CRITICAL LOW (ref 70–99)
GLUCOSE BLDC GLUCOMTR-MCNC: 54 MG/DL — CRITICAL LOW (ref 70–99)
GLUCOSE BLDC GLUCOMTR-MCNC: 58 MG/DL — LOW (ref 70–99)
GLUCOSE BLDC GLUCOMTR-MCNC: 59 MG/DL — LOW (ref 70–99)
GLUCOSE BLDC GLUCOMTR-MCNC: 60 MG/DL — LOW (ref 70–99)
GLUCOSE BLDC GLUCOMTR-MCNC: 62 MG/DL — LOW (ref 70–99)
GLUCOSE BLDC GLUCOMTR-MCNC: 64 MG/DL — LOW (ref 70–99)
GLUCOSE BLDC GLUCOMTR-MCNC: 65 MG/DL — LOW (ref 70–99)
GLUCOSE BLDC GLUCOMTR-MCNC: 68 MG/DL — LOW (ref 70–99)
GLUCOSE BLDC GLUCOMTR-MCNC: 69 MG/DL — LOW (ref 70–99)
GLUCOSE BLDC GLUCOMTR-MCNC: 71 MG/DL — SIGNIFICANT CHANGE UP (ref 70–99)
GLUCOSE BLDC GLUCOMTR-MCNC: 74 MG/DL — SIGNIFICANT CHANGE UP (ref 70–99)
GLUCOSE BLDC GLUCOMTR-MCNC: 76 MG/DL — SIGNIFICANT CHANGE UP (ref 70–99)
GLUCOSE BLDC GLUCOMTR-MCNC: 79 MG/DL — SIGNIFICANT CHANGE UP (ref 70–99)
GLUCOSE BLDC GLUCOMTR-MCNC: 79 MG/DL — SIGNIFICANT CHANGE UP (ref 70–99)
GLUCOSE BLDC GLUCOMTR-MCNC: 83 MG/DL — SIGNIFICANT CHANGE UP (ref 70–99)
GLUCOSE BLDC GLUCOMTR-MCNC: 88 MG/DL — SIGNIFICANT CHANGE UP (ref 70–99)
GLUCOSE BLDC GLUCOMTR-MCNC: 92 MG/DL — SIGNIFICANT CHANGE UP (ref 70–99)
GLUCOSE BLDC GLUCOMTR-MCNC: 94 MG/DL — SIGNIFICANT CHANGE UP (ref 70–99)
GLUCOSE BLDV-MCNC: 165 MG/DL — HIGH (ref 70–99)
GLUCOSE SERPL-MCNC: 103 MG/DL — HIGH (ref 70–99)
GLUCOSE SERPL-MCNC: 104 MG/DL — HIGH (ref 70–99)
GLUCOSE SERPL-MCNC: 115 MG/DL — HIGH (ref 70–99)
GLUCOSE SERPL-MCNC: 121 MG/DL — HIGH (ref 70–99)
GLUCOSE SERPL-MCNC: 129 MG/DL — HIGH (ref 70–99)
GLUCOSE SERPL-MCNC: 129 MG/DL — HIGH (ref 70–99)
GLUCOSE SERPL-MCNC: 130 MG/DL — HIGH (ref 70–99)
GLUCOSE SERPL-MCNC: 137 MG/DL — HIGH (ref 70–99)
GLUCOSE SERPL-MCNC: 139 MG/DL — HIGH (ref 70–99)
GLUCOSE SERPL-MCNC: 153 MG/DL — HIGH (ref 70–99)
GLUCOSE SERPL-MCNC: 157 MG/DL — HIGH (ref 70–99)
GLUCOSE SERPL-MCNC: 159 MG/DL — HIGH (ref 70–99)
GLUCOSE SERPL-MCNC: 166 MG/DL — HIGH (ref 70–99)
GLUCOSE SERPL-MCNC: 168 MG/DL — HIGH (ref 70–99)
GLUCOSE SERPL-MCNC: 169 MG/DL — HIGH (ref 70–99)
GLUCOSE SERPL-MCNC: 174 MG/DL — HIGH (ref 70–99)
GLUCOSE SERPL-MCNC: 181 MG/DL
GLUCOSE SERPL-MCNC: 182 MG/DL — HIGH (ref 70–99)
GLUCOSE SERPL-MCNC: 69 MG/DL
GLUCOSE SERPL-MCNC: 81 MG/DL — SIGNIFICANT CHANGE UP (ref 70–99)
GLUCOSE SERPL-MCNC: 90 MG/DL — SIGNIFICANT CHANGE UP (ref 70–99)
GLUCOSE UR QL: ABNORMAL
GLUCOSE UR QL: ABNORMAL
HCO3 BLDV-SCNC: 23 MMOL/L — SIGNIFICANT CHANGE UP (ref 22–29)
HCT VFR BLD CALC: 24.5 % — LOW (ref 34.5–45)
HCT VFR BLD CALC: 24.5 % — LOW (ref 34.5–45)
HCT VFR BLD CALC: 25.4 % — LOW (ref 34.5–45)
HCT VFR BLD CALC: 25.9 % — LOW (ref 34.5–45)
HCT VFR BLD CALC: 25.9 % — LOW (ref 34.5–45)
HCT VFR BLD CALC: 26.5 % — LOW (ref 34.5–45)
HCT VFR BLD CALC: 26.5 % — LOW (ref 34.5–45)
HCT VFR BLD CALC: 26.8 % — LOW (ref 34.5–45)
HCT VFR BLD CALC: 26.9 % — LOW (ref 34.5–45)
HCT VFR BLD CALC: 27.3 % — LOW (ref 34.5–45)
HCT VFR BLD CALC: 28.3 % — LOW (ref 34.5–45)
HCT VFR BLD CALC: 28.7 % — LOW (ref 34.5–45)
HCT VFR BLD CALC: 28.9 % — LOW (ref 34.5–45)
HCT VFR BLD CALC: 29.2 % — LOW (ref 34.5–45)
HCT VFR BLD CALC: 30 % — LOW (ref 34.5–45)
HCT VFR BLDA CALC: 27 % — LOW (ref 34.5–46.5)
HDLC SERPL-MCNC: 28 MG/DL — LOW
HDLC SERPL-MCNC: 28 MG/DL — LOW
HGB BLD CALC-MCNC: 8.9 G/DL — LOW (ref 11.5–15.5)
HGB BLD-MCNC: 7.9 G/DL — LOW (ref 11.5–15.5)
HGB BLD-MCNC: 8.2 G/DL — LOW (ref 11.5–15.5)
HGB BLD-MCNC: 8.2 G/DL — LOW (ref 11.5–15.5)
HGB BLD-MCNC: 8.3 G/DL — LOW (ref 11.5–15.5)
HGB BLD-MCNC: 8.4 G/DL — LOW (ref 11.5–15.5)
HGB BLD-MCNC: 8.7 G/DL — LOW (ref 11.5–15.5)
HGB BLD-MCNC: 8.7 G/DL — LOW (ref 11.5–15.5)
HGB BLD-MCNC: 8.8 G/DL — LOW (ref 11.5–15.5)
HGB BLD-MCNC: 8.9 G/DL — LOW (ref 11.5–15.5)
HGB BLD-MCNC: 8.9 G/DL — LOW (ref 11.5–15.5)
HGB BLD-MCNC: 9 G/DL — LOW (ref 11.5–15.5)
HGB BLD-MCNC: 9.3 G/DL — LOW (ref 11.5–15.5)
HGB BLD-MCNC: 9.4 G/DL — LOW (ref 11.5–15.5)
HGB BLD-MCNC: 9.5 G/DL — LOW (ref 11.5–15.5)
HGB BLD-MCNC: 9.8 G/DL — LOW (ref 11.5–15.5)
HYALINE CASTS # UR AUTO: 0 /LPF — SIGNIFICANT CHANGE UP (ref 0–7)
IANC: 4.64 K/UL — SIGNIFICANT CHANGE UP (ref 1.5–8.5)
IANC: 5.23 K/UL — SIGNIFICANT CHANGE UP (ref 1.5–8.5)
IANC: 8.11 K/UL — SIGNIFICANT CHANGE UP (ref 1.5–8.5)
IMM GRANULOCYTES NFR BLD AUTO: 0.3 % — SIGNIFICANT CHANGE UP (ref 0–1.5)
IMM GRANULOCYTES NFR BLD AUTO: 0.4 % — SIGNIFICANT CHANGE UP (ref 0–1.5)
IMM GRANULOCYTES NFR BLD AUTO: 0.4 % — SIGNIFICANT CHANGE UP (ref 0–1.5)
IMM GRANULOCYTES NFR BLD AUTO: 0.6 % — SIGNIFICANT CHANGE UP (ref 0–1.5)
IMM GRANULOCYTES NFR BLD AUTO: 0.7 % — SIGNIFICANT CHANGE UP (ref 0–1.5)
IRON SATN MFR SERPL: 48 % — SIGNIFICANT CHANGE UP (ref 14–50)
IRON SATN MFR SERPL: 79 UG/DL — SIGNIFICANT CHANGE UP (ref 30–160)
KETONES UR-MCNC: NEGATIVE — SIGNIFICANT CHANGE UP
KETONES UR-MCNC: NEGATIVE — SIGNIFICANT CHANGE UP
LACTATE BLDV-MCNC: 0.7 MMOL/L — SIGNIFICANT CHANGE UP (ref 0.5–2)
LEUKOCYTE ESTERASE UR-ACNC: ABNORMAL
LEUKOCYTE ESTERASE UR-ACNC: ABNORMAL
LIPID PNL WITH DIRECT LDL SERPL: 62 MG/DL — SIGNIFICANT CHANGE UP
LIPID PNL WITH DIRECT LDL SERPL: 72 MG/DL — SIGNIFICANT CHANGE UP
LYMPHOCYTES # BLD AUTO: 0.67 K/UL — LOW (ref 1–3.3)
LYMPHOCYTES # BLD AUTO: 0.81 K/UL — LOW (ref 1–3.3)
LYMPHOCYTES # BLD AUTO: 0.82 K/UL — LOW (ref 1–3.3)
LYMPHOCYTES # BLD AUTO: 0.83 K/UL — LOW (ref 1–3.3)
LYMPHOCYTES # BLD AUTO: 1.27 K/UL — SIGNIFICANT CHANGE UP (ref 1–3.3)
LYMPHOCYTES # BLD AUTO: 10.7 % — LOW (ref 13–44)
LYMPHOCYTES # BLD AUTO: 12.3 % — LOW (ref 13–44)
LYMPHOCYTES # BLD AUTO: 18.9 % — SIGNIFICANT CHANGE UP (ref 13–44)
LYMPHOCYTES # BLD AUTO: 7.1 % — LOW (ref 13–44)
LYMPHOCYTES # BLD AUTO: 9.3 % — LOW (ref 13–44)
MAGNESIUM SERPL-MCNC: 1.5 MG/DL — LOW (ref 1.6–2.6)
MAGNESIUM SERPL-MCNC: 1.6 MG/DL — SIGNIFICANT CHANGE UP (ref 1.6–2.6)
MAGNESIUM SERPL-MCNC: 1.6 MG/DL — SIGNIFICANT CHANGE UP (ref 1.6–2.6)
MAGNESIUM SERPL-MCNC: 1.8 MG/DL — SIGNIFICANT CHANGE UP (ref 1.6–2.6)
MAGNESIUM SERPL-MCNC: 1.8 MG/DL — SIGNIFICANT CHANGE UP (ref 1.6–2.6)
MAGNESIUM SERPL-MCNC: 1.9 MG/DL — SIGNIFICANT CHANGE UP (ref 1.6–2.6)
MAGNESIUM SERPL-MCNC: 1.9 MG/DL — SIGNIFICANT CHANGE UP (ref 1.6–2.6)
MAGNESIUM SERPL-MCNC: 2 MG/DL — SIGNIFICANT CHANGE UP (ref 1.6–2.6)
MAGNESIUM SERPL-MCNC: 2.1 MG/DL — SIGNIFICANT CHANGE UP (ref 1.6–2.6)
MAGNESIUM SERPL-MCNC: 2.2 MG/DL — SIGNIFICANT CHANGE UP (ref 1.6–2.6)
MAGNESIUM SERPL-MCNC: 2.2 MG/DL — SIGNIFICANT CHANGE UP (ref 1.6–2.6)
MCHC RBC-ENTMCNC: 30.7 GM/DL — LOW (ref 32–36)
MCHC RBC-ENTMCNC: 30.9 GM/DL — LOW (ref 32–36)
MCHC RBC-ENTMCNC: 31 GM/DL — LOW (ref 32–36)
MCHC RBC-ENTMCNC: 31.7 GM/DL — LOW (ref 32–36)
MCHC RBC-ENTMCNC: 32 PG — SIGNIFICANT CHANGE UP (ref 27–34)
MCHC RBC-ENTMCNC: 32 PG — SIGNIFICANT CHANGE UP (ref 27–34)
MCHC RBC-ENTMCNC: 32.1 PG — SIGNIFICANT CHANGE UP (ref 27–34)
MCHC RBC-ENTMCNC: 32.2 GM/DL — SIGNIFICANT CHANGE UP (ref 32–36)
MCHC RBC-ENTMCNC: 32.2 PG — SIGNIFICANT CHANGE UP (ref 27–34)
MCHC RBC-ENTMCNC: 32.3 PG — SIGNIFICANT CHANGE UP (ref 27–34)
MCHC RBC-ENTMCNC: 32.3 PG — SIGNIFICANT CHANGE UP (ref 27–34)
MCHC RBC-ENTMCNC: 32.4 GM/DL — SIGNIFICANT CHANGE UP (ref 32–36)
MCHC RBC-ENTMCNC: 32.4 PG — SIGNIFICANT CHANGE UP (ref 27–34)
MCHC RBC-ENTMCNC: 32.6 G/DL — SIGNIFICANT CHANGE UP (ref 32–36)
MCHC RBC-ENTMCNC: 32.8 GM/DL — SIGNIFICANT CHANGE UP (ref 32–36)
MCHC RBC-ENTMCNC: 32.8 GM/DL — SIGNIFICANT CHANGE UP (ref 32–36)
MCHC RBC-ENTMCNC: 32.8 PG — SIGNIFICANT CHANGE UP (ref 27–34)
MCHC RBC-ENTMCNC: 32.8 PG — SIGNIFICANT CHANGE UP (ref 27–34)
MCHC RBC-ENTMCNC: 32.9 G/DL — SIGNIFICANT CHANGE UP (ref 32–36)
MCHC RBC-ENTMCNC: 32.9 PG — SIGNIFICANT CHANGE UP (ref 27–34)
MCHC RBC-ENTMCNC: 33 PG — SIGNIFICANT CHANGE UP (ref 27–34)
MCHC RBC-ENTMCNC: 33.1 PG — SIGNIFICANT CHANGE UP (ref 27–34)
MCHC RBC-ENTMCNC: 33.2 PG — SIGNIFICANT CHANGE UP (ref 27–34)
MCHC RBC-ENTMCNC: 33.5 GM/DL — SIGNIFICANT CHANGE UP (ref 32–36)
MCHC RBC-ENTMCNC: 33.6 GM/DL — SIGNIFICANT CHANGE UP (ref 32–36)
MCHC RBC-ENTMCNC: 34.6 GM/DL — SIGNIFICANT CHANGE UP (ref 32–36)
MCV RBC AUTO: 100.3 FL — HIGH (ref 80–100)
MCV RBC AUTO: 100.4 FL — HIGH (ref 80–100)
MCV RBC AUTO: 100.7 FL — HIGH (ref 80–100)
MCV RBC AUTO: 101.1 FL — HIGH (ref 80–100)
MCV RBC AUTO: 101.6 FL — HIGH (ref 80–100)
MCV RBC AUTO: 104 FL — HIGH (ref 80–100)
MCV RBC AUTO: 104.2 FL — HIGH (ref 80–100)
MCV RBC AUTO: 104.3 FL — HIGH (ref 80–100)
MCV RBC AUTO: 95.1 FL — SIGNIFICANT CHANGE UP (ref 80–100)
MCV RBC AUTO: 95.4 FL — SIGNIFICANT CHANGE UP (ref 80–100)
MCV RBC AUTO: 95.7 FL — SIGNIFICANT CHANGE UP (ref 80–100)
MCV RBC AUTO: 98 FL — SIGNIFICANT CHANGE UP (ref 80–100)
MCV RBC AUTO: 98.3 FL — SIGNIFICANT CHANGE UP (ref 80–100)
MCV RBC AUTO: 98.6 FL — SIGNIFICANT CHANGE UP (ref 80–100)
MCV RBC AUTO: 99.2 FL — SIGNIFICANT CHANGE UP (ref 80–100)
MONOCYTES # BLD AUTO: 0.46 K/UL — SIGNIFICANT CHANGE UP (ref 0–0.9)
MONOCYTES # BLD AUTO: 0.5 K/UL — SIGNIFICANT CHANGE UP (ref 0–0.9)
MONOCYTES # BLD AUTO: 0.52 K/UL — SIGNIFICANT CHANGE UP (ref 0–0.9)
MONOCYTES # BLD AUTO: 0.61 K/UL — SIGNIFICANT CHANGE UP (ref 0–0.9)
MONOCYTES # BLD AUTO: 0.66 K/UL — SIGNIFICANT CHANGE UP (ref 0–0.9)
MONOCYTES NFR BLD AUTO: 5.3 % — SIGNIFICANT CHANGE UP (ref 2–14)
MONOCYTES NFR BLD AUTO: 6.7 % — SIGNIFICANT CHANGE UP (ref 2–14)
MONOCYTES NFR BLD AUTO: 6.9 % — SIGNIFICANT CHANGE UP (ref 2–14)
MONOCYTES NFR BLD AUTO: 7.5 % — SIGNIFICANT CHANGE UP (ref 2–14)
MONOCYTES NFR BLD AUTO: 9.1 % — SIGNIFICANT CHANGE UP (ref 2–14)
NEUTROPHILS # BLD AUTO: 4.64 K/UL — SIGNIFICANT CHANGE UP (ref 1.8–7.4)
NEUTROPHILS # BLD AUTO: 5.23 K/UL — SIGNIFICANT CHANGE UP (ref 1.8–7.4)
NEUTROPHILS # BLD AUTO: 6.18 K/UL — SIGNIFICANT CHANGE UP (ref 1.8–7.4)
NEUTROPHILS # BLD AUTO: 7.07 K/UL — SIGNIFICANT CHANGE UP (ref 1.8–7.4)
NEUTROPHILS # BLD AUTO: 8.11 K/UL — HIGH (ref 1.8–7.4)
NEUTROPHILS NFR BLD AUTO: 69.1 % — SIGNIFICANT CHANGE UP (ref 43–77)
NEUTROPHILS NFR BLD AUTO: 78.4 % — HIGH (ref 43–77)
NEUTROPHILS NFR BLD AUTO: 80.1 % — HIGH (ref 43–77)
NEUTROPHILS NFR BLD AUTO: 80.8 % — HIGH (ref 43–77)
NEUTROPHILS NFR BLD AUTO: 86.4 % — HIGH (ref 43–77)
NITRITE UR-MCNC: NEGATIVE — SIGNIFICANT CHANGE UP
NITRITE UR-MCNC: NEGATIVE — SIGNIFICANT CHANGE UP
NON HDL CHOLESTEROL: 106 MG/DL — SIGNIFICANT CHANGE UP
NON HDL CHOLESTEROL: 86 MG/DL — SIGNIFICANT CHANGE UP
NRBC # BLD: 0 /100 WBCS — SIGNIFICANT CHANGE UP
NRBC # BLD: 0 /100 WBCS — SIGNIFICANT CHANGE UP (ref 0–0)
NRBC # BLD: 0 /100 WBCS — SIGNIFICANT CHANGE UP (ref 0–0)
NRBC # FLD: 0 K/UL — SIGNIFICANT CHANGE UP
NT-PROBNP SERPL-SCNC: 1446 PG/ML — HIGH
PCO2 BLDV: 48 MMHG — HIGH (ref 39–42)
PH BLDV: 7.28 — LOW (ref 7.32–7.43)
PH UR: 5.5 — SIGNIFICANT CHANGE UP (ref 5–8)
PH UR: 7 — SIGNIFICANT CHANGE UP (ref 5–8)
PHOSPHATE SERPL-MCNC: 1.5 MG/DL — LOW (ref 2.5–4.5)
PHOSPHATE SERPL-MCNC: 2 MG/DL — LOW (ref 2.5–4.5)
PHOSPHATE SERPL-MCNC: 2.1 MG/DL — LOW (ref 2.5–4.5)
PHOSPHATE SERPL-MCNC: 2.2 MG/DL — LOW (ref 2.5–4.5)
PHOSPHATE SERPL-MCNC: 2.4 MG/DL — LOW (ref 2.5–4.5)
PHOSPHATE SERPL-MCNC: 2.5 MG/DL — SIGNIFICANT CHANGE UP (ref 2.5–4.5)
PHOSPHATE SERPL-MCNC: 2.5 MG/DL — SIGNIFICANT CHANGE UP (ref 2.5–4.5)
PHOSPHATE SERPL-MCNC: 2.6 MG/DL — SIGNIFICANT CHANGE UP (ref 2.5–4.5)
PHOSPHATE SERPL-MCNC: 3.3 MG/DL — SIGNIFICANT CHANGE UP (ref 2.5–4.5)
PHOSPHATE SERPL-MCNC: 3.5 MG/DL — SIGNIFICANT CHANGE UP (ref 2.5–4.5)
PHOSPHATE SERPL-MCNC: 3.5 MG/DL — SIGNIFICANT CHANGE UP (ref 2.5–4.5)
PHOSPHATE SERPL-MCNC: 4.1 MG/DL — SIGNIFICANT CHANGE UP (ref 2.5–4.5)
PHOSPHATE SERPL-MCNC: 5 MG/DL — HIGH (ref 2.5–4.5)
PLATELET # BLD AUTO: 258 K/UL — SIGNIFICANT CHANGE UP (ref 150–400)
PLATELET # BLD AUTO: 286 K/UL — SIGNIFICANT CHANGE UP (ref 150–400)
PLATELET # BLD AUTO: 295 K/UL — SIGNIFICANT CHANGE UP (ref 150–400)
PLATELET # BLD AUTO: 306 K/UL — SIGNIFICANT CHANGE UP (ref 150–400)
PLATELET # BLD AUTO: 322 K/UL — SIGNIFICANT CHANGE UP (ref 150–400)
PLATELET # BLD AUTO: 328 K/UL — SIGNIFICANT CHANGE UP (ref 150–400)
PLATELET # BLD AUTO: 332 K/UL — SIGNIFICANT CHANGE UP (ref 150–400)
PLATELET # BLD AUTO: 335 K/UL — SIGNIFICANT CHANGE UP (ref 150–400)
PLATELET # BLD AUTO: 336 K/UL — SIGNIFICANT CHANGE UP (ref 150–400)
PLATELET # BLD AUTO: 340 K/UL — SIGNIFICANT CHANGE UP (ref 150–400)
PLATELET # BLD AUTO: 343 K/UL — SIGNIFICANT CHANGE UP (ref 150–400)
PLATELET # BLD AUTO: 344 K/UL — SIGNIFICANT CHANGE UP (ref 150–400)
PLATELET # BLD AUTO: 347 K/UL — SIGNIFICANT CHANGE UP (ref 150–400)
PLATELET # BLD AUTO: 348 K/UL — SIGNIFICANT CHANGE UP (ref 150–400)
PLATELET # BLD AUTO: 353 K/UL — SIGNIFICANT CHANGE UP (ref 150–400)
PO2 BLDV: 31 MMHG — SIGNIFICANT CHANGE UP
POTASSIUM BLDV-SCNC: 4 MMOL/L — SIGNIFICANT CHANGE UP (ref 3.5–5.1)
POTASSIUM SERPL-MCNC: 4 MMOL/L — SIGNIFICANT CHANGE UP (ref 3.5–5.3)
POTASSIUM SERPL-MCNC: 4 MMOL/L — SIGNIFICANT CHANGE UP (ref 3.5–5.3)
POTASSIUM SERPL-MCNC: 4.1 MMOL/L — SIGNIFICANT CHANGE UP (ref 3.5–5.3)
POTASSIUM SERPL-MCNC: 4.2 MMOL/L — SIGNIFICANT CHANGE UP (ref 3.5–5.3)
POTASSIUM SERPL-MCNC: 4.3 MMOL/L — SIGNIFICANT CHANGE UP (ref 3.5–5.3)
POTASSIUM SERPL-MCNC: 4.4 MMOL/L — SIGNIFICANT CHANGE UP (ref 3.5–5.3)
POTASSIUM SERPL-MCNC: 4.4 MMOL/L — SIGNIFICANT CHANGE UP (ref 3.5–5.3)
POTASSIUM SERPL-MCNC: 4.5 MMOL/L — SIGNIFICANT CHANGE UP (ref 3.5–5.3)
POTASSIUM SERPL-MCNC: 4.6 MMOL/L — SIGNIFICANT CHANGE UP (ref 3.5–5.3)
POTASSIUM SERPL-MCNC: 4.7 MMOL/L — SIGNIFICANT CHANGE UP (ref 3.5–5.3)
POTASSIUM SERPL-MCNC: 4.7 MMOL/L — SIGNIFICANT CHANGE UP (ref 3.5–5.3)
POTASSIUM SERPL-MCNC: 4.8 MMOL/L — SIGNIFICANT CHANGE UP (ref 3.5–5.3)
POTASSIUM SERPL-MCNC: 4.8 MMOL/L — SIGNIFICANT CHANGE UP (ref 3.5–5.3)
POTASSIUM SERPL-MCNC: 4.9 MMOL/L — SIGNIFICANT CHANGE UP (ref 3.5–5.3)
POTASSIUM SERPL-MCNC: 4.9 MMOL/L — SIGNIFICANT CHANGE UP (ref 3.5–5.3)
POTASSIUM SERPL-MCNC: 5 MMOL/L — SIGNIFICANT CHANGE UP (ref 3.5–5.3)
POTASSIUM SERPL-SCNC: 4 MMOL/L — SIGNIFICANT CHANGE UP (ref 3.5–5.3)
POTASSIUM SERPL-SCNC: 4 MMOL/L — SIGNIFICANT CHANGE UP (ref 3.5–5.3)
POTASSIUM SERPL-SCNC: 4.1 MMOL/L — SIGNIFICANT CHANGE UP (ref 3.5–5.3)
POTASSIUM SERPL-SCNC: 4.2 MMOL/L — SIGNIFICANT CHANGE UP (ref 3.5–5.3)
POTASSIUM SERPL-SCNC: 4.3 MMOL/L — SIGNIFICANT CHANGE UP (ref 3.5–5.3)
POTASSIUM SERPL-SCNC: 4.4 MMOL/L — SIGNIFICANT CHANGE UP (ref 3.5–5.3)
POTASSIUM SERPL-SCNC: 4.4 MMOL/L — SIGNIFICANT CHANGE UP (ref 3.5–5.3)
POTASSIUM SERPL-SCNC: 4.5 MMOL/L
POTASSIUM SERPL-SCNC: 4.5 MMOL/L — SIGNIFICANT CHANGE UP (ref 3.5–5.3)
POTASSIUM SERPL-SCNC: 4.6 MMOL/L — SIGNIFICANT CHANGE UP (ref 3.5–5.3)
POTASSIUM SERPL-SCNC: 4.7 MMOL/L
POTASSIUM SERPL-SCNC: 4.7 MMOL/L — SIGNIFICANT CHANGE UP (ref 3.5–5.3)
POTASSIUM SERPL-SCNC: 4.7 MMOL/L — SIGNIFICANT CHANGE UP (ref 3.5–5.3)
POTASSIUM SERPL-SCNC: 4.8 MMOL/L — SIGNIFICANT CHANGE UP (ref 3.5–5.3)
POTASSIUM SERPL-SCNC: 4.8 MMOL/L — SIGNIFICANT CHANGE UP (ref 3.5–5.3)
POTASSIUM SERPL-SCNC: 4.9 MMOL/L — SIGNIFICANT CHANGE UP (ref 3.5–5.3)
POTASSIUM SERPL-SCNC: 4.9 MMOL/L — SIGNIFICANT CHANGE UP (ref 3.5–5.3)
POTASSIUM SERPL-SCNC: 5 MMOL/L — SIGNIFICANT CHANGE UP (ref 3.5–5.3)
POTASSIUM UR-SCNC: 15.9 MMOL/L — SIGNIFICANT CHANGE UP
POTASSIUM UR-SCNC: 25.1 MMOL/L — SIGNIFICANT CHANGE UP
PROT SERPL-MCNC: 6.1 G/DL — SIGNIFICANT CHANGE UP (ref 6–8.3)
PROT SERPL-MCNC: 6.3 G/DL
PROT SERPL-MCNC: 6.5 G/DL — SIGNIFICANT CHANGE UP (ref 6–8.3)
PROT SERPL-MCNC: 6.7 G/DL
PROT SERPL-MCNC: 6.7 G/DL — SIGNIFICANT CHANGE UP (ref 6–8.3)
PROT SERPL-MCNC: 6.7 G/DL — SIGNIFICANT CHANGE UP (ref 6–8.3)
PROT SERPL-MCNC: 7 G/DL — SIGNIFICANT CHANGE UP (ref 6–8.3)
PROT SERPL-MCNC: 7.2 G/DL — SIGNIFICANT CHANGE UP (ref 6–8.3)
PROT UR-MCNC: ABNORMAL
PROT UR-MCNC: ABNORMAL
RBC # BLD: 2.44 M/UL — LOW (ref 3.8–5.2)
RBC # BLD: 2.5 M/UL — LOW (ref 3.8–5.2)
RBC # BLD: 2.55 M/UL — LOW (ref 3.8–5.2)
RBC # BLD: 2.58 M/UL — LOW (ref 3.8–5.2)
RBC # BLD: 2.61 M/UL — LOW (ref 3.8–5.2)
RBC # BLD: 2.62 M/UL — LOW (ref 3.8–5.2)
RBC # BLD: 2.67 M/UL — LOW (ref 3.8–5.2)
RBC # BLD: 2.67 M/UL — LOW (ref 3.8–5.2)
RBC # BLD: 2.71 M/UL — LOW (ref 3.8–5.2)
RBC # BLD: 2.72 M/UL — LOW (ref 3.8–5.2)
RBC # BLD: 2.77 M/UL — LOW (ref 3.8–5.2)
RBC # BLD: 2.81 M/UL — LOW (ref 3.8–5.2)
RBC # BLD: 2.86 M/UL — LOW (ref 3.8–5.2)
RBC # BLD: 2.88 M/UL — LOW (ref 3.8–5.2)
RBC # BLD: 2.94 M/UL — LOW (ref 3.8–5.2)
RBC # BLD: 2.96 M/UL — LOW (ref 3.8–5.2)
RBC # FLD: 11.2 % — SIGNIFICANT CHANGE UP (ref 10.3–14.5)
RBC # FLD: 11.2 % — SIGNIFICANT CHANGE UP (ref 10.3–14.5)
RBC # FLD: 11.3 % — SIGNIFICANT CHANGE UP (ref 10.3–14.5)
RBC # FLD: 11.4 % — SIGNIFICANT CHANGE UP (ref 10.3–14.5)
RBC # FLD: 11.6 % — SIGNIFICANT CHANGE UP (ref 10.3–14.5)
RBC # FLD: 11.6 % — SIGNIFICANT CHANGE UP (ref 10.3–14.5)
RBC # FLD: 11.7 % — SIGNIFICANT CHANGE UP (ref 10.3–14.5)
RBC # FLD: 11.8 % — SIGNIFICANT CHANGE UP (ref 10.3–14.5)
RBC # FLD: 11.9 % — SIGNIFICANT CHANGE UP (ref 10.3–14.5)
RBC CASTS # UR COMP ASSIST: 1 /HPF — SIGNIFICANT CHANGE UP (ref 0–4)
RETICS #: 18.7 K/UL — LOW (ref 25–125)
RETICS/RBC NFR: 0.7 % — SIGNIFICANT CHANGE UP (ref 0.5–2.5)
RH IG SCN BLD-IMP: POSITIVE — SIGNIFICANT CHANGE UP
SAO2 % BLDV: 50.5 % — SIGNIFICANT CHANGE UP
SARS-COV-2 IGG+IGM SERPL QL IA: 65.9 U/ML — HIGH
SARS-COV-2 IGG+IGM SERPL QL IA: POSITIVE
SARS-COV-2 N GENE NPH QL NAA+PROBE: NOT DETECTED
SARS-COV-2 RNA SPEC QL NAA+PROBE: SIGNIFICANT CHANGE UP
SODIUM SERPL-SCNC: 133 MMOL/L — LOW (ref 135–145)
SODIUM SERPL-SCNC: 133 MMOL/L — LOW (ref 135–145)
SODIUM SERPL-SCNC: 134 MMOL/L
SODIUM SERPL-SCNC: 134 MMOL/L — LOW (ref 135–145)
SODIUM SERPL-SCNC: 136 MMOL/L — SIGNIFICANT CHANGE UP (ref 135–145)
SODIUM SERPL-SCNC: 136 MMOL/L — SIGNIFICANT CHANGE UP (ref 135–145)
SODIUM SERPL-SCNC: 137 MMOL/L — SIGNIFICANT CHANGE UP (ref 135–145)
SODIUM SERPL-SCNC: 138 MMOL/L — SIGNIFICANT CHANGE UP (ref 135–145)
SODIUM SERPL-SCNC: 139 MMOL/L — SIGNIFICANT CHANGE UP (ref 135–145)
SODIUM SERPL-SCNC: 140 MMOL/L — SIGNIFICANT CHANGE UP (ref 135–145)
SODIUM SERPL-SCNC: 141 MMOL/L — SIGNIFICANT CHANGE UP (ref 135–145)
SODIUM SERPL-SCNC: 141 MMOL/L — SIGNIFICANT CHANGE UP (ref 135–145)
SODIUM SERPL-SCNC: 142 MMOL/L
SODIUM SERPL-SCNC: 142 MMOL/L — SIGNIFICANT CHANGE UP (ref 135–145)
SODIUM SERPL-SCNC: 142 MMOL/L — SIGNIFICANT CHANGE UP (ref 135–145)
SODIUM UR-SCNC: 48 MMOL/L — SIGNIFICANT CHANGE UP
SODIUM UR-SCNC: 60 MMOL/L — SIGNIFICANT CHANGE UP
SP GR SPEC: 1.01 — SIGNIFICANT CHANGE UP (ref 1–1.05)
SP GR SPEC: 1.01 — SIGNIFICANT CHANGE UP (ref 1–1.05)
SPECIMEN SOURCE: SIGNIFICANT CHANGE UP
SPECIMEN SOURCE: SIGNIFICANT CHANGE UP
SURGICAL PATHOLOGY STUDY: SIGNIFICANT CHANGE UP
TIBC SERPL-MCNC: 166 UG/DL — LOW (ref 220–430)
TRIGL SERPL-MCNC: 120 MG/DL — SIGNIFICANT CHANGE UP
TRIGL SERPL-MCNC: 169 MG/DL — HIGH
TSH SERPL-MCNC: 1.6 UIU/ML — SIGNIFICANT CHANGE UP (ref 0.27–4.2)
TSH SERPL-MCNC: 2.62 UIU/ML — SIGNIFICANT CHANGE UP (ref 0.27–4.2)
UIBC SERPL-MCNC: 87 UG/DL — LOW (ref 110–370)
UROBILINOGEN FLD QL: SIGNIFICANT CHANGE UP
UROBILINOGEN FLD QL: SIGNIFICANT CHANGE UP
UUN UR-MCNC: 387 MG/DL — SIGNIFICANT CHANGE UP
VIT B12 SERPL-MCNC: 485 PG/ML — SIGNIFICANT CHANGE UP (ref 200–900)
WBC # BLD: 10.74 K/UL — HIGH (ref 3.8–10.5)
WBC # BLD: 12.7 K/UL — HIGH (ref 3.8–10.5)
WBC # BLD: 5.99 K/UL — SIGNIFICANT CHANGE UP (ref 3.8–10.5)
WBC # BLD: 5.99 K/UL — SIGNIFICANT CHANGE UP (ref 3.8–10.5)
WBC # BLD: 6.68 K/UL — SIGNIFICANT CHANGE UP (ref 3.8–10.5)
WBC # BLD: 6.68 K/UL — SIGNIFICANT CHANGE UP (ref 3.8–10.5)
WBC # BLD: 6.72 K/UL — SIGNIFICANT CHANGE UP (ref 3.8–10.5)
WBC # BLD: 7.21 K/UL — SIGNIFICANT CHANGE UP (ref 3.8–10.5)
WBC # BLD: 7.53 K/UL — SIGNIFICANT CHANGE UP (ref 3.8–10.5)
WBC # BLD: 7.73 K/UL — SIGNIFICANT CHANGE UP (ref 3.8–10.5)
WBC # BLD: 8.75 K/UL — SIGNIFICANT CHANGE UP (ref 3.8–10.5)
WBC # BLD: 8.92 K/UL — SIGNIFICANT CHANGE UP (ref 3.8–10.5)
WBC # BLD: 9.36 K/UL — SIGNIFICANT CHANGE UP (ref 3.8–10.5)
WBC # BLD: 9.4 K/UL — SIGNIFICANT CHANGE UP (ref 3.8–10.5)
WBC # BLD: 9.84 K/UL — SIGNIFICANT CHANGE UP (ref 3.8–10.5)
WBC # FLD AUTO: 10.74 K/UL — HIGH (ref 3.8–10.5)
WBC # FLD AUTO: 12.7 K/UL — HIGH (ref 3.8–10.5)
WBC # FLD AUTO: 5.99 K/UL — SIGNIFICANT CHANGE UP (ref 3.8–10.5)
WBC # FLD AUTO: 5.99 K/UL — SIGNIFICANT CHANGE UP (ref 3.8–10.5)
WBC # FLD AUTO: 6.68 K/UL — SIGNIFICANT CHANGE UP (ref 3.8–10.5)
WBC # FLD AUTO: 6.68 K/UL — SIGNIFICANT CHANGE UP (ref 3.8–10.5)
WBC # FLD AUTO: 6.72 K/UL — SIGNIFICANT CHANGE UP (ref 3.8–10.5)
WBC # FLD AUTO: 7.21 K/UL — SIGNIFICANT CHANGE UP (ref 3.8–10.5)
WBC # FLD AUTO: 7.53 K/UL — SIGNIFICANT CHANGE UP (ref 3.8–10.5)
WBC # FLD AUTO: 7.73 K/UL — SIGNIFICANT CHANGE UP (ref 3.8–10.5)
WBC # FLD AUTO: 8.75 K/UL — SIGNIFICANT CHANGE UP (ref 3.8–10.5)
WBC # FLD AUTO: 8.92 K/UL — SIGNIFICANT CHANGE UP (ref 3.8–10.5)
WBC # FLD AUTO: 9.36 K/UL — SIGNIFICANT CHANGE UP (ref 3.8–10.5)
WBC # FLD AUTO: 9.4 K/UL — SIGNIFICANT CHANGE UP (ref 3.8–10.5)
WBC # FLD AUTO: 9.84 K/UL — SIGNIFICANT CHANGE UP (ref 3.8–10.5)
WBC UR QL: SIGNIFICANT CHANGE UP /HPF (ref 0–5)

## 2021-01-01 PROCEDURE — 99233 SBSQ HOSP IP/OBS HIGH 50: CPT

## 2021-01-01 PROCEDURE — 99497 ADVNCD CARE PLAN 30 MIN: CPT | Mod: 25

## 2021-01-01 PROCEDURE — 99232 SBSQ HOSP IP/OBS MODERATE 35: CPT | Mod: GC

## 2021-01-01 PROCEDURE — 99233 SBSQ HOSP IP/OBS HIGH 50: CPT | Mod: GC

## 2021-01-01 PROCEDURE — 78815 PET IMAGE W/CT SKULL-THIGH: CPT

## 2021-01-01 PROCEDURE — 77435 SBRT MANAGEMENT: CPT

## 2021-01-01 PROCEDURE — 88331 PATH CONSLTJ SURG 1 BLK 1SPC: CPT | Mod: 26

## 2021-01-01 PROCEDURE — 88305 TISSUE EXAM BY PATHOLOGIST: CPT | Mod: 26

## 2021-01-01 PROCEDURE — 76770 US EXAM ABDO BACK WALL COMP: CPT | Mod: 26

## 2021-01-01 PROCEDURE — 99223 1ST HOSP IP/OBS HIGH 75: CPT | Mod: GC

## 2021-01-01 PROCEDURE — 70491 CT SOFT TISSUE NECK W/DYE: CPT | Mod: 26,MH

## 2021-01-01 PROCEDURE — 99239 HOSP IP/OBS DSCHRG MGMT >30: CPT

## 2021-01-01 PROCEDURE — 99498 ADVNCD CARE PLAN ADDL 30 MIN: CPT

## 2021-01-01 PROCEDURE — 77334 RADIATION TREATMENT AID(S): CPT | Mod: 26

## 2021-01-01 PROCEDURE — A9552: CPT

## 2021-01-01 PROCEDURE — 99223 1ST HOSP IP/OBS HIGH 75: CPT

## 2021-01-01 PROCEDURE — 99214 OFFICE O/P EST MOD 30 MIN: CPT | Mod: GC,25

## 2021-01-01 PROCEDURE — 31575 DIAGNOSTIC LARYNGOSCOPY: CPT

## 2021-01-01 PROCEDURE — 78815 PET IMAGE W/CT SKULL-THIGH: CPT | Mod: 26,PI,MH

## 2021-01-01 PROCEDURE — 99232 SBSQ HOSP IP/OBS MODERATE 35: CPT

## 2021-01-01 PROCEDURE — 82565 ASSAY OF CREATININE: CPT

## 2021-01-01 PROCEDURE — 71045 X-RAY EXAM CHEST 1 VIEW: CPT | Mod: 26

## 2021-01-01 PROCEDURE — 31535 LARYNGOSCOPY W/BIOPSY: CPT | Mod: GC

## 2021-01-01 PROCEDURE — 73060 X-RAY EXAM OF HUMERUS: CPT | Mod: 26,RT

## 2021-01-01 PROCEDURE — 99205 OFFICE O/P NEW HI 60 MIN: CPT

## 2021-01-01 PROCEDURE — 99285 EMERGENCY DEPT VISIT HI MDM: CPT | Mod: GC

## 2021-01-01 PROCEDURE — 77300 RADIATION THERAPY DOSE PLAN: CPT | Mod: 26

## 2021-01-01 PROCEDURE — 99223 1ST HOSP IP/OBS HIGH 75: CPT | Mod: 25

## 2021-01-01 PROCEDURE — 43191 ESOPHAGOSCOPY RIGID TRNSO DX: CPT | Mod: GC

## 2021-01-01 PROCEDURE — 99204 OFFICE O/P NEW MOD 45 MIN: CPT | Mod: 25

## 2021-01-01 PROCEDURE — 88367 INSITU HYBRIDIZATION AUTO: CPT | Mod: 26

## 2021-01-01 PROCEDURE — 99214 OFFICE O/P EST MOD 30 MIN: CPT

## 2021-01-01 PROCEDURE — 70491 CT SOFT TISSUE NECK W/DYE: CPT

## 2021-01-01 PROCEDURE — 93010 ELECTROCARDIOGRAM REPORT: CPT

## 2021-01-01 PROCEDURE — 99285 EMERGENCY DEPT VISIT HI MDM: CPT

## 2021-01-01 PROCEDURE — 77295 3-D RADIOTHERAPY PLAN: CPT | Mod: 26

## 2021-01-01 PROCEDURE — 77263 THER RADIOLOGY TX PLNG CPLX: CPT

## 2021-01-01 PROCEDURE — 99215 OFFICE O/P EST HI 40 MIN: CPT | Mod: 25

## 2021-01-01 PROCEDURE — 74018 RADEX ABDOMEN 1 VIEW: CPT | Mod: 26

## 2021-01-01 PROCEDURE — 88365 INSITU HYBRIDIZATION (FISH): CPT | Mod: 26,59

## 2021-01-01 PROCEDURE — 93000 ELECTROCARDIOGRAM COMPLETE: CPT

## 2021-01-01 PROCEDURE — 99222 1ST HOSP IP/OBS MODERATE 55: CPT | Mod: GC

## 2021-01-01 PROCEDURE — 88342 IMHCHEM/IMCYTCHM 1ST ANTB: CPT | Mod: 26,59

## 2021-01-01 PROCEDURE — 77290 THER RAD SIMULAJ FIELD CPLX: CPT | Mod: 26

## 2021-01-01 PROCEDURE — 99497 ADVNCD CARE PLAN 30 MIN: CPT

## 2021-01-01 PROCEDURE — 99215 OFFICE O/P EST HI 40 MIN: CPT

## 2021-01-01 RX ORDER — OXYCODONE HYDROCHLORIDE 5 MG/1
2.5 TABLET ORAL ONCE
Refills: 0 | Status: DISCONTINUED | OUTPATIENT
Start: 2021-01-01 | End: 2021-01-01

## 2021-01-01 RX ORDER — LIDOCAINE 4 G/100G
10 CREAM TOPICAL THREE TIMES A DAY
Refills: 0 | Status: DISCONTINUED | OUTPATIENT
Start: 2021-01-01 | End: 2021-01-01

## 2021-01-01 RX ORDER — CARVEDILOL PHOSPHATE 80 MG/1
25 CAPSULE, EXTENDED RELEASE ORAL EVERY 12 HOURS
Refills: 0 | Status: DISCONTINUED | OUTPATIENT
Start: 2021-01-01 | End: 2021-01-01

## 2021-01-01 RX ORDER — SERTRALINE 25 MG/1
1 TABLET, FILM COATED ORAL
Qty: 0 | Refills: 0 | DISCHARGE

## 2021-01-01 RX ORDER — SODIUM CHLORIDE 9 MG/ML
1000 INJECTION INTRAMUSCULAR; INTRAVENOUS; SUBCUTANEOUS
Refills: 0 | Status: DISCONTINUED | OUTPATIENT
Start: 2021-01-01 | End: 2021-01-01

## 2021-01-01 RX ORDER — DENOSUMAB 60 MG/ML
1 INJECTION SUBCUTANEOUS
Qty: 0 | Refills: 0 | DISCHARGE

## 2021-01-01 RX ORDER — BRIMONIDINE TARTRATE 2 MG/MG
1 SOLUTION/ DROPS OPHTHALMIC
Refills: 0 | Status: DISCONTINUED | OUTPATIENT
Start: 2021-01-01 | End: 2021-01-01

## 2021-01-01 RX ORDER — HEPARIN SODIUM 5000 [USP'U]/ML
5000 INJECTION INTRAVENOUS; SUBCUTANEOUS EVERY 12 HOURS
Refills: 0 | Status: DISCONTINUED | OUTPATIENT
Start: 2021-01-01 | End: 2021-01-01

## 2021-01-01 RX ORDER — HYDROMORPHONE HYDROCHLORIDE 2 MG/ML
0.5 INJECTION INTRAMUSCULAR; INTRAVENOUS; SUBCUTANEOUS
Qty: 21 | Refills: 0
Start: 2021-01-01 | End: 2021-01-01

## 2021-01-01 RX ORDER — DEXTROSE 50 % IN WATER 50 %
25 SYRINGE (ML) INTRAVENOUS ONCE
Refills: 0 | Status: DISCONTINUED | OUTPATIENT
Start: 2021-01-01 | End: 2021-01-01

## 2021-01-01 RX ORDER — SODIUM CHLORIDE 9 MG/ML
1000 INJECTION, SOLUTION INTRAVENOUS
Refills: 0 | Status: DISCONTINUED | OUTPATIENT
Start: 2021-01-01 | End: 2021-01-01

## 2021-01-01 RX ORDER — AMLODIPINE BESYLATE 2.5 MG/1
10 TABLET ORAL ONCE
Refills: 0 | Status: COMPLETED | OUTPATIENT
Start: 2021-01-01 | End: 2021-01-01

## 2021-01-01 RX ORDER — DIPHENHYDRAMINE HYDROCHLORIDE AND LIDOCAINE HYDROCHLORIDE AND ALUMINUM HYDROXIDE AND MAGNESIUM HYDRO
10 KIT
Qty: 900 | Refills: 0
Start: 2021-01-01 | End: 2021-01-01

## 2021-01-01 RX ORDER — FUROSEMIDE 20 MG/1
20 TABLET ORAL
Qty: 30 | Refills: 0 | Status: DISCONTINUED | COMMUNITY
Start: 2019-02-13 | End: 2021-01-01

## 2021-01-01 RX ORDER — SENNA PLUS 8.6 MG/1
15 TABLET ORAL
Qty: 0 | Refills: 0 | DISCHARGE
Start: 2021-01-01

## 2021-01-01 RX ORDER — INSULIN LISPRO 100/ML
VIAL (ML) SUBCUTANEOUS AT BEDTIME
Refills: 0 | Status: DISCONTINUED | OUTPATIENT
Start: 2021-01-01 | End: 2021-01-01

## 2021-01-01 RX ORDER — SENNA PLUS 8.6 MG/1
15 TABLET ORAL
Qty: 450 | Refills: 0
Start: 2021-01-01 | End: 2021-01-01

## 2021-01-01 RX ORDER — MAGNESIUM SULFATE 500 MG/ML
2 VIAL (ML) INJECTION ONCE
Refills: 0 | Status: COMPLETED | OUTPATIENT
Start: 2021-01-01 | End: 2021-01-01

## 2021-01-01 RX ORDER — SENNA PLUS 8.6 MG/1
15 TABLET ORAL AT BEDTIME
Refills: 0 | Status: DISCONTINUED | OUTPATIENT
Start: 2021-01-01 | End: 2021-01-01

## 2021-01-01 RX ORDER — FLUTICASONE PROPIONATE 50 MCG
1 SPRAY, SUSPENSION NASAL
Refills: 0 | Status: DISCONTINUED | OUTPATIENT
Start: 2021-01-01 | End: 2021-01-01

## 2021-01-01 RX ORDER — GLIMEPIRIDE 2 MG/1
2 TABLET ORAL
Qty: 90 | Refills: 0 | Status: DISCONTINUED | COMMUNITY
Start: 2019-02-13 | End: 2021-01-01

## 2021-01-01 RX ORDER — DEXTROSE 50 % IN WATER 50 %
12.5 SYRINGE (ML) INTRAVENOUS ONCE
Refills: 0 | Status: DISCONTINUED | OUTPATIENT
Start: 2021-01-01 | End: 2021-01-01

## 2021-01-01 RX ORDER — PEN NEEDLE, DIABETIC 29 G X1/2"
31G X 5 MM NEEDLE, DISPOSABLE MISCELLANEOUS
Qty: 100 | Refills: 0 | Status: ACTIVE | COMMUNITY
Start: 2021-01-01

## 2021-01-01 RX ORDER — DORZOLAMIDE HYDROCHLORIDE TIMOLOL MALEATE 20; 5 MG/ML; MG/ML
1 SOLUTION/ DROPS OPHTHALMIC
Qty: 0 | Refills: 0 | DISCHARGE

## 2021-01-01 RX ORDER — GLUCAGON INJECTION, SOLUTION 0.5 MG/.1ML
1 INJECTION, SOLUTION SUBCUTANEOUS ONCE
Refills: 0 | Status: DISCONTINUED | OUTPATIENT
Start: 2021-01-01 | End: 2021-01-01

## 2021-01-01 RX ORDER — LATANOPROST 0.05 MG/ML
1 SOLUTION/ DROPS OPHTHALMIC; TOPICAL AT BEDTIME
Refills: 0 | Status: DISCONTINUED | OUTPATIENT
Start: 2021-01-01 | End: 2021-01-01

## 2021-01-01 RX ORDER — AMLODIPINE BESYLATE 2.5 MG/1
2.5 TABLET ORAL ONCE
Refills: 0 | Status: COMPLETED | OUTPATIENT
Start: 2021-01-01 | End: 2021-01-01

## 2021-01-01 RX ORDER — DEXTROSE 50 % IN WATER 50 %
15 SYRINGE (ML) INTRAVENOUS ONCE
Refills: 0 | Status: DISCONTINUED | OUTPATIENT
Start: 2021-01-01 | End: 2021-01-01

## 2021-01-01 RX ORDER — DENOSUMAB 60 MG/ML
60 INJECTION SUBCUTANEOUS
Qty: 1 | Refills: 0 | Status: DISCONTINUED | COMMUNITY
Start: 2021-06-22

## 2021-01-01 RX ORDER — AMLODIPINE BESYLATE 2.5 MG/1
10 TABLET ORAL DAILY
Refills: 0 | Status: DISCONTINUED | OUTPATIENT
Start: 2021-01-01 | End: 2021-01-01

## 2021-01-01 RX ORDER — LIDOCAINE HYDROCHLORIDE 20 MG/ML
2 SOLUTION ORAL; TOPICAL
Refills: 0 | Status: ACTIVE | COMMUNITY

## 2021-01-01 RX ORDER — LIDOCAINE 4 G/100G
10 CREAM TOPICAL
Qty: 420 | Refills: 0
Start: 2021-01-01 | End: 2021-01-01

## 2021-01-01 RX ORDER — DIPHENHYDRAMINE HYDROCHLORIDE AND LIDOCAINE HYDROCHLORIDE AND ALUMINUM HYDROXIDE AND MAGNESIUM HYDRO
10 KIT
Qty: 420 | Refills: 0
Start: 2021-01-01 | End: 2021-01-01

## 2021-01-01 RX ORDER — INSULIN GLARGINE 100 [IU]/ML
12 INJECTION, SOLUTION SUBCUTANEOUS EVERY MORNING
Refills: 0 | Status: DISCONTINUED | OUTPATIENT
Start: 2021-01-01 | End: 2021-01-01

## 2021-01-01 RX ORDER — HYDROMORPHONE HYDROCHLORIDE 2 MG/ML
0.5 INJECTION INTRAMUSCULAR; INTRAVENOUS; SUBCUTANEOUS
Qty: 32 | Refills: 0
Start: 2021-01-01 | End: 2021-01-01

## 2021-01-01 RX ORDER — ALLOPURINOL 300 MG
1 TABLET ORAL
Qty: 0 | Refills: 0 | DISCHARGE

## 2021-01-01 RX ORDER — SODIUM,POTASSIUM PHOSPHATES 278-250MG
1 POWDER IN PACKET (EA) ORAL ONCE
Refills: 0 | Status: COMPLETED | OUTPATIENT
Start: 2021-01-01 | End: 2021-01-01

## 2021-01-01 RX ORDER — AMOXICILLIN AND CLAVULANATE POTASSIUM 875; 125 MG/1; MG/1
875-125 TABLET, COATED ORAL
Qty: 14 | Refills: 0 | Status: DISCONTINUED | COMMUNITY
Start: 2019-05-07 | End: 2021-01-01

## 2021-01-01 RX ORDER — INSULIN LISPRO 100/ML
VIAL (ML) SUBCUTANEOUS
Refills: 0 | Status: DISCONTINUED | OUTPATIENT
Start: 2021-01-01 | End: 2021-01-01

## 2021-01-01 RX ORDER — DORZOLAMIDE HYDROCHLORIDE AND TIMOLOL MALEATE 20; 5 MG/ML; MG/ML
22.3-6.8 SOLUTION/ DROPS OPHTHALMIC
Qty: 60 | Refills: 0 | Status: DISCONTINUED | COMMUNITY
Start: 2019-07-25 | End: 2021-01-01

## 2021-01-01 RX ORDER — OXYCODONE AND ACETAMINOPHEN 5; 325 MG/1; MG/1
1 TABLET ORAL EVERY 6 HOURS
Refills: 0 | Status: DISCONTINUED | OUTPATIENT
Start: 2021-01-01 | End: 2021-01-01

## 2021-01-01 RX ORDER — DENOSUMAB 60 MG/ML
0 INJECTION SUBCUTANEOUS
Qty: 0 | Refills: 0 | DISCHARGE

## 2021-01-01 RX ORDER — INSULIN GLARGINE 100 [IU]/ML
3 INJECTION, SOLUTION SUBCUTANEOUS EVERY MORNING
Refills: 0 | Status: DISCONTINUED | OUTPATIENT
Start: 2021-01-01 | End: 2021-01-01

## 2021-01-01 RX ORDER — SODIUM BICARBONATE 1 MEQ/ML
1300 SYRINGE (ML) INTRAVENOUS ONCE
Refills: 0 | Status: COMPLETED | OUTPATIENT
Start: 2021-01-01 | End: 2021-01-01

## 2021-01-01 RX ORDER — NETARSUDIL 0.2 MG/ML
0.02 SOLUTION/ DROPS OPHTHALMIC; TOPICAL
Qty: 2 | Refills: 0 | Status: DISCONTINUED | COMMUNITY
Start: 2021-06-22

## 2021-01-01 RX ORDER — HYDROMORPHONE HYDROCHLORIDE 2 MG/ML
1 INJECTION INTRAMUSCULAR; INTRAVENOUS; SUBCUTANEOUS EVERY 4 HOURS
Refills: 0 | Status: DISCONTINUED | OUTPATIENT
Start: 2021-01-01 | End: 2021-01-01

## 2021-01-01 RX ORDER — HYDROMORPHONE HYDROCHLORIDE 2 MG/ML
1 INJECTION INTRAMUSCULAR; INTRAVENOUS; SUBCUTANEOUS
Refills: 0 | Status: DISCONTINUED | OUTPATIENT
Start: 2021-01-01 | End: 2021-01-01

## 2021-01-01 RX ORDER — HYDROCHLOROTHIAZIDE 12.5 MG/1
12.5 TABLET ORAL
Qty: 45 | Refills: 0 | Status: DISCONTINUED | COMMUNITY
Start: 2021-01-01

## 2021-01-01 RX ORDER — ECHOTHIOPHATE IODIDE FOR OPHTHALMIC SOLUTION 0.125 %
1 KIT OPHTHALMIC
Qty: 0 | Refills: 0 | DISCHARGE

## 2021-01-01 RX ORDER — SODIUM CHLORIDE 9 MG/ML
0 INJECTION INTRAMUSCULAR; INTRAVENOUS; SUBCUTANEOUS
Qty: 0 | Refills: 0 | DISCHARGE
Start: 2021-01-01

## 2021-01-01 RX ORDER — RISEDRONATE SODIUM 25.8; 4.2 MG/1; MG/1
1 TABLET, FILM COATED ORAL
Qty: 0 | Refills: 0 | DISCHARGE

## 2021-01-01 RX ORDER — AMLODIPINE BESYLATE 2.5 MG/1
1 TABLET ORAL
Qty: 0 | Refills: 0 | DISCHARGE
Start: 2021-01-01

## 2021-01-01 RX ORDER — POLYETHYLENE GLYCOL 3350 17 G/17G
17 POWDER, FOR SOLUTION ORAL DAILY
Refills: 0 | Status: DISCONTINUED | OUTPATIENT
Start: 2021-01-01 | End: 2021-01-01

## 2021-01-01 RX ORDER — HYDROMORPHONE HYDROCHLORIDE 2 MG/ML
1 INJECTION INTRAMUSCULAR; INTRAVENOUS; SUBCUTANEOUS
Qty: 30 | Refills: 0
Start: 2021-01-01 | End: 2021-01-01

## 2021-01-01 RX ORDER — SENNA PLUS 8.6 MG/1
2 TABLET ORAL AT BEDTIME
Refills: 0 | Status: DISCONTINUED | OUTPATIENT
Start: 2021-01-01 | End: 2021-01-01

## 2021-01-01 RX ORDER — SERTRALINE HYDROCHLORIDE 50 MG/1
50 TABLET, FILM COATED ORAL DAILY
Refills: 0 | Status: ACTIVE | COMMUNITY

## 2021-01-01 RX ORDER — MAGNESIUM SULFATE 500 MG/ML
1 VIAL (ML) INJECTION ONCE
Refills: 0 | Status: COMPLETED | OUTPATIENT
Start: 2021-01-01 | End: 2021-01-01

## 2021-01-01 RX ORDER — INSULIN GLARGINE 100 [IU]/ML
5 INJECTION, SOLUTION SUBCUTANEOUS EVERY MORNING
Refills: 0 | Status: DISCONTINUED | OUTPATIENT
Start: 2021-01-01 | End: 2021-01-01

## 2021-01-01 RX ORDER — DIPHENHYDRAMINE HYDROCHLORIDE AND LIDOCAINE HYDROCHLORIDE AND ALUMINUM HYDROXIDE AND MAGNESIUM HYDRO
10 KIT THREE TIMES A DAY
Refills: 0 | Status: DISCONTINUED | OUTPATIENT
Start: 2021-01-01 | End: 2021-01-01

## 2021-01-01 RX ORDER — GLIMEPIRIDE 1 MG
1 TABLET ORAL
Qty: 0 | Refills: 0 | DISCHARGE

## 2021-01-01 RX ORDER — MIRTAZAPINE 45 MG/1
1 TABLET, ORALLY DISINTEGRATING ORAL
Qty: 30 | Refills: 0
Start: 2021-01-01 | End: 2021-01-01

## 2021-01-01 RX ORDER — POLYETHYLENE GLYCOL 3350 17 G/17G
17 POWDER, FOR SOLUTION ORAL
Qty: 238 | Refills: 0
Start: 2021-01-01 | End: 2021-01-01

## 2021-01-01 RX ORDER — CALCIUM ACETATE 667 MG
667 TABLET ORAL ONCE
Refills: 0 | Status: COMPLETED | OUTPATIENT
Start: 2021-01-01 | End: 2021-01-01

## 2021-01-01 RX ORDER — EMPAGLIFLOZIN AND LINAGLIPTIN 10; 5 MG/1; MG/1
1 TABLET, FILM COATED ORAL
Qty: 0 | Refills: 0 | DISCHARGE

## 2021-01-01 RX ORDER — CLOTRIMAZOLE 10 MG/1
10 LOZENGE ORAL
Qty: 70 | Refills: 0 | Status: DISCONTINUED | COMMUNITY
Start: 2021-01-01

## 2021-01-01 RX ORDER — ACETAMINOPHEN 500 MG
650 TABLET ORAL EVERY 6 HOURS
Refills: 0 | Status: DISCONTINUED | OUTPATIENT
Start: 2021-01-01 | End: 2021-01-01

## 2021-01-01 RX ORDER — ONDANSETRON 8 MG/1
4 TABLET, FILM COATED ORAL EVERY 8 HOURS
Refills: 0 | Status: DISCONTINUED | OUTPATIENT
Start: 2021-01-01 | End: 2021-01-01

## 2021-01-01 RX ORDER — LOPERAMIDE HCL 2 MG
0 TABLET ORAL
Qty: 0 | Refills: 0 | DISCHARGE

## 2021-01-01 RX ORDER — DEXTROSE 50 % IN WATER 50 %
15 SYRINGE (ML) INTRAVENOUS ONCE
Refills: 0 | Status: COMPLETED | OUTPATIENT
Start: 2021-01-01 | End: 2021-01-01

## 2021-01-01 RX ORDER — DEXTROSE 50 % IN WATER 50 %
12.5 SYRINGE (ML) INTRAVENOUS ONCE
Refills: 0 | Status: COMPLETED | OUTPATIENT
Start: 2021-01-01 | End: 2021-01-01

## 2021-01-01 RX ORDER — INFLUENZA VIRUS VACCINE 15; 15; 15; 15 UG/.5ML; UG/.5ML; UG/.5ML; UG/.5ML
0.7 SUSPENSION INTRAMUSCULAR ONCE
Refills: 0 | Status: DISCONTINUED | OUTPATIENT
Start: 2021-01-01 | End: 2021-01-01

## 2021-01-01 RX ORDER — NETARSUDIL AND LATANOPROST OPHTHALMIC SOLUTION, 0.02%/0.005% .2; .05 MG/ML; MG/ML
1 SOLUTION/ DROPS OPHTHALMIC; TOPICAL
Qty: 0 | Refills: 0 | DISCHARGE

## 2021-01-01 RX ORDER — SODIUM,POTASSIUM PHOSPHATES 278-250MG
2 POWDER IN PACKET (EA) ORAL ONCE
Refills: 0 | Status: COMPLETED | OUTPATIENT
Start: 2021-01-01 | End: 2021-01-01

## 2021-01-01 RX ORDER — CARVEDILOL PHOSPHATE 80 MG/1
1 CAPSULE, EXTENDED RELEASE ORAL
Qty: 0 | Refills: 0 | DISCHARGE

## 2021-01-01 RX ORDER — PSYLLIUM SEED (WITH DEXTROSE)
28.3 POWDER (GRAM) ORAL
Qty: 1 | Refills: 0 | Status: ACTIVE | COMMUNITY
Start: 2021-01-01 | End: 1900-01-01

## 2021-01-01 RX ORDER — POLYETHYLENE GLYCOL 3350 17 G/17G
17 POWDER, FOR SOLUTION ORAL EVERY 12 HOURS
Refills: 0 | Status: DISCONTINUED | OUTPATIENT
Start: 2021-01-01 | End: 2021-01-01

## 2021-01-01 RX ORDER — OXYCODONE AND ACETAMINOPHEN 5; 325 MG/1; MG/1
5-325 TABLET ORAL
Qty: 30 | Refills: 0 | Status: DISCONTINUED | COMMUNITY
Start: 2021-01-01 | End: 2021-01-01

## 2021-01-01 RX ORDER — TIMOLOL MALEATE 5 MG/ML
0.5 SOLUTION OPHTHALMIC
Qty: 5 | Refills: 0 | Status: DISCONTINUED | COMMUNITY
Start: 2019-02-26 | End: 2021-01-01

## 2021-01-01 RX ORDER — AMLODIPINE BESYLATE 10 MG/1
10 TABLET ORAL
Refills: 0 | Status: ACTIVE | COMMUNITY

## 2021-01-01 RX ORDER — BIMATOPROST 0.3 MG/ML
1 SOLUTION/ DROPS OPHTHALMIC
Qty: 0 | Refills: 0 | DISCHARGE

## 2021-01-01 RX ORDER — AMLODIPINE BESYLATE 2.5 MG/1
5 TABLET ORAL DAILY
Refills: 0 | Status: DISCONTINUED | OUTPATIENT
Start: 2021-01-01 | End: 2021-01-01

## 2021-01-01 RX ORDER — ACETAMINOPHEN 500 MG
1000 TABLET ORAL ONCE
Refills: 0 | Status: DISCONTINUED | OUTPATIENT
Start: 2021-01-01 | End: 2021-01-01

## 2021-01-01 RX ORDER — SODIUM CHLORIDE 9 MG/ML
0 INJECTION, SOLUTION INTRAVENOUS
Qty: 0 | Refills: 0 | DISCHARGE
Start: 2021-01-01

## 2021-01-01 RX ORDER — DENOSUMAB 60 MG/ML
60 INJECTION SUBCUTANEOUS
Refills: 0 | Status: ACTIVE | COMMUNITY

## 2021-01-01 RX ORDER — SODIUM BICARBONATE 1 MEQ/ML
325 SYRINGE (ML) INTRAVENOUS THREE TIMES A DAY
Refills: 0 | Status: COMPLETED | OUTPATIENT
Start: 2021-01-01 | End: 2021-01-01

## 2021-01-01 RX ORDER — AMLODIPINE BESYLATE AND BENAZEPRIL HYDROCHLORIDE 10; 20 MG/1; MG/1
1 CAPSULE ORAL
Qty: 0 | Refills: 0 | DISCHARGE

## 2021-01-01 RX ORDER — ATORVASTATIN CALCIUM 80 MG/1
10 TABLET, FILM COATED ORAL AT BEDTIME
Refills: 0 | Status: DISCONTINUED | OUTPATIENT
Start: 2021-01-01 | End: 2021-01-01

## 2021-01-01 RX ORDER — IBUPROFEN 600 MG/1
600 TABLET, FILM COATED ORAL
Qty: 28 | Refills: 0 | Status: DISCONTINUED | COMMUNITY
Start: 2021-01-01

## 2021-01-01 RX ORDER — HYDROMORPHONE HYDROCHLORIDE 2 MG/ML
0.5 INJECTION INTRAMUSCULAR; INTRAVENOUS; SUBCUTANEOUS
Qty: 42 | Refills: 0
Start: 2021-01-01 | End: 2021-01-01

## 2021-01-01 RX ORDER — TRIAMTERENE/HYDROCHLOROTHIAZID 75 MG-50MG
1 TABLET ORAL
Qty: 0 | Refills: 0 | DISCHARGE

## 2021-01-01 RX ORDER — MIRTAZAPINE 45 MG/1
45 TABLET, ORALLY DISINTEGRATING ORAL AT BEDTIME
Refills: 0 | Status: DISCONTINUED | OUTPATIENT
Start: 2021-01-01 | End: 2021-01-01

## 2021-01-01 RX ORDER — SODIUM CHLORIDE 9 MG/ML
3 INJECTION INTRAMUSCULAR; INTRAVENOUS; SUBCUTANEOUS EVERY 8 HOURS
Refills: 0 | Status: DISCONTINUED | OUTPATIENT
Start: 2021-01-01 | End: 2021-01-01

## 2021-01-01 RX ORDER — HYDROCHLOROTHIAZIDE 12.5 MG/1
12.5 CAPSULE ORAL
Refills: 0 | Status: ACTIVE | COMMUNITY

## 2021-01-01 RX ORDER — LIDOCAINE HYDROCHLORIDE 20 MG/ML
2 SOLUTION ORAL; TOPICAL 3 TIMES DAILY
Qty: 500 | Refills: 1 | Status: DISCONTINUED | COMMUNITY
Start: 2020-03-11 | End: 2021-01-01

## 2021-01-01 RX ORDER — AMOXICILLIN 875 MG/1
875 TABLET, FILM COATED ORAL
Qty: 14 | Refills: 0 | Status: DISCONTINUED | COMMUNITY
Start: 2021-01-01

## 2021-01-01 RX ORDER — LANOLIN ALCOHOL/MO/W.PET/CERES
3 CREAM (GRAM) TOPICAL AT BEDTIME
Refills: 0 | Status: DISCONTINUED | OUTPATIENT
Start: 2021-01-01 | End: 2021-01-01

## 2021-01-01 RX ORDER — DAPAGLIFLOZIN 10 MG/1
1 TABLET, FILM COATED ORAL
Qty: 0 | Refills: 0 | DISCHARGE

## 2021-01-01 RX ORDER — IBUPROFEN 200 MG
1 TABLET ORAL
Qty: 28 | Refills: 0
Start: 2021-01-01 | End: 2021-01-01

## 2021-01-01 RX ORDER — DORZOLAMIDE HYDROCHLORIDE 20 MG/ML
2 SOLUTION OPHTHALMIC
Qty: 10 | Refills: 0 | Status: DISCONTINUED | COMMUNITY
Start: 2019-04-04 | End: 2021-01-01

## 2021-01-01 RX ORDER — CLONIDINE HYDROCHLORIDE 0.1 MG/1
0.1 TABLET ORAL
Qty: 60 | Refills: 0 | Status: DISCONTINUED | COMMUNITY
Start: 2019-02-14 | End: 2021-01-01

## 2021-01-01 RX ORDER — FENTANYL CITRATE 50 UG/ML
25 INJECTION INTRAVENOUS
Refills: 0 | Status: DISCONTINUED | OUTPATIENT
Start: 2021-01-01 | End: 2021-01-01

## 2021-01-01 RX ORDER — ALBUTEROL 90 UG/1
2 AEROSOL, METERED ORAL
Qty: 0 | Refills: 0 | DISCHARGE

## 2021-01-01 RX ORDER — SERTRALINE 25 MG/1
50 TABLET, FILM COATED ORAL DAILY
Refills: 0 | Status: DISCONTINUED | OUTPATIENT
Start: 2021-01-01 | End: 2021-01-01

## 2021-01-01 RX ORDER — BRIMONIDINE TARTRATE 2 MG/MG
1 SOLUTION/ DROPS OPHTHALMIC
Qty: 0 | Refills: 0 | DISCHARGE

## 2021-01-01 RX ORDER — EMPAGLIFLOZIN AND LINAGLIPTIN 10; 5 MG/1; MG/1
10-5 TABLET, FILM COATED ORAL
Refills: 0 | Status: ACTIVE | COMMUNITY

## 2021-01-01 RX ORDER — BETAMETHASONE DIPROPIONATE 0.5 MG/G
0.05 OINTMENT TOPICAL
Qty: 45 | Refills: 0 | Status: DISCONTINUED | COMMUNITY
Start: 2021-07-02

## 2021-01-01 RX ORDER — MULTIVIT-MIN/FERROUS GLUCONATE 9 MG/15 ML
1 LIQUID (ML) ORAL
Qty: 0 | Refills: 0 | DISCHARGE

## 2021-01-01 RX ORDER — SERTRALINE 25 MG/1
1 TABLET, FILM COATED ORAL
Qty: 30 | Refills: 1
Start: 2021-01-01 | End: 2022-01-01

## 2021-01-01 RX ORDER — TRIAMTERENE AND HYDROCHLOROTHIAZIDE 25; 37.5 MG/1; MG/1
37.5-25 TABLET ORAL
Qty: 30 | Refills: 0 | Status: DISCONTINUED | COMMUNITY
Start: 2019-08-03 | End: 2021-01-01

## 2021-01-01 RX ORDER — HYDROMORPHONE HYDROCHLORIDE 2 MG/ML
1 INJECTION INTRAMUSCULAR; INTRAVENOUS; SUBCUTANEOUS THREE TIMES A DAY
Refills: 0 | Status: DISCONTINUED | OUTPATIENT
Start: 2021-01-01 | End: 2021-01-01

## 2021-01-01 RX ORDER — LATANOPROST/PF 0.005 %
0.01 DROPS OPHTHALMIC (EYE)
Qty: 2 | Refills: 0 | Status: DISCONTINUED | COMMUNITY
Start: 2021-07-06

## 2021-01-01 RX ORDER — NETARSUDIL AND LATANOPROST OPHTHALMIC SOLUTION, 0.02%/0.005% .2; .05 MG/ML; MG/ML
0.02-0.005 SOLUTION/ DROPS OPHTHALMIC; TOPICAL
Qty: 8 | Refills: 0 | Status: ACTIVE | COMMUNITY
Start: 2021-01-01

## 2021-01-01 RX ORDER — INSULIN GLARGINE 100 [IU]/ML
7 INJECTION, SOLUTION SUBCUTANEOUS EVERY MORNING
Refills: 0 | Status: DISCONTINUED | OUTPATIENT
Start: 2021-01-01 | End: 2021-01-01

## 2021-01-01 RX ORDER — BRIMONIDINE TARTRATE 1 MG/ML
0.1 SOLUTION/ DROPS OPHTHALMIC
Qty: 15 | Refills: 0 | Status: COMPLETED | COMMUNITY
Start: 2020-10-19

## 2021-01-01 RX ORDER — BRIMONIDINE TARTRATE 1.5 MG/ML
0.15 SOLUTION/ DROPS OPHTHALMIC
Qty: 5 | Refills: 0 | Status: DISCONTINUED | COMMUNITY
Start: 2020-03-06 | End: 2021-01-01

## 2021-01-01 RX ORDER — MIRTAZAPINE 45 MG/1
45 TABLET, FILM COATED ORAL
Qty: 30 | Refills: 0 | Status: ACTIVE | COMMUNITY
Start: 2019-07-31

## 2021-01-01 RX ORDER — POTASSIUM PHOSPHATE, MONOBASIC POTASSIUM PHOSPHATE, DIBASIC 236; 224 MG/ML; MG/ML
30 INJECTION, SOLUTION INTRAVENOUS ONCE
Refills: 0 | Status: COMPLETED | OUTPATIENT
Start: 2021-01-01 | End: 2021-01-01

## 2021-01-01 RX ORDER — INSULIN GLARGINE AND LIXISENATIDE 100; 33 U/ML; UG/ML
100-33 INJECTION, SOLUTION SUBCUTANEOUS
Refills: 0 | Status: ACTIVE | COMMUNITY

## 2021-01-01 RX ORDER — GENTAMICIN SULFATE 1 MG/G
0.1 OINTMENT TOPICAL
Qty: 30 | Refills: 0 | Status: DISCONTINUED | COMMUNITY
Start: 2021-01-01

## 2021-01-01 RX ORDER — AMLODIPINE BESYLATE 2.5 MG/1
7.5 TABLET ORAL DAILY
Refills: 0 | Status: DISCONTINUED | OUTPATIENT
Start: 2021-01-01 | End: 2021-01-01

## 2021-01-01 RX ORDER — DOXAZOSIN 2 MG/1
2 TABLET ORAL DAILY
Refills: 0 | Status: ACTIVE | COMMUNITY
Start: 2019-09-12

## 2021-01-01 RX ORDER — HYDROMORPHONE HYDROCHLORIDE 2 MG/ML
1 INJECTION INTRAMUSCULAR; INTRAVENOUS; SUBCUTANEOUS ONCE
Refills: 0 | Status: DISCONTINUED | OUTPATIENT
Start: 2021-01-01 | End: 2021-01-01

## 2021-01-01 RX ORDER — HYDROMORPHONE HYDROCHLORIDE 2 MG/1
2 TABLET ORAL
Qty: 32 | Refills: 0 | Status: ACTIVE | COMMUNITY
Start: 2021-01-01

## 2021-01-01 RX ORDER — BRIMONIDINE TARTRATE 1 MG/ML
0.1 SOLUTION/ DROPS OPHTHALMIC
Refills: 0 | Status: ACTIVE | COMMUNITY

## 2021-01-01 RX ORDER — DOXAZOSIN MESYLATE 4 MG
1 TABLET ORAL
Qty: 0 | Refills: 0 | DISCHARGE

## 2021-01-01 RX ORDER — TRAVOPROST 0.04 MG/ML
1 SOLUTION/ DROPS OPHTHALMIC
Qty: 0 | Refills: 0 | DISCHARGE

## 2021-01-01 RX ORDER — INSULIN GLARGINE 100 [IU]/ML
35 INJECTION, SOLUTION SUBCUTANEOUS
Qty: 0 | Refills: 0 | DISCHARGE

## 2021-01-01 RX ORDER — ACETAMINOPHEN 500 MG
650 TABLET ORAL ONCE
Refills: 0 | Status: COMPLETED | OUTPATIENT
Start: 2021-01-01 | End: 2021-01-01

## 2021-01-01 RX ORDER — INSULIN GLARGINE AND LIXISENATIDE 100; 33 U/ML; UG/ML
15 INJECTION, SOLUTION SUBCUTANEOUS
Qty: 0 | Refills: 0 | DISCHARGE

## 2021-01-01 RX ORDER — PREDNISONE 10 MG/1
10 TABLET ORAL
Qty: 5 | Refills: 0 | Status: DISCONTINUED | COMMUNITY
Start: 2021-01-01

## 2021-01-01 RX ADMIN — LIDOCAINE 10 MILLILITER(S): 4 CREAM TOPICAL at 13:20

## 2021-01-01 RX ADMIN — BRIMONIDINE TARTRATE 1 DROP(S): 2 SOLUTION/ DROPS OPHTHALMIC at 05:27

## 2021-01-01 RX ADMIN — HYDROMORPHONE HYDROCHLORIDE 1 MILLIGRAM(S): 2 INJECTION INTRAMUSCULAR; INTRAVENOUS; SUBCUTANEOUS at 16:58

## 2021-01-01 RX ADMIN — HEPARIN SODIUM 5000 UNIT(S): 5000 INJECTION INTRAVENOUS; SUBCUTANEOUS at 06:31

## 2021-01-01 RX ADMIN — Medication 650 MILLIGRAM(S): at 03:42

## 2021-01-01 RX ADMIN — Medication 1 SPRAY(S): at 06:19

## 2021-01-01 RX ADMIN — LIDOCAINE 10 MILLILITER(S): 4 CREAM TOPICAL at 22:02

## 2021-01-01 RX ADMIN — Medication 1 SPRAY(S): at 18:01

## 2021-01-01 RX ADMIN — BRIMONIDINE TARTRATE 1 DROP(S): 2 SOLUTION/ DROPS OPHTHALMIC at 06:48

## 2021-01-01 RX ADMIN — AMLODIPINE BESYLATE 7.5 MILLIGRAM(S): 2.5 TABLET ORAL at 05:06

## 2021-01-01 RX ADMIN — BRIMONIDINE TARTRATE 1 DROP(S): 2 SOLUTION/ DROPS OPHTHALMIC at 19:00

## 2021-01-01 RX ADMIN — POLYETHYLENE GLYCOL 3350 17 GRAM(S): 17 POWDER, FOR SOLUTION ORAL at 18:39

## 2021-01-01 RX ADMIN — HEPARIN SODIUM 5000 UNIT(S): 5000 INJECTION INTRAVENOUS; SUBCUTANEOUS at 17:45

## 2021-01-01 RX ADMIN — Medication 1 SPRAY(S): at 18:40

## 2021-01-01 RX ADMIN — MIRTAZAPINE 45 MILLIGRAM(S): 45 TABLET, ORALLY DISINTEGRATING ORAL at 22:22

## 2021-01-01 RX ADMIN — Medication 1: at 13:11

## 2021-01-01 RX ADMIN — HEPARIN SODIUM 5000 UNIT(S): 5000 INJECTION INTRAVENOUS; SUBCUTANEOUS at 06:39

## 2021-01-01 RX ADMIN — CARVEDILOL PHOSPHATE 25 MILLIGRAM(S): 80 CAPSULE, EXTENDED RELEASE ORAL at 17:47

## 2021-01-01 RX ADMIN — SENNA PLUS 2 TABLET(S): 8.6 TABLET ORAL at 22:03

## 2021-01-01 RX ADMIN — BRIMONIDINE TARTRATE 1 DROP(S): 2 SOLUTION/ DROPS OPHTHALMIC at 06:15

## 2021-01-01 RX ADMIN — HYDROMORPHONE HYDROCHLORIDE 1 MILLIGRAM(S): 2 INJECTION INTRAMUSCULAR; INTRAVENOUS; SUBCUTANEOUS at 15:23

## 2021-01-01 RX ADMIN — DIPHENHYDRAMINE HYDROCHLORIDE AND LIDOCAINE HYDROCHLORIDE AND ALUMINUM HYDROXIDE AND MAGNESIUM HYDRO 10 MILLILITER(S): KIT at 06:15

## 2021-01-01 RX ADMIN — LIDOCAINE 10 MILLILITER(S): 4 CREAM TOPICAL at 14:57

## 2021-01-01 RX ADMIN — AMLODIPINE BESYLATE 10 MILLIGRAM(S): 2.5 TABLET ORAL at 06:31

## 2021-01-01 RX ADMIN — DIPHENHYDRAMINE HYDROCHLORIDE AND LIDOCAINE HYDROCHLORIDE AND ALUMINUM HYDROXIDE AND MAGNESIUM HYDRO 10 MILLILITER(S): KIT at 14:56

## 2021-01-01 RX ADMIN — Medication 1: at 17:49

## 2021-01-01 RX ADMIN — DIPHENHYDRAMINE HYDROCHLORIDE AND LIDOCAINE HYDROCHLORIDE AND ALUMINUM HYDROXIDE AND MAGNESIUM HYDRO 10 MILLILITER(S): KIT at 22:07

## 2021-01-01 RX ADMIN — BRIMONIDINE TARTRATE 1 DROP(S): 2 SOLUTION/ DROPS OPHTHALMIC at 06:10

## 2021-01-01 RX ADMIN — Medication 100 GRAM(S): at 09:05

## 2021-01-01 RX ADMIN — BRIMONIDINE TARTRATE 1 DROP(S): 2 SOLUTION/ DROPS OPHTHALMIC at 17:47

## 2021-01-01 RX ADMIN — Medication 325 MILLIGRAM(S): at 05:55

## 2021-01-01 RX ADMIN — HEPARIN SODIUM 5000 UNIT(S): 5000 INJECTION INTRAVENOUS; SUBCUTANEOUS at 06:06

## 2021-01-01 RX ADMIN — Medication 1: at 08:47

## 2021-01-01 RX ADMIN — Medication 2: at 12:08

## 2021-01-01 RX ADMIN — SODIUM CHLORIDE 3 MILLILITER(S): 9 INJECTION INTRAMUSCULAR; INTRAVENOUS; SUBCUTANEOUS at 21:49

## 2021-01-01 RX ADMIN — Medication 2: at 18:25

## 2021-01-01 RX ADMIN — AMLODIPINE BESYLATE 2.5 MILLIGRAM(S): 2.5 TABLET ORAL at 13:26

## 2021-01-01 RX ADMIN — HEPARIN SODIUM 5000 UNIT(S): 5000 INJECTION INTRAVENOUS; SUBCUTANEOUS at 17:51

## 2021-01-01 RX ADMIN — SODIUM CHLORIDE 3 MILLILITER(S): 9 INJECTION INTRAMUSCULAR; INTRAVENOUS; SUBCUTANEOUS at 13:38

## 2021-01-01 RX ADMIN — AMLODIPINE BESYLATE 10 MILLIGRAM(S): 2.5 TABLET ORAL at 11:00

## 2021-01-01 RX ADMIN — AMLODIPINE BESYLATE 7.5 MILLIGRAM(S): 2.5 TABLET ORAL at 05:50

## 2021-01-01 RX ADMIN — BRIMONIDINE TARTRATE 1 DROP(S): 2 SOLUTION/ DROPS OPHTHALMIC at 18:17

## 2021-01-01 RX ADMIN — HEPARIN SODIUM 5000 UNIT(S): 5000 INJECTION INTRAVENOUS; SUBCUTANEOUS at 18:17

## 2021-01-01 RX ADMIN — SODIUM CHLORIDE 3 MILLILITER(S): 9 INJECTION INTRAMUSCULAR; INTRAVENOUS; SUBCUTANEOUS at 06:00

## 2021-01-01 RX ADMIN — HYDROMORPHONE HYDROCHLORIDE 1 MILLIGRAM(S): 2 INJECTION INTRAMUSCULAR; INTRAVENOUS; SUBCUTANEOUS at 12:15

## 2021-01-01 RX ADMIN — SODIUM CHLORIDE 3 MILLILITER(S): 9 INJECTION INTRAMUSCULAR; INTRAVENOUS; SUBCUTANEOUS at 22:49

## 2021-01-01 RX ADMIN — LIDOCAINE 10 MILLILITER(S): 4 CREAM TOPICAL at 13:37

## 2021-01-01 RX ADMIN — Medication 100 GRAM(S): at 11:55

## 2021-01-01 RX ADMIN — SODIUM CHLORIDE 75 MILLILITER(S): 9 INJECTION, SOLUTION INTRAVENOUS at 04:38

## 2021-01-01 RX ADMIN — HYDROMORPHONE HYDROCHLORIDE 1 MILLIGRAM(S): 2 INJECTION INTRAMUSCULAR; INTRAVENOUS; SUBCUTANEOUS at 06:31

## 2021-01-01 RX ADMIN — Medication 1: at 17:57

## 2021-01-01 RX ADMIN — HYDROMORPHONE HYDROCHLORIDE 1 MILLIGRAM(S): 2 INJECTION INTRAMUSCULAR; INTRAVENOUS; SUBCUTANEOUS at 22:02

## 2021-01-01 RX ADMIN — SODIUM CHLORIDE 75 MILLILITER(S): 9 INJECTION INTRAMUSCULAR; INTRAVENOUS; SUBCUTANEOUS at 05:13

## 2021-01-01 RX ADMIN — DIPHENHYDRAMINE HYDROCHLORIDE AND LIDOCAINE HYDROCHLORIDE AND ALUMINUM HYDROXIDE AND MAGNESIUM HYDRO 10 MILLILITER(S): KIT at 21:39

## 2021-01-01 RX ADMIN — LIDOCAINE 10 MILLILITER(S): 4 CREAM TOPICAL at 06:47

## 2021-01-01 RX ADMIN — HEPARIN SODIUM 5000 UNIT(S): 5000 INJECTION INTRAVENOUS; SUBCUTANEOUS at 05:35

## 2021-01-01 RX ADMIN — MIRTAZAPINE 45 MILLIGRAM(S): 45 TABLET, ORALLY DISINTEGRATING ORAL at 21:15

## 2021-01-01 RX ADMIN — BRIMONIDINE TARTRATE 1 DROP(S): 2 SOLUTION/ DROPS OPHTHALMIC at 06:25

## 2021-01-01 RX ADMIN — LIDOCAINE 10 MILLILITER(S): 4 CREAM TOPICAL at 23:17

## 2021-01-01 RX ADMIN — LIDOCAINE 10 MILLILITER(S): 4 CREAM TOPICAL at 13:55

## 2021-01-01 RX ADMIN — HYDROMORPHONE HYDROCHLORIDE 1 MILLIGRAM(S): 2 INJECTION INTRAMUSCULAR; INTRAVENOUS; SUBCUTANEOUS at 11:00

## 2021-01-01 RX ADMIN — LIDOCAINE 10 MILLILITER(S): 4 CREAM TOPICAL at 06:37

## 2021-01-01 RX ADMIN — SODIUM CHLORIDE 75 MILLILITER(S): 9 INJECTION INTRAMUSCULAR; INTRAVENOUS; SUBCUTANEOUS at 03:05

## 2021-01-01 RX ADMIN — ATORVASTATIN CALCIUM 10 MILLIGRAM(S): 80 TABLET, FILM COATED ORAL at 23:16

## 2021-01-01 RX ADMIN — HYDROMORPHONE HYDROCHLORIDE 1 MILLIGRAM(S): 2 INJECTION INTRAMUSCULAR; INTRAVENOUS; SUBCUTANEOUS at 15:50

## 2021-01-01 RX ADMIN — LIDOCAINE 10 MILLILITER(S): 4 CREAM TOPICAL at 13:17

## 2021-01-01 RX ADMIN — ATORVASTATIN CALCIUM 10 MILLIGRAM(S): 80 TABLET, FILM COATED ORAL at 22:03

## 2021-01-01 RX ADMIN — HEPARIN SODIUM 5000 UNIT(S): 5000 INJECTION INTRAVENOUS; SUBCUTANEOUS at 18:33

## 2021-01-01 RX ADMIN — ATORVASTATIN CALCIUM 10 MILLIGRAM(S): 80 TABLET, FILM COATED ORAL at 22:35

## 2021-01-01 RX ADMIN — DIPHENHYDRAMINE HYDROCHLORIDE AND LIDOCAINE HYDROCHLORIDE AND ALUMINUM HYDROXIDE AND MAGNESIUM HYDRO 10 MILLILITER(S): KIT at 13:20

## 2021-01-01 RX ADMIN — HYDROMORPHONE HYDROCHLORIDE 1 MILLIGRAM(S): 2 INJECTION INTRAMUSCULAR; INTRAVENOUS; SUBCUTANEOUS at 13:29

## 2021-01-01 RX ADMIN — CARVEDILOL PHOSPHATE 25 MILLIGRAM(S): 80 CAPSULE, EXTENDED RELEASE ORAL at 06:38

## 2021-01-01 RX ADMIN — SENNA PLUS 2 TABLET(S): 8.6 TABLET ORAL at 21:15

## 2021-01-01 RX ADMIN — SODIUM CHLORIDE 3 MILLILITER(S): 9 INJECTION INTRAMUSCULAR; INTRAVENOUS; SUBCUTANEOUS at 05:40

## 2021-01-01 RX ADMIN — AMLODIPINE BESYLATE 2.5 MILLIGRAM(S): 2.5 TABLET ORAL at 11:55

## 2021-01-01 RX ADMIN — CARVEDILOL PHOSPHATE 25 MILLIGRAM(S): 80 CAPSULE, EXTENDED RELEASE ORAL at 18:08

## 2021-01-01 RX ADMIN — DIPHENHYDRAMINE HYDROCHLORIDE AND LIDOCAINE HYDROCHLORIDE AND ALUMINUM HYDROXIDE AND MAGNESIUM HYDRO 10 MILLILITER(S): KIT at 13:22

## 2021-01-01 RX ADMIN — SODIUM CHLORIDE 75 MILLILITER(S): 9 INJECTION, SOLUTION INTRAVENOUS at 14:53

## 2021-01-01 RX ADMIN — INSULIN GLARGINE 7 UNIT(S): 100 INJECTION, SOLUTION SUBCUTANEOUS at 10:25

## 2021-01-01 RX ADMIN — Medication 650 MILLIGRAM(S): at 21:36

## 2021-01-01 RX ADMIN — HEPARIN SODIUM 5000 UNIT(S): 5000 INJECTION INTRAVENOUS; SUBCUTANEOUS at 06:28

## 2021-01-01 RX ADMIN — DIPHENHYDRAMINE HYDROCHLORIDE AND LIDOCAINE HYDROCHLORIDE AND ALUMINUM HYDROXIDE AND MAGNESIUM HYDRO 10 MILLILITER(S): KIT at 15:23

## 2021-01-01 RX ADMIN — LIDOCAINE 10 MILLILITER(S): 4 CREAM TOPICAL at 05:27

## 2021-01-01 RX ADMIN — Medication 50 GRAM(S): at 08:21

## 2021-01-01 RX ADMIN — SERTRALINE 50 MILLIGRAM(S): 25 TABLET, FILM COATED ORAL at 13:14

## 2021-01-01 RX ADMIN — Medication 1 SPRAY(S): at 05:20

## 2021-01-01 RX ADMIN — Medication 15 GRAM(S): at 17:30

## 2021-01-01 RX ADMIN — DIPHENHYDRAMINE HYDROCHLORIDE AND LIDOCAINE HYDROCHLORIDE AND ALUMINUM HYDROXIDE AND MAGNESIUM HYDRO 10 MILLILITER(S): KIT at 14:25

## 2021-01-01 RX ADMIN — BRIMONIDINE TARTRATE 1 DROP(S): 2 SOLUTION/ DROPS OPHTHALMIC at 18:31

## 2021-01-01 RX ADMIN — HYDROMORPHONE HYDROCHLORIDE 1 MILLIGRAM(S): 2 INJECTION INTRAMUSCULAR; INTRAVENOUS; SUBCUTANEOUS at 06:44

## 2021-01-01 RX ADMIN — SODIUM CHLORIDE 3 MILLILITER(S): 9 INJECTION INTRAMUSCULAR; INTRAVENOUS; SUBCUTANEOUS at 05:51

## 2021-01-01 RX ADMIN — Medication 650 MILLIGRAM(S): at 10:41

## 2021-01-01 RX ADMIN — Medication 325 MILLIGRAM(S): at 22:57

## 2021-01-01 RX ADMIN — AMLODIPINE BESYLATE 5 MILLIGRAM(S): 2.5 TABLET ORAL at 05:41

## 2021-01-01 RX ADMIN — HYDROMORPHONE HYDROCHLORIDE 1 MILLIGRAM(S): 2 INJECTION INTRAMUSCULAR; INTRAVENOUS; SUBCUTANEOUS at 08:54

## 2021-01-01 RX ADMIN — DIPHENHYDRAMINE HYDROCHLORIDE AND LIDOCAINE HYDROCHLORIDE AND ALUMINUM HYDROXIDE AND MAGNESIUM HYDRO 10 MILLILITER(S): KIT at 22:00

## 2021-01-01 RX ADMIN — LIDOCAINE 10 MILLILITER(S): 4 CREAM TOPICAL at 21:16

## 2021-01-01 RX ADMIN — AMLODIPINE BESYLATE 7.5 MILLIGRAM(S): 2.5 TABLET ORAL at 05:14

## 2021-01-01 RX ADMIN — AMLODIPINE BESYLATE 5 MILLIGRAM(S): 2.5 TABLET ORAL at 05:59

## 2021-01-01 RX ADMIN — DIPHENHYDRAMINE HYDROCHLORIDE AND LIDOCAINE HYDROCHLORIDE AND ALUMINUM HYDROXIDE AND MAGNESIUM HYDRO 10 MILLILITER(S): KIT at 06:33

## 2021-01-01 RX ADMIN — Medication 15 GRAM(S): at 08:51

## 2021-01-01 RX ADMIN — SERTRALINE 50 MILLIGRAM(S): 25 TABLET, FILM COATED ORAL at 11:17

## 2021-01-01 RX ADMIN — Medication 1 SPRAY(S): at 07:09

## 2021-01-01 RX ADMIN — DIPHENHYDRAMINE HYDROCHLORIDE AND LIDOCAINE HYDROCHLORIDE AND ALUMINUM HYDROXIDE AND MAGNESIUM HYDRO 10 MILLILITER(S): KIT at 21:51

## 2021-01-01 RX ADMIN — Medication 3 MILLIGRAM(S): at 22:52

## 2021-01-01 RX ADMIN — MIRTAZAPINE 45 MILLIGRAM(S): 45 TABLET, ORALLY DISINTEGRATING ORAL at 22:06

## 2021-01-01 RX ADMIN — LATANOPROST 1 DROP(S): 0.05 SOLUTION/ DROPS OPHTHALMIC; TOPICAL at 22:03

## 2021-01-01 RX ADMIN — LIDOCAINE 10 MILLILITER(S): 4 CREAM TOPICAL at 15:23

## 2021-01-01 RX ADMIN — DIPHENHYDRAMINE HYDROCHLORIDE AND LIDOCAINE HYDROCHLORIDE AND ALUMINUM HYDROXIDE AND MAGNESIUM HYDRO 10 MILLILITER(S): KIT at 22:15

## 2021-01-01 RX ADMIN — HYDROMORPHONE HYDROCHLORIDE 1 MILLIGRAM(S): 2 INJECTION INTRAMUSCULAR; INTRAVENOUS; SUBCUTANEOUS at 18:03

## 2021-01-01 RX ADMIN — CARVEDILOL PHOSPHATE 25 MILLIGRAM(S): 80 CAPSULE, EXTENDED RELEASE ORAL at 06:06

## 2021-01-01 RX ADMIN — DIPHENHYDRAMINE HYDROCHLORIDE AND LIDOCAINE HYDROCHLORIDE AND ALUMINUM HYDROXIDE AND MAGNESIUM HYDRO 10 MILLILITER(S): KIT at 16:52

## 2021-01-01 RX ADMIN — BRIMONIDINE TARTRATE 1 DROP(S): 2 SOLUTION/ DROPS OPHTHALMIC at 05:46

## 2021-01-01 RX ADMIN — ATORVASTATIN CALCIUM 10 MILLIGRAM(S): 80 TABLET, FILM COATED ORAL at 23:23

## 2021-01-01 RX ADMIN — CARVEDILOL PHOSPHATE 25 MILLIGRAM(S): 80 CAPSULE, EXTENDED RELEASE ORAL at 17:49

## 2021-01-01 RX ADMIN — SODIUM CHLORIDE 3 MILLILITER(S): 9 INJECTION INTRAMUSCULAR; INTRAVENOUS; SUBCUTANEOUS at 22:14

## 2021-01-01 RX ADMIN — DIPHENHYDRAMINE HYDROCHLORIDE AND LIDOCAINE HYDROCHLORIDE AND ALUMINUM HYDROXIDE AND MAGNESIUM HYDRO 10 MILLILITER(S): KIT at 22:03

## 2021-01-01 RX ADMIN — LIDOCAINE 10 MILLILITER(S): 4 CREAM TOPICAL at 05:16

## 2021-01-01 RX ADMIN — HYDROMORPHONE HYDROCHLORIDE 1 MILLIGRAM(S): 2 INJECTION INTRAMUSCULAR; INTRAVENOUS; SUBCUTANEOUS at 04:27

## 2021-01-01 RX ADMIN — CARVEDILOL PHOSPHATE 25 MILLIGRAM(S): 80 CAPSULE, EXTENDED RELEASE ORAL at 18:33

## 2021-01-01 RX ADMIN — HEPARIN SODIUM 5000 UNIT(S): 5000 INJECTION INTRAVENOUS; SUBCUTANEOUS at 18:01

## 2021-01-01 RX ADMIN — DIPHENHYDRAMINE HYDROCHLORIDE AND LIDOCAINE HYDROCHLORIDE AND ALUMINUM HYDROXIDE AND MAGNESIUM HYDRO 10 MILLILITER(S): KIT at 06:04

## 2021-01-01 RX ADMIN — LIDOCAINE 10 MILLILITER(S): 4 CREAM TOPICAL at 16:52

## 2021-01-01 RX ADMIN — LIDOCAINE 10 MILLILITER(S): 4 CREAM TOPICAL at 06:00

## 2021-01-01 RX ADMIN — DIPHENHYDRAMINE HYDROCHLORIDE AND LIDOCAINE HYDROCHLORIDE AND ALUMINUM HYDROXIDE AND MAGNESIUM HYDRO 10 MILLILITER(S): KIT at 22:22

## 2021-01-01 RX ADMIN — HYDROMORPHONE HYDROCHLORIDE 1 MILLIGRAM(S): 2 INJECTION INTRAMUSCULAR; INTRAVENOUS; SUBCUTANEOUS at 08:46

## 2021-01-01 RX ADMIN — DIPHENHYDRAMINE HYDROCHLORIDE AND LIDOCAINE HYDROCHLORIDE AND ALUMINUM HYDROXIDE AND MAGNESIUM HYDRO 10 MILLILITER(S): KIT at 05:14

## 2021-01-01 RX ADMIN — HYDROMORPHONE HYDROCHLORIDE 1 MILLIGRAM(S): 2 INJECTION INTRAMUSCULAR; INTRAVENOUS; SUBCUTANEOUS at 15:55

## 2021-01-01 RX ADMIN — LIDOCAINE 10 MILLILITER(S): 4 CREAM TOPICAL at 13:32

## 2021-01-01 RX ADMIN — ATORVASTATIN CALCIUM 10 MILLIGRAM(S): 80 TABLET, FILM COATED ORAL at 21:10

## 2021-01-01 RX ADMIN — Medication 1 SPRAY(S): at 18:29

## 2021-01-01 RX ADMIN — Medication 1 SPRAY(S): at 05:33

## 2021-01-01 RX ADMIN — SODIUM CHLORIDE 75 MILLILITER(S): 9 INJECTION INTRAMUSCULAR; INTRAVENOUS; SUBCUTANEOUS at 13:39

## 2021-01-01 RX ADMIN — Medication 1: at 18:31

## 2021-01-01 RX ADMIN — LIDOCAINE 10 MILLILITER(S): 4 CREAM TOPICAL at 05:51

## 2021-01-01 RX ADMIN — DIPHENHYDRAMINE HYDROCHLORIDE AND LIDOCAINE HYDROCHLORIDE AND ALUMINUM HYDROXIDE AND MAGNESIUM HYDRO 10 MILLILITER(S): KIT at 05:26

## 2021-01-01 RX ADMIN — Medication 2: at 13:28

## 2021-01-01 RX ADMIN — Medication 325 MILLIGRAM(S): at 14:01

## 2021-01-01 RX ADMIN — HEPARIN SODIUM 5000 UNIT(S): 5000 INJECTION INTRAVENOUS; SUBCUTANEOUS at 05:06

## 2021-01-01 RX ADMIN — Medication 1 SPRAY(S): at 18:08

## 2021-01-01 RX ADMIN — LIDOCAINE 10 MILLILITER(S): 4 CREAM TOPICAL at 22:03

## 2021-01-01 RX ADMIN — AMLODIPINE BESYLATE 10 MILLIGRAM(S): 2.5 TABLET ORAL at 06:13

## 2021-01-01 RX ADMIN — LATANOPROST 1 DROP(S): 0.05 SOLUTION/ DROPS OPHTHALMIC; TOPICAL at 23:15

## 2021-01-01 RX ADMIN — HEPARIN SODIUM 5000 UNIT(S): 5000 INJECTION INTRAVENOUS; SUBCUTANEOUS at 18:38

## 2021-01-01 RX ADMIN — HYDROMORPHONE HYDROCHLORIDE 1 MILLIGRAM(S): 2 INJECTION INTRAMUSCULAR; INTRAVENOUS; SUBCUTANEOUS at 05:36

## 2021-01-01 RX ADMIN — HEPARIN SODIUM 5000 UNIT(S): 5000 INJECTION INTRAVENOUS; SUBCUTANEOUS at 17:36

## 2021-01-01 RX ADMIN — SERTRALINE 50 MILLIGRAM(S): 25 TABLET, FILM COATED ORAL at 12:33

## 2021-01-01 RX ADMIN — DIPHENHYDRAMINE HYDROCHLORIDE AND LIDOCAINE HYDROCHLORIDE AND ALUMINUM HYDROXIDE AND MAGNESIUM HYDRO 10 MILLILITER(S): KIT at 13:17

## 2021-01-01 RX ADMIN — HYDROMORPHONE HYDROCHLORIDE 1 MILLIGRAM(S): 2 INJECTION INTRAMUSCULAR; INTRAVENOUS; SUBCUTANEOUS at 14:24

## 2021-01-01 RX ADMIN — HYDROMORPHONE HYDROCHLORIDE 1 MILLIGRAM(S): 2 INJECTION INTRAMUSCULAR; INTRAVENOUS; SUBCUTANEOUS at 20:54

## 2021-01-01 RX ADMIN — SENNA PLUS 2 TABLET(S): 8.6 TABLET ORAL at 23:15

## 2021-01-01 RX ADMIN — LIDOCAINE 10 MILLILITER(S): 4 CREAM TOPICAL at 05:10

## 2021-01-01 RX ADMIN — LIDOCAINE 10 MILLILITER(S): 4 CREAM TOPICAL at 05:47

## 2021-01-01 RX ADMIN — SENNA PLUS 15 MILLILITER(S): 8.6 TABLET ORAL at 22:18

## 2021-01-01 RX ADMIN — HYDROMORPHONE HYDROCHLORIDE 1 MILLIGRAM(S): 2 INJECTION INTRAMUSCULAR; INTRAVENOUS; SUBCUTANEOUS at 14:00

## 2021-01-01 RX ADMIN — SODIUM CHLORIDE 75 MILLILITER(S): 9 INJECTION, SOLUTION INTRAVENOUS at 02:06

## 2021-01-01 RX ADMIN — HEPARIN SODIUM 5000 UNIT(S): 5000 INJECTION INTRAVENOUS; SUBCUTANEOUS at 06:48

## 2021-01-01 RX ADMIN — LIDOCAINE 10 MILLILITER(S): 4 CREAM TOPICAL at 00:13

## 2021-01-01 RX ADMIN — Medication 325 MILLIGRAM(S): at 22:15

## 2021-01-01 RX ADMIN — CARVEDILOL PHOSPHATE 25 MILLIGRAM(S): 80 CAPSULE, EXTENDED RELEASE ORAL at 06:48

## 2021-01-01 RX ADMIN — AMLODIPINE BESYLATE 10 MILLIGRAM(S): 2.5 TABLET ORAL at 23:14

## 2021-01-01 RX ADMIN — DIPHENHYDRAMINE HYDROCHLORIDE AND LIDOCAINE HYDROCHLORIDE AND ALUMINUM HYDROXIDE AND MAGNESIUM HYDRO 10 MILLILITER(S): KIT at 12:15

## 2021-01-01 RX ADMIN — Medication 1: at 08:54

## 2021-01-01 RX ADMIN — HEPARIN SODIUM 5000 UNIT(S): 5000 INJECTION INTRAVENOUS; SUBCUTANEOUS at 05:19

## 2021-01-01 RX ADMIN — Medication 1 SPRAY(S): at 18:59

## 2021-01-01 RX ADMIN — Medication 2: at 13:21

## 2021-01-01 RX ADMIN — Medication 650 MILLIGRAM(S): at 00:00

## 2021-01-01 RX ADMIN — HEPARIN SODIUM 5000 UNIT(S): 5000 INJECTION INTRAVENOUS; SUBCUTANEOUS at 18:31

## 2021-01-01 RX ADMIN — SENNA PLUS 2 TABLET(S): 8.6 TABLET ORAL at 20:56

## 2021-01-01 RX ADMIN — HYDROMORPHONE HYDROCHLORIDE 1 MILLIGRAM(S): 2 INJECTION INTRAMUSCULAR; INTRAVENOUS; SUBCUTANEOUS at 20:00

## 2021-01-01 RX ADMIN — SENNA PLUS 15 MILLILITER(S): 8.6 TABLET ORAL at 21:10

## 2021-01-01 RX ADMIN — SODIUM CHLORIDE 75 MILLILITER(S): 9 INJECTION INTRAMUSCULAR; INTRAVENOUS; SUBCUTANEOUS at 16:06

## 2021-01-01 RX ADMIN — LATANOPROST 1 DROP(S): 0.05 SOLUTION/ DROPS OPHTHALMIC; TOPICAL at 22:39

## 2021-01-01 RX ADMIN — Medication 1: at 08:39

## 2021-01-01 RX ADMIN — BRIMONIDINE TARTRATE 1 DROP(S): 2 SOLUTION/ DROPS OPHTHALMIC at 05:56

## 2021-01-01 RX ADMIN — Medication 650 MILLIGRAM(S): at 15:11

## 2021-01-01 RX ADMIN — Medication 1 SPRAY(S): at 17:35

## 2021-01-01 RX ADMIN — Medication 650 MILLIGRAM(S): at 13:20

## 2021-01-01 RX ADMIN — HEPARIN SODIUM 5000 UNIT(S): 5000 INJECTION INTRAVENOUS; SUBCUTANEOUS at 17:49

## 2021-01-01 RX ADMIN — HYDROMORPHONE HYDROCHLORIDE 1 MILLIGRAM(S): 2 INJECTION INTRAMUSCULAR; INTRAVENOUS; SUBCUTANEOUS at 22:33

## 2021-01-01 RX ADMIN — POLYETHYLENE GLYCOL 3350 17 GRAM(S): 17 POWDER, FOR SOLUTION ORAL at 05:21

## 2021-01-01 RX ADMIN — BRIMONIDINE TARTRATE 1 DROP(S): 2 SOLUTION/ DROPS OPHTHALMIC at 17:51

## 2021-01-01 RX ADMIN — ATORVASTATIN CALCIUM 10 MILLIGRAM(S): 80 TABLET, FILM COATED ORAL at 22:15

## 2021-01-01 RX ADMIN — BRIMONIDINE TARTRATE 1 DROP(S): 2 SOLUTION/ DROPS OPHTHALMIC at 17:50

## 2021-01-01 RX ADMIN — HEPARIN SODIUM 5000 UNIT(S): 5000 INJECTION INTRAVENOUS; SUBCUTANEOUS at 17:48

## 2021-01-01 RX ADMIN — HEPARIN SODIUM 5000 UNIT(S): 5000 INJECTION INTRAVENOUS; SUBCUTANEOUS at 05:14

## 2021-01-01 RX ADMIN — Medication 2: at 18:16

## 2021-01-01 RX ADMIN — AMLODIPINE BESYLATE 5 MILLIGRAM(S): 2.5 TABLET ORAL at 06:06

## 2021-01-01 RX ADMIN — CARVEDILOL PHOSPHATE 25 MILLIGRAM(S): 80 CAPSULE, EXTENDED RELEASE ORAL at 06:29

## 2021-01-01 RX ADMIN — SODIUM CHLORIDE 75 MILLILITER(S): 9 INJECTION, SOLUTION INTRAVENOUS at 20:57

## 2021-01-01 RX ADMIN — BRIMONIDINE TARTRATE 1 DROP(S): 2 SOLUTION/ DROPS OPHTHALMIC at 18:08

## 2021-01-01 RX ADMIN — DIPHENHYDRAMINE HYDROCHLORIDE AND LIDOCAINE HYDROCHLORIDE AND ALUMINUM HYDROXIDE AND MAGNESIUM HYDRO 10 MILLILITER(S): KIT at 05:59

## 2021-01-01 RX ADMIN — HYDROMORPHONE HYDROCHLORIDE 1 MILLIGRAM(S): 2 INJECTION INTRAMUSCULAR; INTRAVENOUS; SUBCUTANEOUS at 13:24

## 2021-01-01 RX ADMIN — LIDOCAINE 10 MILLILITER(S): 4 CREAM TOPICAL at 20:56

## 2021-01-01 RX ADMIN — SENNA PLUS 15 MILLILITER(S): 8.6 TABLET ORAL at 22:29

## 2021-01-01 RX ADMIN — DIPHENHYDRAMINE HYDROCHLORIDE AND LIDOCAINE HYDROCHLORIDE AND ALUMINUM HYDROXIDE AND MAGNESIUM HYDRO 10 MILLILITER(S): KIT at 05:19

## 2021-01-01 RX ADMIN — BRIMONIDINE TARTRATE 1 DROP(S): 2 SOLUTION/ DROPS OPHTHALMIC at 06:06

## 2021-01-01 RX ADMIN — HYDROMORPHONE HYDROCHLORIDE 1 MILLIGRAM(S): 2 INJECTION INTRAMUSCULAR; INTRAVENOUS; SUBCUTANEOUS at 18:35

## 2021-01-01 RX ADMIN — LIDOCAINE 10 MILLILITER(S): 4 CREAM TOPICAL at 22:22

## 2021-01-01 RX ADMIN — HEPARIN SODIUM 5000 UNIT(S): 5000 INJECTION INTRAVENOUS; SUBCUTANEOUS at 18:02

## 2021-01-01 RX ADMIN — LATANOPROST 1 DROP(S): 0.05 SOLUTION/ DROPS OPHTHALMIC; TOPICAL at 20:57

## 2021-01-01 RX ADMIN — HYDROMORPHONE HYDROCHLORIDE 1 MILLIGRAM(S): 2 INJECTION INTRAMUSCULAR; INTRAVENOUS; SUBCUTANEOUS at 17:45

## 2021-01-01 RX ADMIN — ATORVASTATIN CALCIUM 10 MILLIGRAM(S): 80 TABLET, FILM COATED ORAL at 21:15

## 2021-01-01 RX ADMIN — LIDOCAINE 10 MILLILITER(S): 4 CREAM TOPICAL at 00:25

## 2021-01-01 RX ADMIN — HYDROMORPHONE HYDROCHLORIDE 1 MILLIGRAM(S): 2 INJECTION INTRAMUSCULAR; INTRAVENOUS; SUBCUTANEOUS at 13:17

## 2021-01-01 RX ADMIN — HYDROMORPHONE HYDROCHLORIDE 1 MILLIGRAM(S): 2 INJECTION INTRAMUSCULAR; INTRAVENOUS; SUBCUTANEOUS at 08:36

## 2021-01-01 RX ADMIN — BRIMONIDINE TARTRATE 1 DROP(S): 2 SOLUTION/ DROPS OPHTHALMIC at 05:06

## 2021-01-01 RX ADMIN — LIDOCAINE 10 MILLILITER(S): 4 CREAM TOPICAL at 06:14

## 2021-01-01 RX ADMIN — SODIUM CHLORIDE 75 MILLILITER(S): 9 INJECTION, SOLUTION INTRAVENOUS at 02:12

## 2021-01-01 RX ADMIN — MIRTAZAPINE 45 MILLIGRAM(S): 45 TABLET, ORALLY DISINTEGRATING ORAL at 00:00

## 2021-01-01 RX ADMIN — DIPHENHYDRAMINE HYDROCHLORIDE AND LIDOCAINE HYDROCHLORIDE AND ALUMINUM HYDROXIDE AND MAGNESIUM HYDRO 10 MILLILITER(S): KIT at 13:32

## 2021-01-01 RX ADMIN — SODIUM CHLORIDE 75 MILLILITER(S): 9 INJECTION, SOLUTION INTRAVENOUS at 22:35

## 2021-01-01 RX ADMIN — LIDOCAINE 10 MILLILITER(S): 4 CREAM TOPICAL at 22:38

## 2021-01-01 RX ADMIN — AMLODIPINE BESYLATE 10 MILLIGRAM(S): 2.5 TABLET ORAL at 06:37

## 2021-01-01 RX ADMIN — Medication 2 PACKET(S): at 09:05

## 2021-01-01 RX ADMIN — Medication 1: at 13:21

## 2021-01-01 RX ADMIN — BRIMONIDINE TARTRATE 1 DROP(S): 2 SOLUTION/ DROPS OPHTHALMIC at 06:37

## 2021-01-01 RX ADMIN — LIDOCAINE 10 MILLILITER(S): 4 CREAM TOPICAL at 23:14

## 2021-01-01 RX ADMIN — Medication 1: at 18:37

## 2021-01-01 RX ADMIN — Medication 1 SPRAY(S): at 06:25

## 2021-01-01 RX ADMIN — Medication 2: at 12:01

## 2021-01-01 RX ADMIN — LIDOCAINE 10 MILLILITER(S): 4 CREAM TOPICAL at 22:49

## 2021-01-01 RX ADMIN — DIPHENHYDRAMINE HYDROCHLORIDE AND LIDOCAINE HYDROCHLORIDE AND ALUMINUM HYDROXIDE AND MAGNESIUM HYDRO 10 MILLILITER(S): KIT at 06:28

## 2021-01-01 RX ADMIN — LIDOCAINE 10 MILLILITER(S): 4 CREAM TOPICAL at 05:33

## 2021-01-01 RX ADMIN — HEPARIN SODIUM 5000 UNIT(S): 5000 INJECTION INTRAVENOUS; SUBCUTANEOUS at 05:22

## 2021-01-01 RX ADMIN — Medication 1 PACKET(S): at 11:54

## 2021-01-01 RX ADMIN — Medication 1: at 13:26

## 2021-01-01 RX ADMIN — SENNA PLUS 15 MILLILITER(S): 8.6 TABLET ORAL at 22:39

## 2021-01-01 RX ADMIN — BRIMONIDINE TARTRATE 1 DROP(S): 2 SOLUTION/ DROPS OPHTHALMIC at 05:20

## 2021-01-01 RX ADMIN — Medication 1: at 18:07

## 2021-01-01 RX ADMIN — Medication 650 MILLIGRAM(S): at 12:10

## 2021-01-01 RX ADMIN — INSULIN GLARGINE 7 UNIT(S): 100 INJECTION, SOLUTION SUBCUTANEOUS at 09:15

## 2021-01-01 RX ADMIN — Medication 1 SPRAY(S): at 16:58

## 2021-01-01 RX ADMIN — AMLODIPINE BESYLATE 10 MILLIGRAM(S): 2.5 TABLET ORAL at 06:25

## 2021-01-01 RX ADMIN — BRIMONIDINE TARTRATE 1 DROP(S): 2 SOLUTION/ DROPS OPHTHALMIC at 05:50

## 2021-01-01 RX ADMIN — HEPARIN SODIUM 5000 UNIT(S): 5000 INJECTION INTRAVENOUS; SUBCUTANEOUS at 17:35

## 2021-01-01 RX ADMIN — POLYETHYLENE GLYCOL 3350 17 GRAM(S): 17 POWDER, FOR SOLUTION ORAL at 14:55

## 2021-01-01 RX ADMIN — POTASSIUM PHOSPHATE, MONOBASIC POTASSIUM PHOSPHATE, DIBASIC 83.33 MILLIMOLE(S): 236; 224 INJECTION, SOLUTION INTRAVENOUS at 14:52

## 2021-01-01 RX ADMIN — SODIUM CHLORIDE 3 MILLILITER(S): 9 INJECTION INTRAMUSCULAR; INTRAVENOUS; SUBCUTANEOUS at 05:05

## 2021-01-01 RX ADMIN — AMLODIPINE BESYLATE 10 MILLIGRAM(S): 2.5 TABLET ORAL at 05:19

## 2021-01-01 RX ADMIN — POLYETHYLENE GLYCOL 3350 17 GRAM(S): 17 POWDER, FOR SOLUTION ORAL at 11:18

## 2021-01-01 RX ADMIN — CARVEDILOL PHOSPHATE 25 MILLIGRAM(S): 80 CAPSULE, EXTENDED RELEASE ORAL at 05:13

## 2021-01-01 RX ADMIN — HYDROMORPHONE HYDROCHLORIDE 1 MILLIGRAM(S): 2 INJECTION INTRAMUSCULAR; INTRAVENOUS; SUBCUTANEOUS at 13:32

## 2021-01-01 RX ADMIN — SERTRALINE 50 MILLIGRAM(S): 25 TABLET, FILM COATED ORAL at 11:37

## 2021-01-01 RX ADMIN — POLYETHYLENE GLYCOL 3350 17 GRAM(S): 17 POWDER, FOR SOLUTION ORAL at 13:14

## 2021-01-01 RX ADMIN — Medication 650 MILLIGRAM(S): at 22:30

## 2021-01-01 RX ADMIN — AMLODIPINE BESYLATE 10 MILLIGRAM(S): 2.5 TABLET ORAL at 06:03

## 2021-01-01 RX ADMIN — DIPHENHYDRAMINE HYDROCHLORIDE AND LIDOCAINE HYDROCHLORIDE AND ALUMINUM HYDROXIDE AND MAGNESIUM HYDRO 10 MILLILITER(S): KIT at 06:47

## 2021-01-01 RX ADMIN — CARVEDILOL PHOSPHATE 25 MILLIGRAM(S): 80 CAPSULE, EXTENDED RELEASE ORAL at 05:26

## 2021-01-01 RX ADMIN — Medication 1 SPRAY(S): at 06:15

## 2021-01-01 RX ADMIN — Medication 650 MILLIGRAM(S): at 11:05

## 2021-01-01 RX ADMIN — SODIUM CHLORIDE 75 MILLILITER(S): 9 INJECTION INTRAMUSCULAR; INTRAVENOUS; SUBCUTANEOUS at 16:58

## 2021-01-01 RX ADMIN — CARVEDILOL PHOSPHATE 25 MILLIGRAM(S): 80 CAPSULE, EXTENDED RELEASE ORAL at 05:50

## 2021-01-01 RX ADMIN — BRIMONIDINE TARTRATE 1 DROP(S): 2 SOLUTION/ DROPS OPHTHALMIC at 18:02

## 2021-01-01 RX ADMIN — HEPARIN SODIUM 5000 UNIT(S): 5000 INJECTION INTRAVENOUS; SUBCUTANEOUS at 05:57

## 2021-01-01 RX ADMIN — HEPARIN SODIUM 5000 UNIT(S): 5000 INJECTION INTRAVENOUS; SUBCUTANEOUS at 06:04

## 2021-01-01 RX ADMIN — SERTRALINE 50 MILLIGRAM(S): 25 TABLET, FILM COATED ORAL at 12:12

## 2021-01-01 RX ADMIN — Medication 1: at 17:59

## 2021-01-01 RX ADMIN — Medication 12.5 GRAM(S): at 23:00

## 2021-01-01 RX ADMIN — AMLODIPINE BESYLATE 10 MILLIGRAM(S): 2.5 TABLET ORAL at 05:36

## 2021-01-01 RX ADMIN — SODIUM CHLORIDE 75 MILLILITER(S): 9 INJECTION, SOLUTION INTRAVENOUS at 09:14

## 2021-01-01 RX ADMIN — HYDROMORPHONE HYDROCHLORIDE 1 MILLIGRAM(S): 2 INJECTION INTRAMUSCULAR; INTRAVENOUS; SUBCUTANEOUS at 14:52

## 2021-01-01 RX ADMIN — CARVEDILOL PHOSPHATE 25 MILLIGRAM(S): 80 CAPSULE, EXTENDED RELEASE ORAL at 18:38

## 2021-01-01 RX ADMIN — ATORVASTATIN CALCIUM 10 MILLIGRAM(S): 80 TABLET, FILM COATED ORAL at 22:21

## 2021-01-01 RX ADMIN — LIDOCAINE 10 MILLILITER(S): 4 CREAM TOPICAL at 16:04

## 2021-01-01 RX ADMIN — CARVEDILOL PHOSPHATE 25 MILLIGRAM(S): 80 CAPSULE, EXTENDED RELEASE ORAL at 05:33

## 2021-01-01 RX ADMIN — LIDOCAINE 10 MILLILITER(S): 4 CREAM TOPICAL at 22:43

## 2021-01-01 RX ADMIN — Medication 650 MILLIGRAM(S): at 16:00

## 2021-01-01 RX ADMIN — HYDROMORPHONE HYDROCHLORIDE 1 MILLIGRAM(S): 2 INJECTION INTRAMUSCULAR; INTRAVENOUS; SUBCUTANEOUS at 21:54

## 2021-01-01 RX ADMIN — SODIUM CHLORIDE 3 MILLILITER(S): 9 INJECTION INTRAMUSCULAR; INTRAVENOUS; SUBCUTANEOUS at 21:56

## 2021-01-01 RX ADMIN — SODIUM CHLORIDE 30 MILLILITER(S): 9 INJECTION INTRAMUSCULAR; INTRAVENOUS; SUBCUTANEOUS at 13:35

## 2021-01-01 RX ADMIN — ATORVASTATIN CALCIUM 10 MILLIGRAM(S): 80 TABLET, FILM COATED ORAL at 21:39

## 2021-01-01 RX ADMIN — Medication 650 MILLIGRAM(S): at 04:42

## 2021-01-01 RX ADMIN — CARVEDILOL PHOSPHATE 25 MILLIGRAM(S): 80 CAPSULE, EXTENDED RELEASE ORAL at 17:34

## 2021-01-01 RX ADMIN — Medication 1: at 12:15

## 2021-01-01 RX ADMIN — SENNA PLUS 2 TABLET(S): 8.6 TABLET ORAL at 22:15

## 2021-01-01 RX ADMIN — LATANOPROST 1 DROP(S): 0.05 SOLUTION/ DROPS OPHTHALMIC; TOPICAL at 22:38

## 2021-01-01 RX ADMIN — Medication 1: at 08:45

## 2021-01-01 RX ADMIN — LIDOCAINE 10 MILLILITER(S): 4 CREAM TOPICAL at 13:41

## 2021-01-01 RX ADMIN — DIPHENHYDRAMINE HYDROCHLORIDE AND LIDOCAINE HYDROCHLORIDE AND ALUMINUM HYDROXIDE AND MAGNESIUM HYDRO 10 MILLILITER(S): KIT at 22:37

## 2021-01-01 RX ADMIN — Medication 1 SPRAY(S): at 06:39

## 2021-01-01 RX ADMIN — BRIMONIDINE TARTRATE 1 DROP(S): 2 SOLUTION/ DROPS OPHTHALMIC at 05:32

## 2021-01-01 RX ADMIN — DIPHENHYDRAMINE HYDROCHLORIDE AND LIDOCAINE HYDROCHLORIDE AND ALUMINUM HYDROXIDE AND MAGNESIUM HYDRO 10 MILLILITER(S): KIT at 06:38

## 2021-01-01 RX ADMIN — SODIUM CHLORIDE 75 MILLILITER(S): 9 INJECTION INTRAMUSCULAR; INTRAVENOUS; SUBCUTANEOUS at 08:39

## 2021-01-01 RX ADMIN — BRIMONIDINE TARTRATE 1 DROP(S): 2 SOLUTION/ DROPS OPHTHALMIC at 06:33

## 2021-01-01 RX ADMIN — SODIUM CHLORIDE 75 MILLILITER(S): 9 INJECTION, SOLUTION INTRAVENOUS at 18:45

## 2021-01-01 RX ADMIN — SENNA PLUS 15 MILLILITER(S): 8.6 TABLET ORAL at 23:57

## 2021-01-01 RX ADMIN — SODIUM CHLORIDE 75 MILLILITER(S): 9 INJECTION, SOLUTION INTRAVENOUS at 08:57

## 2021-01-01 RX ADMIN — Medication 1: at 18:03

## 2021-01-01 RX ADMIN — Medication 650 MILLIGRAM(S): at 19:53

## 2021-01-01 RX ADMIN — DIPHENHYDRAMINE HYDROCHLORIDE AND LIDOCAINE HYDROCHLORIDE AND ALUMINUM HYDROXIDE AND MAGNESIUM HYDRO 10 MILLILITER(S): KIT at 21:18

## 2021-01-01 RX ADMIN — DIPHENHYDRAMINE HYDROCHLORIDE AND LIDOCAINE HYDROCHLORIDE AND ALUMINUM HYDROXIDE AND MAGNESIUM HYDRO 10 MILLILITER(S): KIT at 13:58

## 2021-01-01 RX ADMIN — DIPHENHYDRAMINE HYDROCHLORIDE AND LIDOCAINE HYDROCHLORIDE AND ALUMINUM HYDROXIDE AND MAGNESIUM HYDRO 10 MILLILITER(S): KIT at 12:09

## 2021-01-01 RX ADMIN — HYDROMORPHONE HYDROCHLORIDE 1 MILLIGRAM(S): 2 INJECTION INTRAMUSCULAR; INTRAVENOUS; SUBCUTANEOUS at 23:20

## 2021-01-01 RX ADMIN — DIPHENHYDRAMINE HYDROCHLORIDE AND LIDOCAINE HYDROCHLORIDE AND ALUMINUM HYDROXIDE AND MAGNESIUM HYDRO 10 MILLILITER(S): KIT at 16:04

## 2021-01-01 RX ADMIN — CARVEDILOL PHOSPHATE 25 MILLIGRAM(S): 80 CAPSULE, EXTENDED RELEASE ORAL at 06:26

## 2021-01-01 RX ADMIN — BRIMONIDINE TARTRATE 1 DROP(S): 2 SOLUTION/ DROPS OPHTHALMIC at 16:59

## 2021-01-01 RX ADMIN — SODIUM CHLORIDE 3 MILLILITER(S): 9 INJECTION INTRAMUSCULAR; INTRAVENOUS; SUBCUTANEOUS at 13:41

## 2021-01-01 RX ADMIN — Medication 650 MILLIGRAM(S): at 08:54

## 2021-01-01 RX ADMIN — DIPHENHYDRAMINE HYDROCHLORIDE AND LIDOCAINE HYDROCHLORIDE AND ALUMINUM HYDROXIDE AND MAGNESIUM HYDRO 10 MILLILITER(S): KIT at 22:38

## 2021-01-01 RX ADMIN — LIDOCAINE 10 MILLILITER(S): 4 CREAM TOPICAL at 22:06

## 2021-01-01 RX ADMIN — Medication 1 SPRAY(S): at 05:18

## 2021-01-01 RX ADMIN — DIPHENHYDRAMINE HYDROCHLORIDE AND LIDOCAINE HYDROCHLORIDE AND ALUMINUM HYDROXIDE AND MAGNESIUM HYDRO 10 MILLILITER(S): KIT at 15:01

## 2021-01-01 RX ADMIN — CARVEDILOL PHOSPHATE 25 MILLIGRAM(S): 80 CAPSULE, EXTENDED RELEASE ORAL at 05:56

## 2021-01-01 RX ADMIN — ATORVASTATIN CALCIUM 10 MILLIGRAM(S): 80 TABLET, FILM COATED ORAL at 21:51

## 2021-01-01 RX ADMIN — LIDOCAINE 10 MILLILITER(S): 4 CREAM TOPICAL at 12:36

## 2021-01-01 RX ADMIN — HYDROMORPHONE HYDROCHLORIDE 1 MILLIGRAM(S): 2 INJECTION INTRAMUSCULAR; INTRAVENOUS; SUBCUTANEOUS at 18:02

## 2021-01-01 RX ADMIN — Medication 3 MILLIGRAM(S): at 22:03

## 2021-01-01 RX ADMIN — POLYETHYLENE GLYCOL 3350 17 GRAM(S): 17 POWDER, FOR SOLUTION ORAL at 22:04

## 2021-01-01 RX ADMIN — LATANOPROST 1 DROP(S): 0.05 SOLUTION/ DROPS OPHTHALMIC; TOPICAL at 23:58

## 2021-01-01 RX ADMIN — SODIUM CHLORIDE 75 MILLILITER(S): 9 INJECTION, SOLUTION INTRAVENOUS at 09:54

## 2021-01-01 RX ADMIN — MIRTAZAPINE 45 MILLIGRAM(S): 45 TABLET, ORALLY DISINTEGRATING ORAL at 23:16

## 2021-01-01 RX ADMIN — Medication 3 MILLIGRAM(S): at 23:16

## 2021-01-01 RX ADMIN — Medication 1: at 18:49

## 2021-01-01 RX ADMIN — Medication 1300 MILLIGRAM(S): at 21:46

## 2021-01-01 RX ADMIN — Medication 650 MILLIGRAM(S): at 11:40

## 2021-01-01 RX ADMIN — SODIUM CHLORIDE 3 MILLILITER(S): 9 INJECTION INTRAMUSCULAR; INTRAVENOUS; SUBCUTANEOUS at 13:49

## 2021-01-01 RX ADMIN — Medication 1: at 13:15

## 2021-01-01 RX ADMIN — DIPHENHYDRAMINE HYDROCHLORIDE AND LIDOCAINE HYDROCHLORIDE AND ALUMINUM HYDROXIDE AND MAGNESIUM HYDRO 10 MILLILITER(S): KIT at 05:33

## 2021-01-01 RX ADMIN — LIDOCAINE 10 MILLILITER(S): 4 CREAM TOPICAL at 14:56

## 2021-01-01 RX ADMIN — SODIUM CHLORIDE 75 MILLILITER(S): 9 INJECTION, SOLUTION INTRAVENOUS at 06:30

## 2021-01-01 RX ADMIN — CARVEDILOL PHOSPHATE 25 MILLIGRAM(S): 80 CAPSULE, EXTENDED RELEASE ORAL at 18:58

## 2021-01-01 RX ADMIN — HEPARIN SODIUM 5000 UNIT(S): 5000 INJECTION INTRAVENOUS; SUBCUTANEOUS at 05:42

## 2021-01-01 RX ADMIN — DIPHENHYDRAMINE HYDROCHLORIDE AND LIDOCAINE HYDROCHLORIDE AND ALUMINUM HYDROXIDE AND MAGNESIUM HYDRO 10 MILLILITER(S): KIT at 21:11

## 2021-01-01 RX ADMIN — CARVEDILOL PHOSPHATE 25 MILLIGRAM(S): 80 CAPSULE, EXTENDED RELEASE ORAL at 17:03

## 2021-01-01 RX ADMIN — BRIMONIDINE TARTRATE 1 DROP(S): 2 SOLUTION/ DROPS OPHTHALMIC at 05:14

## 2021-01-01 RX ADMIN — HYDROMORPHONE HYDROCHLORIDE 1 MILLIGRAM(S): 2 INJECTION INTRAMUSCULAR; INTRAVENOUS; SUBCUTANEOUS at 06:14

## 2021-01-01 RX ADMIN — ATORVASTATIN CALCIUM 10 MILLIGRAM(S): 80 TABLET, FILM COATED ORAL at 20:56

## 2021-01-01 RX ADMIN — BRIMONIDINE TARTRATE 1 DROP(S): 2 SOLUTION/ DROPS OPHTHALMIC at 05:21

## 2021-01-01 RX ADMIN — Medication 650 MILLIGRAM(S): at 09:50

## 2021-01-01 RX ADMIN — Medication 650 MILLIGRAM(S): at 21:31

## 2021-01-01 RX ADMIN — Medication 1: at 23:29

## 2021-01-01 RX ADMIN — AMLODIPINE BESYLATE 10 MILLIGRAM(S): 2.5 TABLET ORAL at 06:49

## 2021-01-01 RX ADMIN — HYDROMORPHONE HYDROCHLORIDE 1 MILLIGRAM(S): 2 INJECTION INTRAMUSCULAR; INTRAVENOUS; SUBCUTANEOUS at 05:27

## 2021-01-01 RX ADMIN — ATORVASTATIN CALCIUM 10 MILLIGRAM(S): 80 TABLET, FILM COATED ORAL at 22:57

## 2021-01-01 RX ADMIN — HYDROMORPHONE HYDROCHLORIDE 1 MILLIGRAM(S): 2 INJECTION INTRAMUSCULAR; INTRAVENOUS; SUBCUTANEOUS at 23:26

## 2021-01-01 RX ADMIN — Medication 667 MILLIGRAM(S): at 21:46

## 2021-01-01 RX ADMIN — CARVEDILOL PHOSPHATE 25 MILLIGRAM(S): 80 CAPSULE, EXTENDED RELEASE ORAL at 06:12

## 2021-01-01 RX ADMIN — POLYETHYLENE GLYCOL 3350 17 GRAM(S): 17 POWDER, FOR SOLUTION ORAL at 12:25

## 2021-01-01 RX ADMIN — HYDROMORPHONE HYDROCHLORIDE 1 MILLIGRAM(S): 2 INJECTION INTRAMUSCULAR; INTRAVENOUS; SUBCUTANEOUS at 05:32

## 2021-01-01 RX ADMIN — HEPARIN SODIUM 5000 UNIT(S): 5000 INJECTION INTRAVENOUS; SUBCUTANEOUS at 18:58

## 2021-01-01 RX ADMIN — ATORVASTATIN CALCIUM 10 MILLIGRAM(S): 80 TABLET, FILM COATED ORAL at 22:36

## 2021-01-01 RX ADMIN — MIRTAZAPINE 45 MILLIGRAM(S): 45 TABLET, ORALLY DISINTEGRATING ORAL at 20:56

## 2021-01-01 RX ADMIN — LIDOCAINE 10 MILLILITER(S): 4 CREAM TOPICAL at 14:24

## 2021-01-01 RX ADMIN — DIPHENHYDRAMINE HYDROCHLORIDE AND LIDOCAINE HYDROCHLORIDE AND ALUMINUM HYDROXIDE AND MAGNESIUM HYDRO 10 MILLILITER(S): KIT at 06:25

## 2021-01-01 RX ADMIN — LIDOCAINE 10 MILLILITER(S): 4 CREAM TOPICAL at 08:18

## 2021-01-01 RX ADMIN — LIDOCAINE 10 MILLILITER(S): 4 CREAM TOPICAL at 05:14

## 2021-01-01 RX ADMIN — SERTRALINE 50 MILLIGRAM(S): 25 TABLET, FILM COATED ORAL at 12:17

## 2021-01-01 RX ADMIN — SODIUM CHLORIDE 75 MILLILITER(S): 9 INJECTION, SOLUTION INTRAVENOUS at 12:17

## 2021-01-01 RX ADMIN — LATANOPROST 1 DROP(S): 0.05 SOLUTION/ DROPS OPHTHALMIC; TOPICAL at 21:16

## 2021-01-01 RX ADMIN — HEPARIN SODIUM 5000 UNIT(S): 5000 INJECTION INTRAVENOUS; SUBCUTANEOUS at 17:58

## 2021-01-01 RX ADMIN — LIDOCAINE 10 MILLILITER(S): 4 CREAM TOPICAL at 21:52

## 2021-01-01 RX ADMIN — LIDOCAINE 10 MILLILITER(S): 4 CREAM TOPICAL at 05:19

## 2021-01-01 RX ADMIN — Medication 1: at 09:04

## 2021-01-01 RX ADMIN — Medication 1 SPRAY(S): at 05:59

## 2021-01-01 RX ADMIN — BRIMONIDINE TARTRATE 1 DROP(S): 2 SOLUTION/ DROPS OPHTHALMIC at 17:58

## 2021-01-01 RX ADMIN — CARVEDILOL PHOSPHATE 25 MILLIGRAM(S): 80 CAPSULE, EXTENDED RELEASE ORAL at 18:12

## 2021-01-01 RX ADMIN — Medication 1: at 12:28

## 2021-01-01 RX ADMIN — HYDROMORPHONE HYDROCHLORIDE 1 MILLIGRAM(S): 2 INJECTION INTRAMUSCULAR; INTRAVENOUS; SUBCUTANEOUS at 15:15

## 2021-01-01 RX ADMIN — HYDROMORPHONE HYDROCHLORIDE 1 MILLIGRAM(S): 2 INJECTION INTRAMUSCULAR; INTRAVENOUS; SUBCUTANEOUS at 17:46

## 2021-01-01 RX ADMIN — Medication 2: at 12:55

## 2021-01-01 RX ADMIN — MIRTAZAPINE 45 MILLIGRAM(S): 45 TABLET, ORALLY DISINTEGRATING ORAL at 22:02

## 2021-01-01 RX ADMIN — HYDROMORPHONE HYDROCHLORIDE 1 MILLIGRAM(S): 2 INJECTION INTRAMUSCULAR; INTRAVENOUS; SUBCUTANEOUS at 12:08

## 2021-01-01 RX ADMIN — LIDOCAINE 10 MILLILITER(S): 4 CREAM TOPICAL at 21:49

## 2021-01-01 RX ADMIN — POLYETHYLENE GLYCOL 3350 17 GRAM(S): 17 POWDER, FOR SOLUTION ORAL at 09:04

## 2021-01-01 RX ADMIN — Medication 1 SPRAY(S): at 17:50

## 2021-01-01 RX ADMIN — SENNA PLUS 15 MILLILITER(S): 8.6 TABLET ORAL at 22:06

## 2021-01-01 RX ADMIN — SERTRALINE 50 MILLIGRAM(S): 25 TABLET, FILM COATED ORAL at 12:10

## 2021-01-01 RX ADMIN — HEPARIN SODIUM 5000 UNIT(S): 5000 INJECTION INTRAVENOUS; SUBCUTANEOUS at 18:08

## 2021-01-01 RX ADMIN — HYDROMORPHONE HYDROCHLORIDE 1 MILLIGRAM(S): 2 INJECTION INTRAMUSCULAR; INTRAVENOUS; SUBCUTANEOUS at 09:30

## 2021-01-01 RX ADMIN — Medication 15 GRAM(S): at 18:30

## 2021-01-01 RX ADMIN — LATANOPROST 1 DROP(S): 0.05 SOLUTION/ DROPS OPHTHALMIC; TOPICAL at 22:22

## 2021-01-01 RX ADMIN — HYDROMORPHONE HYDROCHLORIDE 1 MILLIGRAM(S): 2 INJECTION INTRAMUSCULAR; INTRAVENOUS; SUBCUTANEOUS at 10:23

## 2021-01-01 RX ADMIN — HYDROMORPHONE HYDROCHLORIDE 1 MILLIGRAM(S): 2 INJECTION INTRAMUSCULAR; INTRAVENOUS; SUBCUTANEOUS at 22:03

## 2021-01-01 RX ADMIN — BRIMONIDINE TARTRATE 1 DROP(S): 2 SOLUTION/ DROPS OPHTHALMIC at 17:45

## 2021-01-01 RX ADMIN — SODIUM CHLORIDE 75 MILLILITER(S): 9 INJECTION INTRAMUSCULAR; INTRAVENOUS; SUBCUTANEOUS at 08:45

## 2021-01-01 RX ADMIN — Medication 1 SPRAY(S): at 06:34

## 2021-01-01 RX ADMIN — Medication 1: at 09:12

## 2021-01-01 RX ADMIN — Medication 650 MILLIGRAM(S): at 17:48

## 2021-01-01 RX ADMIN — HEPARIN SODIUM 5000 UNIT(S): 5000 INJECTION INTRAVENOUS; SUBCUTANEOUS at 17:03

## 2021-01-01 RX ADMIN — MIRTAZAPINE 45 MILLIGRAM(S): 45 TABLET, ORALLY DISINTEGRATING ORAL at 22:03

## 2021-01-01 RX ADMIN — MIRTAZAPINE 45 MILLIGRAM(S): 45 TABLET, ORALLY DISINTEGRATING ORAL at 22:35

## 2021-01-01 RX ADMIN — POLYETHYLENE GLYCOL 3350 17 GRAM(S): 17 POWDER, FOR SOLUTION ORAL at 18:31

## 2021-01-01 RX ADMIN — SERTRALINE 50 MILLIGRAM(S): 25 TABLET, FILM COATED ORAL at 14:56

## 2021-01-01 RX ADMIN — HYDROMORPHONE HYDROCHLORIDE 1 MILLIGRAM(S): 2 INJECTION INTRAMUSCULAR; INTRAVENOUS; SUBCUTANEOUS at 06:36

## 2021-01-01 RX ADMIN — AMLODIPINE BESYLATE 10 MILLIGRAM(S): 2.5 TABLET ORAL at 06:29

## 2021-01-01 RX ADMIN — AMLODIPINE BESYLATE 10 MILLIGRAM(S): 2.5 TABLET ORAL at 05:26

## 2021-01-01 RX ADMIN — BRIMONIDINE TARTRATE 1 DROP(S): 2 SOLUTION/ DROPS OPHTHALMIC at 17:17

## 2021-01-01 RX ADMIN — SODIUM CHLORIDE 75 MILLILITER(S): 9 INJECTION INTRAMUSCULAR; INTRAVENOUS; SUBCUTANEOUS at 11:09

## 2021-01-01 RX ADMIN — Medication 650 MILLIGRAM(S): at 10:05

## 2021-01-01 RX ADMIN — Medication 1: at 09:02

## 2021-01-01 RX ADMIN — HEPARIN SODIUM 5000 UNIT(S): 5000 INJECTION INTRAVENOUS; SUBCUTANEOUS at 05:50

## 2021-01-01 RX ADMIN — Medication 1: at 08:57

## 2021-01-01 RX ADMIN — SODIUM CHLORIDE 75 MILLILITER(S): 9 INJECTION INTRAMUSCULAR; INTRAVENOUS; SUBCUTANEOUS at 21:07

## 2021-01-01 RX ADMIN — CARVEDILOL PHOSPHATE 25 MILLIGRAM(S): 80 CAPSULE, EXTENDED RELEASE ORAL at 05:36

## 2021-01-01 RX ADMIN — Medication 325 MILLIGRAM(S): at 14:22

## 2021-01-01 RX ADMIN — BRIMONIDINE TARTRATE 1 DROP(S): 2 SOLUTION/ DROPS OPHTHALMIC at 18:30

## 2021-01-01 RX ADMIN — Medication 1 PACKET(S): at 12:51

## 2021-01-01 RX ADMIN — DIPHENHYDRAMINE HYDROCHLORIDE AND LIDOCAINE HYDROCHLORIDE AND ALUMINUM HYDROXIDE AND MAGNESIUM HYDRO 10 MILLILITER(S): KIT at 23:14

## 2021-01-01 RX ADMIN — Medication 2 PACKET(S): at 16:58

## 2021-01-01 RX ADMIN — HEPARIN SODIUM 5000 UNIT(S): 5000 INJECTION INTRAVENOUS; SUBCUTANEOUS at 06:12

## 2021-01-01 RX ADMIN — ATORVASTATIN CALCIUM 10 MILLIGRAM(S): 80 TABLET, FILM COATED ORAL at 22:06

## 2021-01-01 RX ADMIN — LIDOCAINE 10 MILLILITER(S): 4 CREAM TOPICAL at 06:25

## 2021-01-01 RX ADMIN — Medication 2: at 13:29

## 2021-01-01 RX ADMIN — DIPHENHYDRAMINE HYDROCHLORIDE AND LIDOCAINE HYDROCHLORIDE AND ALUMINUM HYDROXIDE AND MAGNESIUM HYDRO 10 MILLILITER(S): KIT at 05:15

## 2021-01-01 RX ADMIN — AMLODIPINE BESYLATE 10 MILLIGRAM(S): 2.5 TABLET ORAL at 05:32

## 2021-01-01 RX ADMIN — HEPARIN SODIUM 5000 UNIT(S): 5000 INJECTION INTRAVENOUS; SUBCUTANEOUS at 05:33

## 2021-01-01 RX ADMIN — INSULIN GLARGINE 5 UNIT(S): 100 INJECTION, SOLUTION SUBCUTANEOUS at 09:06

## 2021-01-01 RX ADMIN — SERTRALINE 50 MILLIGRAM(S): 25 TABLET, FILM COATED ORAL at 12:25

## 2021-01-01 RX ADMIN — CARVEDILOL PHOSPHATE 25 MILLIGRAM(S): 80 CAPSULE, EXTENDED RELEASE ORAL at 05:20

## 2021-01-01 RX ADMIN — DIPHENHYDRAMINE HYDROCHLORIDE AND LIDOCAINE HYDROCHLORIDE AND ALUMINUM HYDROXIDE AND MAGNESIUM HYDRO 10 MILLILITER(S): KIT at 14:00

## 2021-01-01 RX ADMIN — BRIMONIDINE TARTRATE 1 DROP(S): 2 SOLUTION/ DROPS OPHTHALMIC at 05:59

## 2021-01-01 RX ADMIN — HYDROMORPHONE HYDROCHLORIDE 1 MILLIGRAM(S): 2 INJECTION INTRAMUSCULAR; INTRAVENOUS; SUBCUTANEOUS at 07:00

## 2021-01-01 RX ADMIN — LATANOPROST 1 DROP(S): 0.05 SOLUTION/ DROPS OPHTHALMIC; TOPICAL at 22:04

## 2021-01-01 RX ADMIN — SODIUM CHLORIDE 3 MILLILITER(S): 9 INJECTION INTRAMUSCULAR; INTRAVENOUS; SUBCUTANEOUS at 21:43

## 2021-01-01 RX ADMIN — Medication 2: at 08:39

## 2021-01-01 RX ADMIN — CARVEDILOL PHOSPHATE 25 MILLIGRAM(S): 80 CAPSULE, EXTENDED RELEASE ORAL at 06:31

## 2021-01-01 RX ADMIN — BRIMONIDINE TARTRATE 1 DROP(S): 2 SOLUTION/ DROPS OPHTHALMIC at 06:29

## 2021-01-01 RX ADMIN — SENNA PLUS 2 TABLET(S): 8.6 TABLET ORAL at 22:43

## 2021-01-01 RX ADMIN — Medication 325 MILLIGRAM(S): at 18:38

## 2021-01-01 RX ADMIN — BRIMONIDINE TARTRATE 1 DROP(S): 2 SOLUTION/ DROPS OPHTHALMIC at 17:35

## 2021-01-01 RX ADMIN — SODIUM CHLORIDE 75 MILLILITER(S): 9 INJECTION, SOLUTION INTRAVENOUS at 11:16

## 2021-01-01 RX ADMIN — ATORVASTATIN CALCIUM 10 MILLIGRAM(S): 80 TABLET, FILM COATED ORAL at 22:16

## 2021-01-01 RX ADMIN — HYDROMORPHONE HYDROCHLORIDE 1 MILLIGRAM(S): 2 INJECTION INTRAMUSCULAR; INTRAVENOUS; SUBCUTANEOUS at 22:36

## 2021-01-01 RX ADMIN — Medication 650 MILLIGRAM(S): at 18:53

## 2021-01-01 RX ADMIN — MIRTAZAPINE 45 MILLIGRAM(S): 45 TABLET, ORALLY DISINTEGRATING ORAL at 22:36

## 2021-01-01 RX ADMIN — LIDOCAINE 10 MILLILITER(S): 4 CREAM TOPICAL at 16:40

## 2021-01-01 RX ADMIN — ATORVASTATIN CALCIUM 10 MILLIGRAM(S): 80 TABLET, FILM COATED ORAL at 21:45

## 2021-01-01 RX ADMIN — LIDOCAINE 10 MILLILITER(S): 4 CREAM TOPICAL at 17:55

## 2021-01-01 RX ADMIN — SODIUM CHLORIDE 3 MILLILITER(S): 9 INJECTION INTRAMUSCULAR; INTRAVENOUS; SUBCUTANEOUS at 16:40

## 2021-01-01 RX ADMIN — BRIMONIDINE TARTRATE 1 DROP(S): 2 SOLUTION/ DROPS OPHTHALMIC at 18:40

## 2021-01-01 RX ADMIN — Medication 1 SPRAY(S): at 18:17

## 2021-01-01 RX ADMIN — LIDOCAINE 10 MILLILITER(S): 4 CREAM TOPICAL at 06:49

## 2021-01-01 RX ADMIN — HEPARIN SODIUM 5000 UNIT(S): 5000 INJECTION INTRAVENOUS; SUBCUTANEOUS at 06:00

## 2021-01-01 RX ADMIN — LATANOPROST 1 DROP(S): 0.05 SOLUTION/ DROPS OPHTHALMIC; TOPICAL at 22:08

## 2021-01-01 RX ADMIN — HEPARIN SODIUM 5000 UNIT(S): 5000 INJECTION INTRAVENOUS; SUBCUTANEOUS at 06:24

## 2021-01-01 RX ADMIN — SENNA PLUS 15 MILLILITER(S): 8.6 TABLET ORAL at 22:22

## 2021-03-30 ENCOUNTER — APPOINTMENT (OUTPATIENT)
Dept: SURGERY | Facility: CLINIC | Age: 81
End: 2021-03-30
Payer: MEDICARE

## 2021-03-30 PROCEDURE — 99214 OFFICE O/P EST MOD 30 MIN: CPT

## 2021-03-30 NOTE — HISTORY OF PRESENT ILLNESS
[de-identified] : 22 1/4  years s/p partial glossectomy for malignancy, s/p resection right cheek  and right scalp  BCC. denies pain, bleeding, dysphagia, hoarseness or new lesions.  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 weight stable.  hospitalized for syncope since last visit.  gait altered. I have reviewed all old and new data and available images.\par

## 2021-03-30 NOTE — PHYSICAL EXAM
[de-identified] : no palpable thyroid nodules [Laryngoscopy Performed] : laryngoscopy was performed, see procedure section for findings [Midline] : located in midline position [de-identified] : no palpable masses.  changes from prior surgery [de-identified] : no lesions noted [Normal] : orientation to person, place, and time: normal [FreeTextEntry2] : well healed right cheek scar.  well healed right scalp excision site

## 2021-03-30 NOTE — ASSESSMENT
[FreeTextEntry1] : will observe. no indication for any studies at this time.   to return earlier if any change.   patient has been given the opportunity to ask questions, and all of the patient's questions have been answered to their satisfaction\par

## 2021-06-24 ENCOUNTER — APPOINTMENT (OUTPATIENT)
Dept: SURGERY | Facility: CLINIC | Age: 81
End: 2021-06-24
Payer: MEDICARE

## 2021-06-24 PROCEDURE — 99214 OFFICE O/P EST MOD 30 MIN: CPT

## 2021-06-24 NOTE — HISTORY OF PRESENT ILLNESS
[de-identified] : 20 1/2   years s/p partial glossectomy for malignancy, s/p resection right cheek  and right scalp  BCC. denies pain, bleeding, dysphagia, hoarseness or new lesions.  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 weight stable.  no changes medically since last visit. I have reviewed all old and new data and available images.\par

## 2021-06-24 NOTE — PHYSICAL EXAM
[de-identified] : no palpable thyroid nodules [de-identified] : small lesion dorsum of nose [Laryngoscopy Performed] : laryngoscopy was performed, see procedure section for findings [Midline] : located in midline position [de-identified] : no palpable masses.  changes from prior surgery [de-identified] : no lesions noted [Normal] : orientation to person, place, and time: normal [FreeTextEntry2] : well healed right cheek scar.  well healed right scalp excision site. several small lesions left cheek and infraorbital

## 2021-06-24 NOTE — ASSESSMENT
[FreeTextEntry1] : will observe. no indication for any studies at this time.   to return earlier if any change.   patient has been given the opportunity to ask questions, and all of the patient's questions have been answered to their satisfaction.  to see dermatologist regarding nasal and cheek skin lesions\par

## 2021-09-23 NOTE — HISTORY OF PRESENT ILLNESS
[de-identified] : 22 1/2   years s/p partial glossectomy for malignancy, s/p resection right cheek  and right scalp  BCC. denies pain, bleeding, dysphagia, hoarseness or new lesions.  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 weight stable.  2 month history of right ear pain.  seen by ENT who did not see any lesions. I have reviewed all old and new data and available images.  Additional information was obtained from others present at the time of visit to ensure the completeness of the history.  seen by dermatologist who treated left cheek skin lesion topically\par \par

## 2021-09-23 NOTE — PHYSICAL EXAM
[de-identified] : no palpable thyroid nodules [de-identified] : small lesion dorsum of nose [Laryngoscopy Performed] : laryngoscopy was performed, see procedure section for findings [Midline] : located in midline position [de-identified] :   changes from prior surgery.  1.5 cm right base of tongue mass [de-identified] : no lesions noted [Normal] : orientation to person, place, and time: normal [FreeTextEntry2] : well healed right cheek scar.  well healed right scalp excision site. several small lesions left cheek and infraorbital

## 2021-09-23 NOTE — ASSESSMENT
[FreeTextEntry1] : concerned ear pain related to BOT mass. CT requested. to call next week for results.   patient has been given the opportunity to ask questions, and all of the patient's questions have been answered to their satisfaction.  to see dermatologist regarding nasal and cheek skin lesions\par

## 2021-10-05 NOTE — ADDENDUM
[FreeTextEntry1] : Documented by Ana Laura Carey acting as scribe for Dr. Diamond on 10/05/2021 \par \par All Medical record entries made by the Scribe were at my, Dr. Diamond's, direction and personally dictated by me on 10/05/2021  . I have reviewed the chart and agree that the record accurately reflects my personal performance of the history, physical exam, assessment and plan. I have also personally directed, reviewed, and agreed with the discharge instructions.\par

## 2021-10-05 NOTE — PHYSICAL EXAM
[Normal] : no rashes [Laryngoscopy Performed] : laryngoscopy was performed, see procedure section for findings [FreeTextEntry1] : Postsurgical changes along the tongue.  It deviates to the right.  There is a firm submucosal mass along the right posterolateral tongue and BOT with TTP.

## 2021-10-05 NOTE — HISTORY OF PRESENT ILLNESS
[de-identified] : 81 year female referred by Dr. Doan for neoplasm of the tongue. States pain on the right side of tongue and right ear since July 2021. Patient reports dysphagia and occasional odynophagia also starting in July 2021. She had RT last year on the left side of the tongue for CA.  Denies dyspnea or dysphonia. Patient denies otorrhea, ear/throat infections, changes in hearing, tinnitus, dizziness, vertigo, headaches related to hearing. She was never a smoker.

## 2021-10-05 NOTE — PROCEDURE
[None] : none [Flexible Endoscope] : examined with the flexible endoscope [Serial Number: ___] : Serial Number: [unfilled] [Lesion] : lesion identified by mirror examination needing further evaluation [de-identified] : No lesions in the NPx, HPx or larynx.  There is a primarily submucosal mass involving the R. BOT, GT sulcus.  VC mobile, airway patent.  Posttreatment changes noted.

## 2021-10-05 NOTE — REASON FOR VISIT
[Initial Evaluation] : an initial evaluation for [Spouse] : spouse [FreeTextEntry2] : referred by Dr. Doan for neoplasm of tongue

## 2021-10-07 NOTE — PHYSICAL EXAM
[General Appearance - Well Developed] : well developed [Normal Appearance] : normal appearance [Well Groomed] : well groomed [General Appearance - Well Nourished] : well nourished [No Deformities] : no deformities [General Appearance - In No Acute Distress] : no acute distress [Normal Conjunctiva] : the conjunctiva exhibited no abnormalities [Eyelids - No Xanthelasma] : the eyelids demonstrated no xanthelasmas [Normal Oral Mucosa] : normal oral mucosa [No Oral Pallor] : no oral pallor [No Oral Cyanosis] : no oral cyanosis [Normal Jugular Venous A Waves Present] : normal jugular venous A waves present [Normal Jugular Venous V Waves Present] : normal jugular venous V waves present [No Jugular Venous Quarles A Waves] : no jugular venous quarles A waves [Respiration, Rhythm And Depth] : normal respiratory rhythm and effort [Exaggerated Use Of Accessory Muscles For Inspiration] : no accessory muscle use [Auscultation Breath Sounds / Voice Sounds] : lungs were clear to auscultation bilaterally [Heart Rate And Rhythm] : heart rate and rhythm were normal [Murmurs] : no murmurs present [Heart Sounds] : normal S1 and S2 [Abdomen Soft] : soft [Abdomen Tenderness] : non-tender [Abdomen Mass (___ Cm)] : no abdominal mass palpated [Abnormal Walk] : normal gait [Gait - Sufficient For Exercise Testing] : the gait was sufficient for exercise testing [Nail Clubbing] : no clubbing of the fingernails [Cyanosis, Localized] : no localized cyanosis [Petechial Hemorrhages (___cm)] : no petechial hemorrhages [Skin Color & Pigmentation] : normal skin color and pigmentation [] : no rash [No Venous Stasis] : no venous stasis [Skin Lesions] : no skin lesions [No Skin Ulcers] : no skin ulcer [No Xanthoma] : no  xanthoma was observed [Oriented To Time, Place, And Person] : oriented to person, place, and time [Affect] : the affect was normal [Mood] : the mood was normal [No Anxiety] : not feeling anxious

## 2021-10-07 NOTE — DISCUSSION/SUMMARY
[Procedure Low Risk] : the procedure risk is low [Patient Low Risk] : the patient is a low surgical risk [Optimized for Surgery Pending Laboratory Results] : the patient is optimized for surgery pending laboratory results [As per surgery] : as per surgery [Continue] : Continue medications as currently directed [FreeTextEntry1] : 81-year-old female status post syncopal episode possibly in the setting of colitis and hypovolemia.    Patient with limited ambulation (parkinsonian?). Her blood pressures appear adequately controlled. Encouraged increased p.o. fluid as tolerated, avoidance of NSAIDs. [Guardian] : the guardian [With PCP] : with ~his/her~ primary care provider

## 2021-10-07 NOTE — HISTORY OF PRESENT ILLNESS
[Preoperative Visit] : for a medical evaluation prior to surgery [Scheduled Procedure ___] : a [unfilled] [Date of Surgery ___] : on [unfilled] [Surgeon Name ___] : surgeon: [unfilled] [Stable] : Stable [Electrocardiogram] : ~T an ECG ~C was performed [Echocardiogram] : ~T an echocardiogram ~C was performed [Cardiovascular Stress Test] : a cardiac stress test ~T ~C was performed [Unable to Assess] : Unable to Assess [FreeTextEntry1] : 81-year-old with a known history of long-standing diabetes, hypertension and hyperlipidemia.  Recently hospitalized status post syncopal episode at home and found to be hypotensive with evidence of colitis.  Patient had fluid repletion and antibiotics and improved significantly although she still complains of mild weakness and as per her , appears to be shuffling her feet and having a mildly worsened left hand tremor.  Medications were unchanged, her blood pressures appear adequately controlled at this time.  Discussion in the discharge summary mentioned need for ischemia evaluation post discharge.  She denies any postural complaints, no chest pain or shortness of breath, no palpitations.  Severely hard of hearing.  Nondrinker non-smoker.

## 2021-10-08 NOTE — H&P PST ADULT - HISTORY OF PRESENT ILLNESS
Pt is an 81 y.o. female with h/o HTN, HLD, DM, tongue cancer tx surgically 20 years ago, radiation therapy, last 2020, new tongue lesion noted on follow up exam, preop diagnosis malignant neoplasm of base of tongue, c/o tongue pain, right ear pain, denies discharge or bleeding, scheduled for direct laryngoscopy with biopsy esophagoscopy     Pt is an 81 y.o. female with h/o HTN, HLD, DM, basal cell carcinoma excision, tongue cancer tx surgically 20 years ago, h/o radiation therapy of the tongue in 2020, new tongue lesion noted on follow up exam, preop diagnosis malignant neoplasm of base of tongue, c/o right sided tongue pain, burning sensations in the mouth, c/o right ear pain, denies dysphagia, throat pain, denies tinnitus, vertigo, denies discharge or bleeding, scheduled for direct laryngoscopy with biopsy esophagoscopy

## 2021-10-08 NOTE — H&P PST ADULT - PROBLEM SELECTOR PLAN 1
pt scheduled for direct laryngoscopy with biopsy esophagoscopy on 10/13/21  Preop instructions provided. Pt verbalized understanding.   s/p cardiac eval, copy requested  pt confirmed has appt for COVID test scheduled 72 hrs preop pt scheduled for direct laryngoscopy with biopsy esophagoscopy on 10/13/21  Preop instructions provided. Pt verbalized understanding.   s/p cardiac eval, copy in chart  pt confirmed has appt for COVID test scheduled 72 hrs preop

## 2021-10-08 NOTE — H&P PST ADULT - NSICDXPASTSURGICALHX_GEN_ALL_CORE_FT
PAST SURGICAL HISTORY:  H/O local excision of skin lesion head 2019, reports "benign"    Tongue cancer tx surgically, RT     PAST SURGICAL HISTORY:  H/O local excision of skin lesion excision of scalp lesion 2019    Tongue cancer tx surgically, RT

## 2021-10-08 NOTE — H&P PST ADULT - ENMT COMMENTS
tongue cancer, see HPI [place]; s/p right tongue surgery; malignant neoplasm of tongue, no tongue lesion visualized on exam by NP

## 2021-10-08 NOTE — H&P PST ADULT - NEGATIVE ENMT SYMPTOMS
no sinus symptoms/no nasal congestion/no nasal discharge/no post-nasal discharge/no throat pain/no dysphagia no tinnitus/no vertigo/no sinus symptoms/no nasal congestion/no nasal discharge/no post-nasal discharge/no throat pain/no dysphagia

## 2021-10-09 PROBLEM — Z01.818 PREOP TESTING: Status: ACTIVE | Noted: 2021-01-01

## 2021-10-12 NOTE — ASU PATIENT PROFILE, ADULT - NSICDXPASTSURGICALHX_GEN_ALL_CORE_FT
PAST SURGICAL HISTORY:  H/O local excision of skin lesion excision of scalp lesion 2019    Tongue cancer tx surgically, RT

## 2021-10-13 NOTE — ASU PREOP CHECKLIST - 2.
pt has blanchable redness on sacrum.  open sore with redness on left shin.  pt states she hit leg into dresser drawer. pt has blanchable redness on sacrum.  open sore with redness on left shin.  pt states she hit left leg into dresser drawer.

## 2021-10-13 NOTE — ASU PREOP CHECKLIST - COMMENTS
pt took carvedilol, lotrex, amlodipine today at 0700 with sip water pt took carvedilol, lorazepam, amlodipine today at 0700 with sip water

## 2021-10-13 NOTE — BRIEF OPERATIVE NOTE - NSICDXBRIEFPROCEDURE_GEN_ALL_CORE_FT
PROCEDURES:  Direct laryngoscopy with biopsy 13-Oct-2021 12:40:18  Curtis Higgins  Esophagoscopy, rigid 13-Oct-2021 12:40:32  Curtis Higgins

## 2021-10-13 NOTE — ASU DISCHARGE PLAN (ADULT/PEDIATRIC) - NURSING INSTRUCTIONS
DO NOT take any Tylenol (Acetaminophen) or narcotics containing Tylenol until after 6:30pm . You received Tylenol during your operation and it can cause damage to your liver if too much is taken within a 24 hour time period.

## 2021-10-19 PROBLEM — C05.1 CANCER OF SOFT PALATE: Status: ACTIVE | Noted: 2020-04-09

## 2021-10-19 NOTE — HISTORY OF PRESENT ILLNESS
[de-identified] : 81 year old female presents for follow up for neoplasm of tongue. States has a burn pain on tongue at the site of biopsy, dysphagia and odynophagia with solids and liquids, has dizziness at times. Completed RT therapy last year.  Denies  choking on foods, fevers, night sweats, chills dyspnea or dysphonia otorrhea, ear/throat infections, changes in hearing, tinnitus,  vertigo, or headaches related to hearing.\par  \par Direct Laryngoscopy with Biopsy+ Esophagoscopy -10/13/2021\par

## 2021-10-19 NOTE — PROCEDURE
[None] : none [Flexible Endoscope] : examined with the flexible endoscope [Serial Number: ___] : Serial Number: [unfilled] [de-identified] : No lesions in the NPx, HPx or larynx. There is a primarily submucosal mass involving the R. BOT, GT sulcus. VC mobile, airway patent. Posttreatment changes noted.  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

## 2021-10-19 NOTE — REASON FOR VISIT
[Subsequent Evaluation] : a subsequent evaluation for [FreeTextEntry2] :  for neoplasm of tongue. \par

## 2021-10-19 NOTE — PHYSICAL EXAM
[Laryngoscopy Performed] : laryngoscopy was performed, see procedure section for findings [Normal] : no rashes [de-identified] : No LAD. [FreeTextEntry1] : Postsurgical changes along the tongue. It deviates to the right. There is a firm submucosal mass along the right posterolateral tongue and BOT with TTP. \par

## 2021-10-28 PROBLEM — K59.00 CONSTIPATION: Status: ACTIVE | Noted: 2021-01-01

## 2021-10-29 NOTE — H&P ADULT - PROBLEM SELECTOR PLAN 4
Patient with hemoglobin of 9.0.  [id number] previously 9.8 on 10/8.  Continue to trend.  Likely in setting of chronic disease. Patient with hemoglobin of 9.0.  Hemoglobin previously 9.8 on 10/8.  Continue to trend.  Likely in setting of chronic disease.  Will order TIBC, reticulocyte count, ferritin, B12 and folate. Acute, due to NICK on CKD  Patient phosphorus level of 5.  Phoslo 667 mg ordered once.  Follow repeat phosphorus in AM.  Renal consult in AM

## 2021-10-29 NOTE — H&P ADULT - PROBLEM SELECTOR PLAN 3
Patient phosphorus level of 5.  Phooslo 667 mg ordered once.  Follow repeat phosphorus in AM.  Consider Renal consult in AM if not improved. Patient phosphorus level of 5.  Phoslo 667 mg ordered once.  Follow repeat phosphorus in AM.  Consider Renal consult in AM if not improved. Newly dx'd dysphagia;   NMPET/CT Onc Skull to thigh, 10/27/21: Large, FDG-avid mass in right lateral oral tongue/base of tongue, extending into glossotonsillar sulcus; Diffuse, mildly increased FDG activity in the esophagus may reflect inflammation.    -NPO except meds and sips of water   -GI c/s for EGD in AM  -cineesophagogram   -formal speech and swallow evaluation

## 2021-10-29 NOTE — H&P ADULT - PROBLEM SELECTOR PLAN 5
Oncology consult emailed.  Continue monitoring.  Tongue wound.... Oncology consult emailed.  Continue monitoring.  Tongue with scar on right side. Oncology consult emailed.  Continue monitoring.  Tongue with scar on right side.  Dysphagia screen ordered. Oncology consult emailed.  Continue monitoring.  Tongue with scar on right side.  Dysphagia screen passed. Oncology consult emailed.  Continue monitoring.  Tongue with scar on right side.  Dysphagia screen passed.  Mechanical soft diet ordered. Patient with hemoglobin of 9.0.  Hemoglobin previously 9.8 on 10/8.  Continue to trend.  Likely in setting of chronic disease.  Will order TIBC, reticulocyte count, ferritin, B12 and folate.

## 2021-10-29 NOTE — H&P ADULT - NSHPSOCIALHISTORY_GEN_ALL_CORE
Patient denies use of cigarettes and alcohol. Patient ambulates independently. Lives with . Patient denies use of cigarettes and alcohol  Patient ambulates independently    Lives with

## 2021-10-29 NOTE — H&P ADULT - ASSESSMENT
80 y/o F with PMH of HTN, Type 2 diabetes, oropharyngeal cancer, HLD presents to the ED for NICK. Patient admitted for NICK, Metabolic acidosis, hyperphosphatemia, Anemia.  82 y/o Female, ambulatory with a cane, with MHx of HTN, Type 2 diabetes, oropharyngeal cancer (25 years, not on chemo or radiation), HLD a/w NICK c/b metabolic acidosis and hyperphosphatemia, and dysphagia in the setting of newly found large, FDG-avid mass in right lateral oral tongue/base of tongue corresponding to patient's biopsy proven squamous cell carcinoma.

## 2021-10-29 NOTE — H&P ADULT - PROBLEM SELECTOR PLAN 6
Monitor BP.  DASH/TLC diet ordered.  Continue carvedilol and amlodipine-benazepril daily. Monitor BP.  DASH/TLC diet ordered.  Continue carvedilol and amlodipine.   Will hold benazepril in setting of NICK. Oncology consult emailed.  Continue monitoring.  Tongue with scar on right side.  Dysphagia w/up as above

## 2021-10-29 NOTE — ED PROVIDER NOTE - PROGRESS NOTE DETAILS
Of note, recent PET reveals area of activity at prox humerus.  We performed XR right humerus to assess this and found e/o old prox humeral fx. Pt states she fell approx 1 yr ago, had her arm in a sling for a while and eventually did PT.  States she doesn't think she broke her arm.  At present has no pain at right arm, FROM. Skin intact. I reviewed XR findings c pt.

## 2021-10-29 NOTE — ED ADULT TRIAGE NOTE - CHIEF COMPLAINT QUOTE
pt had blood work done at [facility] a few days ago showing ESRD, was told to come to the ED. reports no complaints, no pain, sob, dizziness, headache. pt in no distress. hx. oropharyngeal ca, not on chemo or radiation.

## 2021-10-29 NOTE — H&P ADULT - ATTENDING COMMENTS
82 y/o Female, ambulatory with a cane, with MHx of HTN, Type 2 diabetes, oropharyngeal cancer (25 years, not on chemo or radiation), HLD a/w NICK c/b metabolic acidosis and hyperphosphatemia, and dysphagia in the setting of newly found large, FDG-avid mass in right lateral oral tongue/base of tongue corresponding to patient's biopsy proven squamous cell carcinoma. 82 y/o Female, ambulatory with a cane, with MHx of HTN, Type 2 diabetes, oropharyngeal cancer (25 years, not on chemo or radiation), HLD a/w NICK c/b metabolic acidosis and hyperphosphatemia, and dysphagia in the setting of newly found large, FDG-avid mass in right lateral oral tongue/base of tongue corresponding to patient's biopsy proven squamous cell carcinoma.    The above assessment and plan were supplemented and modified by attending where needed;

## 2021-10-29 NOTE — H&P ADULT - PROBLEM SELECTOR PLAN 9
Heparin subcutaneous 5000 units q12h. Patient on Soliqua 15 units daily in AM with breakfast and Glyxambi  10 mg-5mg.  Will place patient on low dose insulin sliding scale.  Hemoglobin a1c ordered with AM labs.  Monitor fingersticks.  Consistent carbohydrate diet ordered.  May need low dose Lantus daily

## 2021-10-29 NOTE — ED PROVIDER NOTE - ENMT, MLM
Airway patent, Nasal mucosa clear. Mouth with normal mucosa. Throat has no vesicles, no oropharyngeal exudates and uvula is midline. Scarring evident from prior tongue surgery, well healed. Right side of tongue.

## 2021-10-29 NOTE — ED PROVIDER NOTE - CLINICAL SUMMARY MEDICAL DECISION MAKING FREE TEXT BOX
Pt c complex PMHx sent in by alexander colunga for renal insufficiency.  Well appearing.  Will send labs, likely admit if NICK confirmed.

## 2021-10-29 NOTE — H&P ADULT - PROBLEM SELECTOR PLAN 2
Patient with pH of 7.24 and pCO2 of 47.  1300 mg Sodium bicarbonate ordered.  Will repeat VBG in AM. Acute; Patient with pH of 7.24 and serum bicarb = 19; Due to NICK on CKD  1300 mg Sodium bicarbonate ordered.  Will repeat VBG in AM.  Renal c/s in AM  Start sodium bicarb 325mg TID  2 days supp  pending Renal eval  monitor pH

## 2021-10-29 NOTE — ED PROVIDER NOTE - OBJECTIVE STATEMENT
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 Grover for renal insufficiency.  Pt states she feels well, denies any complaints.  No N/V/D. No fever/chills.  Breathing comfortably.  Pt's  Murali Burdick - [phone] -also provides similar HPI.

## 2021-10-29 NOTE — H&P ADULT - REASON FOR ADMISSION
INCK, Metabolic acidosis Referred by [facility] for evaluation of "ESRD"

## 2021-10-29 NOTE — H&P ADULT - PROBLEM SELECTOR PLAN 7
Lipid level in AM.  Continue pravastatin. Monitor BP.  DASH/TLC diet ordered.  Continue carvedilol and amlodipine.   Will hold benazepril in setting of NICK.

## 2021-10-29 NOTE — ED ADULT NURSE NOTE - CHIEF COMPLAINT QUOTE
pt had blood work done at [facility] a few days ago showing ESRD, was told to come to the ED. reports no complaints, no pain, sob, dizziness, headache. pt in no distress. hx. oropharyngeal ca, not on chemo or radiation.

## 2021-10-29 NOTE — H&P ADULT - NSHPLABSRESULTS_GEN_ALL_CORE
Vital Signs Last 24 Hrs  T(C): 36.5 (29 Oct 2021 17:00), Max: 36.5 (29 Oct 2021 17:00)  T(F): 97.7 (29 Oct 2021 17:00), Max: 97.7 (29 Oct 2021 17:00)  HR: 60 (29 Oct 2021 19:06) (60 - 60)  BP: 152/74 (29 Oct 2021 19:06) (116/74 - 152/74)  BP(mean): --  RR: 16 (29 Oct 2021 19:06) (16 - 16)  SpO2: 100% (29 Oct 2021 19:06) (99% - 100%)                          9.0    6.72  )-----------( 344      ( 29 Oct 2021 19:06 )             26.8   10-29    134<L>  |  100  |  88<H>  ----------------------------<  103<H>  4.2   |  19<L>  |  3.45<H>    Ca    9.1      29 Oct 2021 19:06  Phos  5.0     10-29  Mg     2.20     10-29    TPro  6.5  /  Alb  3.5  /  TBili  <0.2  /  DBili  x   /  AST  7   /  ALT  7   /  AlkPhos  73  10-29    19:06 - VBG - pH: 7.24  | pCO2: 47    | pO2: 23    | Lactate: 0.6    Surgical pathology October 13th:  1-Base of tongue right    Final Diagnosis  1. Tongue, right, base, biopsy  -Invasive squamous cell carcinoma  Immunohistochemistry: The tumor is negative for P16. HRHPV results are  pending an addendum will follow.    NMPET/CT Onc Skull to thigh:  FINDINGS:    HEAD/NECK: Physiologic FDG activity in the brain.    There is a large, intensely FDG-avid lesion in the right lateral aspect of the oral tongue/base of tongue, extending into the glossotonsillar sulcus, corresponding to enhancing mass seen on recent contrast-enhanced CT (SUV 24.4; image 55 of dedicated head and neck series), and to patient's biopsy proven squamous cell carcinoma. Please see report of recent contrast-enhanced CT for further discussion of the lesion.    There is nonspecific, asymmetrically increased FDG activity in the right side of the nasopharynx (SUV 6.5; image 42 of dedicated head and neck series, as compared to SUV 3.6 on the left).    There is physiologic FDG activity in the remainder the visualized head and neck. No enlarged or FDG-avid cervical lymph node.    CHEST: No abnormal FDG activity. No lymphadenopathy.    LUNGS: No abnormal FDG activity. No lung nodule.    PLEURA/PERICARDIUM: No abnormal FDG activity. No effusion.    HEPATOBILIARY/PANCREAS:  Physiologic FDG activity. Liver SUV mean is 2.3. Cholelithiasis.    SPLEEN: Physiologic FDG activity. Normal in size.    ADRENAL GLANDS: Mildly FDG-avid low-attenuation left adrenal nodule measures 2 cm, SUV 3.1 (image 122), compatible with an adenoma.    KIDNEYS/URINARY BLADDER: Physiologic excreted FDG activity.    REPRODUCTIVE ORGANS: No abnormal FDG activity.    ABDOMINOPELVIC NODES: No enlarged or FDG-avid lymph node.    BOWEL/PERITONEUM/MESENTERY: Nonspecific, diffuse, mildly increased FDG activity in the esophagus (SUV 4.5; image 107).    Nonspecific, large focus of increased FDG activity in perianal region (SUV 7.4; image 229).    BONES/SOFT TISSUES: FDG-avid deformity, proximal right humerus, probably is secondary to fracture/trauma (SUV 3.8; image 67).    IMPRESSION: Abnormal FDG-PET/CT scan.    1. Large, FDG-avid mass in right lateral oral tongue/base of tongue, extending into glossotonsillar sulcus, best delineated on recent contrast-enhanced CT, and corresponding to patient's biopsy proven squamous cell carcinoma.    2. Nonspecific, asymmetrically increased FDG activity in right side of nasopharynx. Please correlate with direct visualization.    3. Nonspecific, diffuse, mildly increased FDG activity in the esophagus may reflect inflammation. There also is a large: Nonspecific focus of increased FDG activity in the perianal region. Please correlate clinically and with endoscopy/proctoscopy, as indicated.    4. FDG-avid deformity, proximal right humerus, probably is secondary to fracture/trauma. Please correlate clinically. Personally reviewed the labs below:                          9.0    6.72  )-----------( 344      ( 29 Oct 2021 19:06 )             26.8   10-29    134<L>  |  100  |  88<H>  ----------------------------<  103<H>  4.2   |  19<L>  |  3.45<H>    Ca    9.1      29 Oct 2021 19:06  Phos  5.0     10-29  Mg     2.20     10-29    TPro  6.5  /  Alb  3.5  /  TBili  <0.2  /  DBili  x   /  AST  7   /  ALT  7   /  AlkPhos  73  10-29    19:06 - VBG - pH: 7.24  | pCO2: 47    | pO2: 23    | Lactate: 0.6      Reviewed surgical pathology October 13th:  1-Base of tongue right  Final Diagnosis  1. Tongue, right, base, biopsy  -Invasive squamous cell carcinoma  Immunohistochemistry: The tumor is negative for P16. HRHPV results are  pending an addendum will follow.    Reviewed the radiological imaging below:    NMPET/CT Onc Skull to thigh:  FINDINGS:  HEAD/NECK: Physiologic FDG activity in the brain.  There is a large, intensely FDG-avid lesion in the right lateral aspect of the oral tongue/base of tongue, extending into the glossotonsillar sulcus, corresponding to enhancing mass seen on recent contrast-enhanced CT (SUV 24.4; image 55 of dedicated head and neck series), and to patient's biopsy proven squamous cell carcinoma. Please see report of recent contrast-enhanced CT for further discussion of the lesion.  There is nonspecific, asymmetrically increased FDG activity in the right side of the nasopharynx (SUV 6.5; image 42 of dedicated head and neck series, as compared to SUV 3.6 on the left).  There is physiologic FDG activity in the remainder the visualized head and neck. No enlarged or FDG-avid cervical lymph node.  CHEST: No abnormal FDG activity. No lymphadenopathy.  LUNGS: No abnormal FDG activity. No lung nodule.  PLEURA/PERICARDIUM: No abnormal FDG activity. No effusion.  HEPATOBILIARY/PANCREAS:  Physiologic FDG activity. Liver SUV mean is 2.3. Cholelithiasis.  SPLEEN: Physiologic FDG activity. Normal in size.  ADRENAL GLANDS: Mildly FDG-avid low-attenuation left adrenal nodule measures 2 cm, SUV 3.1 (image 122), compatible with an adenoma.  KIDNEYS/URINARY BLADDER: Physiologic excreted FDG activity.  REPRODUCTIVE ORGANS: No abnormal FDG activity.  ABDOMINOPELVIC NODES: No enlarged or FDG-avid lymph node.  BOWEL/PERITONEUM/MESENTERY: Nonspecific, diffuse, mildly increased FDG activity in the esophagus (SUV 4.5; image 107).  Nonspecific, large focus of increased FDG activity in perianal region (SUV 7.4; image 229).  BONES/SOFT TISSUES: FDG-avid deformity, proximal right humerus, probably is secondary to fracture/trauma (SUV 3.8; image 67).  IMPRESSION: Abnormal FDG-PET/CT scan.  1. Large, FDG-avid mass in right lateral oral tongue/base of tongue, extending into glossotonsillar sulcus, best delineated on recent contrast-enhanced CT, and corresponding to patient's biopsy proven squamous cell carcinoma.  2. Nonspecific, asymmetrically increased FDG activity in right side of nasopharynx. Please correlate with direct visualization.  3. Nonspecific, diffuse, mildly increased FDG activity in the esophagus may reflect inflammation. There also is a large: Nonspecific focus of increased FDG activity in the perianal region. Please correlate clinically and with endoscopy/proctoscopy, as indicated.  4. FDG-avid deformity, proximal right humerus, probably is secondary to fracture/trauma. Please correlate clinically. EKG, 10/30, nsr bpm, qtc no acute Tw or ST changes - personally interpreted     Personally reviewed the labs below:                          9.0    6.72  )-----------( 344      ( 29 Oct 2021 19:06 )             26.8   10-29    134<L>  |  100  |  88<H>  ----------------------------<  103<H>  4.2   |  19<L>  |  3.45<H>    Ca    9.1      29 Oct 2021 19:06  Phos  5.0     10-29  Mg     2.20     10-29    TPro  6.5  /  Alb  3.5  /  TBili  <0.2  /  DBili  x   /  AST  7   /  ALT  7   /  AlkPhos  73  10-29    19:06 - VBG - pH: 7.24  | pCO2: 47    | pO2: 23    | Lactate: 0.6      Reviewed surgical pathology October 13th, 2021:  1-Base of tongue right  Final Diagnosis  1. Tongue, right, base, biopsy  -Invasive squamous cell carcinoma  Immunohistochemistry: The tumor is negative for P16. HRHPV results are  pending an addendum will follow.    Reviewed the radiological imaging below:    NMPET/CT Onc Skull to thigh, 10/27/21:  FINDINGS:  HEAD/NECK: Physiologic FDG activity in the brain.  There is a large, intensely FDG-avid lesion in the right lateral aspect of the oral tongue/base of tongue, extending into the glossotonsillar sulcus, corresponding to enhancing mass seen on recent contrast-enhanced CT (SUV 24.4; image 55 of dedicated head and neck series), and to patient's biopsy proven squamous cell carcinoma. Please see report of recent contrast-enhanced CT for further discussion of the lesion.  There is nonspecific, asymmetrically increased FDG activity in the right side of the nasopharynx (SUV 6.5; image 42 of dedicated head and neck series, as compared to SUV 3.6 on the left).  There is physiologic FDG activity in the remainder the visualized head and neck. No enlarged or FDG-avid cervical lymph node.  CHEST: No abnormal FDG activity. No lymphadenopathy.  LUNGS: No abnormal FDG activity. No lung nodule.  PLEURA/PERICARDIUM: No abnormal FDG activity. No effusion.  HEPATOBILIARY/PANCREAS:  Physiologic FDG activity. Liver SUV mean is 2.3. Cholelithiasis.  SPLEEN: Physiologic FDG activity. Normal in size.  ADRENAL GLANDS: Mildly FDG-avid low-attenuation left adrenal nodule measures 2 cm, SUV 3.1 (image 122), compatible with an adenoma.  KIDNEYS/URINARY BLADDER: Physiologic excreted FDG activity.  REPRODUCTIVE ORGANS: No abnormal FDG activity.  ABDOMINOPELVIC NODES: No enlarged or FDG-avid lymph node.  BOWEL/PERITONEUM/MESENTERY: Nonspecific, diffuse, mildly increased FDG activity in the esophagus (SUV 4.5; image 107).  Nonspecific, large focus of increased FDG activity in perianal region (SUV 7.4; image 229).  BONES/SOFT TISSUES: FDG-avid deformity, proximal right humerus, probably is secondary to fracture/trauma (SUV 3.8; image 67).  IMPRESSION: Abnormal FDG-PET/CT scan.  1. Large, FDG-avid mass in right lateral oral tongue/base of tongue, extending into glossotonsillar sulcus, best delineated on recent contrast-enhanced CT, and corresponding to patient's biopsy proven squamous cell carcinoma.  2. Nonspecific, asymmetrically increased FDG activity in right side of nasopharynx. Please correlate with direct visualization.  3. Nonspecific, diffuse, mildly increased FDG activity in the esophagus may reflect inflammation. There also is a large: Nonspecific focus of increased FDG activity in the perianal region. Please correlate clinically and with endoscopy/proctoscopy, as indicated.  4. FDG-avid deformity, proximal right humerus, probably is secondary to fracture/trauma. Please correlate clinically. EKG, 10/30, sinus bradycardia 56bpm, qtc 434, 1st deg AV block, LBBB, no acute Tw or ST changes, unchanged compared to EKG from 8/25/2020 - personally interpreted     Personally reviewed the labs below:                          9.0    6.72  )-----------( 344      ( 29 Oct 2021 19:06 )             26.8   10-29    134<L>  |  100  |  88<H>  ----------------------------<  103<H>  4.2   |  19<L>  |  3.45<H>    Ca    9.1      29 Oct 2021 19:06  Phos  5.0     10-29  Mg     2.20     10-29    TPro  6.5  /  Alb  3.5  /  TBili  <0.2  /  DBili  x   /  AST  7   /  ALT  7   /  AlkPhos  73  10-29    19:06 - VBG - pH: 7.24  | pCO2: 47    | pO2: 23    | Lactate: 0.6      Personally reviewed surgical pathology October 13th, 2021:  1-Base of tongue right  Final Diagnosis  1. Tongue, right, base, biopsy  -Invasive squamous cell carcinoma  Immunohistochemistry: The tumor is negative for P16. HRHPV results are  pending an addendum will follow.    Personally reviewed the radiological imaging below:    NMPET/CT Onc Skull to thigh, 10/27/21:  FINDINGS:  HEAD/NECK: Physiologic FDG activity in the brain.  There is a large, intensely FDG-avid lesion in the right lateral aspect of the oral tongue/base of tongue, extending into the glossotonsillar sulcus, corresponding to enhancing mass seen on recent contrast-enhanced CT (SUV 24.4; image 55 of dedicated head and neck series), and to patient's biopsy proven squamous cell carcinoma. Please see report of recent contrast-enhanced CT for further discussion of the lesion.  There is nonspecific, asymmetrically increased FDG activity in the right side of the nasopharynx (SUV 6.5; image 42 of dedicated head and neck series, as compared to SUV 3.6 on the left).  There is physiologic FDG activity in the remainder the visualized head and neck. No enlarged or FDG-avid cervical lymph node.  CHEST: No abnormal FDG activity. No lymphadenopathy.  LUNGS: No abnormal FDG activity. No lung nodule.  PLEURA/PERICARDIUM: No abnormal FDG activity. No effusion.  HEPATOBILIARY/PANCREAS:  Physiologic FDG activity. Liver SUV mean is 2.3. Cholelithiasis.  SPLEEN: Physiologic FDG activity. Normal in size.  ADRENAL GLANDS: Mildly FDG-avid low-attenuation left adrenal nodule measures 2 cm, SUV 3.1 (image 122), compatible with an adenoma.  KIDNEYS/URINARY BLADDER: Physiologic excreted FDG activity.  REPRODUCTIVE ORGANS: No abnormal FDG activity.  ABDOMINOPELVIC NODES: No enlarged or FDG-avid lymph node.  BOWEL/PERITONEUM/MESENTERY: Nonspecific, diffuse, mildly increased FDG activity in the esophagus (SUV 4.5; image 107).  Nonspecific, large focus of increased FDG activity in perianal region (SUV 7.4; image 229).  BONES/SOFT TISSUES: FDG-avid deformity, proximal right humerus, probably is secondary to fracture/trauma (SUV 3.8; image 67).  IMPRESSION: Abnormal FDG-PET/CT scan.  1. Large, FDG-avid mass in right lateral oral tongue/base of tongue, extending into glossotonsillar sulcus, best delineated on recent contrast-enhanced CT, and corresponding to patient's biopsy proven squamous cell carcinoma.  2. Nonspecific, asymmetrically increased FDG activity in right side of nasopharynx. Please correlate with direct visualization.  3. Nonspecific, diffuse, mildly increased FDG activity in the esophagus may reflect inflammation. There also is a large: Nonspecific focus of increased FDG activity in the perianal region. Please correlate clinically and with endoscopy/proctoscopy, as indicated.  4. FDG-avid deformity, proximal right humerus, probably is secondary to fracture/trauma. Please correlate clinically.

## 2021-10-29 NOTE — H&P ADULT - MENTAL STATUS
AAO x2, Oriented to person and place. AAO x2, partially to date only, conversant, slowed, not lethargic, follows commands

## 2021-10-29 NOTE — H&P ADULT - PROBLEM SELECTOR PLAN 1
Admit to medicine, continue monitoring.  Cr 3.45, prior Cr 1.93  on October 8th.  GFR 12 previously 24.  Renal US ordered.  Urine electrolytes ordered.  Continue to trend. Admit to medicine, continue monitoring.  Cr 3.45, prior Cr 1.93  on October 8th.  GFR 12 previously 24.  Renal US performed. Follow up results.  Urine electrolytes ordered.  Will hold benazepril and Hydrochlorothiazide in setting of NICK.  Consider renal consult in AM.  Continue to trend. Admit to medicine, continue monitoring.  Cr 3.45, prior Cr 1.93  on October 8th.  GFR 12 previously 24.  Renal US performed. Follow up results.  Urine electrolytes ordered.  Will hold benazepril and Hydrochlorothiazide in setting of NICK.  Call for renal consult in AM.  Continue to trend. Admit to medicine, continue monitoring.  Cr 3.45, prior Cr 1.93  on October 8th.  GFR 12 previously 24.  Renal US performed. Follow up results.  Urine electrolytes ordered.  Will hold benazepril and Hydrochlorothiazide in setting of NICK.  Call for renal consult in AM.  Monitor renal function daily and response to IV hydration Admit to medicine, continue monitoring.  Cr 3.45, prior Cr 1.93  on October 8th.  GFR 12 previously 24.  Renal US performed, results pending  Urine electrolytes ordered.  Will hold benazepril and Hydrochlorothiazide in setting of NICK.  Call for renal consult in AM.  Monitor renal function daily and response to IV hydration

## 2021-10-29 NOTE — H&P ADULT - PROBLEM SELECTOR PLAN 8
Patient on Soliqua 15 units daily and Glyxambi  10 mg-5mg.  Will place patient on low dose insulin sliding scale.  Hemoglobin a1c ordered with AM labs.  Monitor fingersticks. Patient on Soliqua 15 units daily in AM with breaekfast and Glyxambi  10 mg-5mg.  Will place patient on low dose insulin sliding scale.  Hemoglobin a1c ordered with AM labs.  Monitor fingersticks.  Consistent carbohydrate diet ordered. Patient on Soliqua 15 units daily in AM with breakfast and Glyxambi  10 mg-5mg.  Will place patient on low dose insulin sliding scale.  Hemoglobin a1c ordered with AM labs.  Monitor fingersticks.  Consistent carbohydrate diet ordered. Lipid level in AM.  Continue pravastatin.

## 2021-10-29 NOTE — H&P ADULT - HISTORY OF PRESENT ILLNESS
80 y/o F with PMH of HTN, Type 2 diabetes, oropharyngeal cancer, HLD presents to the ED for NICK. 82 y/o F with PMH of HTN, Type 2 diabetes, oropharyngeal cancer (25 years, not on chemo or radiation), HLD presents to the ED for NICK. Patient reports that she was called by Doctor from [facility] and told to come to ED because of NICK. Patient denies history of kidney problems. Patient reports having poor PO intake due to surgery. Patient AAOx2.  called for collateral. Per  patient had biopsy done on tongue on October 13th. Since then patient has been has poor PO intake due to pain. Per  patient does not have Kidney problems but sees nephrologist every 6 months. Patient endorses having constipation since being on oxycodone per pain. Patient does not remember last bowel movement however  reports last bowel movement was yesterday. Patient endorses passing gas. Patient endorses pain when swallowing when she does not take medication. Patient reports no difficulty swallowing medications and endorses eating regular foods. Patient denies, chest pain, shortness of breath, nausea, vomiting, leg pain, fever, chills. Patient denies difficulty urinating. 80 y/o Female with PMH of HTN, Type 2 diabetes, oropharyngeal cancer (25 years, not on chemo or radiation), HLD presents to the ED for NICK. Patient reports that she was called by Doctor from [facility] and told to come to ED because of "ESRD". Patient denies history of kidney problems. Patient reports having poor PO intake due to recent "surgery." Patient AAOx2;  called for collateral. Per  patient had tongue biopsy done on October 13th, since then patient has been has poor oral intake due to pain. Per  patient does not have kidney problems but sees nephrologist every 6 months. Patient endorses having constipation since being on oxycodone per pain. Patient does not remember last bowel movement however  reports last bowel movement was yesterday. Patient endorses passing gas. Patient endorses pain when swallowing when she does not take medication. Patient reports no difficulty swallowing medications and endorses eating regular foods. Patient denies, chest pain, shortness of breath, nausea, vomiting, leg pain, fever, chills. Patient denies difficulty urinating. 80 y/o Female, ambulatory with a cane, with MHx of HTN, Type 2 diabetes, oropharyngeal cancer (25 years, not on chemo or radiation), HLD presents to the ED for NICK. Patient reports that she was called by Doctor from [facility] and told to come to ED because of "ESRD". Patient denies history of kidney problems. Patient reports having poor PO intake due to recent "surgery." Patient AAOx2;  called for collateral. Per  patient had tongue biopsy done on October 13th, since then patient has been has poor oral intake due to pain. Per  patient does not have kidney problems but sees nephrologist every 6 months. Patient endorses having constipation since being on oxycodone per pain. Patient does not remember last bowel movement however  reports last bowel movement was yesterday. Patient endorses passing gas. Patient endorses pain when swallowing when she does not take medication. States that eats soft food at home as has difficulty swallowing regular consistency food. Reports no difficulty swallowing medications. Patient denies, chest pain, shortness of breath, nausea, vomiting, leg pain, fever, chills. Patient denies difficulty urinating.

## 2021-10-29 NOTE — ED ADULT NURSE NOTE - OBJECTIVE STATEMENT
[place] RN: Pt presenting to room 26 AxOx2- disoriented to year, with c.o abnormal labs. Pt states she received a call this AM saying her kidney functions are abnormal. Pt poor historian, unsure of when she had recent bloodwork done. Pt denies CP, SOB, N/V, fever. Skin dry with stage 2 noted to sacral area. Pt s/p tongue surgery- tongue healing well. IV established with 20g in RAC. Labs drawn and sent. Report given to primary RN. MD at bedside, will continue to monitor.

## 2021-10-30 NOTE — PROGRESS NOTE ADULT - PROBLEM SELECTOR PLAN 1
Admit to medicine, continue monitoring.  Cr 3.45, prior Cr 1.93  on October 8th.  GFR 12 previously 24.  Renal US performed, results pending  Urine electrolytes ordered.  Will hold benazepril and Hydrochlorothiazide in setting of NICK.  Call for renal consult in AM.  Monitor renal function daily and response to IV hydration Cr 3.45, prior Cr 1.93  on October 8th. Creatinine 1.1-1.2 in 9/2020.  - GFR 12 previously 24.  - FENa calculated 3.1% correlates with intrinsic, but more likely pre-renal in the setting of poor PO intake and elevated BUN:Cr ratio  - Continue hold benazepril and Hydrochlorothiazide in setting of NICK.  - Renal US performed, results pending  - Monitor renal function daily and response to IV hydration, continue with NS 0.9% pending creatinine function  - Nephrology consulted, appreciate recs

## 2021-10-30 NOTE — SWALLOW BEDSIDE ASSESSMENT ADULT - SWALLOW EVAL: DIAGNOSIS
1) Functional oral stage of swallow for puree, moderately thick fluids, mildly thick fluids, and thin fluids marked by adequate acceptance, bolus manipulation, containment, and coordination. Adequate bolus collection and timely anterior to posterior oral transit/transfer noted. Adequate oral clearance observed. 2) Functional pharyngeal stage of swallow for puree, moderately thick fluids, mildly thick fluids, and thin fluids marked by initiation of pharyngeal swallow trigger and hyolaryngeal elevation noted upon digital palpation. No overt signs/symptoms of penetration/aspiration noted. However, pt reported odynophagia throughout the tongue and at the level of the throat during the swallow.

## 2021-10-30 NOTE — CHART NOTE - NSCHARTNOTEFT_GEN_A_CORE
I-STOP report for patient Torrie Burdick      Patient Name: Torrie RothgBirth Date: 1940  Address: [address] [place], [place] [address]Sex: Female  Rx Written	Rx Dispensed	Drug	Quantity	Days Supply	Prescriber Name	Prescriber Shari #	Payment Method  10/15/2021	10/16/2021	lorazepam 0.5 mg tablet	60	30	Farhat Santos DO	[id number]	[facility] #[id number]  10/13/2021	10/15/2021	oxycodone-acetaminophen 5-325 mg tablet	28	7	[facility]	[id number]	Medicare  [facility] #[id number]  10/05/2021	10/06/2021	oxycodone-acetaminophen 5-325 mg tablet	30	10	Francisco Rogers [id number]	Medicare  [facility] #[id number]  08/06/2021	08/07/2021	lorazepam 0.5 mg tablet	60	30	Farhat Santos DO	[id number]	[facility] #[id number]

## 2021-10-30 NOTE — PROGRESS NOTE ADULT - PROBLEM SELECTOR PLAN 2
Acute; Patient with pH of 7.24 and serum bicarb = 19; Due to NICK on CKD  1300 mg Sodium bicarbonate ordered.  Will repeat VBG in AM.  Renal c/s in AM  Start sodium bicarb 325mg TID  2 days supp  pending Renal eval  monitor pH Patient with pH of 7.24 and serum bicarb = 19  - appears to have metabolic acidosis with concomitant respiratory acidosis, pCO2 is elevated at 47 compared to expected  respiratory compensation per Winter's formula. Continue to follow with repeat vbg.  - s/p 1300 mg Sodium bicarbonate  - Start sodium bicarb 325mg TID 2 days supp pending Renal eval  - Nephrology consulted, appreciate recs

## 2021-10-30 NOTE — PROGRESS NOTE ADULT - PROBLEM SELECTOR PLAN 9
Patient on Soliqua 15 units daily in AM with breakfast and Glyxambi  10 mg-5mg.  Will place patient on low dose insulin sliding scale.  Hemoglobin a1c ordered with AM labs.  Monitor fingersticks.  Consistent carbohydrate diet ordered.  May need low dose Lantus daily Patient on Soliqua 15 units daily in AM with breakfast and Glyxambi 10 mg-5mg at home. Hold home meds.  - Last A1C 5.6 10/8/21  - FS q6h  - was hypoglycemic this morning, improved with oral glucose and after diet was initiated

## 2021-10-30 NOTE — CONSULT NOTE ADULT - SUBJECTIVE AND OBJECTIVE BOX
HPI:  80 y/o Female, ambulatory with a cane, with MHx of HTN, Type 2 diabetes, oropharyngeal cancer (25 years, not on chemo or radiation), HLD presents to the ED for NICK. Patient reports that she was called by Doctor from [facility] and told to come to ED because of "ESRD". Patient denies history of kidney problems. Patient reports having poor PO intake due to recent "surgery." Patient AAOx2;  called for collateral. Per  patient had tongue biopsy done on October 13th, since then patient has been has poor oral intake due to pain. Per  patient does not have kidney problems but sees nephrologist every 6 months. Patient endorses having constipation since being on oxycodone per pain. Patient does not remember last bowel movement however  reports last bowel movement was yesterday. Patient endorses passing gas. Patient endorses pain when swallowing when she does not take medication. States that eats soft food at home as has difficulty swallowing regular consistency food. Reports no difficulty swallowing medications. Patient denies, chest pain, shortness of breath, nausea, vomiting, leg pain, fever, chills. Patient denies difficulty urinating.     Allergies  No Known Allergies    Intolerances        MEDICATIONS  (STANDING):  amLODIPine   Tablet 5 milliGRAM(s) Oral daily  atorvastatin 10 milliGRAM(s) Oral at bedtime  brimonidine 0.2% Ophthalmic Solution 1 Drop(s) Right EYE two times a day  carvedilol 25 milliGRAM(s) Oral every 12 hours  dextrose 40% Gel 15 Gram(s) Oral once  dextrose 5%. 1000 milliLiter(s) (50 mL/Hr) IV Continuous <Continuous>  dextrose 5%. 1000 milliLiter(s) (100 mL/Hr) IV Continuous <Continuous>  dextrose 50% Injectable 25 Gram(s) IV Push once  dextrose 50% Injectable 12.5 Gram(s) IV Push once  dextrose 50% Injectable 25 Gram(s) IV Push once  glucagon  Injectable 1 milliGRAM(s) IntraMuscular once  heparin   Injectable 5000 Unit(s) SubCutaneous every 12 hours  influenza  Vaccine (HIGH DOSE) 0.7 milliLiter(s) IntraMuscular once  sodium bicarbonate 325 milliGRAM(s) Oral three times a day  sodium chloride 0.9% lock flush 3 milliLiter(s) IV Push every 8 hours  sodium chloride 0.9%. 1000 milliLiter(s) (75 mL/Hr) IV Continuous <Continuous>    MEDICATIONS  (PRN):  acetaminophen     Tablet .. 650 milliGRAM(s) Oral every 6 hours PRN Mild Pain (1 - 3), Moderate Pain (4 - 6)      PAST MEDICAL & SURGICAL HISTORY:  Tongue cancer  tx surgically &gt; 20 years ago    Anxiety    Depression    Hyperuricemia    Hypertension    Chronic kidney disease (CKD)    Glaucoma    Hypercholesterolemia    Osteopenia    Diabetes mellitus    Tongue cancer  tx surgically, RT    H/O local excision of skin lesion  excision of scalp lesion 2019        FAMILY HISTORY:  Family history of Alzheimer&#x27;s disease  mother    FH: dementia        SOCIAL HISTORY: No EtOH, no tobacco    REVIEW OF SYSTEMS:    CONSTITUTIONAL: no fever  EYES/ENT: No visual changes;  no throat pain   NECK: No pain or stiffness  RESPIRATORY: no sob  CARDIOVASCULAR: No chest pain or palpitations  GASTROINTESTINAL: No abdominal or epigastric pain. No NV/D/C  GENITOURINARY: No dysuria, change in frequency or hematuria  NEUROLOGICAL: No numbness or weakness  SKIN: No itching, burning, rashes, or lesions   Psych: No depression   MSK: no joint pain  Allergy: no urticaria     Height (cm): 165.1 (10-29 @ 17:00)  Weight (kg): 43.5 (10-30 @ 02:57)  BMI (kg/m2): 16 (10-30 @ 02:57)  BSA (m2): 1.45 (10-30 @ 02:57)    T(F): 98 (10-30-21 @ 05:50), Max: 98 (10-29-21 @ 21:50)  HR: 55 (10-30-21 @ 05:50)  BP: 143/69 (10-30-21 @ 05:50)  RR: 17 (10-30-21 @ 05:50)  SpO2: 98% (10-30-21 @ 05:50)  Wt(kg): --    GENERAL: NAD  HEENT: EOMI, MMM, no oropharyngeal lesions or erythema appreciated  Pulm: no inc wob  CV: RRR  ABDOMEN: soft, nt, nd  MSK: nl ROM  EXTREMITIES:  no appreciable edema in b/l LE  Neuro: A&Ox3, no focal deficits  SKIN: warm and dry, no visible rash                          9.0    6.72  )-----------( 344      ( 29 Oct 2021 19:06 )             26.8       10-29    134<L>  |  100  |  88<H>  ----------------------------<  103<H>  4.2   |  19<L>  |  3.45<H>    Ca    9.1      29 Oct 2021 19:06  Phos  5.0     10-29  Mg     2.20     10-29    TPro  6.5  /  Alb  3.5  /  TBili  <0.2  /  DBili  x   /  AST  7   /  ALT  7   /  AlkPhos  73  10-29      Magnesium, Serum: 2.20 mg/dL (10-29 @ 19:06)  Phosphorus Level, Serum: 5.0 mg/dL (10-29 @ 19:06)       HPI:  82 y/o Female, ambulatory with a cane, with MHx of HTN, Type 2 diabetes, oropharyngeal cancer (25 years, not on chemo or radiation), HLD presents to the ED for NICK. Patient reports that she was called by Doctor from [facility] and told to come to ED because of "ESRD". Patient denies history of kidney problems. Patient reports having poor PO intake due to recent "surgery." Patient AAOx2;  called for collateral. Per  patient had tongue biopsy done on October 13th, since then patient has been has poor oral intake due to pain. Per  patient does not have kidney problems but sees nephrologist every 6 months. Patient endorses having constipation since being on oxycodone per pain. Patient does not remember last bowel movement however  reports last bowel movement was yesterday. Patient endorses passing gas. Patient endorses pain when swallowing when she does not take medication. States that eats soft food at home as has difficulty swallowing regular consistency food. Reports no difficulty swallowing medications. Patient denies, chest pain, shortness of breath, nausea, vomiting, leg pain, fever, chills. Patient denies difficulty urinating.     Allergies  No Known Allergies    MEDICATIONS  (STANDING):  amLODIPine   Tablet 5 milliGRAM(s) Oral daily  atorvastatin 10 milliGRAM(s) Oral at bedtime  brimonidine 0.2% Ophthalmic Solution 1 Drop(s) Right EYE two times a day  carvedilol 25 milliGRAM(s) Oral every 12 hours  dextrose 40% Gel 15 Gram(s) Oral once  dextrose 5%. 1000 milliLiter(s) (50 mL/Hr) IV Continuous <Continuous>  dextrose 5%. 1000 milliLiter(s) (100 mL/Hr) IV Continuous <Continuous>  dextrose 50% Injectable 25 Gram(s) IV Push once  dextrose 50% Injectable 12.5 Gram(s) IV Push once  dextrose 50% Injectable 25 Gram(s) IV Push once  glucagon  Injectable 1 milliGRAM(s) IntraMuscular once  heparin   Injectable 5000 Unit(s) SubCutaneous every 12 hours  influenza  Vaccine (HIGH DOSE) 0.7 milliLiter(s) IntraMuscular once  sodium bicarbonate 325 milliGRAM(s) Oral three times a day  sodium chloride 0.9% lock flush 3 milliLiter(s) IV Push every 8 hours  sodium chloride 0.9%. 1000 milliLiter(s) (75 mL/Hr) IV Continuous <Continuous>    MEDICATIONS  (PRN):  acetaminophen     Tablet .. 650 milliGRAM(s) Oral every 6 hours PRN Mild Pain (1 - 3), Moderate Pain (4 - 6)    PAST MEDICAL & SURGICAL HISTORY:  Tongue cancer  tx surgically &gt; 20 years ago    Anxiety    Depression    Hyperuricemia    Hypertension    Chronic kidney disease (CKD)    Glaucoma    Hypercholesterolemia    Osteopenia    Diabetes mellitus    Tongue cancer  tx surgically, RT    H/O local excision of skin lesion  excision of scalp lesion 2019    FAMILY HISTORY:  Family history of Alzheimer&#x27;s disease  mother    FH: dementia    SOCIAL HISTORY: No EtOH, no tobacco    REVIEW OF SYSTEMS:    CONSTITUTIONAL: no fever  EYES/ENT: see above hpi  NECK: No pain or stiffness  RESPIRATORY: no sob  CARDIOVASCULAR: No chest pain or palpitations  GASTROINTESTINAL: No abdominal or epigastric pain. No NV/D/C  GENITOURINARY: No dysuria, change in frequency or hematuria  NEUROLOGICAL: No numbness or weakness  SKIN: No itching, burning, rashes, or lesions   MSK: see above hpi   Allergy: no urticaria     Height (cm): 165.1 (10-29 @ 17:00)  Weight (kg): 43.5 (10-30 @ 02:57)  BMI (kg/m2): 16 (10-30 @ 02:57)  BSA (m2): 1.45 (10-30 @ 02:57)    T(F): 98 (10-30-21 @ 05:50), Max: 98 (10-29-21 @ 21:50)  HR: 55 (10-30-21 @ 05:50)  BP: 143/69 (10-30-21 @ 05:50)  RR: 17 (10-30-21 @ 05:50)  SpO2: 98% (10-30-21 @ 05:50)  Wt(kg): --    GENERAL: NAD  HEENT: EOMI, MMM,   Pulm: no inc wob  CV: RRR  ABDOMEN: soft, nt, nd  MSK: nl ROM  EXTREMITIES:  no appreciable edema in b/l LE  Neuro: A&Ox3, no focal deficits  SKIN: warm and dry, no visible rash                          9.0    6.72  )-----------( 344      ( 29 Oct 2021 19:06 )             26.8       10-29    134<L>  |  100  |  88<H>  ----------------------------<  103<H>  4.2   |  19<L>  |  3.45<H>    Ca    9.1      29 Oct 2021 19:06  Phos  5.0     10-29  Mg     2.20     10-29    TPro  6.5  /  Alb  3.5  /  TBili  <0.2  /  DBili  x   /  AST  7   /  ALT  7   /  AlkPhos  73  10-29      Magnesium, Serum: 2.20 mg/dL (10-29 @ 19:06)  Phosphorus Level, Serum: 5.0 mg/dL (10-29 @ 19:06)

## 2021-10-30 NOTE — CONSULT NOTE ADULT - SUBJECTIVE AND OBJECTIVE BOX
HPI:  PAULA BLANCO is a 81 year old female with history of HTN, T2DM, oropharyngeal squamous cell carcinoma who presents with NICK and oropharyngeal dysphagia.    Patient follows at [facility] for oropharyngeal/tongue squamous cell carcinoma reportedly diagnosed over 20 years ago.  She underwent glossectomy over 20 years ago.  Never on chemotherapy, however reportedly underwent radiation therapy in 2020.  She underwent recent  laryngoscopy with biopsy and esophagoscopy with brushing on 10/13/2021 with pathology consistent with squamous cell carcinoma.  Outpatient PET imaging 10/27/2021 reveals large mass in right lateral tongue extending into glossotonsillar sulcus, increased FDG activity in right nasopharynx, nonspecific mildly increased FDG activity in esophagus, increased FDG uptake in perianal region, and FDG deformity in proximal right humerus.  GI consulted for dysphagia.  Patient states that ever since her laryngoscopy with biopsy and esophagoscopy with brushing on 10/13/2021 she has had "difficulty initiating a swallow."  Otherwise, patient denies fevers, chills, esophageal dysphagia, difficulty managing secretions, abdominal pain, nausea, vomiting, diarrhea, melena, hematemesis, hematochezia.      ROS:   General:  No fevers, chills, night sweats  Eyes:  Good vision, no reported pain  ENT:  No sore throat, pain, runny nose  CV:  No pain, palpitations  Pulm:  No dyspnea, cough  GI:  See HPI, otherwise negative  :  No incontinence, nocturia  Muscle:  No reported pain, weakness  Neuro:  No memory problems  Psych:  No insomnia, psychosis  Endocrine:  No polyuria, polydipsia  Heme:  No petechiae, ecchymosis, easy bruisability  Skin:  No reported rash    PMHX/PSHX:    Tongue cancer    Anxiety    Depression    Hyperuricemia    Hypertension    Chronic kidney disease (CKD)    Glaucoma    Hypercholesterolemia    Osteopenia    Diabetes mellitus    Tongue cancer    H/O local excision of skin lesion      Allergies:  No Known Allergies      Home Medications: reviewed  Hospital Medications:  acetaminophen     Tablet .. 650 milliGRAM(s) Oral every 6 hours PRN  amLODIPine   Tablet 5 milliGRAM(s) Oral daily  atorvastatin 10 milliGRAM(s) Oral at bedtime  brimonidine 0.2% Ophthalmic Solution 1 Drop(s) Right EYE two times a day  carvedilol 25 milliGRAM(s) Oral every 12 hours  dextrose 40% Gel 15 Gram(s) Oral once  dextrose 5%. 1000 milliLiter(s) IV Continuous <Continuous>  dextrose 5%. 1000 milliLiter(s) IV Continuous <Continuous>  dextrose 50% Injectable 25 Gram(s) IV Push once  dextrose 50% Injectable 12.5 Gram(s) IV Push once  dextrose 50% Injectable 25 Gram(s) IV Push once  glucagon  Injectable 1 milliGRAM(s) IntraMuscular once  heparin   Injectable 5000 Unit(s) SubCutaneous every 12 hours  influenza  Vaccine (HIGH DOSE) 0.7 milliLiter(s) IntraMuscular once  sodium bicarbonate 325 milliGRAM(s) Oral three times a day  sodium chloride 0.9% lock flush 3 milliLiter(s) IV Push every 8 hours  sodium chloride 0.9%. 1000 milliLiter(s) IV Continuous <Continuous>      Social History:   Tobacco: Denies  EtOH: Denies  Illicit Drugs: Denies    Family history:    Family history of Alzheimer&#x27;s disease    FH: dementia      Denies family history of colon cancer/polyps, stomach cancer/polyps, pancreatic cancer/masses, liver cancer/disease, ovarian cancer and endometrial cancer.    PHYSICAL EXAM:   Vital Signs:  Vital Signs Last 24 Hrs  T(C): 36.7 (30 Oct 2021 05:50), Max: 36.7 (29 Oct 2021 21:50)  T(F): 98 (30 Oct 2021 05:50), Max: 98 (29 Oct 2021 21:50)  HR: 55 (30 Oct 2021 05:50) (55 - 61)  BP: 143/69 (30 Oct 2021 05:50) (116/74 - 152/74)  BP(mean): --  RR: 17 (30 Oct 2021 05:50) (16 - 17)  SpO2: 98% (30 Oct 2021 05:50) (94% - 100%)  Daily Height in cm: 165.1 (29 Oct 2021 17:00)    Daily     GENERAL: no acute distress  NEURO: alert  HEENT: anicteric sclera, no conjunctival pallor appreciated  CHEST: no respiratory distress, no accessory muscle use  CARDIAC: regular rate, +S1/S2  ABDOMEN: soft, nondistended, nontender, no rebound or guarding  EXTREMITIES: warm, well perfused, no edema  SKIN: no lesions noted    LABS: reviewed                        9.0    6.72  )-----------( 344      ( 29 Oct 2021 19:06 )             26.8     10-29    134<L>  |  100  |  88<H>  ----------------------------<  103<H>  4.2   |  19<L>  |  3.45<H>    Ca    9.1      29 Oct 2021 19:06  Phos  5.0     10-29  Mg     2.20     10-29    TPro  6.5  /  Alb  3.5  /  TBili  <0.2  /  DBili  x   /  AST  7   /  ALT  7   /  AlkPhos  73  10-29    LIVER FUNCTIONS - ( 29 Oct 2021 19:06 )  Alb: 3.5 g/dL / Pro: 6.5 g/dL / ALK PHOS: 73 U/L / ALT: 7 U/L / AST: 7 U/L / GGT: x               Diagnostic Studies: see sunrise for full report

## 2021-10-30 NOTE — PROGRESS NOTE ADULT - PROBLEM SELECTOR PLAN 4
Acute, due to NICK on CKD  Patient phosphorus level of 5.  Phoslo 667 mg ordered once.  Follow repeat phosphorus in AM.  Renal consult in AM Likely in the setting of worsening kidney function.   - phosphorus level of 5  - s/p 1x phoslo 667 mg   - pending renal recs

## 2021-10-30 NOTE — PROGRESS NOTE ADULT - PROBLEM SELECTOR PLAN 10
Heparin subcutaneous 5000 units q12h. DVT: Heparin subcutaneous 5000 units q12h.  Diet: DASH/TLC diet ordered.  Disposition: pending improvement in creatinine function

## 2021-10-30 NOTE — CONSULT NOTE ADULT - ATTENDING COMMENTS
Oropharyngeal dysphagia  Rec speech/swallowing eval
82 y/o F with history of oropharyngeal cancer >20 years ago s/p resection, with newly diagnosed large biopsy-proven base of tongue mass c/w SCC. She presents with dysphagia and worsening NICK. Imaging studies and bloodwork reviewed in detail. Agree with renal workup and consultation. Dysphagia evaluation as above, f/u GI recommendations and advance diet as tolerated. Oncologic treatment will be managed as outpatient, no inpatient chemotherapy planned. PT/OT evaluation. Will continue to follow.

## 2021-10-30 NOTE — PROGRESS NOTE ADULT - ASSESSMENT
82 y/o Female, ambulatory with a cane, with MHx of HTN, Type 2 diabetes, oropharyngeal cancer (25 years, not on chemo or radiation), HLD a/w NICK c/b metabolic acidosis and hyperphosphatemia, and dysphagia in the setting of newly found large, FDG-avid mass in right lateral oral tongue/base of tongue corresponding to patient's biopsy proven squamous cell carcinoma.     80 y/o Female, ambulatory with a cane, with MHx of HTN, Type 2 diabetes, oropharyngeal cancer (25 years, not on chemo or radiation), HLD presents with NICK likely in setting of poor PO intake, associated with metabolic and concomitant respiratory acidosis and hyperphosphatemia, and dysphagia in the setting of newly found large, FDG-avid mass in right lateral oral tongue/base of tongue corresponding to patient's biopsy proven squamous cell carcinoma. Heme/onc, nephrology, and GI following.

## 2021-10-30 NOTE — PROGRESS NOTE ADULT - PROBLEM SELECTOR PLAN 6
Oncology consult emailed.  Continue monitoring.  Tongue with scar on right side.  Dysphagia w/up as above Diagnosed 25 years ago, previously treated with radiation. Not currently on treatment. s/p tongue biopsy 10/13. PET scan 10/27 with multiple areas of uptake including right lateral tongue extending into glossotonsillar sulcus, esophagus, r. side nasopharynx, and right humerus   - x-ray right humerus with fracture deformity of proximal humerus, no complaint of pain from patient  - Oncology consulted, appreciate recs

## 2021-10-30 NOTE — PROGRESS NOTE ADULT - PROBLEM SELECTOR PLAN 7
Monitor BP.  DASH/TLC diet ordered.  Continue carvedilol and amlodipine.   Will hold benazepril in setting of NICK. - Continue carvedilol and amlodipine.   - Will hold benazepril in setting of NICK  - Monitor BP

## 2021-10-30 NOTE — PROGRESS NOTE ADULT - PROBLEM SELECTOR PLAN 3
Newly dx'd dysphagia;   NMPET/CT Onc Skull to thigh, 10/27/21: Large, FDG-avid mass in right lateral oral tongue/base of tongue, extending into glossotonsillar sulcus; Diffuse, mildly increased FDG activity in the esophagus may reflect inflammation.    -NPO except meds and sips of water   -GI c/s for EGD in AM  -cineesophagogram   -formal speech and swallow evaluation The patient does not report problems with swallowing at bedside. More likely odynophagia in the setting of tongue pain after biopsy on 10/13.  - NMPET/CT Onc Skull to thigh, 10/27/21: Large, FDG-avid mass in right lateral oral tongue/base of tongue, extending into glossotonsillar sulcus; Diffuse, mildly increased FDG activity in the esophagus may reflect inflammation.  - Speech and swallow evaluated, no cineesophagogram indicated, started dysphagia diet  - GI consulted for potential EGD for increased FDG activity in esophagus, added to patient provider list, appreciate recs

## 2021-10-30 NOTE — SWALLOW BEDSIDE ASSESSMENT ADULT - COMMENTS
As per charting, pt is a "80 y/o Female, ambulatory with a cane, with MHx of HTN, Type 2 diabetes, oropharyngeal cancer (25 years, not on chemo or radiation), HLD a/w NICK c/b metabolic acidosis and hyperphosphatemia, and dysphagia in the setting of newly found large, FDG-avid mass in right lateral oral tongue/base of tongue corresponding to patient's biopsy proven squamous cell carcinoma."    WBC is WFL. NMPET/CT Onc Skull to thigh, 10/27/21: "Large, FDG-avid mass in right lateral oral tongue/base of tongue, extending into glossotonsillar sulcus; Diffuse, mildly increased FDG activity in the esophagus may reflect inflammation."    Pt seen at bedside, awake/alert and oriented, this AM for initial clinical swallow evaluation. Pt able to follow directions, answer questions, and engage in conversational discourse with SLP. Pt reported pain and discomfort when swallowing. Pt was cooperative and accepted all PO trials.

## 2021-10-30 NOTE — CONSULT NOTE ADULT - ASSESSMENT
81F ambulatory with a cane, h/o HTN, Type 2 diabetes, oropharyngeal cancer (25 years, not on chemo or radiation), a/f NICK c/b metabolic acidosis and hyperphosphatemia, and dysphagia in the setting of newly found large, FDG-avid mass in right lateral oral tongue/base of tongue corresponding to patient's biopsy proven squamous cell carcinoma:    #oropharyngeal SCC  -history of oral tongue SCCa s/p glossectomy over 20 years ago.   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 Knowles last year. ---- She now presents with what seems to be an infiltrative third primary R. BOT SCCa.   -This is confirmed after EUA and biopsy where frozen section positive for SCCa.  -outpt PET:  Large, FDG-avid mass in right lateral oral tongue/base of tongue, extending into glossotonsillar sulcus, best delineated on recent contrast-enhanced CT, and corresponding to patient's biopsy proven squamous cell carcinoma. Nonspecific, asymmetrically increased FDG activity in right side of nasopharynx. Please correlate with direct visualization. Nonspecific, diffuse, mildly increased FDG activity in the esophagus may reflect inflammation. There also is a large: Nonspecific focus of increased FDG activity in the perianal region. Please correlate clinically and with endoscopy/proctoscopy, as indicated. FDG-avid deformity, proximal right humerus, probably is secondary to fracture/trauma.  -GI consulted for dysphagia in setting of FDG activity in esophagus   -swallow eval recs appreciated: puree with thin liquids   -has normocytic anemia, iron studies pending   -recommend ortho eval for R humeral fracture   -disease appears to be localized to oropharynx   -can f/u with Dr. Grover as outpt, no plan for inpatient chemotherapy     #NICK, metabolic acidosis   Cr 3.45, prior Cr 1.93  on October 8th.  GFR 12 previously 24.  Renal US performed, results pending  holding benazepril and Hydrochlorothiazide in setting of NICK.  renal consult pending, c/w management per primary team     Pollo Cuevas Heme/onc PGY5 [id number] 81F ambulatory with a cane, h/o HTN, Type 2 diabetes, oropharyngeal cancer (25 years, not on chemo or radiation), a/f NICK c/b metabolic acidosis and hyperphosphatemia, and dysphagia in the setting of newly found large, FDG-avid mass in right lateral oral tongue/base of tongue corresponding to patient's biopsy proven squamous cell carcinoma:    #oropharyngeal SCC  -history of oral tongue SCCa s/p glossectomy over 20 years ago.   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 Knowles last year. ---- She now presents with what seems to be an infiltrative third primary R. BOT SCCa.   -This is confirmed after EUA and biopsy where frozen section positive for SCCa.  -outpt PET:  Large, FDG-avid mass in right lateral oral tongue/base of tongue, extending into glossotonsillar sulcus, best delineated on recent contrast-enhanced CT, and corresponding to patient's biopsy proven squamous cell carcinoma. Nonspecific, asymmetrically increased FDG activity in right side of nasopharynx. Please correlate with direct visualization. Nonspecific, diffuse, mildly increased FDG activity in the esophagus may reflect inflammation. There also is a large: Nonspecific focus of increased FDG activity in the perianal region. Please correlate clinically and with endoscopy/proctoscopy, as indicated. FDG-avid deformity, proximal right humerus, probably is secondary to fracture/trauma.  -GI consulted for dysphagia in setting of FDG activity in esophagus   -swallow eval recs appreciated: puree with thin liquids   -has normocytic anemia, iron studies pending   -humeral fracture is old, patient reports falling on her arm about 1 year ago   -disease appears to be localized to oropharynx   -can f/u with Dr. Grover as outpt, no plan for inpatient chemotherapy     #NICK, metabolic acidosis   Cr 3.45, prior Cr 1.93  on October 8th.  GFR 12 previously 24.  Renal US performed, results pending  holding benazepril and Hydrochlorothiazide in setting of NICK.  renal consult pending, c/w management per primary team     Pollo Cuevas Heme/onc PGY5 [id number]

## 2021-10-30 NOTE — PROVIDER CONTACT NOTE (HYPOGLYCEMIA EVENT) - NS PROVIDER CONTACT ASSESS-HYPO
Patient Blood glucose 60, rechecked and confirmed 62. Patient alert and oriented x4. Patient asymptomatic.

## 2021-10-30 NOTE — PROVIDER CONTACT NOTE (HYPOGLYCEMIA EVENT) - NS PROVIDER CONTACT BACKGROUND-HYPO
Age: 81y    Gender: Female    POCT Blood Glucose:  156 mg/dL (10-30-21 @ 17:46)  103 mg/dL (10-30-21 @ 12:16)  111 mg/dL (10-30-21 @ 09:44)  79 mg/dL (10-30-21 @ 09:18)  62 mg/dL (10-30-21 @ 08:36)  60 mg/dL (10-30-21 @ 08:35)  92 mg/dL (10-29-21 @ 23:54)      eMAR:  dextrose 40% Gel   15 Gram(s) Oral (10-30-21 @ 08:51)

## 2021-10-30 NOTE — PROGRESS NOTE ADULT - SUBJECTIVE AND OBJECTIVE BOX
PAULA BLANCO  81y  MRN: [id number]    Subjective:    Patient is a 81y old  Female who presents with a chief complaint of Referred by [facility] for evaluation of "ESRD" (29 Oct 2021 20:54)      Interval history/overnight events:      MEDICATIONS  (STANDING):  amLODIPine   Tablet 5 milliGRAM(s) Oral daily  atorvastatin 10 milliGRAM(s) Oral at bedtime  brimonidine 0.2% Ophthalmic Solution 1 Drop(s) Right EYE two times a day  carvedilol 25 milliGRAM(s) Oral every 12 hours  dextrose 40% Gel 15 Gram(s) Oral once  dextrose 5%. 1000 milliLiter(s) (50 mL/Hr) IV Continuous <Continuous>  dextrose 5%. 1000 milliLiter(s) (100 mL/Hr) IV Continuous <Continuous>  dextrose 50% Injectable 25 Gram(s) IV Push once  dextrose 50% Injectable 12.5 Gram(s) IV Push once  dextrose 50% Injectable 25 Gram(s) IV Push once  glucagon  Injectable 1 milliGRAM(s) IntraMuscular once  heparin   Injectable 5000 Unit(s) SubCutaneous every 12 hours  influenza  Vaccine (HIGH DOSE) 0.7 milliLiter(s) IntraMuscular once  insulin lispro (ADMELOG) corrective regimen sliding scale   SubCutaneous three times a day before meals  insulin lispro (ADMELOG) corrective regimen sliding scale   SubCutaneous at bedtime  sodium bicarbonate 325 milliGRAM(s) Oral three times a day  sodium chloride 0.9% lock flush 3 milliLiter(s) IV Push every 8 hours  sodium chloride 0.9%. 1000 milliLiter(s) (75 mL/Hr) IV Continuous <Continuous>    MEDICATIONS  (PRN):        Objective:    Vitals: Vital Signs Last 24 Hrs  T(C): 36.7 (10-30-21 @ 05:50), Max: 36.7 (10-29-21 @ 21:50)  T(F): 98 (10-30-21 @ 05:50), Max: 98 (10-29-21 @ 21:50)  HR: 55 (10-30-21 @ 05:50) (55 - 61)  BP: 143/69 (10-30-21 @ 05:50) (116/74 - 152/74)  BP(mean): --  RR: 17 (10-30-21 @ 05:50) (16 - 17)  SpO2: 98% (10-30-21 @ 05:50) (94% - 100%)            I&O's Summary      PHYSICAL EXAM:  GENERAL: NAD  HEENT: PERRL, no scleral icterus, no head and neck lad   CHEST/LUNG: CTAB, no wheezing, crackles, or ronchi   HEART: RRR, normal S1, S2, no murmurs, gallops, or rubs appreciated   ABDOMEN: soft, nondistended, non-tender, normoactive, no HSM, no rebound, no guarding, no rigidity  SKIN: No rashes or lesions  NERVOUS SYSTEM: Alert & Oriented X3  EXT: no peripheral edema  PSYCH: calm and cooperative     LABS:    10-29    134<L>  |  100  |  88<H>  ----------------------------<  103<H>  4.2   |  19<L>  |  3.45<H>    Ca    9.1      29 Oct 2021 19:06  Phos  5.0     10-29  Mg     2.20     10-29    TPro  6.5  /  Alb  3.5  /  TBili  <0.2  /  DBili  x   /  AST  7   /  ALT  7   /  AlkPhos  73  10-29                  Urinalysis Basic - ( 29 Oct 2021 23:53 )    Color: Light Yellow / Appearance: Slightly Turbid / S.010 / pH: x  Gluc: x / Ketone: Negative  / Bili: Negative / Urobili: <2 mg/dL   Blood: x / Protein: Trace / Nitrite: Negative   Leuk Esterase: Moderate / RBC: 1 /HPF / WBC 31 /HPF   Sq Epi: x / Non Sq Epi: 8 /HPF / Bacteria: Occasional                              9.0    6.72  )-----------( 344      ( 29 Oct 2021 19:06 )             26.8                         9.5    7.73  )-----------( 322      ( 28 Oct 2021 15:27 )             28.9     CAPILLARY BLOOD GLUCOSE      POCT Blood Glucose.: 92 mg/dL (29 Oct 2021 23:54)          RADIOLOGY & ADDITIONAL TESTS:            Imaging Personally Reviewed:  [ ] YES  [ ] NO    Consultants involved in case:   Consultant(s) Notes Reviewed:  [ ] YES  [ ] NO:   Care Discussed with Consultants/Other Providers [ ] YES  [ ] NO         PAULA BLANCO  81y  MRN: [id number]    Subjective:    Patient is a 81y old  Female who presents with a chief complaint of Referred by [facility] for evaluation of "ESRD" (29 Oct 2021 20:54)      Interval history/overnight events:  No acute events overnight. The patient reports feeling bad this morning. She is aware that an outside physician asked her to come to the hospital, but poor historian. Patient AOx2-3 (name, but forgot last name, location, year with prompting) this morning. She reports problems with constipation at home. Currently has poor appetite. She denies pain currently, but says she has pain when swallowing. Denies any difficulty swallowing. No history of kidney problems per patient. She denies any dysuria, hematuria, or changes in urination. Denies shortness of breath, chest pain, palpitations or lightheadedness.      MEDICATIONS  (STANDING):  amLODIPine   Tablet 5 milliGRAM(s) Oral daily  atorvastatin 10 milliGRAM(s) Oral at bedtime  brimonidine 0.2% Ophthalmic Solution 1 Drop(s) Right EYE two times a day  carvedilol 25 milliGRAM(s) Oral every 12 hours  dextrose 40% Gel 15 Gram(s) Oral once  dextrose 5%. 1000 milliLiter(s) (50 mL/Hr) IV Continuous <Continuous>  dextrose 5%. 1000 milliLiter(s) (100 mL/Hr) IV Continuous <Continuous>  dextrose 50% Injectable 25 Gram(s) IV Push once  dextrose 50% Injectable 12.5 Gram(s) IV Push once  dextrose 50% Injectable 25 Gram(s) IV Push once  glucagon  Injectable 1 milliGRAM(s) IntraMuscular once  heparin   Injectable 5000 Unit(s) SubCutaneous every 12 hours  influenza  Vaccine (HIGH DOSE) 0.7 milliLiter(s) IntraMuscular once  insulin lispro (ADMELOG) corrective regimen sliding scale   SubCutaneous three times a day before meals  insulin lispro (ADMELOG) corrective regimen sliding scale   SubCutaneous at bedtime  sodium bicarbonate 325 milliGRAM(s) Oral three times a day  sodium chloride 0.9% lock flush 3 milliLiter(s) IV Push every 8 hours  sodium chloride 0.9%. 1000 milliLiter(s) (75 mL/Hr) IV Continuous <Continuous>    MEDICATIONS  (PRN):        Objective:    Vitals: Vital Signs Last 24 Hrs  T(C): 36.7 (10-30-21 @ 05:50), Max: 36.7 (10-29-21 @ 21:50)  T(F): 98 (10-30-21 @ 05:50), Max: 98 (10-29-21 @ 21:50)  HR: 55 (10-30-21 @ 05:50) (55 - 61)  BP: 143/69 (10-30-21 @ 05:50) (116/74 - 152/74)  BP(mean): --  RR: 17 (10-30-21 @ 05:50) (16 - 17)  SpO2: 98% (10-30-21 @ 05:50) (94% - 100%)            I&O's Summary      PHYSICAL EXAM:  GENERAL: NAD, lying in bed  HEENT: PERRLA, EOM intact, tongue without obvious lesion, no other oral lesions appreciated, no LAD  CHEST/LUNG: CTAB, no wheezing, crackles, or ronchi   HEART: regular rate, irregular rhythm, no murmur appreciated  ABDOMEN: soft, nondistended, non-tender, normoactive BS  SKIN: No rashes or lesions  NERVOUS SYSTEM: Alert & Oriented X2-3 (forgot last name, location was hospital, year with prompting)  EXT: no peripheral edema  PSYCH: calm and cooperative     LABS:    10-29    134<L>  |  100  |  88<H>  ----------------------------<  103<H>  4.2   |  19<L>  |  3.45<H>    Ca    9.1      29 Oct 2021 19:06  Phos  5.0     10-29  Mg     2.20     10-29    TPro  6.5  /  Alb  3.5  /  TBili  <0.2  /  DBili  x   /  AST  7   /  ALT  7   /  AlkPhos  73  10-29                  Urinalysis Basic - ( 29 Oct 2021 23:53 )    Color: Light Yellow / Appearance: Slightly Turbid / S.010 / pH: x  Gluc: x / Ketone: Negative  / Bili: Negative / Urobili: <2 mg/dL   Blood: x / Protein: Trace / Nitrite: Negative   Leuk Esterase: Moderate / RBC: 1 /HPF / WBC 31 /HPF   Sq Epi: x / Non Sq Epi: 8 /HPF / Bacteria: Occasional                              9.0    6.72  )-----------( 344      ( 29 Oct 2021 19:06 )             26.8                         9.5    7.73  )-----------( 322      ( 28 Oct 2021 15:27 )             28.9     CAPILLARY BLOOD GLUCOSE      POCT Blood Glucose.: 92 mg/dL (29 Oct 2021 23:54)          RADIOLOGY & ADDITIONAL TESTS:            Imaging Personally Reviewed:  [ ] YES  [ ] NO    Consultants involved in case:   Consultant(s) Notes Reviewed:  [ ] YES  [ ] NO:   Care Discussed with Consultants/Other Providers [ ] YES  [ ] NO         PAULA BLANCO  81y  MRN: [id number]    Subjective:    Patient is a 81y old  Female who presents with a chief complaint of Referred by [facility] for evaluation of "ESRD" (29 Oct 2021 20:54)      Interval history/overnight events:  No acute events overnight. The patient reports feeling bad this morning. She is aware that an outside physician asked her to come to the hospital, but poor historian. Patient AOx2-3 (name, but forgot last name, location, year with prompting) this morning. She reports problems with constipation at home. Currently has poor appetite. She denies pain currently, but says she has pain when swallowing. Denies any difficulty swallowing. No history of kidney problems per patient. She denies any dysuria, hematuria, or changes in urination. Denies shortness of breath, chest pain, palpitations or lightheadedness.      MEDICATIONS  (STANDING):  amLODIPine   Tablet 5 milliGRAM(s) Oral daily  atorvastatin 10 milliGRAM(s) Oral at bedtime  brimonidine 0.2% Ophthalmic Solution 1 Drop(s) Right EYE two times a day  carvedilol 25 milliGRAM(s) Oral every 12 hours  dextrose 40% Gel 15 Gram(s) Oral once  dextrose 5%. 1000 milliLiter(s) (50 mL/Hr) IV Continuous <Continuous>  dextrose 5%. 1000 milliLiter(s) (100 mL/Hr) IV Continuous <Continuous>  dextrose 50% Injectable 25 Gram(s) IV Push once  dextrose 50% Injectable 12.5 Gram(s) IV Push once  dextrose 50% Injectable 25 Gram(s) IV Push once  glucagon  Injectable 1 milliGRAM(s) IntraMuscular once  heparin   Injectable 5000 Unit(s) SubCutaneous every 12 hours  influenza  Vaccine (HIGH DOSE) 0.7 milliLiter(s) IntraMuscular once  insulin lispro (ADMELOG) corrective regimen sliding scale   SubCutaneous three times a day before meals  insulin lispro (ADMELOG) corrective regimen sliding scale   SubCutaneous at bedtime  sodium bicarbonate 325 milliGRAM(s) Oral three times a day  sodium chloride 0.9% lock flush 3 milliLiter(s) IV Push every 8 hours  sodium chloride 0.9%. 1000 milliLiter(s) (75 mL/Hr) IV Continuous <Continuous>    MEDICATIONS  (PRN):        Objective:    Vitals: Vital Signs Last 24 Hrs  T(C): 36.7 (10-30-21 @ 05:50), Max: 36.7 (10-29-21 @ 21:50)  T(F): 98 (10-30-21 @ 05:50), Max: 98 (10-29-21 @ 21:50)  HR: 55 (10-30-21 @ 05:50) (55 - 61)  BP: 143/69 (10-30-21 @ 05:50) (116/74 - 152/74)  BP(mean): --  RR: 17 (10-30-21 @ 05:50) (16 - 17)  SpO2: 98% (10-30-21 @ 05:50) (94% - 100%)            I&O's Summary      PHYSICAL EXAM:  GENERAL: NAD, lying in bed  HEENT: PERRLA, EOM intact, tongue without obvious lesion but possible scar on right side, no other oral lesions appreciated, no LAD  CHEST/LUNG: CTAB, no wheezing, crackles, or ronchi   HEART: regular rate, irregular rhythm, no murmur appreciated  ABDOMEN: soft, nondistended, non-tender, normoactive BS  SKIN: No rashes or lesions  NERVOUS SYSTEM: Alert & Oriented X2-3 (forgot last name, location was hospital, year with prompting)  EXT: no peripheral edema  PSYCH: calm and cooperative     LABS:    10-29    134<L>  |  100  |  88<H>  ----------------------------<  103<H>  4.2   |  19<L>  |  3.45<H>    Ca    9.1      29 Oct 2021 19:06  Phos  5.0     10-29  Mg     2.20     10-29    TPro  6.5  /  Alb  3.5  /  TBili  <0.2  /  DBili  x   /  AST  7   /  ALT  7   /  AlkPhos  73  10-29                  Urinalysis Basic - ( 29 Oct 2021 23:53 )    Color: Light Yellow / Appearance: Slightly Turbid / S.010 / pH: x  Gluc: x / Ketone: Negative  / Bili: Negative / Urobili: <2 mg/dL   Blood: x / Protein: Trace / Nitrite: Negative   Leuk Esterase: Moderate / RBC: 1 /HPF / WBC 31 /HPF   Sq Epi: x / Non Sq Epi: 8 /HPF / Bacteria: Occasional                              9.0    6.72  )-----------( 344      ( 29 Oct 2021 19:06 )             26.8                         9.5    7.73  )-----------( 322      ( 28 Oct 2021 15:27 )             28.9     CAPILLARY BLOOD GLUCOSE      POCT Blood Glucose.: 92 mg/dL (29 Oct 2021 23:54)          RADIOLOGY & ADDITIONAL TESTS:            Imaging Personally Reviewed:  [ ] YES  [ ] NO    Consultants involved in case:   Consultant(s) Notes Reviewed:  [ ] YES  [ ] NO:   Care Discussed with Consultants/Other Providers [ ] YES  [ ] NO

## 2021-10-30 NOTE — PATIENT PROFILE ADULT - NSPROPOAURINARYCATHETER_GEN_A_NUR
no
no breast tenderness L/no breast tenderness R/no breast lump L/no breast lump R/no nipple discharge L/no nipple discharge R

## 2021-10-30 NOTE — SWALLOW BEDSIDE ASSESSMENT ADULT - ASR SWALLOW REFERRAL
MD to consider ENT consult given pt reported odynophagia throughout the tongue and at the throat. MD to consider dietary consult to ensure adequate caloric intake./Registered Dietitian/ENT

## 2021-10-30 NOTE — PROGRESS NOTE ADULT - PROBLEM SELECTOR PLAN 5
Patient with hemoglobin of 9.0.  Hemoglobin previously 9.8 on 10/8.  Continue to trend.  Likely in setting of chronic disease.  Will order TIBC, reticulocyte count, ferritin, B12 and folate. Patient with hemoglobin of 9.0. Baseline appears between 9-10. Likely in setting of chronic kidney disease.  - Anemia workup TIBC, reticulocyte count, ferritin, B12 and folate pending  - trend CBC

## 2021-10-30 NOTE — CONSULT NOTE ADULT - ASSESSMENT
81 year old female with history of HTN, T2DM, oropharyngeal squamous cell carcinoma who presents with NICK and oropharyngeal dysphagia.    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  She underwent recent  laryngoscopy with biopsy and esophagoscopy with brushing on 10/13/2021 with pathology consistent with squamous cell carcinoma.  Outpatient PET imaging 10/27/2021 reveals large mass in right lateral tongue extending into glossotonsillar sulcus, increased FDG activity in right nasopharynx, nonspecific mildly increased FDG activity in esophagus, increased FDG uptake in perianal region, and FDG deformity in proximal right humerus.  GI consulted for dysphagia.  Patient states that ever since her laryngoscopy with biopsy and esophagoscopy with brushing on 10/13/2021 she has had "difficulty initiating a swallow."  No signs/symptoms of esophageal dysphagia.    Recommendations:  -recommend formal speech/swallow evaluation  -appreciate input from Heme/Onc for management of oropharyngeal/tongue squamous cell carcinoma  -may need input from ENT if oropharyngeal symptoms worsening or persist  -workup of NICK per primary team  -no acute indication for endoscopy/colonoscopy, however may need to address PEG pending swallow evaluation and clinical course      Fernando Arnold, PGY-4  GI/Hepatology Fellow    MONDAY-FRIDAY 8AM-5PM:  Pager# [phone] ([facility]) or [phone] ([facility])    NON-URGENT CONSULTS:  Please email [email or web address]  AT NIGHT AND ON WEEKENDS:  Contact on-call GI fellow via answering service ([phone]) from 5pm-8am and on weekends/holidays

## 2021-10-30 NOTE — PROVIDER CONTACT NOTE (HYPOGLYCEMIA EVENT) - NS PROVIDER CONTACT NOTE-TREATMENT-HYPO
After administer of Glucose, 15 mins blood glucose recheck was 79. As per Gamal Perez MD ordered to recheck blood glucose in an additional 15 mins. Rechecked Blood glucose, 111./Glucose gel 1 tube/Other (Specify)

## 2021-10-31 NOTE — PROGRESS NOTE ADULT - PROBLEM SELECTOR PLAN 9
Patient on Soliqua 15 units daily in AM with breakfast and Glyxambi 10 mg-5mg at home. Hold home meds.  - Last A1C 5.6 10/8/21  - FS q6h  - was hypoglycemic this morning, improved with oral glucose and after diet was initiated Patient on Soliqua 15 units daily in AM with breakfast and Glyxambi 10 mg-5mg at home. Hold home meds.  - Last A1C 5.6 10/8/21  - FS q6h  - Pureed diet per S+S

## 2021-10-31 NOTE — PROGRESS NOTE ADULT - PROBLEM SELECTOR PLAN 3
The patient does not report problems with swallowing at bedside. More likely odynophagia in the setting of tongue pain after biopsy on 10/13.  - NMPET/CT Onc Skull to thigh, 10/27/21: Large, FDG-avid mass in right lateral oral tongue/base of tongue, extending into glossotonsillar sulcus; Diffuse, mildly increased FDG activity in the esophagus may reflect inflammation.  - Speech and swallow evaluated, no cineesophagogram indicated, started dysphagia diet  - GI consulted for potential EGD for increased FDG activity in esophagus, added to patient provider list, appreciate recs The patient does not report problems with swallowing at bedside. More likely odynophagia in the setting of tongue pain after biopsy on 10/13.  - NMPET/CT Onc Skull to thigh, 10/27/21: Large, FDG-avid mass in right lateral oral tongue/base of tongue, extending into glossotonsillar sulcus; Diffuse, mildly increased FDG activity in the esophagus may reflect inflammation.  - Speech and swallow evaluated, dysphagia diet  - GI consulted for potential EGD for increased FDG activity in esophagus, appreciate recs    - no intervention indicated at this time per note    - possible PEG tube, primary discussed this with patient but she does not want currently  - Having burning sensation in throat this morning, will give lidocaine swish. May be related to FDG activity in esophagus but GI is recommending no intervention at this time.

## 2021-10-31 NOTE — PROGRESS NOTE ADULT - PROBLEM SELECTOR PLAN 4
Likely in the setting of worsening kidney function.   - phosphorus level of 5  - s/p 1x phoslo 667 mg   - pending renal recs Likely in the setting of worsening kidney function.   - phosphorus level of 5  - s/p 1x phoslo 667 mg, now normalized  - pending renal recs

## 2021-10-31 NOTE — PROGRESS NOTE ADULT - PROBLEM SELECTOR PLAN 6
Diagnosed 25 years ago, previously treated with radiation. Not currently on treatment. s/p tongue biopsy 10/13. PET scan 10/27 with multiple areas of uptake including right lateral tongue extending into glossotonsillar sulcus, esophagus, r. side nasopharynx, and right humerus   - x-ray right humerus with fracture deformity of proximal humerus, no complaint of pain from patient  - Oncology consulted, appreciate recs

## 2021-10-31 NOTE — PROGRESS NOTE ADULT - ASSESSMENT
80 y/o Female, ambulatory with a cane, with MHx of HTN, Type 2 diabetes, oropharyngeal cancer (25 years, not on chemo or radiation), HLD presents with NICK likely in setting of poor PO intake, associated with metabolic and concomitant respiratory acidosis and hyperphosphatemia, and dysphagia in the setting of newly found large, FDG-avid mass in right lateral oral tongue/base of tongue corresponding to patient's biopsy proven squamous cell carcinoma. Heme/onc, nephrology, and GI following.

## 2021-10-31 NOTE — PROGRESS NOTE ADULT - PROBLEM SELECTOR PLAN 10
DVT: Heparin subcutaneous 5000 units q12h.  Diet: DASH/TLC diet ordered.  Disposition: pending improvement in creatinine function DVT: Heparin subcutaneous 5000 units q12h.  Diet: DASH/TLC diet ordered.  Disposition: pending improvement in creatinine function and tolerating PO

## 2021-10-31 NOTE — PROGRESS NOTE ADULT - ASSESSMENT
81 year old female with history of HTN, T2DM, oropharyngeal squamous cell carcinoma who presents with NICK and oropharyngeal dysphagia.    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  She underwent recent  laryngoscopy with biopsy and esophagoscopy with brushing on 10/13/2021 with pathology consistent with squamous cell carcinoma.  Outpatient PET imaging 10/27/2021 reveals large mass in right lateral tongue extending into glossotonsillar sulcus, increased FDG activity in right nasopharynx, nonspecific mildly increased FDG activity in esophagus, increased FDG uptake in perianal region, and FDG deformity in proximal right humerus.  GI consulted for dysphagia.  Patient states that ever since her laryngoscopy with biopsy and esophagoscopy with brushing on 10/13/2021 she has had "difficulty initiating a swallow."  No signs/symptoms of esophageal dysphagia.    Recommendations:  -formal speech/swallow evaluation negative for aspiration  -appreciate input from Heme/Onc for management of oropharyngeal/tongue squamous cell carcinoma  -may need input from ENT if oropharyngeal symptoms worsening or persist  -workup of NICK per primary team  -no acute indication for endoscopy/colonoscopy, however may need nonurgent endoscopy as outpatient if tissue diagnosis needed for PET findings  -will sign off at this time, please call back for questions       Fernando Arnold, PGY-4  GI/Hepatology Fellow    MONDAY-FRIDAY 8AM-5PM:  Pager# [phone] ([facility]) or [phone] ([facility])    NON-URGENT CONSULTS:  Please email [email or web address]  AT NIGHT AND ON WEEKENDS:  Contact on-call GI fellow via answering service ([phone]) from 5pm-8am and on weekends/holidays

## 2021-10-31 NOTE — PROGRESS NOTE ADULT - PROBLEM SELECTOR PLAN 2
Patient with pH of 7.24 and serum bicarb = 19  - appears to have metabolic acidosis with concomitant respiratory acidosis, pCO2 is elevated at 47 compared to expected  respiratory compensation per Winter's formula. Continue to follow with repeat vbg.  - s/p 1300 mg Sodium bicarbonate  - Start sodium bicarb 325mg TID 2 days supp pending Renal eval  - Nephrology consulted, appreciate recs Patient with pH of 7.24 and serum bicarb = 19  - appears to have metabolic acidosis with concomitant respiratory acidosis, pCO2 is elevated at 47 compared to expected respiratory compensation per Winter's formula. Continue to follow with repeat vbg.  - s/p 1300 mg Sodium bicarbonate  - Start sodium bicarb 325mg TID 2 days supp pending Renal eval  - Nephrology consulted, appreciate recs

## 2021-10-31 NOTE — PROGRESS NOTE ADULT - SUBJECTIVE AND OBJECTIVE BOX
Interval Events:   No acute events overnight.  Patient denies any acute symptoms at this time.  Swallowing issues slightly improved.    ROS:   A 12-point ROS was performed and negative except as noted in HPI.    Hospital Medications:  acetaminophen     Tablet .. 650 milliGRAM(s) Oral every 6 hours PRN  amLODIPine   Tablet 5 milliGRAM(s) Oral daily  atorvastatin 10 milliGRAM(s) Oral at bedtime  brimonidine 0.2% Ophthalmic Solution 1 Drop(s) Right EYE two times a day  carvedilol 25 milliGRAM(s) Oral every 12 hours  dextrose 40% Gel 15 Gram(s) Oral once  dextrose 5%. 1000 milliLiter(s) IV Continuous <Continuous>  dextrose 5%. 1000 milliLiter(s) IV Continuous <Continuous>  dextrose 50% Injectable 25 Gram(s) IV Push once  dextrose 50% Injectable 12.5 Gram(s) IV Push once  dextrose 50% Injectable 25 Gram(s) IV Push once  glucagon  Injectable 1 milliGRAM(s) IntraMuscular once  heparin   Injectable 5000 Unit(s) SubCutaneous every 12 hours  influenza  Vaccine (HIGH DOSE) 0.7 milliLiter(s) IntraMuscular once  lidocaine 2% Viscous 10 milliLiter(s) Swish and Spit three times a day  oxycodone    5 mG/acetaminophen 325 mG 1 Tablet(s) Oral every 6 hours PRN  sodium bicarbonate 325 milliGRAM(s) Oral three times a day  sodium chloride 0.9% lock flush 3 milliLiter(s) IV Push every 8 hours  sodium chloride 0.9%. 1000 milliLiter(s) IV Continuous <Continuous>      PHYSICAL EXAM:   Vital Signs:  Vital Signs Last 24 Hrs  T(C): 36.5 (31 Oct 2021 05:59), Max: 37.1 (30 Oct 2021 22:24)  T(F): 97.7 (31 Oct 2021 05:59), Max: 98.7 (30 Oct 2021 22:24)  HR: 50 (31 Oct 2021 10:00) (50 - 68)  BP: 119/47 (31 Oct 2021 10:00) (119/47 - 144/56)  BP(mean): --  RR: 16 (31 Oct 2021 05:59) (16 - 17)  SpO2: 100% (31 Oct 2021 05:59) (98% - 100%)  Daily     Daily     GENERAL: no acute distress  NEURO: alert  HEENT: anicteric sclera, no conjunctival pallor appreciated  CHEST: no respiratory distress, no accessory muscle use  CARDIAC: regular rate, +S1/S2  ABDOMEN: soft, nondistended, nontender, no rebound or guarding  EXTREMITIES: warm, well perfused, no edema  SKIN: no lesions noted    LABS: reviewed                        8.9    7.21  )-----------( 343      ( 31 Oct 2021 08:00 )             26.5     10-31    137  |  105  |  65<H>  ----------------------------<  121<H>  4.1   |  21<L>  |  2.61<H>    Ca    9.7      31 Oct 2021 08:00  Phos  3.3     10-31  Mg     2.00     10-31    TPro  6.5  /  Alb  3.5  /  TBili  <0.2  /  DBili  x   /  AST  7   /  ALT  7   /  AlkPhos  73  10-29    LIVER FUNCTIONS - ( 29 Oct 2021 19:06 )  Alb: 3.5 g/dL / Pro: 6.5 g/dL / ALK PHOS: 73 U/L / ALT: 7 U/L / AST: 7 U/L / GGT: x             Interval Diagnostic Studies: see sunrise for full report

## 2021-10-31 NOTE — PROGRESS NOTE ADULT - PROBLEM SELECTOR PLAN 5
Patient with hemoglobin of 9.0. Baseline appears between 9-10. Likely in setting of chronic kidney disease.  - Anemia workup TIBC, reticulocyte count, ferritin, B12 and folate pending  - trend CBC Patient with hemoglobin of 9.0. Baseline appears between 9-10. Likely in setting of chronic kidney disease.  - iron wnl, folate wnl, B12 wnl, TIBC decreased, appears to be normocytic anemia with MCV 95  - trend CBC

## 2021-10-31 NOTE — PROGRESS NOTE ADULT - PROBLEM SELECTOR PLAN 1
Cr 3.45, prior Cr 1.93  on October 8th. Creatinine 1.1-1.2 in 9/2020.  - GFR 12 previously 24.  - FENa calculated 3.1% correlates with intrinsic, but more likely pre-renal in the setting of poor PO intake and elevated BUN:Cr ratio  - Continue hold benazepril and Hydrochlorothiazide in setting of NICK.  - Renal US performed, results pending  - Monitor renal function daily and response to IV hydration, continue with NS 0.9% pending creatinine function  - Nephrology consulted, appreciate recs Cr 3.45, prior Cr 1.93  on October 8th. Creatinine 1.1-1.2 in 9/2020.  - GFR 12 previously 24.  - FENa calculated 3.1% correlates with intrinsic, but more likely pre-renal in the setting of poor PO intake and elevated BUN:Cr ratio >20:1  - Continue hold benazepril and Hydrochlorothiazide in setting of NICK  - Renal US performed, results pending    - Increased cortical echogenicity, suggesting medical renal disease.    - No hydronephrosis    - Bladder distended to 444 cc, ordered bladder scans for postvoid residuals    - Cholelithiasis, incidental finding, patient is asymptomatic  - Renal function responsive to fluids, continue NS 0.9% 75cc/hr for 12 hours  - Nephrology consulted, appreciate recs

## 2021-10-31 NOTE — PROGRESS NOTE ADULT - SUBJECTIVE AND OBJECTIVE BOX
PAULA BLANCO  81y  MRN: [id number]    Subjective:    Patient is a 81y old  Female who presents with a chief complaint of Referred by [facility] for evaluation of "ESRD" (29 Oct 2021 20:54)      Interval history/overnight events:          MEDICATIONS  (STANDING):  amLODIPine   Tablet 5 milliGRAM(s) Oral daily  atorvastatin 10 milliGRAM(s) Oral at bedtime  brimonidine 0.2% Ophthalmic Solution 1 Drop(s) Right EYE two times a day  carvedilol 25 milliGRAM(s) Oral every 12 hours  dextrose 40% Gel 15 Gram(s) Oral once  dextrose 5%. 1000 milliLiter(s) (50 mL/Hr) IV Continuous <Continuous>  dextrose 5%. 1000 milliLiter(s) (100 mL/Hr) IV Continuous <Continuous>  dextrose 50% Injectable 25 Gram(s) IV Push once  dextrose 50% Injectable 12.5 Gram(s) IV Push once  dextrose 50% Injectable 25 Gram(s) IV Push once  glucagon  Injectable 1 milliGRAM(s) IntraMuscular once  heparin   Injectable 5000 Unit(s) SubCutaneous every 12 hours  influenza  Vaccine (HIGH DOSE) 0.7 milliLiter(s) IntraMuscular once  insulin lispro (ADMELOG) corrective regimen sliding scale   SubCutaneous three times a day before meals  insulin lispro (ADMELOG) corrective regimen sliding scale   SubCutaneous at bedtime  sodium bicarbonate 325 milliGRAM(s) Oral three times a day  sodium chloride 0.9% lock flush 3 milliLiter(s) IV Push every 8 hours  sodium chloride 0.9%. 1000 milliLiter(s) (75 mL/Hr) IV Continuous <Continuous>    MEDICATIONS  (PRN):        Objective:    Vitals: Vital Signs Last 24 Hrs  T(C): 36.7 (10-30-21 @ 05:50), Max: 36.7 (10-29-21 @ 21:50)  T(F): 98 (10-30-21 @ 05:50), Max: 98 (10-29-21 @ 21:50)  HR: 55 (10-30-21 @ 05:50) (55 - 61)  BP: 143/69 (10-30-21 @ 05:50) (116/74 - 152/74)  BP(mean): --  RR: 17 (10-30-21 @ 05:50) (16 - 17)  SpO2: 98% (10-30-21 @ 05:50) (94% - 100%)            I&O's Summary      PHYSICAL EXAM:  GENERAL: NAD, lying in bed  HEENT: PERRLA, EOM intact, tongue without obvious lesion but possible scar on right side, no other oral lesions appreciated, no LAD  CHEST/LUNG: CTAB, no wheezing, crackles, or ronchi   HEART: regular rate, irregular rhythm, no murmur appreciated  ABDOMEN: soft, nondistended, non-tender, normoactive BS  SKIN: No rashes or lesions  NERVOUS SYSTEM: Alert & Oriented X2-3 (forgot last name, location was [facility], year with prompting)  EXT: no peripheral edema  PSYCH: calm and cooperative     LABS:    10-29    134<L>  |  100  |  88<H>  ----------------------------<  103<H>  4.2   |  19<L>  |  3.45<H>    Ca    9.1      29 Oct 2021 19:06  Phos  5.0     10-29  Mg     2.20     10-29    TPro  6.5  /  Alb  3.5  /  TBili  <0.2  /  DBili  x   /  AST  7   /  ALT  7   /  AlkPhos  73  10-29                  Urinalysis Basic - ( 29 Oct 2021 23:53 )    Color: Light Yellow / Appearance: Slightly Turbid / S.010 / pH: x  Gluc: x / Ketone: Negative  / Bili: Negative / Urobili: <2 mg/dL   Blood: x / Protein: Trace / Nitrite: Negative   Leuk Esterase: Moderate / RBC: 1 /HPF / WBC 31 /HPF   Sq Epi: x / Non Sq Epi: 8 /HPF / Bacteria: Occasional                              9.0    6.72  )-----------( 344      ( 29 Oct 2021 19:06 )             26.8                         9.5    7.73  )-----------( 322      ( 28 Oct 2021 15:27 )             28.9     CAPILLARY BLOOD GLUCOSE      POCT Blood Glucose.: 92 mg/dL (29 Oct 2021 23:54)          RADIOLOGY & ADDITIONAL TESTS:            Imaging Personally Reviewed:  [ ] YES  [ ] NO    Consultants involved in case:   Consultant(s) Notes Reviewed:  [ ] YES  [ ] NO:   Care Discussed with Consultants/Other Providers [ ] YES  [ ] NO         PAULA BLANCO  81y  MRN: [id number]    Subjective:    Patient is a 81y old  Female who presents with a chief complaint of Referred by [facility] for evaluation of "ESRD" (29 Oct 2021 20:54)      Interval history/overnight events:  The patient is not eating or drinking very much. She says she is reluctant to eat or drink while in the hospital and wants to go home. She has some burning sensation in her throat when swallowing. She continues to have tongue pain this morning. She expresses wanting to go home. No other complaints at this time.         MEDICATIONS  (STANDING):  amLODIPine   Tablet 5 milliGRAM(s) Oral daily  atorvastatin 10 milliGRAM(s) Oral at bedtime  brimonidine 0.2% Ophthalmic Solution 1 Drop(s) Right EYE two times a day  carvedilol 25 milliGRAM(s) Oral every 12 hours  dextrose 40% Gel 15 Gram(s) Oral once  dextrose 5%. 1000 milliLiter(s) (50 mL/Hr) IV Continuous <Continuous>  dextrose 5%. 1000 milliLiter(s) (100 mL/Hr) IV Continuous <Continuous>  dextrose 50% Injectable 25 Gram(s) IV Push once  dextrose 50% Injectable 12.5 Gram(s) IV Push once  dextrose 50% Injectable 25 Gram(s) IV Push once  glucagon  Injectable 1 milliGRAM(s) IntraMuscular once  heparin   Injectable 5000 Unit(s) SubCutaneous every 12 hours  influenza  Vaccine (HIGH DOSE) 0.7 milliLiter(s) IntraMuscular once  insulin lispro (ADMELOG) corrective regimen sliding scale   SubCutaneous three times a day before meals  insulin lispro (ADMELOG) corrective regimen sliding scale   SubCutaneous at bedtime  sodium bicarbonate 325 milliGRAM(s) Oral three times a day  sodium chloride 0.9% lock flush 3 milliLiter(s) IV Push every 8 hours  sodium chloride 0.9%. 1000 milliLiter(s) (75 mL/Hr) IV Continuous <Continuous>    MEDICATIONS  (PRN):        Objective:    Vitals: Vital Signs Last 24 Hrs  T(C): 36.7 (10-30-21 @ 05:50), Max: 36.7 (10-29-21 @ 21:50)  T(F): 98 (10-30-21 @ 05:50), Max: 98 (10-29-21 @ 21:50)  HR: 55 (10-30-21 @ 05:50) (55 - 61)  BP: 143/69 (10-30-21 @ 05:50) (116/74 - 152/74)  BP(mean): --  RR: 17 (10-30-21 @ 05:50) (16 - 17)  SpO2: 98% (10-30-21 @ 05:50) (94% - 100%)            I&O's Summary      PHYSICAL EXAM:  GENERAL: NAD, lying in bed  HEENT: PERRLA, EOM intact, tongue without obvious lesion but possible scar on right side, no other oral lesions appreciated, no LAD  CHEST/LUNG: CTAB, no wheezing, crackles, or ronchi   HEART: regular rate, irregular rhythm, no murmur appreciated  ABDOMEN: soft, nondistended, non-tender, normoactive BS  SKIN: No rashes or lesions  NERVOUS SYSTEM: Alert & Oriented X2-3 (forgot last name, location was hospital, year with prompting)  EXT: no peripheral edema  PSYCH: calm and cooperative     LABS:    10-29    134<L>  |  100  |  88<H>  ----------------------------<  103<H>  4.2   |  19<L>  |  3.45<H>    Ca    9.1      29 Oct 2021 19:06  Phos  5.0     10-29  Mg     2.20     10-29    TPro  6.5  /  Alb  3.5  /  TBili  <0.2  /  DBili  x   /  AST  7   /  ALT  7   /  AlkPhos  73  10-29                  Urinalysis Basic - ( 29 Oct 2021 23:53 )    Color: Light Yellow / Appearance: Slightly Turbid / S.010 / pH: x  Gluc: x / Ketone: Negative  / Bili: Negative / Urobili: <2 mg/dL   Blood: x / Protein: Trace / Nitrite: Negative   Leuk Esterase: Moderate / RBC: 1 /HPF / WBC 31 /HPF   Sq Epi: x / Non Sq Epi: 8 /HPF / Bacteria: Occasional                              9.0    6.72  )-----------( 344      ( 29 Oct 2021 19:06 )             26.8                         9.5    7.73  )-----------( 322      ( 28 Oct 2021 15:27 )             28.9     CAPILLARY BLOOD GLUCOSE      POCT Blood Glucose.: 92 mg/dL (29 Oct 2021 23:54)          RADIOLOGY & ADDITIONAL TESTS:            Imaging Personally Reviewed:  [ ] YES  [ ] NO    Consultants involved in case:   Consultant(s) Notes Reviewed:  [ ] YES  [ ] NO:   Care Discussed with Consultants/Other Providers [ ] YES  [ ] NO         PAULA BLANCO  81y  MRN: [id number]    Subjective:    Patient is a 81y old  Female who presents with a chief complaint of Referred by [facility] for evaluation of "ESRD" (29 Oct 2021 20:54)      Interval history/overnight events:  The patient is not eating or drinking very much. She says she is reluctant to eat or drink while in the hospital and wants to go home. She has some burning sensation in her throat when swallowing. She continues to have tongue pain this morning. She expresses wanting to go home. No other complaints at this time.         MEDICATIONS  (STANDING):  amLODIPine   Tablet 5 milliGRAM(s) Oral daily  atorvastatin 10 milliGRAM(s) Oral at bedtime  brimonidine 0.2% Ophthalmic Solution 1 Drop(s) Right EYE two times a day  carvedilol 25 milliGRAM(s) Oral every 12 hours  dextrose 40% Gel 15 Gram(s) Oral once  dextrose 5%. 1000 milliLiter(s) (50 mL/Hr) IV Continuous <Continuous>  dextrose 5%. 1000 milliLiter(s) (100 mL/Hr) IV Continuous <Continuous>  dextrose 50% Injectable 25 Gram(s) IV Push once  dextrose 50% Injectable 12.5 Gram(s) IV Push once  dextrose 50% Injectable 25 Gram(s) IV Push once  glucagon  Injectable 1 milliGRAM(s) IntraMuscular once  heparin   Injectable 5000 Unit(s) SubCutaneous every 12 hours  influenza  Vaccine (HIGH DOSE) 0.7 milliLiter(s) IntraMuscular once  insulin lispro (ADMELOG) corrective regimen sliding scale   SubCutaneous three times a day before meals  insulin lispro (ADMELOG) corrective regimen sliding scale   SubCutaneous at bedtime  sodium bicarbonate 325 milliGRAM(s) Oral three times a day  sodium chloride 0.9% lock flush 3 milliLiter(s) IV Push every 8 hours  sodium chloride 0.9%. 1000 milliLiter(s) (75 mL/Hr) IV Continuous <Continuous>    MEDICATIONS  (PRN):        Objective:    Vitals: Vital Signs Last 24 Hrs  T(C): 36.7 (10-30-21 @ 05:50), Max: 36.7 (10-29-21 @ 21:50)  T(F): 98 (10-30-21 @ 05:50), Max: 98 (10-29-21 @ 21:50)  HR: 55 (10-30-21 @ 05:50) (55 - 61)  BP: 143/69 (10-30-21 @ 05:50) (116/74 - 152/74)  BP(mean): --  RR: 17 (10-30-21 @ 05:50) (16 - 17)  SpO2: 98% (10-30-21 @ 05:50) (94% - 100%)            I&O's Summary      PHYSICAL EXAM:  GENERAL: NAD, lying in bed  HEENT: PERRLA, EOM intact, tongue without obvious lesion but scar from biopsy on right side, no other oral lesions appreciated, no LAD  CHEST/LUNG: CTAB, no wheezing, crackles, or ronchi   HEART: RRR, no murmur appreciated  ABDOMEN: soft, nondistended, non-tender, normoactive BS  SKIN: No rashes or lesions  NERVOUS SYSTEM: Alert & Oriented X2-3 (name, [facility] [place], year with prompting)  EXT: no peripheral edema  PSYCH: calm and cooperative     LABS:    10-29    134<L>  |  100  |  88<H>  ----------------------------<  103<H>  4.2   |  19<L>  |  3.45<H>    Ca    9.1      29 Oct 2021 19:06  Phos  5.0     10-29  Mg     2.20     10-29    TPro  6.5  /  Alb  3.5  /  TBili  <0.2  /  DBili  x   /  AST  7   /  ALT  7   /  AlkPhos  73  10-29                  Urinalysis Basic - ( 29 Oct 2021 23:53 )    Color: Light Yellow / Appearance: Slightly Turbid / S.010 / pH: x  Gluc: x / Ketone: Negative  / Bili: Negative / Urobili: <2 mg/dL   Blood: x / Protein: Trace / Nitrite: Negative   Leuk Esterase: Moderate / RBC: 1 /HPF / WBC 31 /HPF   Sq Epi: x / Non Sq Epi: 8 /HPF / Bacteria: Occasional                              9.0    6.72  )-----------( 344      ( 29 Oct 2021 19:06 )             26.8                         9.5    7.73  )-----------( 322      ( 28 Oct 2021 15:27 )             28.9     CAPILLARY BLOOD GLUCOSE      POCT Blood Glucose.: 92 mg/dL (29 Oct 2021 23:54)          RADIOLOGY & ADDITIONAL TESTS:            Imaging Personally Reviewed:  [ ] YES  [ ] NO    Consultants involved in case:   Consultant(s) Notes Reviewed:  [ ] YES  [ ] NO:   Care Discussed with Consultants/Other Providers [ ] YES  [ ] NO

## 2021-11-01 NOTE — PROGRESS NOTE ADULT - PROBLEM SELECTOR PLAN 2
Patient with pH of 7.24 and serum bicarb = 19  - appears to have metabolic acidosis with concomitant respiratory acidosis, pCO2 is elevated at 47 compared to expected respiratory compensation per Winter's formula. Continue to follow with repeat vbg.  - s/p 1300 mg Sodium bicarbonate  - Start sodium bicarb 325mg TID 2 days supp pending Renal eval  - Nephrology consulted, appreciate recs Patient with pH of 7.24 and serum bicarb = 19  - appears to have metabolic acidosis with concomitant respiratory acidosis, pCO2 is elevated at 47 compared to expected respiratory compensation per Winter's formula. Continue to follow with repeat vbg.  - s/p 1300 mg Sodium bicarbonate  - s/p sodium bicarb 325mg TID 2 days supp pending Renal eval  - Nephrology added per patient providers list, pending assessment

## 2021-11-01 NOTE — DIETITIAN INITIAL EVALUATION ADULT. - PROBLEM SELECTOR PLAN 2
Acute; Patient with pH of 7.24 and serum bicarb = 19; Due to NICK on CKD  1300 mg Sodium bicarbonate ordered.  Will repeat VBG in AM.  Renal c/s in AM  Start sodium bicarb 325mg TID  2 days supp  pending Renal eval  monitor pH

## 2021-11-01 NOTE — PROGRESS NOTE ADULT - ASSESSMENT
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 Heme/onc, nephrology, and GI following.      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 Heme/onc and GI following.

## 2021-11-01 NOTE — PROGRESS NOTE ADULT - PROBLEM SELECTOR PLAN 3
The patient does not report problems with swallowing at bedside. More likely odynophagia in the setting of tongue pain after biopsy on 10/13.  - NMPET/CT Onc Skull to thigh, 10/27/21: Large, FDG-avid mass in right lateral oral tongue/base of tongue, extending into glossotonsillar sulcus; Diffuse, mildly increased FDG activity in the esophagus may reflect inflammation.  - Speech and swallow evaluated, dysphagia diet  - GI consulted for potential EGD for increased FDG activity in esophagus, appreciate recs    - no intervention indicated at this time per note    - possible PEG tube, primary discussed this with patient but she does not want currently  - Having burning sensation in throat this morning, will give lidocaine swish. May be related to FDG activity in esophagus but GI is recommending no intervention at this time. The patient does not report problems with swallowing at bedside. More likely odynophagia in the setting of tongue pain after biopsy on 10/13.  - NMPET/CT Onc Skull to thigh, 10/27/21: Large, FDG-avid mass in right lateral oral tongue/base of tongue, extending into glossotonsillar sulcus; Diffuse, mildly increased FDG activity in the esophagus may reflect inflammation.  - Speech and swallow evaluated, dysphagia diet  - GI consulted for potential EGD for increased FDG activity in esophagus, appreciate recs    - no intervention indicated at this time per note    - possible PEG tube, primary discussed this with patient but she does not want currently  - Evaluated by dietician, pending recs

## 2021-11-01 NOTE — PROGRESS NOTE ADULT - PROBLEM SELECTOR PLAN 5
Patient with hemoglobin of 9.0. Baseline appears between 9-10. Likely in setting of chronic kidney disease.  - iron wnl, folate wnl, B12 wnl, TIBC decreased, appears to be normocytic anemia with MCV 95  - trend CBC

## 2021-11-01 NOTE — DIETITIAN INITIAL EVALUATION ADULT. - PROBLEM SELECTOR PLAN 6
Oncology consult emailed.  Continue monitoring.  Tongue with scar on right side.  Dysphagia w/up as above

## 2021-11-01 NOTE — DIETITIAN INITIAL EVALUATION ADULT. - PROBLEM SELECTOR PLAN 7
Monitor BP.  DASH/TLC diet ordered.  Continue carvedilol and amlodipine.   Will hold benazepril in setting of NICK.

## 2021-11-01 NOTE — PROGRESS NOTE ADULT - PROBLEM SELECTOR PLAN 1
Cr 3.45, prior Cr 1.93  on October 8th. Creatinine 1.1-1.2 in 9/2020.  - GFR 12 previously 24.  - FENa calculated 3.1% correlates with intrinsic, but more likely pre-renal in the setting of poor PO intake and elevated BUN:Cr ratio >20:1  - Continue hold benazepril and Hydrochlorothiazide in setting of NICK  - Renal US performed, results pending    - Increased cortical echogenicity, suggesting medical renal disease.    - No hydronephrosis    - Bladder distended to 444 cc, ordered bladder scans for postvoid residuals    - Cholelithiasis, incidental finding, patient is asymptomatic  - Renal function responsive to fluids, continue NS 0.9% 75cc/hr for 12 hours  - Nephrology consulted, appreciate recs Cr 3.45, prior Cr 1.93  on October 8th. Creatinine 1.1-1.2 in 9/2020.  - GFR 12 previously 24.  - FENa calculated 3.1% correlates with intrinsic, but more likely pre-renal in the setting of poor PO intake and elevated BUN:Cr ratio >20:1  - Continue hold benazepril and Hydrochlorothiazide in setting of NICK  - Renal US performed, results pending    - Increased cortical echogenicity, suggesting medical renal disease.    - No hydronephrosis    - Bladder distended to 444 cc, not retaining on bladder scans, will DC bladder scans    - Cholelithiasis, incidental finding, patient is asymptomatic  - Renal function responsive to fluids indicating pre-renal etiology, continue NS 0.9% 75cc/hr for 12 hours

## 2021-11-01 NOTE — PROGRESS NOTE ADULT - ASSESSMENT
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 Knowles last year. ---- She now presents with what seems to be an infiltrative third primary R. BOT SCCa.   This is confirmed after EUA and biopsy where frozen section positive for SCCa.  outpt PET:  Large, FDG-avid mass in right lateral oral tongue/base of tongue, extending into glossotonsillar sulcus, best delineated on recent contrast-enhanced CT, and corresponding to patient's biopsy proven squamous cell carcinoma. Nonspecific, asymmetrically increased FDG activity in right side of nasopharynx. Please correlate with direct visualization. Nonspecific, diffuse, mildly increased FDG activity in the esophagus may reflect inflammation. There also is a large: Nonspecific focus of increased FDG activity in the perianal region. Please correlate clinically and with endoscopy/proctoscopy, as indicated. FDG-avid deformity, proximal right humerus, probably is secondary to fracture/trauma.    Patient lives with her elderly , she has 2 daughters but is not in close contact with them and they live in other [place], she has a sister [place] who is her HCP as per pt and her .   She is not interested in a feeding tube. She understands that to be eligible for any treatment she will need to be able to maintain her nutrition status.   She is frail and has a ECOG Performance status of 2-3 at home.   Best supportive care may be the best option.   I called her sister to discuss this but she did not respond.     -Pal care consult for assistance with goals of care   -swallow eval recs appreciated: puree with thin liquids   -Encourage PO intake, monitor and consider maintenance IVF as needed  -Supportive care, pain control, Nutrition, PT, DVT ppx  -Outpatient oncology f/u    Will follow. Please do not hesitate to call back with questions.     Margaret Grover MD  Medical Oncology Attending  C: [phone]

## 2021-11-01 NOTE — DIETITIAN INITIAL EVALUATION ADULT. - ADD RECOMMEND
1. Diet consistency/texture per SLP's rec/MD order. 2. Monitor weights, labs, BM's, skin integrity, PO intake/tolerance. 3. Encourage po intake, assist with meals and menu selections, provide alternatives PRN. 4. May consider Nephro-Gina daily.

## 2021-11-01 NOTE — DIETITIAN INITIAL EVALUATION ADULT. - PROBLEM SELECTOR PLAN 9
Patient on Soliqua 15 units daily in AM with breakfast and Glyxambi  10 mg-5mg.  Will place patient on low dose insulin sliding scale.  Hemoglobin a1c ordered with AM labs.  Monitor fingersticks.  Consistent carbohydrate diet ordered.  May need low dose Lantus daily

## 2021-11-01 NOTE — PROGRESS NOTE ADULT - SUBJECTIVE AND OBJECTIVE BOX
INTERVAL HPI/OVERNIGHT EVENTS:  Patient seen at bedside.      VITAL SIGNS:  T(F): 97.3 (11-01-21 @ 12:00)  HR: 56 (11-01-21 @ 12:00)  BP: 153/53 (11-01-21 @ 12:00)  RR: 17 (11-01-21 @ 12:00)  SpO2: 100% (11-01-21 @ 12:00)  Wt(kg): --    PHYSICAL EXAM:    Constitutional: NAD, resting in bed comfortably  Eyes: EOMI, sclera non-icteric  Neck: supple, no LAP  Respiratory: CTA b/l, good air entry b/l, no wheezing, rhonchi or crackels  Cardiovascular: RRR, normal S1S2, no M/R/G  Gastrointestinal: soft, NTND  Extremities: no edema  Neurological: AAOx3, non focal  Skin: Normal temperature    MEDICATIONS  (STANDING):  atorvastatin 10 milliGRAM(s) Oral at bedtime  brimonidine 0.2% Ophthalmic Solution 1 Drop(s) Right EYE two times a day  carvedilol 25 milliGRAM(s) Oral every 12 hours  dextrose 40% Gel 15 Gram(s) Oral once  dextrose 5%. 1000 milliLiter(s) (100 mL/Hr) IV Continuous <Continuous>  dextrose 5%. 1000 milliLiter(s) (50 mL/Hr) IV Continuous <Continuous>  dextrose 5%. 1000 milliLiter(s) (100 mL/Hr) IV Continuous <Continuous>  dextrose 50% Injectable 25 Gram(s) IV Push once  dextrose 50% Injectable 12.5 Gram(s) IV Push once  dextrose 50% Injectable 25 Gram(s) IV Push once  glucagon  Injectable 1 milliGRAM(s) IntraMuscular once  glucagon  Injectable 1 milliGRAM(s) IntraMuscular once  heparin   Injectable 5000 Unit(s) SubCutaneous every 12 hours  influenza  Vaccine (HIGH DOSE) 0.7 milliLiter(s) IntraMuscular once  insulin lispro (ADMELOG) corrective regimen sliding scale   SubCutaneous three times a day before meals  insulin lispro (ADMELOG) corrective regimen sliding scale   SubCutaneous at bedtime  lidocaine 2% Viscous 10 milliLiter(s) Swish and Spit three times a day  sodium chloride 0.9% lock flush 3 milliLiter(s) IV Push every 8 hours  sodium chloride 0.9%. 1000 milliLiter(s) (75 mL/Hr) IV Continuous <Continuous>    MEDICATIONS  (PRN):  acetaminophen     Tablet .. 650 milliGRAM(s) Oral every 6 hours PRN Mild Pain (1 - 3), Moderate Pain (4 - 6)  oxycodone    5 mG/acetaminophen 325 mG 1 Tablet(s) Oral every 6 hours PRN Severe Pain (7 - 10)      Allergies    No Known Allergies    Intolerances        LABS:                        8.4    5.99  )-----------( 336      ( 01 Nov 2021 07:52 )             25.9     11-01    142  |  104  |  49<H>  ----------------------------<  168<H>  4.4   |  21<L>  |  1.91<H>    Ca    9.4      01 Nov 2021 07:52  Phos  2.6     11-01  Mg     1.90     11-01            RADIOLOGY & ADDITIONAL TESTS:  Studies reviewed.

## 2021-11-01 NOTE — DIETITIAN INITIAL EVALUATION ADULT. - OTHER INFO
Per chart, 80 y/o Female, ambulatory with a cane, with MHx of HTN, Type 2 diabetes, oropharyngeal cancer (25 years, not on chemo or radiation), HLD presents with NICK likely in setting of poor PO intake, associated with metabolic and concomitant respiratory acidosis and hyperphosphatemia, and dysphagia in the setting of newly found large, FDG-avid mass in right lateral oral tongue/base of tongue corresponding to patient's biopsy proven squamous cell carcinoma. Heme/onc, nephrology, and GI following.     RDN met with patient and  at bedside. Per patient, appetite decreased since ~3 months ago in July this year with gradual increase in swallowing difficulty, had been on soft/chopped foods at home mostly, and as per  her PO intake became poor since 10/13 s/p tongue biopsy with reported tongue pain. Had been taking Ensure Original x 1/2 bottle daily. Noted s/p SLP swallow eval on 10/30 and SLP recommended Pureed with thin liquid diet. Patient reports currently still has tongue pain and burning sensation when swallowing foods, but is able to keep food down. + lidocaine swish per MD. Of note, patient with hypoglycemia 10/30 AM, likely related to decreased po intake, FSBG improved s/p glucose gel x1 tube. PO intake % at meals per flowsheet record; however, ? PO intake record is accurate as patient reports poor PO intake on in-house meals in setting of swallowing difficulty. NKFA per patient, no food preferences voiced at this time. RDN encouraged PO intake as tolerated, and patient is amenable to have oral nutrition supplement Nepro at this time to optimize po intake. GI and heme/onc are following. Per MD's note: Discussed utility of PEG tube with patient, she is not ready at this time.     Reported # ~2 years ago. Wt hx per record: 51.8kg (8/25/20), 45.4kg (10/8/21), 43.9kg (10/30/21 current adm wt). Wt trend decreasing likely related to inadequate PO intake prior to admission.    Skin: stage II Pressure Injury to sacrum, pending wound care consult.  Edema: none noted.

## 2021-11-01 NOTE — DIETITIAN INITIAL EVALUATION ADULT. - PROBLEM SELECTOR PLAN 3
Newly dx'd dysphagia;   NMPET/CT Onc Skull to thigh, 10/27/21: Large, FDG-avid mass in right lateral oral tongue/base of tongue, extending into glossotonsillar sulcus; Diffuse, mildly increased FDG activity in the esophagus may reflect inflammation.    -NPO except meds and sips of water   -GI c/s for EGD in AM  -cineesophagogram   -formal speech and swallow evaluation

## 2021-11-01 NOTE — PROGRESS NOTE ADULT - PROBLEM SELECTOR PLAN 10
DVT: Heparin subcutaneous 5000 units q12h.  Diet: DASH/TLC diet ordered.  Disposition: pending improvement in creatinine function and tolerating PO

## 2021-11-01 NOTE — PROGRESS NOTE ADULT - PROBLEM SELECTOR PLAN 9
Patient on Soliqua 15 units daily in AM with breakfast and Glyxambi 10 mg-5mg at home. Hold home meds.  - Last A1C 5.6 10/8/21  - FS q6h  - Pureed diet per S+S Patient on Soliqua 15 units daily in AM with breakfast and Glyxambi 10 mg-5mg at home. Hold home meds.  - Last A1C 6.1 10/30/21  - FS q6h  - Pureed diet per S+S  - SSI

## 2021-11-01 NOTE — DIETITIAN INITIAL EVALUATION ADULT. - PERTINENT LABORATORY DATA
11-01 Na142 mmol/L Glu 168 mg/dL<H> K+ 4.4 mmol/L Cr  1.91 mg/dL<H> BUN 49 mg/dL<H> 11-01 Phos 2.6 mg/dL 10-29 Alb 3.5 g/dL 10-30 Chol 134 mg/dL LDL --    HDL 28 mg/dL<L> Trig 169 mg/dL<H>

## 2021-11-01 NOTE — DIETITIAN INITIAL EVALUATION ADULT. - PERTINENT MEDS FT
MEDICATIONS  (STANDING):  atorvastatin 10 milliGRAM(s) Oral at bedtime  brimonidine 0.2% Ophthalmic Solution 1 Drop(s) Right EYE two times a day  carvedilol 25 milliGRAM(s) Oral every 12 hours  dextrose 40% Gel 15 Gram(s) Oral once  dextrose 5%. 1000 milliLiter(s) (100 mL/Hr) IV Continuous <Continuous>  dextrose 5%. 1000 milliLiter(s) (50 mL/Hr) IV Continuous <Continuous>  dextrose 5%. 1000 milliLiter(s) (100 mL/Hr) IV Continuous <Continuous>  dextrose 50% Injectable 25 Gram(s) IV Push once  dextrose 50% Injectable 12.5 Gram(s) IV Push once  dextrose 50% Injectable 25 Gram(s) IV Push once  glucagon  Injectable 1 milliGRAM(s) IntraMuscular once  glucagon  Injectable 1 milliGRAM(s) IntraMuscular once  heparin   Injectable 5000 Unit(s) SubCutaneous every 12 hours  influenza  Vaccine (HIGH DOSE) 0.7 milliLiter(s) IntraMuscular once  insulin lispro (ADMELOG) corrective regimen sliding scale   SubCutaneous three times a day before meals  insulin lispro (ADMELOG) corrective regimen sliding scale   SubCutaneous at bedtime  lidocaine 2% Viscous 10 milliLiter(s) Swish and Spit three times a day  sodium chloride 0.9% lock flush 3 milliLiter(s) IV Push every 8 hours  sodium chloride 0.9%. 1000 milliLiter(s) (75 mL/Hr) IV Continuous <Continuous>    MEDICATIONS  (PRN):  acetaminophen     Tablet .. 650 milliGRAM(s) Oral every 6 hours PRN Mild Pain (1 - 3), Moderate Pain (4 - 6)  oxycodone    5 mG/acetaminophen 325 mG 1 Tablet(s) Oral every 6 hours PRN Severe Pain (7 - 10)

## 2021-11-01 NOTE — PROGRESS NOTE ADULT - PROBLEM SELECTOR PLAN 6
Diagnosed 25 years ago, previously treated with radiation. Not currently on treatment. s/p tongue biopsy 10/13. PET scan 10/27 with multiple areas of uptake including right lateral tongue extending into glossotonsillar sulcus, esophagus, r. side nasopharynx, and right humerus   - x-ray right humerus with fracture deformity of proximal humerus, no complaint of pain from patient  - Oncology consulted, appreciate recs Diagnosed 25 years ago, previously treated with radiation. Not currently on treatment. s/p tongue biopsy 10/13. PET scan 10/27 with multiple areas of uptake including right lateral tongue extending into glossotonsillar sulcus, esophagus, r. side nasopharynx, and right humerus   - x-ray right humerus with fracture deformity of proximal humerus, no complaint of pain from patient  - Oncology consulted, appreciate recs    - no inpatient chemo    - spoke with Dr. Grovre outpatient Oncologist    - Palliative consulted for GOC. Per Dr. Grover patient may not be able to get chemo or radiation if she is not eating. However, patient is refusing alternative feeding options such as PEG tube.

## 2021-11-01 NOTE — PROGRESS NOTE ADULT - PROBLEM SELECTOR PLAN 4
Likely in the setting of worsening kidney function.   - phosphorus level of 5  - s/p 1x phoslo 667 mg, now normalized  - pending renal recs Likely in the setting of worsening kidney function.   - phosphorus level of 5  - s/p 1x phoslo 667 mg, now normalized

## 2021-11-01 NOTE — DIETITIAN INITIAL EVALUATION ADULT. - PROBLEM SELECTOR PLAN 4
Acute, due to NICK on CKD  Patient phosphorus level of 5.  Phoslo 667 mg ordered once.  Follow repeat phosphorus in AM.  Renal consult in AM

## 2021-11-01 NOTE — DIETITIAN INITIAL EVALUATION ADULT. - PROBLEM SELECTOR PLAN 5
Patient with hemoglobin of 9.0.  Hemoglobin previously 9.8 on 10/8.  Continue to trend.  Likely in setting of chronic disease.  Will order TIBC, reticulocyte count, ferritin, B12 and folate.

## 2021-11-01 NOTE — DIETITIAN INITIAL EVALUATION ADULT. - PROBLEM SELECTOR PLAN 1
Admit to medicine, continue monitoring.  Cr 3.45, prior Cr 1.93  on October 8th.  GFR 12 previously 24.  Renal US performed, results pending  Urine electrolytes ordered.  Will hold benazepril and Hydrochlorothiazide in setting of NICK.  Call for renal consult in AM.  Monitor renal function daily and response to IV hydration

## 2021-11-02 NOTE — PROGRESS NOTE ADULT - PROBLEM SELECTOR PLAN 4
Likely in the setting of worsening kidney function.   - phosphorus level of 5  - s/p 1x phoslo 667 mg, now normalized

## 2021-11-02 NOTE — CONSULT NOTE ADULT - PROBLEM SELECTOR RECOMMENDATION 9
Patient reports pain with swallowing and right ear pain. In setting of poor renal function and S&S recs for puree with thin liquids, will transition PO percocet to PO dilaudid 1mg solution q4h prn severe pain.   Bowel regimen while on opioids   S&S recs appreciated- puree with thin liquids  Ongoing discussions regarding artificial nutrition- family meeting planned for tomorrow at 11-11:15

## 2021-11-02 NOTE — CONSULT NOTE ADULT - PROBLEM SELECTOR RECOMMENDATION 4
Patient with SCC of tongue  Onc: Dr. Grover   Further discussions regarding plan of care planned for tomorrow with patient,  Murali, sister Malnia, pall team, onc team and medicine team

## 2021-11-02 NOTE — PROGRESS NOTE ADULT - SUBJECTIVE AND OBJECTIVE BOX
INTERVAL HPI/OVERNIGHT EVENTS:  Patient seen at bedside.  Patient continues to endorse weakness  poor appetite because of persistent ear pain and while in uncomfortable environment.   no other acute complaints      VITAL SIGNS:  T(F): 97.9 (11-02-21 @ 11:13)  HR: 60 (11-02-21 @ 11:13)  BP: 133/48 (11-02-21 @ 11:13)  RR: 17 (11-02-21 @ 11:13)  SpO2: 100% (11-02-21 @ 11:13)  Wt(kg): --    PHYSICAL EXAM:    GENERAL: NAD, sitting on bed  HEENT: No obvious ear lesion, no drainage or erythema. PERRLA, EOM intact, right sidede tongue scar from biopsy on right side, no other oral lesions appreciated, no LAD  CHEST/LUNG: CTAB, no wheezing, crackles, or ronchi   HEART: Bradycardia, no murmur appreciated  ABDOMEN: soft, nondistended, non-tender, normoactive BS  SKIN: No rashes or lesions  NERVOUS SYSTEM: Alert & Oriented X2-3 (name, location was hospital, year with prompting)  EXT: no peripheral edema  PSYCH: calm and cooperative       MEDICATIONS  (STANDING):  amLODIPine   Tablet 7.5 milliGRAM(s) Oral daily  atorvastatin 10 milliGRAM(s) Oral at bedtime  brimonidine 0.2% Ophthalmic Solution 1 Drop(s) Right EYE two times a day  carvedilol 25 milliGRAM(s) Oral every 12 hours  dextrose 40% Gel 15 Gram(s) Oral once  dextrose 5%. 1000 milliLiter(s) (100 mL/Hr) IV Continuous <Continuous>  dextrose 5%. 1000 milliLiter(s) (50 mL/Hr) IV Continuous <Continuous>  dextrose 5%. 1000 milliLiter(s) (100 mL/Hr) IV Continuous <Continuous>  dextrose 50% Injectable 25 Gram(s) IV Push once  dextrose 50% Injectable 12.5 Gram(s) IV Push once  dextrose 50% Injectable 25 Gram(s) IV Push once  glucagon  Injectable 1 milliGRAM(s) IntraMuscular once  glucagon  Injectable 1 milliGRAM(s) IntraMuscular once  heparin   Injectable 5000 Unit(s) SubCutaneous every 12 hours  influenza  Vaccine (HIGH DOSE) 0.7 milliLiter(s) IntraMuscular once  insulin lispro (ADMELOG) corrective regimen sliding scale   SubCutaneous three times a day before meals  insulin lispro (ADMELOG) corrective regimen sliding scale   SubCutaneous at bedtime  lidocaine 2% Viscous 10 milliLiter(s) Swish and Spit three times a day  sodium chloride 0.9% lock flush 3 milliLiter(s) IV Push every 8 hours  sodium chloride 0.9%. 1000 milliLiter(s) (75 mL/Hr) IV Continuous <Continuous>    MEDICATIONS  (PRN):  acetaminophen     Tablet .. 650 milliGRAM(s) Oral every 6 hours PRN Mild Pain (1 - 3), Moderate Pain (4 - 6)  oxycodone    5 mG/acetaminophen 325 mG 1 Tablet(s) Oral every 6 hours PRN Severe Pain (7 - 10)      Allergies    No Known Allergies    Intolerances        LABS:                        8.2    5.99  )-----------( 347      ( 02 Nov 2021 07:45 )             24.5     11-02    139  |  107  |  34<H>  ----------------------------<  130<H>  4.4   |  21<L>  |  1.48<H>    Ca    8.9      02 Nov 2021 07:45  Phos  1.5     11-02  Mg     1.50     11-02            RADIOLOGY & ADDITIONAL TESTS:  Studies reviewed.   INTERVAL HPI/OVERNIGHT EVENTS:  Patient seen at bedside.  Patient continues to endorse weakness  also c/o poor appetite because of persistent ear pain   no other acute complaints      VITAL SIGNS:  T(F): 97.9 (11-02-21 @ 11:13)  HR: 60 (11-02-21 @ 11:13)  BP: 133/48 (11-02-21 @ 11:13)  RR: 17 (11-02-21 @ 11:13)  SpO2: 100% (11-02-21 @ 11:13)  Wt(kg): --    PHYSICAL EXAM:    GENERAL: NAD, sitting on bed  HEENT: No obvious ear lesion, no drainage or erythema. PERRLA, EOM intact, right sidede tongue scar from biopsy on right side, no other oral lesions appreciated, no LAD  CHEST/LUNG: CTAB, no wheezing, crackles, or ronchi   HEART: Bradycardia, no murmur appreciated  ABDOMEN: soft, nondistended, non-tender, normoactive BS  SKIN: No rashes or lesions  NERVOUS SYSTEM: Alert & Oriented X2-3 (name, location was hospital, year with prompting)  EXT: no peripheral edema  PSYCH: calm and cooperative       MEDICATIONS  (STANDING):  amLODIPine   Tablet 7.5 milliGRAM(s) Oral daily  atorvastatin 10 milliGRAM(s) Oral at bedtime  brimonidine 0.2% Ophthalmic Solution 1 Drop(s) Right EYE two times a day  carvedilol 25 milliGRAM(s) Oral every 12 hours  dextrose 40% Gel 15 Gram(s) Oral once  dextrose 5%. 1000 milliLiter(s) (100 mL/Hr) IV Continuous <Continuous>  dextrose 5%. 1000 milliLiter(s) (50 mL/Hr) IV Continuous <Continuous>  dextrose 5%. 1000 milliLiter(s) (100 mL/Hr) IV Continuous <Continuous>  dextrose 50% Injectable 25 Gram(s) IV Push once  dextrose 50% Injectable 12.5 Gram(s) IV Push once  dextrose 50% Injectable 25 Gram(s) IV Push once  glucagon  Injectable 1 milliGRAM(s) IntraMuscular once  glucagon  Injectable 1 milliGRAM(s) IntraMuscular once  heparin   Injectable 5000 Unit(s) SubCutaneous every 12 hours  influenza  Vaccine (HIGH DOSE) 0.7 milliLiter(s) IntraMuscular once  insulin lispro (ADMELOG) corrective regimen sliding scale   SubCutaneous three times a day before meals  insulin lispro (ADMELOG) corrective regimen sliding scale   SubCutaneous at bedtime  lidocaine 2% Viscous 10 milliLiter(s) Swish and Spit three times a day  sodium chloride 0.9% lock flush 3 milliLiter(s) IV Push every 8 hours  sodium chloride 0.9%. 1000 milliLiter(s) (75 mL/Hr) IV Continuous <Continuous>    MEDICATIONS  (PRN):  acetaminophen     Tablet .. 650 milliGRAM(s) Oral every 6 hours PRN Mild Pain (1 - 3), Moderate Pain (4 - 6)  oxycodone    5 mG/acetaminophen 325 mG 1 Tablet(s) Oral every 6 hours PRN Severe Pain (7 - 10)      Allergies    No Known Allergies    Intolerances        LABS:                        8.2    5.99  )-----------( 347      ( 02 Nov 2021 07:45 )             24.5     11-02    139  |  107  |  34<H>  ----------------------------<  130<H>  4.4   |  21<L>  |  1.48<H>    Ca    8.9      02 Nov 2021 07:45  Phos  1.5     11-02  Mg     1.50     11-02            RADIOLOGY & ADDITIONAL TESTS:  Studies reviewed.

## 2021-11-02 NOTE — CONSULT NOTE ADULT - PROBLEM SELECTOR RECOMMENDATION 6
Thank you for allowing us to participate in your patient's care. We will continue to follow with you. Please page [phone] for any q's or c's.     Gabrielle Butler D.O.   Palliative Medicine

## 2021-11-02 NOTE — CONSULT NOTE ADULT - SUBJECTIVE AND OBJECTIVE BOX
[facility] Geriatrics and Palliative Care  Gabrielle Butler Palliative Care Attending  Contact Info: Page [phone] (including Nights/Weekends), message on Microsoft Teams (Gabrielle Butler), or leave  at Palliative Office [phone] (non-urgent)     HPI:  80 y/o Female, ambulatory with a cane, with MHx of HTN, Type 2 diabetes, oropharyngeal cancer (25 years, not on chemo or radiation), HLD presents to the ED for NICK. Patient reports that she was called by Doctor from [facility] and told to come to ED because of "ESRD". Patient denies history of kidney problems. Patient reports having poor PO intake due to recent "surgery." Patient AAOx2;  called for collateral. Per  patient had tongue biopsy done on October 13th, since then patient has been has poor oral intake due to pain. Per  patient does not have kidney problems but sees nephrologist every 6 months. Patient endorses having constipation since being on oxycodone per pain. Patient does not remember last bowel movement however  reports last bowel movement was yesterday. Patient endorses passing gas. Patient endorses pain when swallowing when she does not take medication. States that eats soft food at home as has difficulty swallowing regular consistency food. Reports no difficulty swallowing medications. Patient denies, chest pain, shortness of breath, nausea, vomiting, leg pain, fever, chills. Patient denies difficulty urinating. (29 Oct 2021 20:54)    PERTINENT PM/SXH:   Tongue cancer    Anxiety    Depression    Hyperuricemia    Hypertension    Chronic kidney disease (CKD)    Glaucoma    Hypercholesterolemia    Osteopenia    Diabetes mellitus      Tongue cancer    H/O local excision of skin lesion      FAMILY HISTORY:  Family history of Alzheimer&#x27;s disease  mother    FH: dementia      ITEMS NOT CHECKED ARE NOT PRESENT    SOCIAL HISTORY:   Significant other/partner[ ]  Children[ ]  Zoroastrianism/Spirituality:  Substance hx:  [ ]   Tobacco hx:  [ ]   Alcohol hx: [ ]   Home Opioid hx:  [ ] I-Stop Reference No:  Living Situation: [ ]Home  [ ]Long term care  [ ]Rehab [ ]Other    ADVANCE DIRECTIVES:    DNR  MOLST  [ ]  Living Will  [ ]   DECISION MAKER(s):  [ ] Health Care Proxy(s)  [ ] Surrogate(s)  [ ] Guardian           Name(s): Phone Number(s):    BASELINE (I)ADL(s) (prior to admission):  [place]: [ ]Total  [ ] Moderate [ ]Dependent    Allergies    No Known Allergies    Intolerances    MEDICATIONS  (STANDING):  amLODIPine   Tablet 7.5 milliGRAM(s) Oral daily  atorvastatin 10 milliGRAM(s) Oral at bedtime  brimonidine 0.2% Ophthalmic Solution 1 Drop(s) Right EYE two times a day  carvedilol 25 milliGRAM(s) Oral every 12 hours  dextrose 40% Gel 15 Gram(s) Oral once  dextrose 5%. 1000 milliLiter(s) (100 mL/Hr) IV Continuous <Continuous>  dextrose 5%. 1000 milliLiter(s) (50 mL/Hr) IV Continuous <Continuous>  dextrose 5%. 1000 milliLiter(s) (100 mL/Hr) IV Continuous <Continuous>  dextrose 50% Injectable 25 Gram(s) IV Push once  dextrose 50% Injectable 12.5 Gram(s) IV Push once  dextrose 50% Injectable 25 Gram(s) IV Push once  glucagon  Injectable 1 milliGRAM(s) IntraMuscular once  glucagon  Injectable 1 milliGRAM(s) IntraMuscular once  heparin   Injectable 5000 Unit(s) SubCutaneous every 12 hours  influenza  Vaccine (HIGH DOSE) 0.7 milliLiter(s) IntraMuscular once  insulin lispro (ADMELOG) corrective regimen sliding scale   SubCutaneous three times a day before meals  insulin lispro (ADMELOG) corrective regimen sliding scale   SubCutaneous at bedtime  lidocaine 2% Viscous 10 milliLiter(s) Swish and Spit three times a day  sodium chloride 0.9% lock flush 3 milliLiter(s) IV Push every 8 hours  sodium chloride 0.9%. 1000 milliLiter(s) (75 mL/Hr) IV Continuous <Continuous>    MEDICATIONS  (PRN):  acetaminophen     Tablet .. 650 milliGRAM(s) Oral every 6 hours PRN Mild Pain (1 - 3), Moderate Pain (4 - 6)  oxycodone    5 mG/acetaminophen 325 mG 1 Tablet(s) Oral every 6 hours PRN Severe Pain (7 - 10)    PRESENT SYMPTOMS: [ ]Unable to obtain due to poor mentation   Source if other than patient:  [ ]Family   [ ]Team     Pain: [ ]yes [ ]no  QOL impact -   Location -                    Aggravating factors -  Quality -  Radiation -  Timing-  Severity (0-10 scale):  Minimal acceptable level (0-10 scale):     PAIN AD Score:     [email or web address] (press ctrl +  left click to view)    Dyspnea:                           [ ]Mild [ ]Moderate [ ]Severe  Anxiety:                             [ ]Mild [ ]Moderate [ ]Severe  Fatigue:                             [ ]Mild [ ]Moderate [ ]Severe  Nausea:                             [ ]Mild [ ]Moderate [ ]Severe  Loss of appetite:              [ ]Mild [ ]Moderate [ ]Severe  Constipation:                    [ ]Mild [ ]Moderate [ ]Severe    Other Symptoms:  [ ]All other review of systems negative     Palliative Performance Status Version 2:         %    [email or web address]://[email or web address]/files/[email or web address]_[email or web address]  PHYSICAL EXAM:  Vital Signs Last 24 Hrs  T(C): 36.6 (02 Nov 2021 11:13), Max: 36.6 (02 Nov 2021 11:13)  T(F): 97.9 (02 Nov 2021 11:13), Max: 97.9 (02 Nov 2021 11:13)  HR: 60 (02 Nov 2021 11:13) (55 - 60)  BP: 133/48 (02 Nov 2021 11:13) (133/48 - 166/69)  BP(mean): --  RR: 17 (02 Nov 2021 11:13) (17 - 18)  SpO2: 100% (02 Nov 2021 11:13) (100% - 100%) I&O's Summary    GENERAL:  [ ]Alert  [ ]Oriented x   [ ]Lethargic  [ ]Cachexia  [ ]Unarousable  [ ]Verbal  [ ]Non-Verbal  Behavioral:   [ ] Anxiety  [ ] Delirium [ ] Agitation [ ] Other  HEENT:  [ ]Normal   [ ]Dry mouth   [ ]ET Tube/Trach  [ ]Oral lesions  PULMONARY:   [ ]Clear [ ]Tachypnea  [ ]Audible excessive secretions   [ ]Rhonchi        [ ]Right [ ]Left [ ]Bilateral  [ ]Crackles        [ ]Right [ ]Left [ ]Bilateral  [ ]Wheezing     [ ]Right [ ]Left [ ]Bilatera  [ ]Diminished breath sounds [ ]right [ ]left [ ]bilateral  CARDIOVASCULAR:    [ ]Regular [ ]Irregular [ ]Tachy  [ ]Solo [ ]Murmur [ ]Other  GASTROINTESTINAL:  [ ]Soft  [ ]Distended   [ ]+BS  [ ]Non tender [ ]Tender  [ ]PEG [ ]OGT/ NGT  Last BM:   GENITOURINARY:  [ ]Normal [ ] Incontinent   [ ]Oliguria/Anuria   [ ]Hicks  MUSCULOSKELETAL:   [ ]Normal   [ ]Weakness  [ ]Bed/Wheelchair bound [ ]Edema  NEUROLOGIC:   [ ]No focal deficits  [ ]Cognitive impairment  [ ]Dysphagia [ ]Dysarthria [ ]Paresis [ ]Other   SKIN:   [ ]Normal    [ ]Rash  [ ]Pressure ulcer(s)       Present on admission [ ]y [ ]n    CRITICAL CARE:  [ ] Shock Present  [ ]Septic [ ]Cardiogenic [ ]Neurologic [ ]Hypovolemic  [ ]  Vasopressors [ ]  Inotropes   [ ]Respiratory failure present [ ]Mechanical ventilation [ ]Non-invasive ventilatory support [ ]High flow  [ ]Acute  [ ]Chronic [ ]Hypoxic  [ ]Hypercarbic [ ]Other  [ ]Other organ failure     LABS:                        8.2    5.99  )-----------( 347      ( 02 Nov 2021 07:45 )             24.5   11-02    139  |  107  |  34<H>  ----------------------------<  130<H>  4.4   |  21<L>  |  1.48<H>    Ca    8.9      02 Nov 2021 07:45  Phos  1.5     11-02  Mg     1.50     11-02          RADIOLOGY & ADDITIONAL STUDIES:    PROTEIN CALORIE MALNUTRITION PRESENT: [ ]mild [ ]moderate [ ]severe [ ]underweight [ ]morbid obesity  https://www.andeal.org/vault/2440/web/files/ONC/Table_Clinical%20Characteristics%20to%20Document%20Malnutrition-White%20JV%20et%20al%775485.pdf    Height (cm): 165.1 (10-29-21 @ 17:00), 165.1 (10-13-21 @ 10:20), 165.1 (10-08-21 @ 10:23)  Weight (kg): 43.5 (10-30-21 @ 02:57), 45.4 (10-13-21 @ 10:20), 45.4 (10-08-21 @ 10:23)  BMI (kg/m2): 16 (10-30-21 @ 02:57), 16.7 (10-29-21 @ 17:00), 16.7 (10-13-21 @ 10:20)    [ ]PPSV2 < or = to 30% [ ]significant weight loss  [ ]poor nutritional intake  [ ]anasarca      [ ]Artificial Nutrition      REFERRALS:   [ ]Chaplaincy  [ ]Hospice  [ ]Child Life  [ ]Social Work  [ ]Case management [ ]Holistic Therapy      [facility] Geriatrics and Palliative Care  Gabrielle Butler, Palliative Care Attending  Contact Info: Page [phone] (including Nights/Weekends), message on Microsoft Teams (Gabrielle Butler), or leave VM at Palliative Office [phone] (non-urgent)     HPI:  82 y/o Female, ambulatory with a cane, with MHx of HTN, Type 2 diabetes, oropharyngeal cancer (25 years, not on chemo or radiation), HLD presents to the ED for NICK. Patient reports that she was called by Doctor from [facility] and told to come to ED because of "ESRD". Patient denies history of kidney problems. Patient reports having poor PO intake due to recent "surgery." Patient AAOx2;  called for collateral. Per  patient had tongue biopsy done on October 13th, since then patient has been has poor oral intake due to pain. Per  patient does not have kidney problems but sees nephrologist every 6 months. Patient endorses having constipation since being on oxycodone per pain. Patient does not remember last bowel movement however  reports last bowel movement was yesterday. Patient endorses passing gas. Patient endorses pain when swallowing when she does not take medication. States that eats soft food at home as has difficulty swallowing regular consistency food. Reports no difficulty swallowing medications. Patient denies, chest pain, shortness of breath, nausea, vomiting, leg pain, fever, chills. Patient denies difficulty urinating. (29 Oct 2021 20:54)    PERTINENT PM/SXH:   Tongue cancer    Anxiety    Depression    Hyperuricemia    Hypertension    Chronic kidney disease (CKD)    Glaucoma    Hypercholesterolemia    Osteopenia    Diabetes mellitus      Tongue cancer    H/O local excision of skin lesion      FAMILY HISTORY:  Family history of Alzheimer&#x27;s disease  mother    FH: dementia      ITEMS NOT CHECKED ARE NOT PRESENT    SOCIAL HISTORY:   Significant other/partner[x ]  Children[x- 2 daughters Patricia and Keli ]  Church/Spirituality:  Substance hx:  [ ]   Tobacco hx:  [ ]   Alcohol hx: [ ]   Home Opioid hx:  [ x] I-Stop Reference No: [id number]  This report was requested by: Gabrielle Butler | Reference #: [id number]    Others' Prescriptions  Patient Name: Torrie Solomon Date: 1940  Address: [address] [place] [id number]: Female  Rx Written	Rx Dispensed	Drug	Quantity	Days Supply	Prescriber Name  10/15/2021	10/16/2021	lorazepam 0.5 mg tablet	60	30	Farhat Santos DO  10/13/2021	10/15/2021	oxycodone-acetaminophen 5-325 mg tablet	28	7	[facility]  10/05/2021	10/06/2021	oxycodone-acetaminophen 5-325 mg tablet	30	10	Francisco Rogers  08/06/2021	08/07/2021	lorazepam 0.5 mg tablet	60	30	Farhat Santos DO  Living Situation: [ ]Home  [ ]Long term care  [ ]Rehab [ ]Other    ADVANCE DIRECTIVES:    MOLST  [ ]  Living Will  [x ]   DECISION MAKER(s): Patient   [ ] Health Care Proxy(s)  [x ] Surrogate(s)  [ ] Guardian           Name(s): Murali Burdick Phone Number(s): [phone] (mobile); [phone] (home)   sister Malina  [phone] (home); [phone] (cell)     BASELINE (I)ADL(s) (prior to admission):   [place]: [x ]Total  [ ] Moderate [ ]Dependent    Allergies    No Known Allergies    Intolerances    MEDICATIONS  (STANDING):  amLODIPine   Tablet 7.5 milliGRAM(s) Oral daily  atorvastatin 10 milliGRAM(s) Oral at bedtime  brimonidine 0.2% Ophthalmic Solution 1 Drop(s) Right EYE two times a day  carvedilol 25 milliGRAM(s) Oral every 12 hours  dextrose 40% Gel 15 Gram(s) Oral once  dextrose 5%. 1000 milliLiter(s) (100 mL/Hr) IV Continuous <Continuous>  dextrose 5%. 1000 milliLiter(s) (50 mL/Hr) IV Continuous <Continuous>  dextrose 5%. 1000 milliLiter(s) (100 mL/Hr) IV Continuous <Continuous>  dextrose 50% Injectable 25 Gram(s) IV Push once  dextrose 50% Injectable 12.5 Gram(s) IV Push once  dextrose 50% Injectable 25 Gram(s) IV Push once  glucagon  Injectable 1 milliGRAM(s) IntraMuscular once  glucagon  Injectable 1 milliGRAM(s) IntraMuscular once  heparin   Injectable 5000 Unit(s) SubCutaneous every 12 hours  influenza  Vaccine (HIGH DOSE) 0.7 milliLiter(s) IntraMuscular once  insulin lispro (ADMELOG) corrective regimen sliding scale   SubCutaneous three times a day before meals  insulin lispro (ADMELOG) corrective regimen sliding scale   SubCutaneous at bedtime  lidocaine 2% Viscous 10 milliLiter(s) Swish and Spit three times a day  sodium chloride 0.9% lock flush 3 milliLiter(s) IV Push every 8 hours  sodium chloride 0.9%. 1000 milliLiter(s) (75 mL/Hr) IV Continuous <Continuous>    MEDICATIONS  (PRN):  acetaminophen     Tablet .. 650 milliGRAM(s) Oral every 6 hours PRN Mild Pain (1 - 3), Moderate Pain (4 - 6)  oxycodone    5 mG/acetaminophen 325 mG 1 Tablet(s) Oral every 6 hours PRN Severe Pain (7 - 10)    PRESENT SYMPTOMS: [ ]Unable to obtain due to poor mentation   Source if other than patient:  [ ]Family   [ ]Team     Pain: [x ]yes [ ]no  QOL impact - pain with swallowing   Location -       pain with swallowing, ear pain              Aggravating factors - during meals   Quality - ache   Radiation - denied   Timing- intermittent   Severity (0-10 scale): did not describe   Minimal acceptable level (0-10 scale): did not describe     PAIN AD Score:     [email or web address] (press ctrl +  left click to view)    Dyspnea:                           [ ]Mild [ ]Moderate [ ]Severe  Anxiety:                             [ ]Mild [ ]Moderate [ ]Severe  Fatigue:                             [ x]Mild [ ]Moderate [ ]Severe  Nausea:                             [ ]Mild [ ]Moderate [ ]Severe  Loss of appetite:              [x ]Mild [ ]Moderate [ ]Severe  Constipation:                    [ ]Mild [ ]Moderate [ ]Severe    Other Symptoms: Patient reports having multiple BMs yesterday but her normal is 2x/week   [x ]All other review of systems negative     Palliative Performance Status Version 2:   60      %    http://npcrc.org/files/news/palliative_performance_scale_[email or web address].pdf  PHYSICAL EXAM:  Vital Signs Last 24 Hrs  T(C): 36.6 (02 Nov 2021 11:13), Max: 36.6 (02 Nov 2021 11:13)  T(F): 97.9 (02 Nov 2021 11:13), Max: 97.9 (02 Nov 2021 11:13)  HR: 60 (02 Nov 2021 11:13) (55 - 60)  BP: 133/48 (02 Nov 2021 11:13) (133/48 - 166/69)  BP(mean): --  RR: 17 (02 Nov 2021 11:13) (17 - 18)  SpO2: 100% (02 Nov 2021 11:13) (100% - 100%) I&O's Summary    GENERAL: frail   [ x]Alert  [ x]Oriented x2-3?   [ ]Lethargic  [ ]Cachexia  [ ]Unarousable  [x ]Verbal- very soft spoken  [ ]Non-Verbal  Behavioral:   [ ] Anxiety  [ ] Delirium [ ] Agitation [x ] Other  HEENT: right side of tongue scar noted, no pain with palpation of of neck or ear   [ ]Normal   [ ]Dry mouth   [ ]ET Tube/Trach  [ ]Oral lesions  PULMONARY:   [x ]Clear [ ]Tachypnea  [ ]Audible excessive secretions   [ ]Rhonchi        [ ]Right [ ]Left [ ]Bilateral  [ ]Crackles        [ ]Right [ ]Left [ ]Bilateral  [ ]Wheezing     [ ]Right [ ]Left [ ]Bilatera  [ ]Diminished breath sounds [ ]right [ ]left [ ]bilateral  CARDIOVASCULAR:    [ x]Regular [ ]Irregular [ ]Tachy  [ ]Solo [ ]Murmur [ ]Other  GASTROINTESTINAL:  [x ]Soft  [ ]Distended   [ ]+BS  [x ]Non tender [ ]Tender  [ ]PEG [ ]OGT/ NGT  Last BM: 11/1  GENITOURINARY:  [ x]Normal [ ] Incontinent   [ ]Oliguria/Anuria   [ ]Hicks  MUSCULOSKELETAL:   [ ]Normal   [ x]Weakness  [ ]Bed/Wheelchair bound [ ]Edema  NEUROLOGIC:   [ ]No focal deficits  [ ]Cognitive impairment  [ ]Dysphagia [ ]Dysarthria [ ]Paresis [x ]Other   SKIN: stage 2 sacrum  [ ]Normal    [ ]Rash  [ ]Pressure ulcer(s)       Present on admission [ ]y [ ]n    CRITICAL CARE:  [ ] Shock Present  [ ]Septic [ ]Cardiogenic [ ]Neurologic [ ]Hypovolemic  [ ]  Vasopressors [ ]  Inotropes   [ ]Respiratory failure present [ ]Mechanical ventilation [ ]Non-invasive ventilatory support [ ]High flow  [ ]Acute  [ ]Chronic [ ]Hypoxic  [ ]Hypercarbic [ ]Other  [ ]Other organ failure     LABS:                        8.2    5.99  )-----------( 347      ( 02 Nov 2021 07:45 )             24.5   11-02    139  |  107  |  34<H>  ----------------------------<  130<H>  4.4   |  21<L>  |  1.48<H>    Ca    8.9      02 Nov 2021 07:45  Phos  1.5     11-02  Mg     1.50     11-02          RADIOLOGY & ADDITIONAL STUDIES: < from: NM PET/CT Onc FDG Skull to Thigh, Inital (10.27.21 @ 17:47) >  IMPRESSION: Abnormal FDG-PET/CT scan.    1. Large, FDG-avid mass in right lateral oral tongue/base of tongue, extending into glossotonsillar sulcus, best delineated on recent contrast-enhanced CT, and corresponding to patient's biopsy proven squamous cell carcinoma.    2. Nonspecific, asymmetrically increased FDG activity in right side of nasopharynx. Please correlate with direct visualization.    3. Nonspecific, diffuse, mildly increased FDG activity in the esophagus may reflect inflammation. There also is a large: Nonspecific focus of increased FDG activity in the perianal region. Please correlate clinically and with endoscopy/proctoscopy, as indicated.    4. FDG-avid deformity, proximal right humerus, probably is secondary to fracture/trauma. Please correlate clinically.    --- End of Report ---    < end of copied text >      PROTEIN CALORIE MALNUTRITION PRESENT: [ ]mild [ ]moderate [x ]severe [ ]underweight [ ]morbid obesity  https://www.andeal.org/vault/2440/web/files/ONC/Table_Clinical%20Characteristics%20to%20Document%20Malnutrition-White%20JV%20et%20al%202012.pdf    Height (cm): 165.1 (10-29-21 @ 17:00), 165.1 (10-13-21 @ 10:20), 165.1 (10-08-21 @ 10:23)  Weight (kg): 43.5 (10-30-21 @ 02:57), 45.4 (10-13-21 @ 10:20), 45.4 (10-08-21 @ 10:23)  BMI (kg/m2): 16 (10-30-21 @ 02:57), 16.7 (10-29-21 @ 17:00), 16.7 (10-13-21 @ 10:20)    [ ]PPSV2 < or = to 30% [ ]significant weight loss  [ ]poor nutritional intake  [ ]anasarca      [ ]Artificial Nutrition      REFERRALS:   [ ]Chaplaincy  [ ]Hospice  [ ]Child Life  [ ]Social Work  [ ]Case management [ ]Holistic Therapy

## 2021-11-02 NOTE — CONSULT NOTE ADULT - ASSESSMENT
80 y/o Female, ambulatory with a cane, with MHx of HTN, Type 2 diabetes, oropharyngeal cancer (25 years, not on chemo or radiation), HLD presents with NICK likely in setting of poor PO intake, associated with metabolic and concomitant respiratory acidosis and hyperphosphatemia, and dysphagia in the setting of newly found large, FDG-avid mass in right lateral oral tongue/base of tongue corresponding to patient's biopsy proven squamous cell carcinoma. Palliative consulted for complex decision making regarding goc in setting of newly diagnosed SCC tongue mass.  80 y/o Female, ambulatory with a cane, with MHx of HTN, Type 2 diabetes, oropharyngeal cancer (25 years, not on chemo or radiation), HLD presents with NICK likely in setting of poor PO intake, associated with metabolic and concomitant respiratory acidosis and hyperphosphatemia, and dysphagia in the setting of newly found large, FDG-avid mass in right lateral oral tongue/base of tongue corresponding to patient's biopsy proven squamous cell carcinoma. Palliative consulted for complex decision making regarding goc in setting of newly diagnosed SCC tongue mass.

## 2021-11-02 NOTE — CONSULT NOTE ADULT - PROBLEM SELECTOR RECOMMENDATION 5
Per chart review, patient reports HCP as her sister, Malina. However, there is no documentation of HCP form. I offered to completed HCP form today but patient would like her  and her sister to be present during completion of HCP form.   Patient has been  to Murali for ~33 years (2nd marriage for patient). She has 2 daughters, Patricia (lives in [place]) and Keli (lives in [place]) who are not involved in decision making for patient. Patient seems to defer to her step sister, Malina as well as her  Murali.  Plan for family meeting tomorrow at 11-11:15am.

## 2021-11-02 NOTE — ADVANCED PRACTICE NURSE CONSULT - REASON FOR CONSULT
Patient seen on skin care rounds after wound care referral received for assessment of skin impairment and recommendations of topical management. Chart reviewed: WBC 5.99, H/H 8.2/24.5, platelets 347, A1C 6.1, BMI 16.0, Soto 18, patient interviewed, patient stating she had a previous pressure injury 1 year ago, uses chair cushion to offload while sitting. Patient H/O of HTN, Type 2 diabetes, oropharyngeal cancer (25 years, not on chemo or radiation), HLD presents with NICK likely in setting of poor PO intake, associated with metabolic and concomitant respiratory acidosis and hyperphosphatemia, and dysphagia in the setting of newly found large, FDG-avid mass in right lateral oral tongue/base of tongue corresponding to patient's biopsy proven squamous cell carcinoma. Palliative consulted for complex decision making regarding goc in setting of newly diagnosed SCC tongue mass. Patient seen by Hematology/Oncology for NICK (referral from [facility]) and gastroenterology for dysphagia.

## 2021-11-02 NOTE — PROGRESS NOTE ADULT - PROBLEM SELECTOR PLAN 9
Patient on Soliqua 15 units daily in AM with breakfast and Glyxambi 10 mg-5mg at home. Hold home meds.  - Last A1C 6.1 10/30/21  - FS q6h  - Pureed diet per S+S  - SSI

## 2021-11-02 NOTE — PHYSICAL THERAPY INITIAL EVALUATION ADULT - PERTINENT HX OF CURRENT PROBLEM, REHAB EVAL
Patient is 81 year old female admitted with history of HTN, DM2, oropharyngeal cancer, HLD, presents with NICK.

## 2021-11-02 NOTE — PROGRESS NOTE ADULT - PROBLEM SELECTOR PLAN 1
Cr 3.45, prior Cr 1.93  on October 8th. Creatinine 1.1-1.2 in 9/2020.  - GFR 12 previously 24.  - FENa calculated 3.1% correlates with intrinsic, but more likely pre-renal in the setting of poor PO intake and elevated BUN:Cr ratio >20:1  - Continue hold benazepril and Hydrochlorothiazide in setting of NICK  - Renal US performed, results pending    - Increased cortical echogenicity, suggesting medical renal disease.    - No hydronephrosis    - Bladder distended to 444 cc, not retaining on bladder scans, will DC bladder scans    - Cholelithiasis, incidental finding, patient is asymptomatic  - Renal function responsive to fluids indicating pre-renal etiology, continue NS 0.9% 75cc/hr for 12 hours Cr 3.45, prior Cr 1.93  on October 8th. Creatinine 1.1-1.2 in 9/2020.  - GFR 12 previously 24.  - FENa calculated 3.1% correlates with intrinsic, but more likely pre-renal in the setting of poor PO intake and elevated BUN:Cr ratio >20:1  - Continue hold benazepril and Hydrochlorothiazide in setting of NICK  - Renal US performed, results pending    - Increased cortical echogenicity, suggesting medical renal disease.    - No hydronephrosis    - Bladder distended to 444 cc, not retaining on bladder scans, will DC bladder scans    - Cholelithiasis, incidental finding, patient is asymptomatic  - Renal function responsive to fluids indicating pre-renal etiology, Cr 1.48 from 1.91  - will continue NS 0.9% 75cc/hr for 12 hours

## 2021-11-02 NOTE — PROGRESS NOTE ADULT - PROBLEM SELECTOR PLAN 6
Diagnosed 25 years ago, previously treated with radiation. Not currently on treatment. s/p tongue biopsy 10/13. PET scan 10/27 with multiple areas of uptake including right lateral tongue extending into glossotonsillar sulcus, esophagus, r. side nasopharynx, and right humerus   - x-ray right humerus with fracture deformity of proximal humerus, no complaint of pain from patient  - Oncology consulted, appreciate recs    - no inpatient chemo    - spoke with Dr. Grover outpatient Oncologist    - Palliative consulted for GOC. Per Dr. Grover patient may not be able to get chemo or radiation if she is not eating. However, patient is refusing alternative feeding options such as PEG tube. Diagnosed 25 years ago, previously treated with radiation. Not currently on treatment. s/p tongue biopsy 10/13. PET scan 10/27 with multiple areas of uptake including right lateral tongue extending into glossotonsillar sulcus, esophagus, r. side nasopharynx, and right humerus   - x-ray right humerus with fracture deformity of proximal humerus, no complaint of pain from patient, likely due from prior fall  - Oncology consulted, appreciate recs    - no inpatient chemo    - spoke with Dr. Grover outpatient Oncologist  - Palliative consulted for GOC, appreciate recs  May require family meeting for GOC discussion to make sure patient, family, and caregivers have common understanding and are all in agreement about plan moving forward

## 2021-11-02 NOTE — PROGRESS NOTE ADULT - PROBLEM SELECTOR PLAN 2
Patient with pH of 7.24 and serum bicarb = 19  - appears to have metabolic acidosis with concomitant respiratory acidosis, pCO2 is elevated at 47 compared to expected respiratory compensation per Winter's formula. Continue to follow with repeat vbg.  - s/p 1300 mg Sodium bicarbonate  - s/p sodium bicarb 325mg TID 2 days supp pending Renal eval  - Nephrology added per patient providers list, pending assessment

## 2021-11-02 NOTE — PROGRESS NOTE ADULT - PROBLEM SELECTOR PLAN 3
The patient does not report problems with swallowing at bedside. More likely odynophagia in the setting of tongue pain after biopsy on 10/13.  - NMPET/CT Onc Skull to thigh, 10/27/21: Large, FDG-avid mass in right lateral oral tongue/base of tongue, extending into glossotonsillar sulcus; Diffuse, mildly increased FDG activity in the esophagus may reflect inflammation.  - Speech and swallow evaluated, dysphagia diet  - GI consulted for potential EGD for increased FDG activity in esophagus, appreciate recs    - no intervention indicated at this time per note    - possible PEG tube, primary discussed this with patient but she does not want currently  - Evaluated by dietician, pending recs The patient does not report problems with swallowing at bedside. More likely odynophagia in the setting of tongue pain after biopsy on 10/13.  - NMPET/CT Onc Skull to thigh, 10/27/21: Large, FDG-avid mass in right lateral oral tongue/base of tongue, extending into glossotonsillar sulcus; Diffuse, mildly increased FDG activity in the esophagus may reflect inflammation.  - Speech and swallow evaluated, dysphagia diet  - Dietician evaluation  - GI consulted for potential EGD for increased FDG activity in esophagus, appreciate recs    - no intervention indicated at this time per note    - possible PEG tube, pending palliative evaluation and family wanted per Dr. Grover (oncologist)

## 2021-11-02 NOTE — PROGRESS NOTE ADULT - ASSESSMENT
80 y/o Female, ambulatory with a cane, with MHx of HTN, Type 2 diabetes, oropharyngeal cancer (25 years, not on chemo or radiation), HLD presents with NICK likely in setting of poor PO intake, associated with metabolic and concomitant respiratory acidosis and hyperphosphatemia, and dysphagia in the setting of newly found large, FDG-avid mass in right lateral oral tongue/base of tongue corresponding to patient's biopsy proven squamous cell carcinoma. Heme/onc and GI following.

## 2021-11-02 NOTE — ADVANCED PRACTICE NURSE CONSULT - RECOMMEDATIONS
Topical recommendations:     Sacrum- Cleanse with NS, pat dry. Apply Liquid barrier film to periwound skin (allow to dry). Cover with silicone foam with border. Change every other day.     Continue low air loss bed therapy,  heel elevation with offloading boots, turn & reposition q2h with Z-flow fluidized pillow, continue moisture management with barrier creams as specified above & single breathable pad, continue measures to decrease friction/shear.   Plan discussed with patient and  at bedside- educated on topical wound therapy to optimize wound healing. Questions answered.     Please contact Wound/Ostomy Care Service Line if we can be of further assistance (ext [phone]).

## 2021-11-02 NOTE — PROGRESS NOTE ADULT - ASSESSMENT
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 Knowles last year. ---- She now presents with what seems to be an infiltrative third primary R. BOT SCCa.   This is confirmed after EUA and biopsy where frozen section positive for SCCa.  outpt PET:  Large, FDG-avid mass in right lateral oral tongue/base of tongue, extending into glossotonsillar sulcus, best delineated on recent contrast-enhanced CT, and corresponding to patient's biopsy proven squamous cell carcinoma. Nonspecific, asymmetrically increased FDG activity in right side of nasopharynx. Please correlate with direct visualization. Nonspecific, diffuse, mildly increased FDG activity in the esophagus may reflect inflammation. There also is a large: Nonspecific focus of increased FDG activity in the perianal region. Please correlate clinically and with endoscopy/proctoscopy, as indicated. FDG-avid deformity, proximal right humerus, probably is secondary to fracture/trauma.    Patient lives with her elderly , she has 2 daughters but is not in close contact with them and they live in other [place], she has a sister [place] who is her HCP as per pt and her .   She is not interested in a feeding tube. She understands that to be eligible for any treatment she will need to be able to maintain her nutrition status.   She is frail and has a ECOG Performance status of 2-3 at home.   Best supportive care may be the best option.   I called her sister to discuss this but she did not respond.     -Pal care consult for assistance with goals of care   -Spoke to patient again by bedside regarding G tube placement, patient endorsing that she is considering it. Still talking it over with her  and sister. Unclear when she will make her decision. Patient also indecisive in regards to pursuing systemic therapy for her recurrent cancer. " Ill think about is"  -swallow eval recs appreciated: puree with thin liquids   -Encourage PO intake, monitor and consider maintenance IVF as needed  -Patient to followup with Dr. Grover  ([facility]) upon discharge  -C/w Supportive care, pain control, Nutrition, PT, DVT ppx  -Oncology will continue to follow with you      Case d/w Dr. Reilly CHACKO  Oncology Physician Assistant  [facility]NIGHT/[facility]  Pager [phone]    If after 5pm or weekends please page On-call Oncology Fellow

## 2021-11-02 NOTE — ADVANCED PRACTICE NURSE CONSULT - ASSESSMENT
General: A&Ox4, turns one assist, ambulates with assistance at baseline, continent of urine and stool. Patient frail, thin, prominent bony prominences. Skin warm, dry poor skin turgor.     Sacrum- Stage 2 pressure injury measuring [id number] exposing 100% dried serous base. Periwound skin with slow blanching erythema extending 3cm  circumferentially. No increased warmth, no induration. Prominent sacral bone. Goals of care: Autolytic debridement of dried serous base, protect periwound skin, continue to offload.

## 2021-11-02 NOTE — PROGRESS NOTE ADULT - SUBJECTIVE AND OBJECTIVE BOX
PAULA BLANCO  81y  MRN: [id number]    Subjective:    Patient is a 81y old  Female who presents with a chief complaint of Referred by [facility] for evaluation of "ESRD" (29 Oct 2021 20:54)      Interval history/overnight events:          MEDICATIONS  (STANDING):  amLODIPine   Tablet 5 milliGRAM(s) Oral daily  atorvastatin 10 milliGRAM(s) Oral at bedtime  brimonidine 0.2% Ophthalmic Solution 1 Drop(s) Right EYE two times a day  carvedilol 25 milliGRAM(s) Oral every 12 hours  dextrose 40% Gel 15 Gram(s) Oral once  dextrose 5%. 1000 milliLiter(s) (50 mL/Hr) IV Continuous <Continuous>  dextrose 5%. 1000 milliLiter(s) (100 mL/Hr) IV Continuous <Continuous>  dextrose 50% Injectable 25 Gram(s) IV Push once  dextrose 50% Injectable 12.5 Gram(s) IV Push once  dextrose 50% Injectable 25 Gram(s) IV Push once  glucagon  Injectable 1 milliGRAM(s) IntraMuscular once  heparin   Injectable 5000 Unit(s) SubCutaneous every 12 hours  influenza  Vaccine (HIGH DOSE) 0.7 milliLiter(s) IntraMuscular once  insulin lispro (ADMELOG) corrective regimen sliding scale   SubCutaneous three times a day before meals  insulin lispro (ADMELOG) corrective regimen sliding scale   SubCutaneous at bedtime  sodium bicarbonate 325 milliGRAM(s) Oral three times a day  sodium chloride 0.9% lock flush 3 milliLiter(s) IV Push every 8 hours  sodium chloride 0.9%. 1000 milliLiter(s) (75 mL/Hr) IV Continuous <Continuous>    MEDICATIONS  (PRN):        Objective:    Vitals: Vital Signs Last 24 Hrs  T(C): 36.7 (10-30-21 @ 05:50), Max: 36.7 (10-29-21 @ 21:50)  T(F): 98 (10-30-21 @ 05:50), Max: 98 (10-29-21 @ 21:50)  HR: 55 (10-30-21 @ 05:50) (55 - 61)  BP: 143/69 (10-30-21 @ 05:50) (116/74 - 152/74)  BP(mean): --  RR: 17 (10-30-21 @ 05:50) (16 - 17)  SpO2: 98% (10-30-21 @ 05:50) (94% - 100%)            I&O's Summary      PHYSICAL EXAM:  GENERAL: NAD, lying in bed  HEENT: No obvious ear lesion, no drainage or erythema. PERRLA, EOM intact, tongue scar from biopsy on right side, no other oral lesions appreciated, no LAD  CHEST/LUNG: CTAB, no wheezing, crackles, or ronchi   HEART: Bradycardia, no murmur appreciated  ABDOMEN: soft, nondistended, non-tender, normoactive BS  SKIN: No rashes or lesions  NERVOUS SYSTEM: Alert & Oriented X2-3 (name, location was [facility], year with prompting)  EXT: no peripheral edema  PSYCH: calm and cooperative     LABS:    10-29    134<L>  |  100  |  88<H>  ----------------------------<  103<H>  4.2   |  19<L>  |  3.45<H>    Ca    9.1      29 Oct 2021 19:06  Phos  5.0     10-29  Mg     2.20     10-29    TPro  6.5  /  Alb  3.5  /  TBili  <0.2  /  DBili  x   /  AST  7   /  ALT  7   /  AlkPhos  73  10-29                  Urinalysis Basic - ( 29 Oct 2021 23:53 )    Color: Light Yellow / Appearance: Slightly Turbid / S.010 / pH: x  Gluc: x / Ketone: Negative  / Bili: Negative / Urobili: <2 mg/dL   Blood: x / Protein: Trace / Nitrite: Negative   Leuk Esterase: Moderate / RBC: 1 /HPF / WBC 31 /HPF   Sq Epi: x / Non Sq Epi: 8 /HPF / Bacteria: Occasional                              9.0    6.72  )-----------( 344      ( 29 Oct 2021 19:06 )             26.8                         9.5    7.73  )-----------( 322      ( 28 Oct 2021 15:27 )             28.9     CAPILLARY BLOOD GLUCOSE      POCT Blood Glucose.: 92 mg/dL (29 Oct 2021 23:54)          RADIOLOGY & ADDITIONAL TESTS:            Imaging Personally Reviewed:  [ ] YES  [ ] NO    Consultants involved in case:   Consultant(s) Notes Reviewed:  [ ] YES  [ ] NO:   Care Discussed with Consultants/Other Providers [ ] YES  [ ] NO         PAULA BLANCO  81y  MRN: [id number]    Subjective:    Patient is a 81y old  Female who presents with a chief complaint of Referred by [facility] for evaluation of "ESRD" (29 Oct 2021 20:54)      Interval history/overnight events:  No acute events overnight.     She is unhappy with current roommate. She wants to go home. Discussed GOC briefly with patient, from short discussion patient would like to prolong life. Continues to have poor PO intake. Patient accepted a few spoonful of food at bedside. Poor appetite because of persistent ear pain and while in uncomfortable environment.         MEDICATIONS  (STANDING):  amLODIPine   Tablet 5 milliGRAM(s) Oral daily  atorvastatin 10 milliGRAM(s) Oral at bedtime  brimonidine 0.2% Ophthalmic Solution 1 Drop(s) Right EYE two times a day  carvedilol 25 milliGRAM(s) Oral every 12 hours  dextrose 40% Gel 15 Gram(s) Oral once  dextrose 5%. 1000 milliLiter(s) (50 mL/Hr) IV Continuous <Continuous>  dextrose 5%. 1000 milliLiter(s) (100 mL/Hr) IV Continuous <Continuous>  dextrose 50% Injectable 25 Gram(s) IV Push once  dextrose 50% Injectable 12.5 Gram(s) IV Push once  dextrose 50% Injectable 25 Gram(s) IV Push once  glucagon  Injectable 1 milliGRAM(s) IntraMuscular once  heparin   Injectable 5000 Unit(s) SubCutaneous every 12 hours  influenza  Vaccine (HIGH DOSE) 0.7 milliLiter(s) IntraMuscular once  insulin lispro (ADMELOG) corrective regimen sliding scale   SubCutaneous three times a day before meals  insulin lispro (ADMELOG) corrective regimen sliding scale   SubCutaneous at bedtime  sodium bicarbonate 325 milliGRAM(s) Oral three times a day  sodium chloride 0.9% lock flush 3 milliLiter(s) IV Push every 8 hours  sodium chloride 0.9%. 1000 milliLiter(s) (75 mL/Hr) IV Continuous <Continuous>    MEDICATIONS  (PRN):        Objective:    Vitals: Vital Signs Last 24 Hrs  T(C): 36.7 (10-30-21 @ 05:50), Max: 36.7 (10-29-21 @ 21:50)  T(F): 98 (10-30-21 @ 05:50), Max: 98 (10-29-21 @ 21:50)  HR: 55 (10-30-21 @ 05:50) (55 - 61)  BP: 143/69 (10-30-21 @ 05:50) (116/74 - 152/74)  BP(mean): --  RR: 17 (10-30-21 @ 05:50) (16 - 17)  SpO2: 98% (10-30-21 @ 05:50) (94% - 100%)            I&O's Summary      PHYSICAL EXAM:  GENERAL: NAD, lying in bed  HEENT: No obvious ear lesion, no drainage or erythema. PERRLA, EOM intact, right sidede tongue scar from biopsy on right side, no other oral lesions appreciated, no LAD  CHEST/LUNG: CTAB, no wheezing, crackles, or ronchi   HEART: Bradycardia, no murmur appreciated  ABDOMEN: soft, nondistended, non-tender, normoactive BS  SKIN: No rashes or lesions  NERVOUS SYSTEM: Alert & Oriented X2-3 (name, location was hospital, year with prompting)  EXT: no peripheral edema  PSYCH: calm and cooperative     LABS:    10-29    134<L>  |  100  |  88<H>  ----------------------------<  103<H>  4.2   |  19<L>  |  3.45<H>    Ca    9.1      29 Oct 2021 19:06  Phos  5.0     10-29  Mg     2.20     10-29    TPro  6.5  /  Alb  3.5  /  TBili  <0.2  /  DBili  x   /  AST  7   /  ALT  7   /  AlkPhos  73  10-29                  Urinalysis Basic - ( 29 Oct 2021 23:53 )    Color: Light Yellow / Appearance: Slightly Turbid / S.010 / pH: x  Gluc: x / Ketone: Negative  / Bili: Negative / Urobili: <2 mg/dL   Blood: x / Protein: Trace / Nitrite: Negative   Leuk Esterase: Moderate / RBC: 1 /HPF / WBC 31 /HPF   Sq Epi: x / Non Sq Epi: 8 /HPF / Bacteria: Occasional                              9.0    6.72  )-----------( 344      ( 29 Oct 2021 19:06 )             26.8                         9.5    7.73  )-----------( 322      ( 28 Oct 2021 15:27 )             28.9     CAPILLARY BLOOD GLUCOSE      POCT Blood Glucose.: 92 mg/dL (29 Oct 2021 23:54)          RADIOLOGY & ADDITIONAL TESTS:            Imaging Personally Reviewed:  [ ] YES  [ ] NO    Consultants involved in case:   Consultant(s) Notes Reviewed:  [ ] YES  [ ] NO:   Care Discussed with Consultants/Other Providers [ ] YES  [ ] NO

## 2021-11-02 NOTE — CONSULT NOTE ADULT - REASON FOR ADMISSION
Referred by [facility] for evaluation of "ESRD"
Referred by [facility] for evaluation of "ESRD"
Referred by [facility] for evaluation of NICK

## 2021-11-02 NOTE — PROGRESS NOTE ADULT - PROBLEM SELECTOR PLAN 10
DVT: Heparin subcutaneous 5000 units q12h.  Diet: DASH/TLC diet ordered.  Disposition: pending improvement in creatinine function and tolerating PO DVT: Heparin subcutaneous 5000 units q12h.  Diet: DASH/TLC diet ordered.  Disposition: plan of care pending family meeting

## 2021-11-03 NOTE — PROGRESS NOTE ADULT - ASSESSMENT
80 y/o Female, ambulatory with a cane, with MHx of HTN, Type 2 diabetes, oropharyngeal cancer (25 years, not on chemo or radiation), HLD presents with NICK likely in setting of poor PO intake, associated with metabolic and concomitant respiratory acidosis and hyperphosphatemia, and dysphagia in the setting of newly found large, FDG-avid mass in right lateral oral tongue/base of tongue corresponding to patient's biopsy proven squamous cell carcinoma. Palliative consulted for complex decision making regarding goc in setting of newly diagnosed SCC tongue mass.

## 2021-11-03 NOTE — PROGRESS NOTE ADULT - PROBLEM SELECTOR PLAN 2
Dietary recs appreciated   Nepro 3x daily.  Family to bring food that patient likes to eat as she does not like current hospital food.

## 2021-11-03 NOTE — PROGRESS NOTE ADULT - SUBJECTIVE AND OBJECTIVE BOX
[facility] Geriatrics and Palliative Care  Gabrielle Butler Palliative Care Attending  Contact Info: Page [phone] (including Nights/Weekends), message on Microsoft Teams (Gabrielle Butler), or leave  at Palliative Office [phone] (non-urgent)     SUBJECTIVE AND OBJECTIVE: Patient seen with , Murali, at bedside. We had family meeting with onc team, medicine team, and patient's family ( and sister) this morning. Please se [facility] note attached. Patient reports eating some breakfast but does not like the hospital food. She reports being able to tolerate the chicken broth soup that her sister brought her. She c/o having multiple BMs this morning.    INTERVAL HPI/OVERNIGHT EVENTS:  > 11/3: chart reviewed. No PRNs utilized over last 24 hours.     DNR on chart:   Allergies    No Known Allergies    Intolerances    MEDICATIONS  (STANDING):  amLODIPine   Tablet 7.5 milliGRAM(s) Oral daily  atorvastatin 10 milliGRAM(s) Oral at bedtime  brimonidine 0.2% Ophthalmic Solution 1 Drop(s) Right EYE two times a day  carvedilol 25 milliGRAM(s) Oral every 12 hours  dextrose 40% Gel 15 Gram(s) Oral once  dextrose 5%. 1000 milliLiter(s) (100 mL/Hr) IV Continuous <Continuous>  dextrose 5%. 1000 milliLiter(s) (50 mL/Hr) IV Continuous <Continuous>  dextrose 5%. 1000 milliLiter(s) (100 mL/Hr) IV Continuous <Continuous>  dextrose 50% Injectable 25 Gram(s) IV Push once  dextrose 50% Injectable 12.5 Gram(s) IV Push once  dextrose 50% Injectable 25 Gram(s) IV Push once  FIRST- Mouthwash  BLM 10 milliLiter(s) Swish and Spit three times a day  glucagon  Injectable 1 milliGRAM(s) IntraMuscular once  glucagon  Injectable 1 milliGRAM(s) IntraMuscular once  heparin   Injectable 5000 Unit(s) SubCutaneous every 12 hours  HYDROmorphone   Solution 1 milliGRAM(s) Oral <User Schedule>  influenza  Vaccine (HIGH DOSE) 0.7 milliLiter(s) IntraMuscular once  insulin lispro (ADMELOG) corrective regimen sliding scale   SubCutaneous three times a day before meals  insulin lispro (ADMELOG) corrective regimen sliding scale   SubCutaneous at bedtime  lidocaine 2% Viscous 10 milliLiter(s) Swish and Spit three times a day  potassium phosphate / sodium phosphate Powder (PHOS-NaK) 1 Packet(s) Oral once  senna Syrup 15 milliLiter(s) Oral at bedtime    MEDICATIONS  (PRN):  acetaminophen     Tablet .. 650 milliGRAM(s) Oral every 6 hours PRN Mild Pain (1 - 3), Moderate Pain (4 - 6)  HYDROmorphone   Solution 1 milliGRAM(s) Oral every 4 hours PRN Severe Pain (7 - 10)      ITEMS UNCHECKED ARE NOT PRESENT    PRESENT SYMPTOMS: [ ]Unable to obtain due to poor mentation   Source if other than patient:  [ ]Family   [ ]Team     Pain: [x ]yes [ ]no  QOL impact - pain with swallowing   Location -       pain with swallowing, ear pain              Aggravating factors - during meals   Quality - ache   Radiation - denied   Timing- intermittent   Severity (0-10 scale): did not describe   Minimal acceptable level (0-10 scale): did not describe     Dyspnea:                           [ ]Mild [ ]Moderate [ ]Severe  Anxiety:                             [ ]Mild [ ]Moderate [ ]Severe  Fatigue:                             [ ]Mild [ ]Moderate [ ]Severe  Nausea:                             [ ]Mild [ ]Moderate [ ]Severe  Loss of appetite:              [ ]Mild [ ]Moderate [ ]Severe  Constipation:                    [ ]Mild [ ]Moderate [ ]Severe    PAIN AD Score:	  [email or web address].[email or web address]/[email or web address] (Ctrl + left click to view)    Other Symptoms:  [ ]All other review of systems negative     Palliative Performance Status Version 2:   60      %      [email or web address]/files/[email or web address]_[email or web address]  PHYSICAL EXAM:  Vital Signs Last 24 Hrs  T(C): 36.4 (03 Nov 2021 12:37), Max: 36.8 (02 Nov 2021 15:13)  T(F): 97.5 (03 Nov 2021 12:37), Max: 98.3 (02 Nov 2021 15:13)  HR: 56 (03 Nov 2021 12:37) (55 - 59)  BP: 169/50 (03 Nov 2021 12:37) (134/54 - 169/50)  BP(mean): --  RR: 18 (03 Nov 2021 12:37) (16 - 18)  SpO2: 100% (03 Nov 2021 12:37) (100% - 100%) I&O's Summary    02 Nov 2021 07:01  -  03 Nov 2021 07:00  --------------------------------------------------------  IN: 0 mL / OUT: 1 mL / NET: -1 mL       GENERAL: frail   [ x]Alert  [ x]Oriented x2-3?   [ ]Lethargic  [ ]Cachexia  [ ]Unarousable  [x ]Verbal- very soft spoken  [ ]Non-Verbal  Behavioral:   [ ] Anxiety  [ ] Delirium [ ] Agitation [x ] Other  HEENT: right side of tongue scar noted, no pain with palpation of of neck or ear   [ ]Normal   [ ]Dry mouth   [ ]ET Tube/Trach  [ ]Oral lesions  PULMONARY:   [x ]Clear [ ]Tachypnea  [ ]Audible excessive secretions   [ ]Rhonchi        [ ]Right [ ]Left [ ]Bilateral  [ ]Crackles        [ ]Right [ ]Left [ ]Bilateral  [ ]Wheezing     [ ]Right [ ]Left [ ]Bilatera  [ ]Diminished breath sounds [ ]right [ ]left [ ]bilateral  CARDIOVASCULAR:    [ x]Regular [ ]Irregular [ ]Tachy  [ ]Solo [ ]Murmur [ ]Other  GASTROINTESTINAL:  [x ]Soft  [ ]Distended   [ ]+BS  [x ]Non tender [ ]Tender  [ ]PEG [ ]OGT/ NGT  Last BM: 11/2 or 11/3? per patient  GENITOURINARY:  [ x]Normal [ ] Incontinent   [ ]Oliguria/Anuria   [ ]Hicks  MUSCULOSKELETAL:   [ ]Normal   [ x]Weakness  [ ]Bed/Wheelchair bound [ ]Edema  NEUROLOGIC:   [ ]No focal deficits  [ ]Cognitive impairment  [ ]Dysphagia [ ]Dysarthria [ ]Paresis [x ]Other   SKIN: stage 2 sacrum  [ ]Normal    [ ]Rash  [ ]Pressure ulcer(s)       Present on admission [ ]y [ ]n    CRITICAL CARE:  [ ] Shock Present  [ ]Septic [ ]Cardiogenic [ ]Neurologic [ ]Hypovolemic  [ ]  Vasopressors [ ]  Inotropes   [ ]Respiratory failure present [ ]Mechanical ventilation [ ]Non-invasive ventilatory support [ ]High flow  [ ]Acute  [ ]Chronic [ ]Hypoxic  [ ]Hypercarbic [ ]Other  [ ]Other organ failure     LABS:                        7.9    7.53  )-----------( 328      ( 03 Nov 2021 07:03 )             24.5   11-03    139  |  108<H>  |  27<H>  ----------------------------<  182<H>  4.1   |  20<L>  |  1.39<H>    Ca    8.8      03 Nov 2021 07:03  Phos  2.1     11-03  Mg     1.60     11-03      RADIOLOGY & ADDITIONAL STUDIES: n/a     Protein Calorie Malnutrition Present: [ ]mild [ ]moderate [ ]severe [ ]underweight [ ]morbid obesity  https://www.andeal.org/vault/2440/web/files/ONC/Table_Clinical%20Characteristics%20to%20Document%20Malnutrition-White%20JV%20et%20al%202012.pdf    Height (cm): 165.1 (10-29-21 @ 17:00), 165.1 (10-13-21 @ 10:20), 165.1 (10-08-21 @ 10:23)  Weight (kg): 43.5 (10-30-21 @ 02:57), 45.4 (10-13-21 @ 10:20), 45.4 (10-08-21 @ 10:23)  BMI (kg/m2): 16 (10-30-21 @ 02:57), 16.7 (10-29-21 @ 17:00), 16.7 (10-13-21 @ 10:20)    [ ]PPSV2 < or = 30%  [ ]significant weight loss [ ]poor nutritional intake [ ]anasarca    [ ]Artificial Nutrition    REFERRALS:   [ ]Chaplaincy  [ ]Hospice  [ ]Child Life  [ ]Social Work  [ ]Case management [ ]Holistic Therapy

## 2021-11-03 NOTE — PROGRESS NOTE ADULT - PROBLEM SELECTOR PLAN 5
Please see [facility] note attached.  Patient has living will per patient's sister that states she would not want extraordinary measures. Patient's family provided with MOLST form to review and discuss amongst themselves. Palliative team will f/u with them regarding completing MOLST form. Please see [facility] note attached, written by Palliative SW 11/3.   Patient has living will per patient's sister that states she would not want extraordinary measures. Patient's family provided with MOLST form to review and discuss amongst themselves. Palliative team will f/u with them regarding completing MOLST form.  HCP form completed today designating her , Murali, and her sister, Malina as her proxies.   Patient clear that she does not want artificial nutrition.  Palliative following for sx management.

## 2021-11-03 NOTE — PROGRESS NOTE ADULT - ASSESSMENT
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 Knowles last year. ---- She now presents with what seems to be an infiltrative third primary R. BOT SCCa.   This is confirmed after EUA and biopsy where frozen section positive for SCCa.  outpt PET:  Large, FDG-avid mass in right lateral oral tongue/base of tongue, extending into glossotonsillar sulcus, best delineated on recent contrast-enhanced CT, and corresponding to patient's biopsy proven squamous cell carcinoma. Nonspecific, asymmetrically increased FDG activity in right side of nasopharynx. Please correlate with direct visualization. Nonspecific, diffuse, mildly increased FDG activity in the esophagus may reflect inflammation. There also is a large: Nonspecific focus of increased FDG activity in the perianal region. Please correlate clinically and with endoscopy/proctoscopy, as indicated. FDG-avid deformity, proximal right humerus, probably is secondary to fracture/trauma.    Patient lives with her elderly , she has 2 daughters but is not in close contact with them and they live in other states, she has a sister who lives on  who is her HCP as per pt and her .   She is not interested in a feeding tube. She understands that to be eligible for any treatment she will need to be able to maintain her nutrition status.   She is frail and has a ECOG Performance status of 2-3 at home.   Best supportive care may be the best option.   I called her sister to discuss this but she did not respond.     -Pal care consult for assistance with goals of care   -Spoke to patient again by bedside regarding G tube placement, patient ,  and pt sister endorsing that they will decline to having Gtube placed for now. Goal is to have patient be at home. As per patient " i want to get better" Pt is open to receiving information about treatment options.  Unclear when she will make her decision. Will also think about her code status,  -swallow eval recs appreciated: puree with thin liquids   -Encourage PO intake, monitor and consider maintenance IVF as needed  -Patient to followup with Dr. Grover  ([facility], [place]) upon discharge  -C/w Supportive care, pain control, Nutrition, PT, DVT ppx  -Oncology will continue to follow with you      Case d/w Dr. Reilly CHACKO  Oncology Physician Assistant  [facility], [place]  Pager [phone]    If after 5pm or weekends please page On-call Oncology Fellow

## 2021-11-03 NOTE — PROGRESS NOTE ADULT - PROBLEM SELECTOR PLAN 1
Patient reports pain with swallowing and right ear pain.   > will start dilaudid 1mg PO tid premeals  > continue dilaudid 1mg PO q4 prn severe pain   > start magic mouthwash tid   > Bowel regimen while on opioids   > S&S recs appreciated- puree with thin liquids  > Patient does not want a feeding tube. She wants to continue with modified diet.

## 2021-11-03 NOTE — PROGRESS NOTE ADULT - SUBJECTIVE AND OBJECTIVE BOX
INTERVAL HPI/OVERNIGHT EVENTS:  Patient seen at bedside.  Accompanied by her , her sister was available by phone call  Primary team and palliative care team at bedside assisted with  family meeting      VITAL SIGNS:  T(F): 97.5 (11-03-21 @ 12:37)  HR: 56 (11-03-21 @ 12:37)  BP: 169/50 (11-03-21 @ 12:37)  RR: 18 (11-03-21 @ 12:37)  SpO2: 100% (11-03-21 @ 12:37)  Wt(kg): --    PHYSICAL EXAM:  GENERAL: NAD, lying in bed  HEENT: No obvious ear lesion, no drainage or erythema. PERRLA, EOM intact, right sidede tongue scar from biopsy on right side, no other oral lesions appreciated, no LAD  CHEST/LUNG: CTAB, no wheezing, crackles, or ronchi   HEART: Bradycardia, no murmur appreciated  ABDOMEN: soft, nondistended, non-tender, normoactive BS  SKIN: No rashes or lesions  NERVOUS SYSTEM: Alert & Oriented X2-3 (name, location was hospital, year with prompting)  EXT: no peripheral edema  PSYCH: calm and cooperative     MEDICATIONS  (STANDING):  amLODIPine   Tablet 7.5 milliGRAM(s) Oral daily  atorvastatin 10 milliGRAM(s) Oral at bedtime  brimonidine 0.2% Ophthalmic Solution 1 Drop(s) Right EYE two times a day  carvedilol 25 milliGRAM(s) Oral every 12 hours  dextrose 40% Gel 15 Gram(s) Oral once  dextrose 5%. 1000 milliLiter(s) (100 mL/Hr) IV Continuous <Continuous>  dextrose 5%. 1000 milliLiter(s) (50 mL/Hr) IV Continuous <Continuous>  dextrose 5%. 1000 milliLiter(s) (100 mL/Hr) IV Continuous <Continuous>  dextrose 50% Injectable 25 Gram(s) IV Push once  dextrose 50% Injectable 12.5 Gram(s) IV Push once  dextrose 50% Injectable 25 Gram(s) IV Push once  FIRST- Mouthwash  BLM 10 milliLiter(s) Swish and Spit three times a day  glucagon  Injectable 1 milliGRAM(s) IntraMuscular once  glucagon  Injectable 1 milliGRAM(s) IntraMuscular once  heparin   Injectable 5000 Unit(s) SubCutaneous every 12 hours  HYDROmorphone   Solution 1 milliGRAM(s) Oral <User Schedule>  influenza  Vaccine (HIGH DOSE) 0.7 milliLiter(s) IntraMuscular once  insulin lispro (ADMELOG) corrective regimen sliding scale   SubCutaneous three times a day before meals  insulin lispro (ADMELOG) corrective regimen sliding scale   SubCutaneous at bedtime  lidocaine 2% Viscous 10 milliLiter(s) Swish and Spit three times a day  senna Syrup 15 milliLiter(s) Oral at bedtime    MEDICATIONS  (PRN):  acetaminophen     Tablet .. 650 milliGRAM(s) Oral every 6 hours PRN Mild Pain (1 - 3), Moderate Pain (4 - 6)  HYDROmorphone   Solution 1 milliGRAM(s) Oral every 4 hours PRN Severe Pain (7 - 10)      Allergies    No Known Allergies    Intolerances        LABS:                        7.9    7.53  )-----------( 328      ( 03 Nov 2021 07:03 )             24.5     11-03    139  |  108<H>  |  27<H>  ----------------------------<  182<H>  4.1   |  20<L>  |  1.39<H>    Ca    8.8      03 Nov 2021 07:03  Phos  2.1     11-03  Mg     1.60     11-03            RADIOLOGY & ADDITIONAL TESTS:  Studies reviewed.

## 2021-11-03 NOTE — GOALS OF CARE CONVERSATION - ADVANCED CARE PLANNING - CONVERSATION DETAILS
Referral to palliative care for complex goals of care in the setting of advanced localized malignancy. Pt has history of head and neck cancer greater than 20 years. Pt appointed her spouse and sister, Malina, as her HCP's. HCP paperwork completed and placed upon medical record.  Meeting held with pt, spouse, sister (via telephone), medical team, oncology team and palliative team. Pt stated she was interested in knowing her options for DMT. Pt has had recent weight loss and difficulty swallowing food. Her spouse was preparing pureed food at home. Pt did not pass speech and swallow exam at hospital. Pt on a pureed diet.   Per oncology pt's nutritional status would need to improve in order to be a candidate for systemic treatment. Risk and benefit of artificial nutrition discussed. Pt clearly stated she did not want a feeding tube. She is in agreement for symptom management in hopes of being able to tolerate oral intake. Pt and family understand that DMT in the form of chemotherapy is not being offered if her performance status most specifically her nutritional status does not significantly improve.  Pt is interested in learning the what radiation oncology could potentially offer her.  Discussed the role, philosophy and services of hospice care. Pt understands that hospice care would help elevate her quality of life and improve her symptom burden. She is aware that she can follow up with medical oncology if her performance status improves.  Pt and family need more time to consider the option of home hospice care.   Pt clearly states she wants to return home and does not want a feeding tube.    Advanced care planning discussed regarding pt's preferences for resuscitative measures. Reviewed the risk and benefits of CPR and mechanical ventilation with pt and family. They need more time to review the MOLST form which was extensively reviewed with them and left at bedside. Will follow up with pt and family tomorrow.    Pt remains a FULL code at this time.

## 2021-11-03 NOTE — PROGRESS NOTE ADULT - SUBJECTIVE AND OBJECTIVE BOX
PAULA BLANCO  81y  MRN: [id number]    Subjective:    Patient is a 81y old  Female who presents with a chief complaint of Referred by [facility] for evaluation of "ESRD" (29 Oct 2021 20:54)      Interval history/overnight events:  No acute events overnight.             MEDICATIONS  (STANDING):  amLODIPine   Tablet 5 milliGRAM(s) Oral daily  atorvastatin 10 milliGRAM(s) Oral at bedtime  brimonidine 0.2% Ophthalmic Solution 1 Drop(s) Right EYE two times a day  carvedilol 25 milliGRAM(s) Oral every 12 hours  dextrose 40% Gel 15 Gram(s) Oral once  dextrose 5%. 1000 milliLiter(s) (50 mL/Hr) IV Continuous <Continuous>  dextrose 5%. 1000 milliLiter(s) (100 mL/Hr) IV Continuous <Continuous>  dextrose 50% Injectable 25 Gram(s) IV Push once  dextrose 50% Injectable 12.5 Gram(s) IV Push once  dextrose 50% Injectable 25 Gram(s) IV Push once  glucagon  Injectable 1 milliGRAM(s) IntraMuscular once  heparin   Injectable 5000 Unit(s) SubCutaneous every 12 hours  influenza  Vaccine (HIGH DOSE) 0.7 milliLiter(s) IntraMuscular once  insulin lispro (ADMELOG) corrective regimen sliding scale   SubCutaneous three times a day before meals  insulin lispro (ADMELOG) corrective regimen sliding scale   SubCutaneous at bedtime  sodium bicarbonate 325 milliGRAM(s) Oral three times a day  sodium chloride 0.9% lock flush 3 milliLiter(s) IV Push every 8 hours  sodium chloride 0.9%. 1000 milliLiter(s) (75 mL/Hr) IV Continuous <Continuous>    MEDICATIONS  (PRN):        Objective:    Vitals: Vital Signs Last 24 Hrs  T(C): 36.7 (10-30-21 @ 05:50), Max: 36.7 (10-29-21 @ 21:50)  T(F): 98 (10-30-21 @ 05:50), Max: 98 (10-29-21 @ 21:50)  HR: 55 (10-30-21 @ 05:50) (55 - 61)  BP: 143/69 (10-30-21 @ 05:50) (116/74 - 152/74)  BP(mean): --  RR: 17 (10-30-21 @ 05:50) (16 - 17)  SpO2: 98% (10-30-21 @ 05:50) (94% - 100%)            I&O's Summary      PHYSICAL EXAM:  GENERAL: NAD, lying in bed  HEENT: No obvious ear lesion, no drainage or erythema. PERRLA, EOM intact, right sidede tongue scar from biopsy on right side, no other oral lesions appreciated, no LAD  CHEST/LUNG: CTAB, no wheezing, crackles, or ronchi   HEART: Bradycardia, no murmur appreciated  ABDOMEN: soft, nondistended, non-tender, normoactive BS  SKIN: No rashes or lesions  NERVOUS SYSTEM: Alert & Oriented X2-3 (name, location was [facility], year with prompting)  EXT: no peripheral edema  PSYCH: calm and cooperative     LABS:    10-29    134<L>  |  100  |  88<H>  ----------------------------<  103<H>  4.2   |  19<L>  |  3.45<H>    Ca    9.1      29 Oct 2021 19:06  Phos  5.0     10-29  Mg     2.20     10-29    TPro  6.5  /  Alb  3.5  /  TBili  <0.2  /  DBili  x   /  AST  7   /  ALT  7   /  AlkPhos  73  10-29                  Urinalysis Basic - ( 29 Oct 2021 23:53 )    Color: Light Yellow / Appearance: Slightly Turbid / S.010 / pH: x  Gluc: x / Ketone: Negative  / Bili: Negative / Urobili: <2 mg/dL   Blood: x / Protein: Trace / Nitrite: Negative   Leuk Esterase: Moderate / RBC: 1 /HPF / WBC 31 /HPF   Sq Epi: x / Non Sq Epi: 8 /HPF / Bacteria: Occasional                              9.0    6.72  )-----------( 344      ( 29 Oct 2021 19:06 )             26.8                         9.5    7.73  )-----------( 322      ( 28 Oct 2021 15:27 )             28.9     CAPILLARY BLOOD GLUCOSE      POCT Blood Glucose.: 92 mg/dL (29 Oct 2021 23:54)          RADIOLOGY & ADDITIONAL TESTS:            Imaging Personally Reviewed:  [ ] YES  [ ] NO    Consultants involved in case:   Consultant(s) Notes Reviewed:  [ ] YES  [ ] NO:   Care Discussed with Consultants/Other Providers [ ] YES  [ ] NO         PAULA BLANCO  81y  MRN: [id number]    Subjective:    Patient is a 81y old  Female who presents with a chief complaint of Referred by [facility] for evaluation of "ESRD" (29 Oct 2021 20:54)      Interval history/overnight events:  No acute events overnight.     The patient feels ok this morning. She is eating breakfast at bedside, attempting to take bites.         MEDICATIONS  (STANDING):  amLODIPine   Tablet 5 milliGRAM(s) Oral daily  atorvastatin 10 milliGRAM(s) Oral at bedtime  brimonidine 0.2% Ophthalmic Solution 1 Drop(s) Right EYE two times a day  carvedilol 25 milliGRAM(s) Oral every 12 hours  dextrose 40% Gel 15 Gram(s) Oral once  dextrose 5%. 1000 milliLiter(s) (50 mL/Hr) IV Continuous <Continuous>  dextrose 5%. 1000 milliLiter(s) (100 mL/Hr) IV Continuous <Continuous>  dextrose 50% Injectable 25 Gram(s) IV Push once  dextrose 50% Injectable 12.5 Gram(s) IV Push once  dextrose 50% Injectable 25 Gram(s) IV Push once  glucagon  Injectable 1 milliGRAM(s) IntraMuscular once  heparin   Injectable 5000 Unit(s) SubCutaneous every 12 hours  influenza  Vaccine (HIGH DOSE) 0.7 milliLiter(s) IntraMuscular once  insulin lispro (ADMELOG) corrective regimen sliding scale   SubCutaneous three times a day before meals  insulin lispro (ADMELOG) corrective regimen sliding scale   SubCutaneous at bedtime  sodium bicarbonate 325 milliGRAM(s) Oral three times a day  sodium chloride 0.9% lock flush 3 milliLiter(s) IV Push every 8 hours  sodium chloride 0.9%. 1000 milliLiter(s) (75 mL/Hr) IV Continuous <Continuous>    MEDICATIONS  (PRN):        Objective:    Vitals: Vital Signs Last 24 Hrs  T(C): 36.7 (10-30-21 @ 05:50), Max: 36.7 (10-29-21 @ 21:50)  T(F): 98 (10-30-21 @ 05:50), Max: 98 (10-29-21 @ 21:50)  HR: 55 (10-30-21 @ 05:50) (55 - 61)  BP: 143/69 (10-30-21 @ 05:50) (116/74 - 152/74)  BP(mean): --  RR: 17 (10-30-21 @ 05:50) (16 - 17)  SpO2: 98% (10-30-21 @ 05:50) (94% - 100%)            I&O's Summary      PHYSICAL EXAM:  GENERAL: NAD, lying in bed  HEENT: No obvious ear lesion, no drainage or erythema. PERRLA, EOM intact, right sidede tongue scar from biopsy on right side, no other oral lesions appreciated, no LAD  CHEST/LUNG: CTAB, no wheezing, crackles, or ronchi   HEART: Bradycardia, no murmur appreciated  ABDOMEN: soft, nondistended, non-tender, normoactive BS  SKIN: No rashes or lesions  NERVOUS SYSTEM: Alert & Oriented X2-3 (name, location was hospital, year with prompting)  EXT: no peripheral edema  PSYCH: calm and cooperative     LABS:    10-29    134<L>  |  100  |  88<H>  ----------------------------<  103<H>  4.2   |  19<L>  |  3.45<H>    Ca    9.1      29 Oct 2021 19:06  Phos  5.0     10-29  Mg     2.20     10-29    TPro  6.5  /  Alb  3.5  /  TBili  <0.2  /  DBili  x   /  AST  7   /  ALT  7   /  AlkPhos  73  10-29                  Urinalysis Basic - ( 29 Oct 2021 23:53 )    Color: Light Yellow / Appearance: Slightly Turbid / S.010 / pH: x  Gluc: x / Ketone: Negative  / Bili: Negative / Urobili: <2 mg/dL   Blood: x / Protein: Trace / Nitrite: Negative   Leuk Esterase: Moderate / RBC: 1 /HPF / WBC 31 /HPF   Sq Epi: x / Non Sq Epi: 8 /HPF / Bacteria: Occasional                              9.0    6.72  )-----------( 344      ( 29 Oct 2021 19:06 )             26.8                         9.5    7.73  )-----------( 322      ( 28 Oct 2021 15:27 )             28.9     CAPILLARY BLOOD GLUCOSE      POCT Blood Glucose.: 92 mg/dL (29 Oct 2021 23:54)          RADIOLOGY & ADDITIONAL TESTS:            Imaging Personally Reviewed:  [ ] YES  [ ] NO    Consultants involved in case:   Consultant(s) Notes Reviewed:  [ ] YES  [ ] NO:   Care Discussed with Consultants/Other Providers [ ] YES  [ ] NO         PAULA BLANCO  81y  MRN: [id number]    Subjective:    Patient is a 81y old  Female who presents with a chief complaint of Referred by [facility] for evaluation of "ESRD" (29 Oct 2021 20:54)      Interval history/overnight events:  No acute events overnight.     The patient feels ok this morning. She is eating breakfast at bedside, attempting to take bites. Family meeting with [facility] of care discussed. Patient and family refused wanting PEG tube at this time. They stated goal is to prolong life, so may explore options with heme/onc or rad onc.       MEDICATIONS  (STANDING):  amLODIPine   Tablet 5 milliGRAM(s) Oral daily  atorvastatin 10 milliGRAM(s) Oral at bedtime  brimonidine 0.2% Ophthalmic Solution 1 Drop(s) Right EYE two times a day  carvedilol 25 milliGRAM(s) Oral every 12 hours  dextrose 40% Gel 15 Gram(s) Oral once  dextrose 5%. 1000 milliLiter(s) (50 mL/Hr) IV Continuous <Continuous>  dextrose 5%. 1000 milliLiter(s) (100 mL/Hr) IV Continuous <Continuous>  dextrose 50% Injectable 25 Gram(s) IV Push once  dextrose 50% Injectable 12.5 Gram(s) IV Push once  dextrose 50% Injectable 25 Gram(s) IV Push once  glucagon  Injectable 1 milliGRAM(s) IntraMuscular once  heparin   Injectable 5000 Unit(s) SubCutaneous every 12 hours  influenza  Vaccine (HIGH DOSE) 0.7 milliLiter(s) IntraMuscular once  insulin lispro (ADMELOG) corrective regimen sliding scale   SubCutaneous three times a day before meals  insulin lispro (ADMELOG) corrective regimen sliding scale   SubCutaneous at bedtime  sodium bicarbonate 325 milliGRAM(s) Oral three times a day  sodium chloride 0.9% lock flush 3 milliLiter(s) IV Push every 8 hours  sodium chloride 0.9%. 1000 milliLiter(s) (75 mL/Hr) IV Continuous <Continuous>    MEDICATIONS  (PRN):        Objective:    Vitals: Vital Signs Last 24 Hrs  T(C): 36.7 (10-30-21 @ 05:50), Max: 36.7 (10-29-21 @ 21:50)  T(F): 98 (10-30-21 @ 05:50), Max: 98 (10-29-21 @ 21:50)  HR: 55 (10-30-21 @ 05:50) (55 - 61)  BP: 143/69 (10-30-21 @ 05:50) (116/74 - 152/74)  BP(mean): --  RR: 17 (10-30-21 @ 05:50) (16 - 17)  SpO2: 98% (10-30-21 @ 05:50) (94% - 100%)            I&O's Summary      PHYSICAL EXAM:  GENERAL: NAD, lying in bed  HEENT: No obvious ear lesion, no drainage or erythema. PERRLA, EOM intact, right sidede tongue scar from biopsy on right side, no other oral lesions appreciated, no LAD  CHEST/LUNG: CTAB, no wheezing, crackles, or ronchi   HEART: Bradycardia, no murmur appreciated  ABDOMEN: soft, nondistended, non-tender, normoactive BS  SKIN: No rashes or lesions  NERVOUS SYSTEM: Alert & Oriented X2-3 (name, location was [facility], year with prompting)  EXT: no peripheral edema  PSYCH: calm and cooperative     LABS:    10-29    134<L>  |  100  |  88<H>  ----------------------------<  103<H>  4.2   |  19<L>  |  3.45<H>    Ca    9.1      29 Oct 2021 19:06  Phos  5.0     10-29  Mg     2.20     10-29    TPro  6.5  /  Alb  3.5  /  TBili  <0.2  /  DBili  x   /  AST  7   /  ALT  7   /  AlkPhos  73  10-29                  Urinalysis Basic - ( 29 Oct 2021 23:53 )    Color: Light Yellow / Appearance: Slightly Turbid / S.010 / pH: x  Gluc: x / Ketone: Negative  / Bili: Negative / Urobili: <2 mg/dL   Blood: x / Protein: Trace / Nitrite: Negative   Leuk Esterase: Moderate / RBC: 1 /HPF / WBC 31 /HPF   Sq Epi: x / Non Sq Epi: 8 /HPF / Bacteria: Occasional                              9.0    6.72  )-----------( 344      ( 29 Oct 2021 19:06 )             26.8                         9.5    7.73  )-----------( 322      ( 28 Oct 2021 15:27 )             28.9     CAPILLARY BLOOD GLUCOSE      POCT Blood Glucose.: 92 mg/dL (29 Oct 2021 23:54)          RADIOLOGY & ADDITIONAL TESTS:            Imaging Personally Reviewed:  [ ] YES  [ ] NO    Consultants involved in case:   Consultant(s) Notes Reviewed:  [ ] YES  [ ] NO:   Care Discussed with Consultants/Other Providers [ ] YES  [ ] NO

## 2021-11-03 NOTE — PROGRESS NOTE ADULT - PROBLEM SELECTOR PLAN 10
DVT: Heparin subcutaneous 5000 units q12h.  Diet: DASH/TLC diet ordered.  Disposition: plan of care pending family meeting

## 2021-11-03 NOTE — PROGRESS NOTE ADULT - PROBLEM SELECTOR PLAN 3
The patient does not report problems with swallowing at bedside. More likely odynophagia in the setting of tongue pain after biopsy on 10/13.  - NMPET/CT Onc Skull to thigh, 10/27/21: Large, FDG-avid mass in right lateral oral tongue/base of tongue, extending into glossotonsillar sulcus; Diffuse, mildly increased FDG activity in the esophagus may reflect inflammation.  - Speech and swallow evaluated, dysphagia diet  - Dietician evaluation  - GI consulted for potential EGD for increased FDG activity in esophagus, appreciate recs    - no intervention indicated at this time per note    - possible PEG tube, pending palliative evaluation and family wanted per Dr. Grover (oncologist) The patient does not report problems with swallowing at bedside. More likely odynophagia in the setting of tongue pain after biopsy on 10/13.  - NMPET/CT Onc Skull to thigh, 10/27/21: Large, FDG-avid mass in right lateral oral tongue/base of tongue, extending into glossotonsillar sulcus; Diffuse, mildly increased FDG activity in the esophagus may reflect inflammation.  - Speech and swallow evaluated, dysphagia diet  - Dietician evaluation  - GI consulted for potential EGD for increased FDG activity in esophagus, appreciate recs    - no intervention indicated at this time per note  - No PEG tube per patient and family at family meeting on 11/3 with primary, palliative, and heme/onc teams

## 2021-11-03 NOTE — PROGRESS NOTE ADULT - PROBLEM SELECTOR PLAN 6
Thank you for allowing us to participate in your patient's care. We will continue to follow with you. Please page [phone] for any q's or c's.     Gabrielle Butler D.O.   Palliative Medicine

## 2021-11-03 NOTE — PROGRESS NOTE ADULT - PROBLEM SELECTOR PLAN 6
Diagnosed 25 years ago, previously treated with radiation. Not currently on treatment. s/p tongue biopsy 10/13. PET scan 10/27 with multiple areas of uptake including right lateral tongue extending into glossotonsillar sulcus, esophagus, r. side nasopharynx, and right humerus   - x-ray right humerus with fracture deformity of proximal humerus, no complaint of pain from patient, likely due from prior fall  - Oncology consulted, appreciate recs    - no inpatient chemo    - spoke with Dr. Grover outpatient Oncologist  - Palliative consulted for GOC, appreciate recs  May require family meeting for GOC discussion to make sure patient, family, and caregivers have common understanding and are all in agreement about plan moving forward Diagnosed 25 years ago, previously treated with radiation. Not currently on treatment. s/p tongue biopsy 10/13. PET scan 10/27 with multiple areas of uptake including right lateral tongue extending into glossotonsillar sulcus, esophagus, r. side nasopharynx, and right humerus   - x-ray right humerus with fracture deformity of proximal humerus, no complaint of pain from patient, likely due from prior fall  - Oncology consulted, appreciate recs    - no inpatient chemo    - spoke with Dr. Grover outpatient Oncologist  - Palliative consulted for GOC, appreciate recs  Family meeting:    - No PEG tube    - monitor PO intake with deon control from palliative    - Rad onc evaluation per heme/onc team

## 2021-11-03 NOTE — PROGRESS NOTE ADULT - PROBLEM SELECTOR PLAN 4
Talk to your dentist about [facility]    [facility]    If you have any questions regarding to your visit please contact your care team:       Team Red:   Clinic Hours Telephone Number   Dr. Ebonie Mccollum, NP 7am-7pm  Monday - Thursday   7am-5pm  Fridays  [phone]  (Appointment scheduling available 24/7)   Urgent Care - [place] and [facility] - 11am-9pm Monday-Friday Saturday-Sunday- 9am-5pm   [place] - 5pm-9pm Monday-Friday Saturday-Sunday- 9am-5pm  [phone] - [place]  [phone] - [place]       What options do I have for a visit other than an office visit? We offer electronic visits (e-visits) and telephone visits, when medically appropriate.  Please check with your medical insurance to see if these types of visits are covered, as you will be responsible for any charges that are not paid by your insurance.      You can use [employer] (secure electronic communication) to access to your chart, send your primary care provider a message, or make an appointment. Ask a team member how to get started.     For a price quote for your services, please call our Consumer Price Line at [phone] or our Imaging Cost estimation line at [phone] (for imaging tests).    Discharged by Chloe Breaux MA.     Patient with SCC of tongue  Onc: Dr. Grover   rad/onc consult to evaluate patient while she is in hospital   Patient and family interested in learning about treatment options. Family understands that patient would need to meet nutritional goals and have a good performance status in order to be a candidate for DMT.   Sister did express interest in patient receiving hospice services.

## 2021-11-03 NOTE — PROGRESS NOTE ADULT - PROBLEM SELECTOR PLAN 1
Cr 3.45, prior Cr 1.93  on October 8th. Creatinine 1.1-1.2 in 9/2020.  - GFR 12 previously 24.  - FENa calculated 3.1% correlates with intrinsic, but more likely pre-renal in the setting of poor PO intake and elevated BUN:Cr ratio >20:1  - Continue hold benazepril and Hydrochlorothiazide in setting of NICK  - Renal US performed, results pending    - Increased cortical echogenicity, suggesting medical renal disease.    - No hydronephrosis    - Bladder distended to 444 cc, not retaining on bladder scans, will DC bladder scans    - Cholelithiasis, incidental finding, patient is asymptomatic  - Renal function responsive to fluids indicating pre-renal etiology, Cr 1.48 from 1.91  - will continue NS 0.9% 75cc/hr for 12 hours Cr 3.45, prior Cr 1.93  on October 8th. Creatinine 1.1-1.2 in 9/2020.  - GFR 12 previously 24.  - FENa calculated 3.1% correlates with intrinsic, but more likely pre-renal in the setting of poor PO intake and elevated BUN:Cr ratio >20:1  - Continue hold benazepril and Hydrochlorothiazide in setting of NICK  - Renal function responsive to fluids indicating pre-renal etiology, Cr 1.39 from 1.48 from 1.91  - Held fluids today, patient with PO intake this morning.

## 2021-11-04 NOTE — PROGRESS NOTE ADULT - PROBLEM SELECTOR PLAN 1
Cr 3.45, prior Cr 1.93  on October 8th. Creatinine 1.1-1.2 in 9/2020.  - GFR 12 previously 24.  - FENa calculated 3.1% correlates with intrinsic, but more likely pre-renal in the setting of poor PO intake and elevated BUN:Cr ratio >20:1  - Continue hold benazepril and Hydrochlorothiazide in setting of NICK  - Renal function responsive to fluids indicating pre-renal etiology, Cr 1.39 from 1.48 from 1.91  - Held fluids today, patient with PO intake this morning. Cr 3.45, prior Cr 1.93  on October 8th. Creatinine 1.1-1.2 in 9/2020.  - GFR 12 previously 24.  - FENa calculated 3.1% correlates with intrinsic, but more likely pre-renal in the setting of poor PO intake and elevated BUN:Cr ratio >20:1  - Continue hold benazepril and Hydrochlorothiazide in setting of NICK  - Renal function responsive to fluids indicating pre-renal etiology, Cr 1.32 from 1.39 from 1.48 from 1.91  - Held fluids today, patient with PO intake this morning.

## 2021-11-04 NOTE — HISTORY OF PRESENT ILLNESS
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 Knowles last year. She now presents with an infiltrative third primary R. BOT SCCa.  This is confirmed after EUA and biopsy where frozen section was clearly positive for SCCa.  \par She was evaluated by Dr Diamond, she is not interested in surgical options. \par \par Today, Ms Burdick presents with her  Murali. \par Patient reports that she has not been eating or drinking much for the past few weeks, this is due to oral pain. She has only been able to take about half of a boost and some water today.\par She was prescribed oxycodone but has not been taking it. \par She appears dehydrated and weak. She has lost a significant amount of weight. \par \par Ms Burdick has 2 daughters, both are out of state and do not keep in touch with her often. \par She lives with her  Murali. He sister Malina and her are close but she lives an hour away.

## 2021-11-04 NOTE — PROGRESS NOTE ADULT - ASSESSMENT
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 Knowles last year. ---- She now presents with what seems to be an infiltrative third primary R. BOT SCCa.   This is confirmed after EUA and biopsy where frozen section positive for SCCa.  outpt PET:  Large, FDG-avid mass in right lateral oral tongue/base of tongue, extending into glossotonsillar sulcus, best delineated on recent contrast-enhanced CT, and corresponding to patient's biopsy proven squamous cell carcinoma. Nonspecific, asymmetrically increased FDG activity in right side of nasopharynx. Please correlate with direct visualization. Nonspecific, diffuse, mildly increased FDG activity in the esophagus may reflect inflammation. There also is a large: Nonspecific focus of increased FDG activity in the perianal region. Please correlate clinically and with endoscopy/proctoscopy, as indicated. FDG-avid deformity, proximal right humerus, probably is secondary to fracture/trauma.    Patient lives with her elderly , she has 2 daughters but is not in close contact with them and they live in other states, she has a sister who [place] who is her HCP as per pt and her .   She is not interested in a feeding tube. She understands that to be eligible for any treatment she will need to be able to maintain her nutrition status.   She is frail and has a ECOG Performance status of 2-3 at home.   Best supportive care may be the best option.       -Appreciate Pal care consult for assistance with goals of care   -Spoke to patient again by bedside today. DNI DNR. Patient and family ultimately deciding for support and comfort care measures. Would like to go home with hospice benefits. No plans for further RT or systemic treatment. SW to help coordinate home services.    -To be continued on  puree diet with thin liquids , per SLP recs  -Encourage PO intake, monitor and consider maintenance IVF as needed  -Patient to may followup with Dr. Grover  ([facility]) upon discharge if in line with GOC  -C/w Supportive care, pain control, Nutrition, PT, DVT ppx  -Oncology will continue to follow with you      Case d/w Dr. Reilly CHACKO  Oncology Physician Assistant  [facility]  Pager [phone]    If after 5pm or weekends please page On-call Oncology Fellow

## 2021-11-04 NOTE — PROGRESS NOTE ADULT - CONVERSATION DETAILS
full note to come  dnr/dni, STEPHEN completed     hospice referral Palliative provider met with patient and her , Murali at bedside. Murali stated he reviewed the form multiple times and re-affirmed that Torrie has living will that states she would not want extraordinary measures performed. Patient is a DNR/DNI, no artificial nutrition, MOLST form completed. After goc discussion yesterday, Murali and Torrie also agreed to hospice referral. Discussed continued symptom management with hospice services. Re-assured patient who asked if her sister, Malina was called, that I spoke to Malina who agrees with DNR/DNI and hospice referral.

## 2021-11-04 NOTE — PROGRESS NOTE ADULT - PROBLEM SELECTOR PLAN 10
DVT: Heparin subcutaneous 5000 units q12h.  Diet: DASH/TLC diet ordered.  Disposition: plan of care pending family meeting DVT: Heparin subcutaneous 5000 units q12h.  Diet: dysphagia diet  Disposition: likely discharge tomorrow

## 2021-11-04 NOTE — PROGRESS NOTE ADULT - PROBLEM SELECTOR PLAN 2
Patient with pH of 7.24 and serum bicarb = 19  - appears to have metabolic acidosis with concomitant respiratory acidosis, pCO2 is elevated at 47 compared to expected respiratory compensation per Winter's formula. Continue to follow with repeat vbg.  - s/p 1300 mg Sodium bicarbonate  - s/p sodium bicarb 325mg TID 2 days supp pending Renal eval  - Nephrology added per patient providers list, pending assessment The patient does not report problems with swallowing at bedside. More likely odynophagia in the setting of tongue pain after biopsy on 10/13.  - NMPET/CT Onc Skull to thigh, 10/27/21: Large, FDG-avid mass in right lateral oral tongue/base of tongue, extending into glossotonsillar sulcus; Diffuse, mildly increased FDG activity in the esophagus may reflect inflammation.  - Speech and swallow evaluated, dysphagia diet  - GI consulted for potential EGD for increased FDG activity in esophagus, appreciate recs    - no intervention indicated at this time per note  - No PEG tube per patient and family at family meeting on 11/3 with primary, palliative, and heme/onc teams  - Palliative care, recs appreciated    - Hospice referral, patient made DNR/DNI 11/4/21    - pain controld

## 2021-11-04 NOTE — PROGRESS NOTE ADULT - SUBJECTIVE AND OBJECTIVE BOX
PAULA BLANCO  81y  MRN: [id number]    Subjective:    Patient is a 81y old  Female who presents with a chief complaint of Referred by [facility] for evaluation of "ESRD" (29 Oct 2021 20:54)      Interval history/overnight events:          MEDICATIONS  (STANDING):  amLODIPine   Tablet 5 milliGRAM(s) Oral daily  atorvastatin 10 milliGRAM(s) Oral at bedtime  brimonidine 0.2% Ophthalmic Solution 1 Drop(s) Right EYE two times a day  carvedilol 25 milliGRAM(s) Oral every 12 hours  dextrose 40% Gel 15 Gram(s) Oral once  dextrose 5%. 1000 milliLiter(s) (50 mL/Hr) IV Continuous <Continuous>  dextrose 5%. 1000 milliLiter(s) (100 mL/Hr) IV Continuous <Continuous>  dextrose 50% Injectable 25 Gram(s) IV Push once  dextrose 50% Injectable 12.5 Gram(s) IV Push once  dextrose 50% Injectable 25 Gram(s) IV Push once  glucagon  Injectable 1 milliGRAM(s) IntraMuscular once  heparin   Injectable 5000 Unit(s) SubCutaneous every 12 hours  influenza  Vaccine (HIGH DOSE) 0.7 milliLiter(s) IntraMuscular once  insulin lispro (ADMELOG) corrective regimen sliding scale   SubCutaneous three times a day before meals  insulin lispro (ADMELOG) corrective regimen sliding scale   SubCutaneous at bedtime  sodium bicarbonate 325 milliGRAM(s) Oral three times a day  sodium chloride 0.9% lock flush 3 milliLiter(s) IV Push every 8 hours  sodium chloride 0.9%. 1000 milliLiter(s) (75 mL/Hr) IV Continuous <Continuous>    MEDICATIONS  (PRN):        Objective:    Vitals: Vital Signs Last 24 Hrs  T(C): 36.7 (10-30-21 @ 05:50), Max: 36.7 (10-29-21 @ 21:50)  T(F): 98 (10-30-21 @ 05:50), Max: 98 (10-29-21 @ 21:50)  HR: 55 (10-30-21 @ 05:50) (55 - 61)  BP: 143/69 (10-30-21 @ 05:50) (116/74 - 152/74)  BP(mean): --  RR: 17 (10-30-21 @ 05:50) (16 - 17)  SpO2: 98% (10-30-21 @ 05:50) (94% - 100%)            I&O's Summary      PHYSICAL EXAM:  GENERAL: NAD, lying in bed  HEENT: No obvious ear lesion, no drainage or erythema. PERRLA, EOM intact, right sidede tongue scar from biopsy on right side, no other oral lesions appreciated, no LAD  CHEST/LUNG: CTAB, no wheezing, crackles, or ronchi   HEART: Bradycardia, no murmur appreciated  ABDOMEN: soft, nondistended, non-tender, normoactive BS  SKIN: No rashes or lesions  NERVOUS SYSTEM: Alert & Oriented X2-3 (name, location was [facility], year with prompting)  EXT: no peripheral edema  PSYCH: calm and cooperative     LABS:    10-29    134<L>  |  100  |  88<H>  ----------------------------<  103<H>  4.2   |  19<L>  |  3.45<H>    Ca    9.1      29 Oct 2021 19:06  Phos  5.0     10-29  Mg     2.20     10-29    TPro  6.5  /  Alb  3.5  /  TBili  <0.2  /  DBili  x   /  AST  7   /  ALT  7   /  AlkPhos  73  10-29                  Urinalysis Basic - ( 29 Oct 2021 23:53 )    Color: Light Yellow / Appearance: Slightly Turbid / S.010 / pH: x  Gluc: x / Ketone: Negative  / Bili: Negative / Urobili: <2 mg/dL   Blood: x / Protein: Trace / Nitrite: Negative   Leuk Esterase: Moderate / RBC: 1 /HPF / WBC 31 /HPF   Sq Epi: x / Non Sq Epi: 8 /HPF / Bacteria: Occasional                              9.0    6.72  )-----------( 344      ( 29 Oct 2021 19:06 )             26.8                         9.5    7.73  )-----------( 322      ( 28 Oct 2021 15:27 )             28.9     CAPILLARY BLOOD GLUCOSE      POCT Blood Glucose.: 92 mg/dL (29 Oct 2021 23:54)          RADIOLOGY & ADDITIONAL TESTS:            Imaging Personally Reviewed:  [ ] YES  [ ] NO    Consultants involved in case:   Consultant(s) Notes Reviewed:  [ ] YES  [ ] NO:   Care Discussed with Consultants/Other Providers [ ] YES  [ ] NO         PAULA BLANCO  81y  MRN: [id number]    Subjective:    Patient is a 81y old  Female who presents with a chief complaint of Referred by [email or web address] for evaluation of "ESRD" (29 Oct 2021 20:54)      Interval history/overnight events:  No acute events overnight. Slightly hypertensive up to systolic of 170s.     The patient reports some tongue pain this morning. She ate almost 100% of her breakfast. She wants to go home. No other complaints at this time.       MEDICATIONS  (STANDING):  amLODIPine   Tablet 5 milliGRAM(s) Oral daily  atorvastatin 10 milliGRAM(s) Oral at bedtime  brimonidine 0.2% Ophthalmic Solution 1 Drop(s) Right EYE two times a day  carvedilol 25 milliGRAM(s) Oral every 12 hours  dextrose 40% Gel 15 Gram(s) Oral once  dextrose 5%. 1000 milliLiter(s) (50 mL/Hr) IV Continuous <Continuous>  dextrose 5%. 1000 milliLiter(s) (100 mL/Hr) IV Continuous <Continuous>  dextrose 50% Injectable 25 Gram(s) IV Push once  dextrose 50% Injectable 12.5 Gram(s) IV Push once  dextrose 50% Injectable 25 Gram(s) IV Push once  glucagon  Injectable 1 milliGRAM(s) IntraMuscular once  heparin   Injectable 5000 Unit(s) SubCutaneous every 12 hours  influenza  Vaccine (HIGH DOSE) 0.7 milliLiter(s) IntraMuscular once  insulin lispro (ADMELOG) corrective regimen sliding scale   SubCutaneous three times a day before meals  insulin lispro (ADMELOG) corrective regimen sliding scale   SubCutaneous at bedtime  sodium bicarbonate 325 milliGRAM(s) Oral three times a day  sodium chloride 0.9% lock flush 3 milliLiter(s) IV Push every 8 hours  sodium chloride 0.9%. 1000 milliLiter(s) (75 mL/Hr) IV Continuous <Continuous>    MEDICATIONS  (PRN):        Objective:    Vitals: Vital Signs Last 24 Hrs  T(C): 36.7 (10-30-21 @ 05:50), Max: 36.7 (10-29-21 @ 21:50)  T(F): 98 (10-30-21 @ 05:50), Max: 98 (10-29-21 @ 21:50)  HR: 55 (10-30-21 @ 05:50) (55 - 61)  BP: 143/69 (10-30-21 @ 05:50) (116/74 - 152/74)  BP(mean): --  RR: 17 (10-30-21 @ 05:50) (16 - 17)  SpO2: 98% (10-30-21 @ 05:50) (94% - 100%)            I&O's Summary      PHYSICAL EXAM:  GENERAL: NAD, lying in bed  HEENT: No obvious ear lesion, no drainage or erythema. PERRLA, EOM intact, right sidede tongue scar from biopsy on right side, no other oral lesions appreciated, no LAD  CHEST/LUNG: CTAB, no wheezing, crackles, or ronchi   HEART: Bradycardia, no murmur appreciated  ABDOMEN: soft, nondistended, non-tender, normoactive BS  SKIN: No rashes or lesions  NERVOUS SYSTEM: Alert & Oriented X2-3 (name, location was hospital, year with prompting)  EXT: no peripheral edema  PSYCH: calm and cooperative     LABS:    10-29    134<L>  |  100  |  88<H>  ----------------------------<  103<H>  4.2   |  19<L>  |  3.45<H>    Ca    9.1      29 Oct 2021 19:06  Phos  5.0     10-29  Mg     2.20     10-29    TPro  6.5  /  Alb  3.5  /  TBili  <0.2  /  DBili  x   /  AST  7   /  ALT  7   /  AlkPhos  73  10-29                  Urinalysis Basic - ( 29 Oct 2021 23:53 )    Color: Light Yellow / Appearance: Slightly Turbid / S.010 / pH: x  Gluc: x / Ketone: Negative  / Bili: Negative / Urobili: <2 mg/dL   Blood: x / Protein: Trace / Nitrite: Negative   Leuk Esterase: Moderate / RBC: 1 /HPF / WBC 31 /HPF   Sq Epi: x / Non Sq Epi: 8 /HPF / Bacteria: Occasional                              9.0    6.72  )-----------( 344      ( 29 Oct 2021 19:06 )             26.8                         9.5    7.73  )-----------( 322      ( 28 Oct 2021 15:27 )             28.9     CAPILLARY BLOOD GLUCOSE      POCT Blood Glucose.: 92 mg/dL (29 Oct 2021 23:54)          RADIOLOGY & ADDITIONAL TESTS:            Imaging Personally Reviewed:  [ ] YES  [ ] NO    Consultants involved in case:   Consultant(s) Notes Reviewed:  [ ] YES  [ ] NO:   Care Discussed with Consultants/Other Providers [ ] YES  [ ] NO

## 2021-11-04 NOTE — PROGRESS NOTE ADULT - ASSESSMENT
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 Heme/onc and GI following.

## 2021-11-04 NOTE — PROGRESS NOTE ADULT - PROBLEM SELECTOR PLAN 6
Thank you for allowing us to participate in your patient's care. We will continue to follow with you. Please page [phone] for any q's or c's.     Gabrielle Butler D.O.   Palliative Medicine

## 2021-11-04 NOTE — PROGRESS NOTE ADULT - PROBLEM SELECTOR PLAN 3
The patient does not report problems with swallowing at bedside. More likely odynophagia in the setting of tongue pain after biopsy on 10/13.  - NMPET/CT Onc Skull to thigh, 10/27/21: Large, FDG-avid mass in right lateral oral tongue/base of tongue, extending into glossotonsillar sulcus; Diffuse, mildly increased FDG activity in the esophagus may reflect inflammation.  - Speech and swallow evaluated, dysphagia diet  - Dietician evaluation  - GI consulted for potential EGD for increased FDG activity in esophagus, appreciate recs    - no intervention indicated at this time per note  - No PEG tube per patient and family at family meeting on 11/3 with primary, palliative, and heme/onc teams Patient with pH of 7.24 and serum bicarb = 19  - appears to have metabolic acidosis with concomitant respiratory acidosis, pCO2 is elevated at 47 compared to expected respiratory compensation per Winter's formula. Continue to follow with repeat vbg.  - s/p 1300 mg Sodium bicarbonate  - s/p sodium bicarb 325mg TID 2 days supp pending Renal eval  - Nephrology added per patient providers list, pending assessment

## 2021-11-04 NOTE — PROVIDER CONTACT NOTE (OTHER) - ASSESSMENT
Diastolic BP is 49. No acute distress noted
Patient Bp 119/49. HR 58.  Patient asymptomatic.
Patient Bp 144/56. HR 54.  Patient asymptomatic.
/48, patient assessed, patient is asymptomatic.
diastolic bp is 48

## 2021-11-04 NOTE — PHYSICAL EXAM
[Capable of only limited self care, confined to bed or chair more than 50% of waking hours] : Status 3- Capable of only limited self care, confined to bed or chair more than 50% of waking hours [Normal] : affect appropriate [de-identified] :  Postsurgical and radiation changes along the tongue. It deviates to the right. There is a tender submucosal mass along the right posterolateral tongue and BOT

## 2021-11-04 NOTE — PROGRESS NOTE ADULT - PROBLEM SELECTOR PLAN 6
Diagnosed 25 years ago, previously treated with radiation. Not currently on treatment. s/p tongue biopsy 10/13. PET scan 10/27 with multiple areas of uptake including right lateral tongue extending into glossotonsillar sulcus, esophagus, r. side nasopharynx, and right humerus   - x-ray right humerus with fracture deformity of proximal humerus, no complaint of pain from patient, likely due from prior fall  - Oncology consulted, appreciate recs    - no inpatient chemo    - spoke with Dr. Grover outpatient Oncologist  - Palliative consulted for GOC, appreciate recs  Family meeting:    - No PEG tube    - monitor PO intake with deon control from palliative    - Rad onc evaluation per heme/onc team Diagnosed 25 years ago, previously treated with radiation. Not currently on treatment. s/p tongue biopsy 10/13. PET scan 10/27 with multiple areas of uptake including right lateral tongue extending into glossotonsillar sulcus, esophagus, r. side nasopharynx, and right humerus   - x-ray right humerus with fracture deformity of proximal humerus, no complaint of pain from patient, likely due from prior fall  - Oncology consulted, appreciate recs    - no inpatient chemo    - spoke with Dr. Grover outpatient Oncologist  - Palliative consulted for GOC, appreciate recs  Family meeting:    - No PEG tube    - monitor PO intake with pain control from palliative    - Rad onc evaluation per heme/onc team

## 2021-11-04 NOTE — PROGRESS NOTE ADULT - PROBLEM SELECTOR PLAN 1
Patient reports pain with swallowing and right ear pain.   > dilaudid 1mg PO tid premeals  > continue dilaudid 1mg PO q4 prn severe pain   > start magic mouthwash tid   > Bowel regimen while on opioids   > S&S recs appreciated- puree with thin liquids  > Patient does not want a feeding tube. She wants to continue with modified diet.  > Anticipated d/c recs: dilaudid 1mg PO tid premeal with dilaudid 1mg every 4 hours as needed for moderate to severe pain, Magic mouthwash before meals.

## 2021-11-04 NOTE — REVIEW OF SYSTEMS
[Fatigue] : fatigue [Recent Change In Weight] : ~T recent weight change [Odynophagia] : odynophagia [Mucosal Pain] : mucosal pain [Dizziness] : dizziness [Difficulty Walking] : difficulty walking [Negative] : Allergic/Immunologic

## 2021-11-04 NOTE — PROGRESS NOTE ADULT - PROBLEM SELECTOR PLAN 5
MOLST form completed today and placed in her chart. DNR/DNI, no artificial nutrition. Please see goc note.   Home hospice referral

## 2021-11-04 NOTE — PROVIDER CONTACT NOTE (OTHER) - RECOMMENDATIONS
Notify MD. Hold Bp medication. Nursing care continued.
Dr. Ruiz notified
Notify MD
notify md of out of parameter diastolic bp.
Notify MD. Kennedy BP. Nursing care continued.
Notify provider for further intervention.

## 2021-11-04 NOTE — PROVIDER CONTACT NOTE (OTHER) - SITUATION
/52, HR 55
/55, HR 59
DBP 48
/51, HR 56
Patient Bp 119/49. HR 58.
Patient Bp 144/56. HR 54.
/49
diastolic bp is 48

## 2021-11-04 NOTE — PROVIDER CONTACT NOTE (OTHER) - BACKGROUND
Patient admitted for acute renal failure, PMH tongue cancer, HTN.
patient admitted with acute renal failure
Patient admitted for acute renal failure.
Patient admitted for acute renal failure
Patient admitted with ARF. Hx of dm, osteopenia, hld, ckd, htn, depression and anxiety
patient admitted with acute renal failure
Patient admitted with ARF. Hx of dm, osteopenia, hld, ckd, htn, depression and anxiety
Patient admitted with ARF. Hx of dm, osteopenia, hld, ckd, htn, depression and anxiety

## 2021-11-04 NOTE — PROVIDER CONTACT NOTE (OTHER) - DATE AND TIME:
02-Nov-2021 11:35
30-Oct-2021 23:22
30-Oct-2021 02:00
04-Nov-2021 09:30
30-Oct-2021 12:35
03-Nov-2021 02:25
30-Oct-2021 17:25
30-Oct-2021 21:30

## 2021-11-04 NOTE — PROGRESS NOTE ADULT - SUBJECTIVE AND OBJECTIVE BOX
[facility] Geriatrics and Palliative Care  Gabrielle Butler Palliative Care Attending  Contact Info: Page [phone] (including Nights/Weekends), message on Microsoft Teams (Gabrielle Butler), or leave  at Palliative Office [phone] (non-urgent)     SUBJECTIVE AND OBJECTIVE:   INTERVAL HPI/OVERNIGHT EVENTS:    DNR on chart:   Allergies    No Known Allergies    Intolerances    MEDICATIONS  (STANDING):  amLODIPine   Tablet 2.5 milliGRAM(s) Oral once  atorvastatin 10 milliGRAM(s) Oral at bedtime  brimonidine 0.2% Ophthalmic Solution 1 Drop(s) Right EYE two times a day  carvedilol 25 milliGRAM(s) Oral every 12 hours  dextrose 40% Gel 15 Gram(s) Oral once  dextrose 5%. 1000 milliLiter(s) (100 mL/Hr) IV Continuous <Continuous>  dextrose 5%. 1000 milliLiter(s) (50 mL/Hr) IV Continuous <Continuous>  dextrose 5%. 1000 milliLiter(s) (100 mL/Hr) IV Continuous <Continuous>  dextrose 50% Injectable 25 Gram(s) IV Push once  dextrose 50% Injectable 12.5 Gram(s) IV Push once  dextrose 50% Injectable 25 Gram(s) IV Push once  FIRST- Mouthwash  BLM 10 milliLiter(s) Swish and Spit three times a day  glucagon  Injectable 1 milliGRAM(s) IntraMuscular once  glucagon  Injectable 1 milliGRAM(s) IntraMuscular once  heparin   Injectable 5000 Unit(s) SubCutaneous every 12 hours  HYDROmorphone   Tablet 1 milliGRAM(s) Oral three times a day  influenza  Vaccine (HIGH DOSE) 0.7 milliLiter(s) IntraMuscular once  insulin lispro (ADMELOG) corrective regimen sliding scale   SubCutaneous three times a day before meals  insulin lispro (ADMELOG) corrective regimen sliding scale   SubCutaneous at bedtime  lidocaine 2% Viscous 10 milliLiter(s) Swish and Spit three times a day  magnesium sulfate  IVPB 1 Gram(s) IV Intermittent once  potassium phosphate / sodium phosphate Powder (PHOS-NaK) 1 Packet(s) Oral once  senna Syrup 15 milliLiter(s) Oral at bedtime    MEDICATIONS  (PRN):  acetaminophen     Tablet .. 650 milliGRAM(s) Oral every 6 hours PRN Mild Pain (1 - 3), Moderate Pain (4 - 6)  HYDROmorphone   Tablet 1 milliGRAM(s) Oral every 4 hours PRN Severe Pain (7 - 10)      ITEMS UNCHECKED ARE NOT PRESENT    PRESENT SYMPTOMS: [ ]Unable to obtain due to poor mentation   Source if other than patient:  [ ]Family   [ ]Team     Pain:  [ ]yes [ ]no  QOL impact -   Location -                    Aggravating factors -  Quality -  Radiation -  Timing-  Severity (0-10 scale):  Minimal acceptable level (0-10 scale):     Dyspnea:                           [ ]Mild [ ]Moderate [ ]Severe  Anxiety:                             [ ]Mild [ ]Moderate [ ]Severe  Fatigue:                             [ ]Mild [ ]Moderate [ ]Severe  Nausea:                             [ ]Mild [ ]Moderate [ ]Severe  Loss of appetite:              [ ]Mild [ ]Moderate [ ]Severe  Constipation:                    [ ]Mild [ ]Moderate [ ]Severe    PAIN AD Score:	  [email or web address] (Ctrl + left click to view)    Other Symptoms:  [ ]All other review of systems negative     Palliative Performance Status Version 2:         %      [email or web address]/[email or web address]_[email or web address].pdf  PHYSICAL EXAM:  Vital Signs Last 24 Hrs  T(C): 36.7 (04 Nov 2021 09:15), Max: 36.9 (04 Nov 2021 05:00)  T(F): 98 (04 Nov 2021 09:15), Max: 98.5 (04 Nov 2021 05:00)  HR: 62 (04 Nov 2021 09:15) (56 - 62)  BP: 126/48 (04 Nov 2021 09:15) (126/48 - 173/55)  BP(mean): --  RR: 17 (04 Nov 2021 09:15) (16 - 18)  SpO2: 100% (04 Nov 2021 09:15) (98% - 100%) I&O's Summary    03 Nov 2021 07:01  -  04 Nov 2021 07:00  --------------------------------------------------------  IN: 240 mL / OUT: 370 mL / NET: -130 mL       GENERAL:  [ ]Alert  [ ]Oriented x   [ ]Lethargic  [ ]Cachexia  [ ]Unarousable  [ ]Verbal  [ ]Non-Verbal  Behavioral:   [ ]Anxiety  [ ]Delirium [ ]Agitation [ ]Other  HEENT:  [ ]Normal   [ ]Dry mouth   [ ]ET Tube/Trach  [ ]Oral lesions  PULMONARY:   [ ]Clear [ ]Tachypnea  [ ]Audible excessive secretions   [ ]Rhonchi        [ ]Right [ ]Left [ ]Bilateral  [ ]Crackles        [ ]Right [ ]Left [ ]Bilateral  [ ]Wheezing     [ ]Right [ ]Left [ ]Bilateral  [ ]Diminished BS [ ] Right [ ]Left [ ]Bilateral  CARDIOVASCULAR:    [ ]Regular [ ]Irregular [ ]Tachy  [ ]Solo [ ]Murmur [ ]Other  GASTROINTESTINAL:  [ ]Soft  [ ]Distended   [ ]+BS  [ ]Non tender [ ]Tender  [ ]PEG [ ]OGT/ NGT   Last BM:    GENITOURINARY:  [ ]Normal [ ]Incontinent   [ ]Oliguria/Anuria   [ ]Hicks  MUSCULOSKELETAL:   [ ]Normal   [ ]Weakness  [ ]Bed/Wheelchair bound [ ]Edema  NEUROLOGIC:   [ ]No focal deficits  [ ] Cognitive impairment  [ ] Dysphagia [ ]Dysarthria [ ] Paresis [ ]Other   SKIN:   [ ]Normal  [ ]Rash   [ ]Pressure ulcer(s) [ ]y [ ]n present on admission    CRITICAL CARE:  [ ]Shock Present  [ ]Septic [ ]Cardiogenic [ ]Neurologic [ ]Hypovolemic  [ ]Vasopressors [ ]Inotropes  [ ]Respiratory failure present [ ]Mechanical Ventilation [ ]Non-invasive ventilatory support [ ]High-Flow  [ ]Acute  [ ]Chronic [ ]Hypoxic  [ ]Hypercarbic [ ]Other  [ ]Other organ failure     LABS:                        8.2    6.68  )-----------( 353      ( 04 Nov 2021 07:37 )             25.9   11-04    140  |  107  |  22  ----------------------------<  139<H>  4.3   |  23  |  1.32<H>    Ca    8.9      04 Nov 2021 07:40  Phos  2.1     11-04  Mg     1.50     11-04          RADIOLOGY & ADDITIONAL STUDIES:    Protein Calorie Malnutrition Present: [ ]mild [ ]moderate [ ]severe [ ]underweight [ ]morbid obesity  https://www.andeal.org/vault/1978/web/files/ONC/Table_Clinical%20Characteristics%20to%20Document%20Malnutrition-White%20JV%20et%20al%379107.pdf    Height (cm): 165.1 (10-29-21 @ 17:00), 165.1 (10-13-21 @ 10:20), 165.1 (10-08-21 @ 10:23)  Weight (kg): 43.5 (10-30-21 @ 02:57), 45.4 (10-13-21 @ 10:20), 45.4 (10-08-21 @ 10:23)  BMI (kg/m2): 16 (10-30-21 @ 02:57), 16.7 (10-29-21 @ 17:00), 16.7 (10-13-21 @ 10:20)    [ ]PPSV2 < or = 30%  [ ]significant weight loss [ ]poor nutritional intake [ ]anasarca    [ ]Artificial Nutrition    REFERRALS:   [ ]Chaplaincy  [ ]Hospice  [ ]Child Life  [ ]Social Work  [ ]Case management [ ]Holistic Therapy      [facility] Geriatrics and Palliative Care  Gabrielle Butler, Palliative Care Attending  Contact Info: Page [phone] (including Nights/Weekends), message on Microsoft Teams (Gabrielle Butler), or leave  at Palliative Office [phone] (non-urgent)     SUBJECTIVE AND OBJECTIVE: Patient seen with , Murali at bedside. Patient states she "ate up all her food" this morning and her pain is better controlled. I shared that patient's sister, Malina, called palliative team this morning and we discussed plan of care, which I re-affirmed with patient and . Completed MOLST form and patient's family interested in hospice services. Please see [facility] note.     INTERVAL HPI/OVERNIGHT EVENTS:  > Over the past 24 hours, patient did not require additional PRN medications after ATC meds were started.     DNR on chart:   Allergies    No Known Allergies    Intolerances    MEDICATIONS  (STANDING):  amLODIPine   Tablet 2.5 milliGRAM(s) Oral once  atorvastatin 10 milliGRAM(s) Oral at bedtime  brimonidine 0.2% Ophthalmic Solution 1 Drop(s) Right EYE two times a day  carvedilol 25 milliGRAM(s) Oral every 12 hours  dextrose 40% Gel 15 Gram(s) Oral once  dextrose 5%. 1000 milliLiter(s) (100 mL/Hr) IV Continuous <Continuous>  dextrose 5%. 1000 milliLiter(s) (50 mL/Hr) IV Continuous <Continuous>  dextrose 5%. 1000 milliLiter(s) (100 mL/Hr) IV Continuous <Continuous>  dextrose 50% Injectable 25 Gram(s) IV Push once  dextrose 50% Injectable 12.5 Gram(s) IV Push once  dextrose 50% Injectable 25 Gram(s) IV Push once  FIRST- Mouthwash  BLM 10 milliLiter(s) Swish and Spit three times a day  glucagon  Injectable 1 milliGRAM(s) IntraMuscular once  glucagon  Injectable 1 milliGRAM(s) IntraMuscular once  heparin   Injectable 5000 Unit(s) SubCutaneous every 12 hours  HYDROmorphone   Tablet 1 milliGRAM(s) Oral three times a day  influenza  Vaccine (HIGH DOSE) 0.7 milliLiter(s) IntraMuscular once  insulin lispro (ADMELOG) corrective regimen sliding scale   SubCutaneous three times a day before meals  insulin lispro (ADMELOG) corrective regimen sliding scale   SubCutaneous at bedtime  lidocaine 2% Viscous 10 milliLiter(s) Swish and Spit three times a day  magnesium sulfate  IVPB 1 Gram(s) IV Intermittent once  potassium phosphate / sodium phosphate Powder (PHOS-NaK) 1 Packet(s) Oral once  senna Syrup 15 milliLiter(s) Oral at bedtime    MEDICATIONS  (PRN):  acetaminophen     Tablet .. 650 milliGRAM(s) Oral every 6 hours PRN Mild Pain (1 - 3), Moderate Pain (4 - 6)  HYDROmorphone   Tablet 1 milliGRAM(s) Oral every 4 hours PRN Severe Pain (7 - 10)      ITEMS UNCHECKED ARE NOT PRESENT    PRESENT SYMPTOMS: [ ]Unable to obtain due to poor mentation   Source if other than patient:  [ ]Family   [ ]Team     Pain:  [ x]yes- improved [ ]no  QOL impact - pain with swallowing   Location -       pain with swallowing, ear pain              Aggravating factors - during meals   Quality - ache   Radiation - denied   Timing- intermittent   Severity (0-10 scale): did not describe   Minimal acceptable level (0-10 scale): did not describe     Dyspnea:                           [ ]Mild [ ]Moderate [ ]Severe  Anxiety:                             [ ]Mild [ ]Moderate [ ]Severe  Fatigue:                             [ ]Mild [ ]Moderate [ ]Severe  Nausea:                             [ ]Mild [ ]Moderate [ ]Severe  Loss of appetite:              [ ]Mild [ ]Moderate [ ]Severe  Constipation:                    [ ]Mild [ ]Moderate [ ]Severe    PAIN AD Score:	  [email or web address].[place].[email or web address]/painad.pdf (Ctrl + left click to view)    Other Symptoms:  [ ]All other review of systems negative     Palliative Performance Status Version 2:    60     %      http://[email or web address]/files/news/palliative_performance_scale_[email or web address].pdf    PHYSICAL EXAM:  Vital Signs Last 24 Hrs  T(C): 36.7 (04 Nov 2021 09:15), Max: 36.9 (04 Nov 2021 05:00)  T(F): 98 (04 Nov 2021 09:15), Max: 98.5 (04 Nov 2021 05:00)  HR: 62 (04 Nov 2021 09:15) (56 - 62)  BP: 126/48 (04 Nov 2021 09:15) (126/48 - 173/55)  BP(mean): --  RR: 17 (04 Nov 2021 09:15) (16 - 18)  SpO2: 100% (04 Nov 2021 09:15) (98% - 100%) I&O's Summary    03 Nov 2021 07:01  -  04 Nov 2021 07:00  --------------------------------------------------------  IN: 240 mL / OUT: 370 mL / NET: -130 mL       GENERAL: frail   [ x]Alert  [ x]Oriented x2-3?   [ ]Lethargic  [ ]Cachexia  [ ]Unarousable  [x ]Verbal- very soft spoken  [ ]Non-Verbal  Behavioral:   [ ] Anxiety  [ ] Delirium [ ] Agitation [x ] Other  HEENT: right side of tongue scar noted, no pain with palpation of of neck or ear   [ ]Normal   [ ]Dry mouth   [ ]ET Tube/Trach  [ ]Oral lesions  PULMONARY:   [x ]Clear [ ]Tachypnea  [ ]Audible excessive secretions   [ ]Rhonchi        [ ]Right [ ]Left [ ]Bilateral  [ ]Crackles        [ ]Right [ ]Left [ ]Bilateral  [ ]Wheezing     [ ]Right [ ]Left [ ]Bilatera  [ ]Diminished breath sounds [ ]right [ ]left [ ]bilateral  CARDIOVASCULAR:    [ x]Regular [ ]Irregular [ ]Tachy  [ ]Solo [ ]Murmur [ ]Other  GASTROINTESTINAL:  [x ]Soft  [ ]Distended   [ ]+BS  [x ]Non tender [ ]Tender  [ ]PEG [ ]OGT/ NGT  Last BM: 11/4?   GENITOURINARY:  [ x]Normal [ ] Incontinent   [ ]Oliguria/Anuria   [ ]Hicks  MUSCULOSKELETAL:   [ ]Normal   [ x]Weakness  [ ]Bed/Wheelchair bound [ ]Edema  NEUROLOGIC:   [ ]No focal deficits  [ ]Cognitive impairment  [ ]Dysphagia [ ]Dysarthria [ ]Paresis [x ]Other   SKIN: stage 2 sacrum  [ ]Normal    [ ]Rash  [ ]Pressure ulcer(s)       Present on admission [ ]y [ ]n    CRITICAL CARE:  [ ] Shock Present  [ ]Septic [ ]Cardiogenic [ ]Neurologic [ ]Hypovolemic  [ ]  Vasopressors [ ]  Inotropes   [ ]Respiratory failure present [ ]Mechanical ventilation [ ]Non-invasive ventilatory support [ ]High flow  [ ]Acute  [ ]Chronic [ ]Hypoxic  [ ]Hypercarbic [ ]Other  [ ]Other organ failure        LABS:                        8.2    6.68  )-----------( 353      ( 04 Nov 2021 07:37 )             25.9   11-04    140  |  107  |  22  ----------------------------<  139<H>  4.3   |  23  |  1.32<H>    Ca    8.9      04 Nov 2021 07:40  Phos  2.1     11-04  Mg     1.50     11-04    RADIOLOGY & ADDITIONAL STUDIES: n/a     Protein Calorie Malnutrition Present: [ ]mild [ ]moderate [x ]severe [ ]underweight [ ]morbid obesity  https://www.andeal.org/vault/2440/web/files/ONC/Table_Clinical%20Characteristics%20to%20Document%20Malnutrition-White%20JV%20et%20al%202012.pdf    Height (cm): 165.1 (10-29-21 @ 17:00), 165.1 (10-13-21 @ 10:20), 165.1 (10-08-21 @ 10:23)  Weight (kg): 43.5 (10-30-21 @ 02:57), 45.4 (10-13-21 @ 10:20), 45.4 (10-08-21 @ 10:23)  BMI (kg/m2): 16 (10-30-21 @ 02:57), 16.7 (10-29-21 @ 17:00), 16.7 (10-13-21 @ 10:20)    [ ]PPSV2 < or = 30%  [ ]significant weight loss [ ]poor nutritional intake [ ]anasarca    [ ]Artificial Nutrition    REFERRALS:   [ ]Chaplaincy  [ ]Hospice  [ ]Child Life  [ ]Social Work  [ ]Case management [ ]Holistic Therapy

## 2021-11-04 NOTE — PROVIDER CONTACT NOTE (OTHER) - REASON
Patient Bp 119/49. HR 58.
diastolic bp 48
/54, HR 59
/51, HR 56
/49
/52, HR 55
DBP 48
Patient Bp 144/56. HR 54.

## 2021-11-04 NOTE — ASSESSMENT
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  \par She was evaluated by Dr Diamond, she is not interested in surgical options. \par She will see Dr Duenas next week. \par \par Pt with poor PO intake, appears dehydrated. \par Labs checked including CBC and CMP. \par Patient with poor performance status and may not be a candidate for systemic treatment. \par Feeding tube placement discussed with patient but she is not interested.\par Option of supportive care and comfort care also discussed. \par Patient asks that I speak to her sister to go over options so her sister can help her decide about what to do.\par \par Found to have NICK with Cr elevation to 3.5. Ambulance sent to patient's house to take patient to [facility]. \par Will follow patient's hospitalization course. \par \par \par Total time of this visit, including time spent face to face with patient and/or via video/audio, and also in preparing for today's visit for MDM and documentation (Medical Decision Making, including consideration of possible diagnoses, management options, complex medical record review, review of diagnostic tests and information, consideration and discussion of significant complications based on comorbidities, and discussion with providers involved with the care of the patient) 60 minutes.\par

## 2021-11-04 NOTE — PROGRESS NOTE ADULT - PROBLEM SELECTOR PLAN 4
Patient with SCC of tongue  Onc: Dr. Grover   rad/onc consult to evaluate patient while she is in hospital   Patient and family interested in learning about treatment options. Family understands that patient would need to meet nutritional goals and have a good performance status in order to be a candidate for DMT.   Sister did express interest in patient receiving hospice services. Patient with SCC of tongue  Onc: Dr. Grover   Patient does interested in artificial nutrition.   Patient reports having rad/onc appointment with Dr. Duenas on Monday, which we explained she can attend to hear treatment options.

## 2021-11-04 NOTE — PROGRESS NOTE ADULT - SUBJECTIVE AND OBJECTIVE BOX
INTERVAL HPI/OVERNIGHT EVENTS:  Patient seen at bedside.  Accompanied by her    No acute complaints  Anticipating discharge soon      VITAL SIGNS:  T(F): 98.3 (11-04-21 @ 13:15)  HR: 56 (11-04-21 @ 13:15)  BP: 126/48 (11-04-21 @ 09:15)  RR: 17 (11-04-21 @ 13:15)  SpO2: 100% (11-04-21 @ 13:15)  Wt(kg): --    PHYSICAL EXAM:    GENERAL: NAD, lying in bed  HEENT: No obvious ear lesion, no drainage or erythema. PERRLA, EOM intact, right sidede tongue scar from biopsy on right side, no other oral lesions appreciated, no LAD  CHEST/LUNG: CTAB, no wheezing, crackles, or ronchi   HEART: Bradycardia, no murmur appreciated  ABDOMEN: soft, nondistended, non-tender, normoactive BS  SKIN: No rashes or lesions  NERVOUS SYSTEM: Alert & Oriented X2-3 (name, location was [facility], year with prompting)  EXT: no peripheral edema  PSYCH: calm and cooperative       MEDICATIONS  (STANDING):  atorvastatin 10 milliGRAM(s) Oral at bedtime  brimonidine 0.2% Ophthalmic Solution 1 Drop(s) Right EYE two times a day  carvedilol 25 milliGRAM(s) Oral every 12 hours  dextrose 40% Gel 15 Gram(s) Oral once  dextrose 5%. 1000 milliLiter(s) (100 mL/Hr) IV Continuous <Continuous>  dextrose 5%. 1000 milliLiter(s) (50 mL/Hr) IV Continuous <Continuous>  dextrose 5%. 1000 milliLiter(s) (100 mL/Hr) IV Continuous <Continuous>  dextrose 50% Injectable 25 Gram(s) IV Push once  dextrose 50% Injectable 12.5 Gram(s) IV Push once  dextrose 50% Injectable 25 Gram(s) IV Push once  FIRST- Mouthwash  BLM 10 milliLiter(s) Swish and Spit three times a day  glucagon  Injectable 1 milliGRAM(s) IntraMuscular once  glucagon  Injectable 1 milliGRAM(s) IntraMuscular once  heparin   Injectable 5000 Unit(s) SubCutaneous every 12 hours  HYDROmorphone   Tablet 1 milliGRAM(s) Oral three times a day  influenza  Vaccine (HIGH DOSE) 0.7 milliLiter(s) IntraMuscular once  insulin lispro (ADMELOG) corrective regimen sliding scale   SubCutaneous three times a day before meals  insulin lispro (ADMELOG) corrective regimen sliding scale   SubCutaneous at bedtime  lidocaine 2% Viscous 10 milliLiter(s) Swish and Spit three times a day  senna Syrup 15 milliLiter(s) Oral at bedtime    MEDICATIONS  (PRN):  acetaminophen     Tablet .. 650 milliGRAM(s) Oral every 6 hours PRN Mild Pain (1 - 3), Moderate Pain (4 - 6)  HYDROmorphone   Tablet 1 milliGRAM(s) Oral every 4 hours PRN Severe Pain (7 - 10)      Allergies    No Known Allergies    Intolerances        LABS:                        8.2    6.68  )-----------( 353      ( 04 Nov 2021 07:37 )             25.9     11-04    140  |  107  |  22  ----------------------------<  139<H>  4.3   |  23  |  1.32<H>    Ca    8.9      04 Nov 2021 07:40  Phos  2.1     11-04  Mg     1.50     11-04            RADIOLOGY & ADDITIONAL TESTS:  Studies reviewed.

## 2021-11-04 NOTE — PROVIDER CONTACT NOTE (OTHER) - ACTION/TREATMENT ORDERED:
No action required at this time. Continue to monitor
MD notified. MD stated to keep monitoring and that for now it is okay.
No action required at this time. Continue to monitor
No action required at this time. Continue to monitor
MD made aware. Ok to hold BP medication.
MD notified. No further interventions at this time.
MD Perez made aware and no new orders received at this time, will continue to monitor.
MD made aware. No further interventions ordered at this time.

## 2021-11-05 NOTE — PROGRESS NOTE ADULT - PROBLEM SELECTOR PLAN 1
Cr 3.45, prior Cr 1.93  on October 8th. Creatinine 1.1-1.2 in 9/2020.  - GFR 12 previously 24.  - FENa calculated 3.1% correlates with intrinsic, but more likely pre-renal in the setting of poor PO intake and elevated BUN:Cr ratio >20:1  - Continue hold benazepril and Hydrochlorothiazide in setting of NICK  - Renal function responsive to fluids indicating pre-renal etiology, Cr 1.32 from 1.39 from 1.48 from 1.91  - Held fluids today, patient with PO intake this morning. Cr 3.45, prior Cr 1.93  on October 8th. Creatinine 1.1-1.2 in 9/2020.  - GFR 12 previously 24.  - FENa calculated 3.1% correlates with intrinsic, but more likely pre-renal in the setting of poor PO intake and elevated BUN:Cr ratio >20:1  - Continue hold benazepril and Hydrochlorothiazide in setting of NICK  - Renal function responsive to fluids indicating pre-renal etiology, Cr 1.32 from 1.39 from 1.48 from 1.91  - Improved PO intake

## 2021-11-05 NOTE — HOSPICE CARE NOTE - CONVESATION DETAILS
Received referral for home hospice. Pt medically approved. This writer reached out to pts spouse Murali to discuss services.  L/M for call back.
Hospice RN met with pt/spouse and pt's sister, Malina Morales, who assists pt and spouse. Reviewed hospice care, services and eligibility. Pt/family explained that there is appointment scheduled with Dr Duenas, Radiation oncologist on Mon 11/8/2021 and if radiation therapy is offered/recommended, pt wishes to pursue it. It is still to be determined if RT is offered, how long/how many treatments the course of therapy will be. Pt/spouse/sister all agreed they will get this information and then make a decision regarding electing the hospice plan of care. All [facility] contact information provided along with the [facility] folder of information, with consents and the listing of private hire caregivers- should pt/family decide pt requires added care/assistance at home. [facility] will follow up in the community. Pt/family in agreement with this plan and they know to contact [facility] if a home hospice plan of care is desired.

## 2021-11-05 NOTE — DISCHARGE NOTE NURSING/CASE MANAGEMENT/SOCIAL WORK - NSDCVIVACCINE_GEN_ALL_CORE_FT
Tdap; 25-Aug-2020 13:55; Wolf Hickey (RN); [employer]; [id number] (Exp. Date: 21-Nov-2021); IntraMuscular; Deltoid Left.; 0.5 milliLiter(s); VIS (VIS Published: 09-May-2013, VIS Presented: 25-Aug-2020);

## 2021-11-05 NOTE — PROGRESS NOTE ADULT - PROBLEM SELECTOR PLAN 2
The patient does not report problems with swallowing at bedside. More likely odynophagia in the setting of tongue pain after biopsy on 10/13.  - NMPET/CT Onc Skull to thigh, 10/27/21: Large, FDG-avid mass in right lateral oral tongue/base of tongue, extending into glossotonsillar sulcus; Diffuse, mildly increased FDG activity in the esophagus may reflect inflammation.  - Speech and swallow evaluated, dysphagia diet  - GI consulted for potential EGD for increased FDG activity in esophagus, appreciate recs    - no intervention indicated at this time per note  - No PEG tube per patient and family at family meeting on 11/3 with primary, palliative, and heme/onc teams  - Palliative care, recs appreciated    - Hospice referral, patient made DNR/DNI 11/4/21    - pain controld

## 2021-11-05 NOTE — DISCHARGE NOTE PROVIDER - NSDCMRMEDTOKEN_GEN_ALL_CORE_FT
Alphagan P 0.1% ophthalmic solution: 1 drop(s) in the right eye 2 times a day  amLODIPine 10 mg oral tablet: 1 tab(s) orally once a day  carvedilol 25 mg oral tablet: 1 tab(s) orally every 12 hours  Glyxambi 10 mg-5 mg oral tablet: 1 tab(s) orally once a day (in the morning)  hydroCHLOROthiazide 12.5 mg oral tablet: 0.5 tab(s) orally once a day --am  mirtazapine 45 mg oral tablet: 1 tab(s) orally once a day (at bedtime)  pravastatin 20 mg oral tablet: 1 tab(s) orally once a day--pm  Prolia 60 mg/mL subcutaneous solution: subcutaneous every 6 months  Rocklatan 0.02%-0.005% ophthalmic solution: 1 drop(s) in the right eye once a day (at bedtime)  senna 8.8 mg/5 mL oral syrup: 15 milliliter(s) orally once a day (at bedtime)  Soliqua 100/33 subcutaneous solution: 15 unit(s) subcutaneous once a day  Zoloft 50 mg oral tablet: 1 tab(s) orally once a day-pm   Alphagan P 0.1% ophthalmic solution: 1 drop(s) in the right eye 2 times a day  amLODIPine 10 mg oral tablet: 1 tab(s) orally once a day  carvedilol 25 mg oral tablet: 1 tab(s) orally every 12 hours  diphenhydramine/lidocaine/aluminum hydroxide/magnesium hydroxide/simethicone mucous membrane suspension: 10 milliliter(s) mucous membrane 3 times a day   Glyxambi 10 mg-5 mg oral tablet: 1 tab(s) orally once a day (in the morning)  HYDROmorphone 2 mg oral tablet: 0.5 tab(s) orally 3 times a day (before meals) MDD:MDD: 3  HYDROmorphone 2 mg oral tablet: 0.5 tab(s) orally every 4 hours, As Needed MDD:MDD: 6mg   lidocaine 2% mucous membrane solution: 10 milliliter(s) mucous membrane 3 times a day   mirtazapine 45 mg oral tablet: 1 tab(s) orally once a day (at bedtime)  pravastatin 20 mg oral tablet: 1 tab(s) orally once a day--pm  Prolia 60 mg/mL subcutaneous solution: subcutaneous every 6 months  Rocklatan 0.02%-0.005% ophthalmic solution: 1 drop(s) in the right eye once a day (at bedtime)  senna 8.8 mg/5 mL oral syrup: 15 milliliter(s) orally once a day (at bedtime)  Soliqua 100/33 subcutaneous solution: 15 unit(s) subcutaneous once a day  Zoloft 50 mg oral tablet: 1 tab(s) orally once a day-pm   Alphagan P 0.1% ophthalmic solution: 1 drop(s) in the right eye 2 times a day  amLODIPine 10 mg oral tablet: 1 tab(s) orally once a day  carvedilol 25 mg oral tablet: 1 tab(s) orally every 12 hours  diphenhydramine/lidocaine/aluminum hydroxide/magnesium hydroxide/simethicone mucous membrane suspension: 10 milliliter(s) mucous membrane 3 times a day   Glyxambi 10 mg-5 mg oral tablet: 1 tab(s) orally once a day (in the morning)  HYDROmorphone 2 mg oral tablet: 0.5 tab(s) orally 3 times a day (before meals), And As Needed MDD:MDD: 9mg  lidocaine 2% mucous membrane solution: 10 milliliter(s) mucous membrane 3 times a day   mirtazapine 45 mg oral tablet: 1 tab(s) orally once a day (at bedtime)  pravastatin 20 mg oral tablet: 1 tab(s) orally once a day--pm  Prolia 60 mg/mL subcutaneous solution: subcutaneous every 6 months  Rocklatan 0.02%-0.005% ophthalmic solution: 1 drop(s) in the right eye once a day (at bedtime)  senna 8.8 mg/5 mL oral syrup: 15 milliliter(s) orally once a day (at bedtime)  Soliqua 100/33 subcutaneous solution: 15 unit(s) subcutaneous once a day  Zoloft 50 mg oral tablet: 1 tab(s) orally once a day-pm   Alphagan P 0.1% ophthalmic solution: 1 drop(s) in the right eye 2 times a day  amLODIPine 10 mg oral tablet: 1 tab(s) orally once a day  carvedilol 25 mg oral tablet: 1 tab(s) orally every 12 hours  diphenhydramine/lidocaine/aluminum hydroxide/magnesium hydroxide/simethicone mucous membrane suspension: 10 milliliter(s) mucous membrane 3 times a day   Glyxambi 10 mg-5 mg oral tablet: 1 tab(s) orally once a day (in the morning)  HYDROmorphone 2 mg oral tablet: 0.5 tab(s) orally 3 times a day (before meals), And As Needed every 4 hours for severe pain. MDD:MDD: 9mg  lidocaine 2% mucous membrane solution: 10 milliliter(s) mucous membrane 3 times a day   mirtazapine 45 mg oral tablet: 1 tab(s) orally once a day (at bedtime)  pravastatin 20 mg oral tablet: 1 tab(s) orally once a day--pm  Prolia 60 mg/mL subcutaneous solution: subcutaneous every 6 months  Rocklatan 0.02%-0.005% ophthalmic solution: 1 drop(s) in the right eye once a day (at bedtime)  senna 8.8 mg/5 mL oral syrup: 15 milliliter(s) orally once a day (at bedtime)  Soliqua 100/33 subcutaneous solution: 15 unit(s) subcutaneous once a day  Zoloft 50 mg oral tablet: 1 tab(s) orally once a day-pm

## 2021-11-05 NOTE — DISCHARGE NOTE PROVIDER - NSDCCPCAREPLAN_GEN_ALL_CORE_FT
PRINCIPAL DISCHARGE DIAGNOSIS  Diagnosis: NICK (acute kidney injury)  Assessment and Plan of Treatment: You were admitted to the hospital with abnormal labs. You had decreased kidney function, so your oncologist recommended you come to the hospital. You were given fluids and your kidney function improved. Please return to the hospital if you have shortness of breath, flank pain,       PRINCIPAL DISCHARGE DIAGNOSIS  Diagnosis: NICK (acute kidney injury)  Assessment and Plan of Treatment: You were admitted to the hospital with abnormal labs. You had decreased kidney function, so your oncologist recommended you come to the hospital. You were given fluids and your kidney function improved. Please return to the hospital if you have shortness of breath, chest pain, flank pain, or urinary retention.      SECONDARY DISCHARGE DIAGNOSES  Diagnosis: Oropharyngeal cancer  Assessment and Plan of Treatment: You were admitted to the hospital for decreased kidney function. This was likely due poor intake of food and drink at home. You were seen by the Speech and Swallow team who gave you a pureed diet while inpatient. There was discussion with all the medical teams whether you would want alternative feeding options. You decided you did not want a feeeding tube. Instead, your mouth pain was controlled with help from the Palliative care team and you were able to eat and drink more. You will be discharged with oral pain medications to control pain at home. You will return to the hospital if you are unable to have adequate food and drink intake at home. You were provided a hospice referral while in the hospital and you will be discharged with home hospice services.     PRINCIPAL DISCHARGE DIAGNOSIS  Diagnosis: NICK (acute kidney injury)  Assessment and Plan of Treatment: You were admitted to the hospital with abnormal labs. You had decreased kidney function, so your oncologist recommended you come to the hospital. You were given fluids and your kidney function improved. Please return to the hospital if you have shortness of breath, chest pain, flank pain, or urinary retention.      SECONDARY DISCHARGE DIAGNOSES  Diagnosis: Oropharyngeal cancer  Assessment and Plan of Treatment: You were admitted to the hospital for decreased kidney function. This was likely due poor intake of food and drink at home. You were seen by the Speech and Swallow team who gave you a pureed diet while inpatient. There was discussion with all the medical teams whether you would want alternative feeding options. You decided you did not want a feeeding tube. Instead, your mouth pain was controlled with help from the Palliative care team and you were able to eat and drink more. You will be discharged with oral pain medications to control pain at home. You will have home care services at home. You will return to the hospital if you are unable to have adequate food and drink intake at home.

## 2021-11-05 NOTE — DISCHARGE NOTE NURSING/CASE MANAGEMENT/SOCIAL WORK - PATIENT PORTAL LINK FT
You can access the FollowMyHealth Patient Portal offered by [facility] by registering at the following website: [email or web address]. By joining [employer]’s FollowMyHealth portal, you will also be able to view your health information using other applications (apps) compatible with our system.

## 2021-11-05 NOTE — DISCHARGE NOTE PROVIDER - DETAILS OF MALNUTRITION DIAGNOSIS/DIAGNOSES
This patient has been assessed with a concern for Malnutrition and was treated during this hospitalization for the following Nutrition diagnosis/diagnoses:     -  11/01/2021: Severe protein-calorie malnutrition   -  11/01/2021: Underweight (BMI < 19)

## 2021-11-05 NOTE — DISCHARGE NOTE PROVIDER - CARE PROVIDER_API CALL
Margaret Grover)  Internal Medicine; Medical Oncology  [address] [place] [address]  Phone: [phone]  Fax: [phone]  Established Patient  Follow Up Time:

## 2021-11-05 NOTE — PROGRESS NOTE ADULT - PROBLEM SELECTOR PLAN 10
DVT: Heparin subcutaneous 5000 units q12h.  Diet: dysphagia diet  Disposition: likely discharge tomorrow DVT: Heparin subcutaneous 5000 units q12h  Diet: dysphagia diet  Disposition: likely discharge pending hospice meeting

## 2021-11-05 NOTE — PROGRESS NOTE ADULT - PROBLEM SELECTOR PLAN 3
Patient with pH of 7.24 and serum bicarb = 19  - appears to have metabolic acidosis with concomitant respiratory acidosis, pCO2 is elevated at 47 compared to expected respiratory compensation per Winter's formula. Continue to follow with repeat vbg.  - s/p 1300 mg Sodium bicarbonate  - s/p sodium bicarb 325mg TID 2 days supp pending Renal eval  - Nephrology added per patient providers list, pending assessment Patient with pH of 7.24 and serum bicarb = 19  - appears to have metabolic acidosis with concomitant respiratory acidosis, pCO2 is elevated at 47 compared to expected respiratory compensation per Winter's formula. Continue to follow with repeat vbg.  - s/p 1300 mg Sodium bicarbonate  - s/p sodium bicarb 325mg TID 2 days supp  - saturating 100% on room air

## 2021-11-05 NOTE — DISCHARGE NOTE PROVIDER - NSDCFUSCHEDAPPT_GEN_ALL_CORE_FT
PAULA BLANCO ; 11/08/2021 ; NPP Rad Med [facility]  PAULA BLANCO ; 11/23/2021 ; NPP OtoLaryng [facility] PAULA BLANCO ; 11/18/2021 ; RAUL Mee[facility]  PAULA BLANCO ; 11/23/2021 ; RAUL OtoLaryng [address] [place] [address]

## 2021-11-05 NOTE — DISCHARGE NOTE PROVIDER - HOSPITAL COURSE
80 y/o Female, ambulatory with a cane, with MHx of HTN, Type 2 diabetes, oropharyngeal cancer (25 years, not on chemo or radiation), HLD a/w NICK c/b metabolic acidosis and hyperphosphatemia, and dysphagia in the setting of newly found large, FDG-avid mass in right lateral oral tongue/base of tongue corresponding to patient's biopsy proven squamous cell carcinoma.    10/30: Seen by S+S, pureed diet. DC'ed cineesophagram. Hypoglycemic this morning, started diet. Hypoglycemia improved. Heme/onc and Nephro following. GI consulted by other team.   10/31: Discussed PEG, patient refused. Continue fluids for poor PO intake, creatine is responsive. Mother with history of Alzheimer's per daughter. Does have history of surgery on oropharyngeal cancer, received radiation last year per sister.   11/1: Spoke with Dr. Grover (Oncologist), palliative care consulted. Increased amlodipine to 7.5, placed back on sliding scale for A1C 6.1. Continue with IVF NS 75cc/hr 12 hours.  11/2: Palliative care evaluation. Possible family meeting.   11/3: Family meeting --> No PEG, assess eating with improved pain control. Oncology will reach out to radiation oncology for inpatient evaluation.   11/4: Tolerating PO better, likely DC tomorrow pending potential evaluation by radiation oncology. DNR/DNI per palliative   82 y/o Female, ambulatory with a cane, with MHx of HTN, Type 2 diabetes, oropharyngeal cancer (25 years, not on chemo or radiation), HLD presents to the ED for NICK. Patient reports that she was called by Doctor from [facility] and told to come to ED because of "ESRD". 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    The patient was evaluated by Speech and Swallow, who       10/30: Seen by S+S, pureed diet. DC'ed cineesophagram. Hypoglycemic this morning, started diet. Hypoglycemia improved. Heme/onc and Nephro following. GI consulted by other team.   10/31: Discussed PEG, patient refused. Continue fluids for poor PO intake, creatine is responsive. Mother with history of Alzheimer's per daughter. Does have history of surgery on oropharyngeal cancer, received radiation last year per sister.   11/1: Spoke with Dr. Grover (Oncologist), palliative care consulted. Increased amlodipine to 7.5, placed back on sliding scale for A1C 6.1. Continue with IVF NS 75cc/hr 12 hours.  11/2: Palliative care evaluation. Possible family meeting.   11/3: Family meeting --> No PEG, assess eating with improved pain control. Oncology will reach out to radiation oncology for inpatient evaluation.   11/4: Tolerating PO better, likely DC tomorrow pending potential evaluation by radiation oncology. DNR/DNI per palliative   82 y/o Female, ambulatory with a cane, with MHx of HTN, Type 2 diabetes, oropharyngeal cancer (25 years, not on chemo or radiation), HLD presents to the ED for NICK. Patient reports that she was called by Doctor from [facility] and told to come to ED because of "ESRD". 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    The patient was evaluated by Speech and Swallow, who recommended pureed diet. The patient was not eating well when she first arrived because of poor appetite and pain. However, PO intake improved over course of admission with pain control by Palliative Medicine to the point where she was eating 100% of meals. She will be discharged with appropriate pain control.     A family meeting took place with family, patient, primary team, Palliative, and Heme/Onc during admission when it was decided not to pursue PEG tube. Pursued improved PO intake with pain control. Palliative medicine discussed with family and patient, eventually made DNI/DNR. Hospice referral was made. The patient will go home with hospice services.     Regarding NICK, kidney function improved with fluids. Creatinine had returned to baseline by end of admission. No urinary retention, dysuria, or flank pain at discharge.     PT evaluated patient and determined home with home PT. Patient is stable for discharge.    82 y/o Female, ambulatory with a cane, with MHx of HTN, Type 2 diabetes, oropharyngeal cancer (25 years, not on chemo or radiation), HLD presents to the ED for NICK. Patient reports that she was called by Doctor from [facility] and told to come to ED because of "ESRD". 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    The patient was evaluated by Speech and Swallow, who recommended pureed diet. The patient was not eating well when she first arrived because of poor appetite and pain. However, PO intake improved over course of admission with pain control by Palliative Medicine to the point where she was eating 100% of meals. She will be discharged with appropriate pain control.     A family meeting took place with family, patient, primary team, Palliative, and Heme/Onc during admission when it was decided not to pursue PEG tube. Pursued improved PO intake with pain control. Palliative medicine discussed with family and patient, eventually made DNI/DNR. Patient is interested in pursuing radiation therapy as outpatient on Monday, November 8th. She would like to be discharged with home care services.     Regarding NICK, kidney function improved with fluids. Creatinine had returned to baseline by end of admission. No urinary retention, dysuria, or flank pain at discharge.     PT evaluated patient and determined home with home PT. Patient is stable for discharge.

## 2021-11-05 NOTE — PROGRESS NOTE ADULT - PROBLEM SELECTOR PLAN 6
Diagnosed 25 years ago, previously treated with radiation. Not currently on treatment. s/p tongue biopsy 10/13. PET scan 10/27 with multiple areas of uptake including right lateral tongue extending into glossotonsillar sulcus, esophagus, r. side nasopharynx, and right humerus   - x-ray right humerus with fracture deformity of proximal humerus, no complaint of pain from patient, likely due from prior fall  - Oncology consulted, appreciate recs    - no inpatient chemo    - spoke with Dr. Grover outpatient Oncologist  - Palliative consulted for GOC, appreciate recs  Family meeting:    - No PEG tube    - monitor PO intake with pain control from palliative    - Rad onc evaluation per heme/onc team Diagnosed 25 years ago, previously treated with radiation. Not currently on treatment. s/p tongue biopsy 10/13. PET scan 10/27 with multiple areas of uptake including right lateral tongue extending into glossotonsillar sulcus, esophagus, r. side nasopharynx, and right humerus   - x-ray right humerus with fracture deformity of proximal humerus, no complaint of pain from patient, likely due from prior fall  - Oncology consulted, appreciate recs    - no inpatient chemo    - spoke with Dr. Grover outpatient Oncologist  - Palliative consulted for GOC, appreciate recs  Family meeting:    - No PEG tube    - monitor PO intake with pain control from palliative    - Rad onc evaluation per heme/onc team, might be outpatient

## 2021-11-05 NOTE — DISCHARGE NOTE PROVIDER - NSDCFUADDAPPT_GEN_ALL_CORE_FT
Please follow up Monday 11/8/21 for Radiation Oncology appointment  Please follow up Monday 11/8/21 for Radiation Oncology appointment.

## 2021-11-05 NOTE — DISCHARGE NOTE PROVIDER - NSDCFUADDINST_GEN_ALL_CORE_FT
Wound Care Recommendations:  Sacrum- Cleanse with NS, pat dry. Apply Liquid barrier film to periwound skin (allow to dry). Cover with silicone foam with border. Change every other day.     Continue low air loss bed therapy,  heel elevation with offloading boots, turn & reposition q2h with Z-flow fluidized pillow, continue moisture management with barrier creams as specified above & single breathable pad, continue measures to decrease friction/shear.   Plan discussed with patient and  at bedside- educated on topical wound therapy to optimize wound healing. Questions answered.

## 2021-11-06 NOTE — PROGRESS NOTE ADULT - ATTENDING COMMENTS
Improving oropharyngeal dysphagia  no role for egd  will follow up as needed
Patient seen at bedside. Case discussed with GINNA Howe. Plan as above.   Family meeting held at bedside in the presence of Murali, patient's , pal care, primary team and oncology, with patient's sister Justyn over the phone.   Discussed potential treatment options.  Discussed feeding tube.   Discussed best supportive care and hospcie.  Patient not interested in feeding tube.   Will discuss among family and let us know of final decision tomorrow.   Will follow.
Patient seen at bedside. Case discussed with GINNA Howe. Plan as above.   patient and family have decided on best supportive care and hospice.   hospice referral
80 yo F with hx of HTN, DM, oropharyngeal SCCa s/p glossectomy and RT with continued progression admitted for NICK likely pre-renal, improving   -renal US no hydro  -Cr improved after IVF to baseline. will hold further fluid for now. Pt with improved PO intake, given stable clinical status, holding off on labs for today. Will d/c with outpatient f/u within the week for repeat labs  -hold HCTZ and ACEi. avoid NSAIDS  -s/p swallow eval- rec puree diet.   -oncology following, not offering additional treatments at this time. Family opting for comfort care  -palliative care consulted for GOC- not a candidate for home hospice currently given plans for RT.  Dispo: home
80 yo F with hx of HTN, DM, oropharyngeal SCCa s/p glossectomy and RT with continued progression admitted for NICK likely pre-renal, improving   -renal US no hydro  -Cr improved after IVF. will hold further fluid for now  -hold HCTZ and ACEi. avoid NSAIDS  -s/p swallow eval- rec puree diet.   -oncology following, will f/u further recs  -severe protein calorie malnutrition- po intake remains poor. O  -palliative care consulted for GOC- family meeting 11/3- will hold off PEG. started oral Dilaudid premeal. Referred to home hospice
80 yo F with hx of HTN, DM, oropharyngeal SCCa s/p glossectomy and RT with continued progression admitted for NICK likely pre-renal, improving   -renal US no hydro  -cont IVF. monitor Cr  -hold HCTZ and ACEi. avoid NSAIDS  -s/p swallow eval- rec puree diet.   -oncology following, will f/u further recs  -severe protein calorie malnutrition- po intake remains poor. Pt is now amendable for PEG, will c/w GI for placement.   -palliative care consulted for GOC- pt is interested in DMT if deemed appropriate
82 yo F with hx of HTN, DM, oropharyngeal SCCa s/p glossectomy and RT with continued progression admitted for NICK likely pre-renal, improving   -renal US no hydro  -cont IVF. monitor Cr  -hold HCTZ and ACEi. avoid NSAIDS  -s/p swallow eval- rec puree diet. No indication for endoscopy as per GI  -oncology following, will f/u further recs  -severe protein calorie malnutrition- po intake remains poor d/t mouth pain. started on topical lidocaine. refusing feeding tube
82 yo F with hx of HTN, DM, oropharyngeal SCCa s/p glossectomy and RT with continued progression admitted for NCIK likely pre-renal, improving   -renal US no hydro  -Cr improved after IVF. will hold further fluid for now  -hold HCTZ and ACEi. avoid NSAIDS  -s/p swallow eval- rec puree diet.   -oncology following, will f/u further recs  -severe protein calorie malnutrition- po intake remains poor. Onc rec PEG but pt undecided   -palliative care consulted for GOC- family meeting scheduled today
80 yo F with hx of HTN, DM, oropharyngeal SCCa s/p glossectomy and RT with continued progression admitted for NICK likely pre-renal, improving   -renal US no hydro  -cont IVF. monitor Cr  -hold HCTZ and ACEi. avoid NSAIDS  -s/p swallow eval- rec puree diet. No indication for endoscopy as per GI  -oncology following, will f/u further recs  -severe protein calorie malnutrition- po intake remains poor d/t mouth pain. started on topical lidocaine
82 yo F with hx of HTN, DM, oropharyngeal SCCa s/p glossectomy and RT with continued progression admitted for NICK likely pre-renal  -renal US no hydro  -cont IVF. monitor Cr  -hold HCTZ and ACEi. avoid NSAIDS  -s/p swallow eval- rec puree diet. No indication for endoscopy as per GI  -oncology following, will f/u further recs  -severe protein calorie malnutrition -nutrition c/s

## 2021-11-06 NOTE — PROGRESS NOTE ADULT - PROBLEM SELECTOR PLAN 6
Diagnosed 25 years ago, previously treated with radiation. Not currently on treatment. s/p tongue biopsy 10/13. PET scan 10/27 with multiple areas of uptake including right lateral tongue extending into glossotonsillar sulcus, esophagus, r. side nasopharynx, and right humerus   - x-ray right humerus with fracture deformity of proximal humerus, no complaint of pain from patient, likely due from prior fall  - Oncology consulted, appreciate recs    - no inpatient chemo    - spoke with Dr. Grover outpatient Oncologist  - Palliative consulted for GOC, appreciate recs  Family meeting:    - No PEG tube    - monitor PO intake with pain control from palliative    - Rad onc evaluation per heme/onc team, might be outpatient

## 2021-11-06 NOTE — PROGRESS NOTE ADULT - PROVIDER SPECIALTY LIST ADULT
Heme/Onc
Gastroenterology
Heme/Onc
Palliative Care
Heme/Onc
Heme/Onc
Palliative Care
Internal Medicine

## 2021-11-06 NOTE — PROGRESS NOTE ADULT - PROBLEM SELECTOR PROBLEM 5
Anemia
Advanced care planning/counseling discussion
Anemia
Anemia
Advanced care planning/counseling discussion

## 2021-11-06 NOTE — PROGRESS NOTE ADULT - PROBLEM SELECTOR PROBLEM 6
Oropharyngeal cancer
Encounter for palliative care
Oropharyngeal cancer
Encounter for palliative care
Oropharyngeal cancer

## 2021-11-06 NOTE — PROGRESS NOTE ADULT - PROBLEM SELECTOR PLAN 8
Lipid level in AM.  Continue pravastatin.

## 2021-11-06 NOTE — PROGRESS NOTE ADULT - PROBLEM SELECTOR PROBLEM 1
NICK (acute kidney injury)
Odynophagia
NICK (acute kidney injury)
Odynophagia

## 2021-11-06 NOTE — PROGRESS NOTE ADULT - PROBLEM SELECTOR PLAN 1
Cr 3.45, prior Cr 1.93  on October 8th. Creatinine 1.1-1.2 in 9/2020.  - GFR 12 previously 24.  - FENa calculated 3.1% correlates with intrinsic, but more likely pre-renal in the setting of poor PO intake and elevated BUN:Cr ratio >20:1  - Continue hold benazepril and Hydrochlorothiazide in setting of NICK  - Renal function responsive to fluids indicating pre-renal etiology, Cr 1.32 from 1.39 from 1.48 from 1.91  - Improved PO intake

## 2021-11-06 NOTE — PROGRESS NOTE ADULT - PROBLEM SELECTOR PROBLEM 4
Hyperphosphatemia
Oropharyngeal cancer
Hyperphosphatemia
Hyperphosphatemia
Oropharyngeal cancer

## 2021-11-06 NOTE — PROGRESS NOTE ADULT - PROBLEM SELECTOR PLAN 3
Patient with pH of 7.24 and serum bicarb = 19  - appears to have metabolic acidosis with concomitant respiratory acidosis, pCO2 is elevated at 47 compared to expected respiratory compensation per Winter's formula. Continue to follow with repeat vbg.  - s/p 1300 mg Sodium bicarbonate  - s/p sodium bicarb 325mg TID 2 days supp  - saturating 100% on room air

## 2021-11-06 NOTE — PROGRESS NOTE ADULT - SUBJECTIVE AND OBJECTIVE BOX
PROGRESS NOTE:   Authoted by Dr. Kat Ordonez MD    Pager [phone] [facility], [phone] [facility]     Patient is a 81y old  Female who presents with a chief complaint of Referred by [facility] for evaluation of "ESRD" (05 Nov 2021 09:22)    SUBJECTIVE / OVERNIGHT EVENTS:  No acute events overnight.  The patient states she feels well this morning. Reports mild tongue and ear pain, but improved compared to admission. No other complaints at this time.    MEDICATIONS  (STANDING):  amLODIPine   Tablet 10 milliGRAM(s) Oral daily  atorvastatin 10 milliGRAM(s) Oral at bedtime  brimonidine 0.2% Ophthalmic Solution 1 Drop(s) Right EYE two times a day  carvedilol 25 milliGRAM(s) Oral every 12 hours  dextrose 40% Gel 15 Gram(s) Oral once  dextrose 5%. 1000 milliLiter(s) (50 mL/Hr) IV Continuous <Continuous>  dextrose 5%. 1000 milliLiter(s) (100 mL/Hr) IV Continuous <Continuous>  dextrose 5%. 1000 milliLiter(s) (100 mL/Hr) IV Continuous <Continuous>  dextrose 50% Injectable 25 Gram(s) IV Push once  dextrose 50% Injectable 12.5 Gram(s) IV Push once  dextrose 50% Injectable 25 Gram(s) IV Push once  FIRST- Mouthwash  BLM 10 milliLiter(s) Swish and Spit three times a day  glucagon  Injectable 1 milliGRAM(s) IntraMuscular once  glucagon  Injectable 1 milliGRAM(s) IntraMuscular once  heparin   Injectable 5000 Unit(s) SubCutaneous every 12 hours  HYDROmorphone   Tablet 1 milliGRAM(s) Oral three times a day  influenza  Vaccine (HIGH DOSE) 0.7 milliLiter(s) IntraMuscular once  insulin lispro (ADMELOG) corrective regimen sliding scale   SubCutaneous three times a day before meals  insulin lispro (ADMELOG) corrective regimen sliding scale   SubCutaneous at bedtime  lidocaine 2% Viscous 10 milliLiter(s) Swish and Spit three times a day  senna Syrup 15 milliLiter(s) Oral at bedtime    MEDICATIONS  (PRN):  acetaminophen     Tablet .. 650 milliGRAM(s) Oral every 6 hours PRN Mild Pain (1 - 3), Moderate Pain (4 - 6)  HYDROmorphone   Tablet 1 milliGRAM(s) Oral every 4 hours PRN Severe Pain (7 - 10)    CAPILLARY BLOOD GLUCOSE  POCT Blood Glucose.: 179 mg/dL (06 Nov 2021 08:27)  POCT Blood Glucose.: 233 mg/dL (05 Nov 2021 20:57)  POCT Blood Glucose.: 149 mg/dL (05 Nov 2021 17:49)  POCT Blood Glucose.: 164 mg/dL (05 Nov 2021 12:11)    I&O's Summary    PHYSICAL EXAM:  Vital Signs Last 24 Hrs  T(C): 36.8 (06 Nov 2021 06:13), Max: 36.8 (06 Nov 2021 06:13)  T(F): 98.3 (06 Nov 2021 06:13), Max: 98.3 (06 Nov 2021 06:13)  HR: 57 (06 Nov 2021 06:13) (57 - 69)  BP: 151/56 (06 Nov 2021 06:13) (129/57 - 155/50)  RR: 16 (06 Nov 2021 06:13) (15 - 16)  SpO2: 99% (06 Nov 2021 06:13) (99% - 100%)    PHYSICAL EXAM:  GENERAL: NAD, lying in bed  HEENT: No obvious ear lesion, no drainage or erythema. PERRLA, EOM intact, right sided tongue scar from biopsy on right side, no other oral lesions appreciated, no LAD  CHEST/LUNG: CTAB, no wheezing, crackles, or ronchi   HEART: Bradycardia, no murmur appreciated  ABDOMEN: soft, nondistended, non-tender, normoactive BS  SKIN: No rashes or lesions  NERVOUS SYSTEM: Alert & Oriented X2-3 (name, location was hospital, year with prompting)  EXT: no peripheral edema  PSYCH: calm and cooperative     LABS:    RADIOLOGY & ADDITIONAL TESTS:  No new imaging or tests    COORDINATION OF CARE:  Care Discussed with Consultants/Other Providers [Y/N]:  Prior or Outpatient Records Reviewed [Y/N]:

## 2021-11-06 NOTE — PROGRESS NOTE ADULT - NUTRITIONAL ASSESSMENT
This patient has been assessed with a concern for Malnutrition and has been determined to have a diagnosis/diagnoses of Severe protein-calorie malnutrition and Underweight (BMI < 19).    This patient is being managed with:   Diet Pureed-  Consistent Carbohydrate {Evening Snack} (CSTCHOSN)  DASH/TLC {Sodium & Cholesterol Restricted} (DASH)  Entered: Nov 1 2021  8:47AM    

## 2021-11-06 NOTE — PROGRESS NOTE ADULT - REASON FOR ADMISSION
Referred by [facility] for evaluation of "ESRD"
Referred by [facility] for evaluation of "ESRD"
Referred by [facility] for evaluation of "ESRD"
Referred by [facility] for evaluation of "ESRD"
Referred by [facility] for evaluation of "ESRD"
Referred by [facility] for evaluation of "ESRD"
Referred by [facility] for evaluation of "ESRD"
Referred by [facility] for evaluation of "ESRD"
Referred by [facility] for evaluation of "ESRD"
Referred by [facility] for evaluation of "ESRD"
Referred by [facility] for evaluation of "ESRD"
Referred by [facility] for evaluation of "ESRD"
Referred by [facility] for evaluation of "ESRD"
Referred by [facility] for evaluation of "ESRD"
Referred by [facility] for evaluation of "ESRD"

## 2021-11-06 NOTE — PROGRESS NOTE ADULT - PROBLEM SELECTOR PLAN 10
DVT: Heparin subcutaneous 5000 units q12h  Diet: dysphagia diet  Disposition: likely discharge pending hospice meeting

## 2021-11-06 NOTE — PROGRESS NOTE ADULT - PROBLEM SELECTOR PROBLEM 9
Type 2 diabetes mellitus, without long-term current use of insulin

## 2021-11-08 NOTE — VITALS
[Maximal Pain Intensity: 9/10] : 9/10 [Least Pain Intensity: 0/10] : 0/10 [Pain Description/Quality: ___] : Pain description/quality: [unfilled] [Pain Duration: ___] : Pain duration: [unfilled] [Pain Location: ___] : Pain Location: [unfilled] [Pain Interferes with ADLs] : Pain interferes with activities of daily living. [Opioid] : opioid [80: Normal activity with effort; some signs or symptoms of disease.] : 80: Normal activity with effort; some signs or symptoms of disease.  [ECOG Performance Status: 1 - Restricted in physically strenuous activity but ambulatory and able to carry out work of a light or sedentary nature] : Performance Status: 1 - Restricted in physically strenuous activity but ambulatory and able to carry out work of a light or sedentary nature, e.g., light house work, office work

## 2021-11-11 NOTE — REVIEW OF SYSTEMS
[Fatigue] : fatigue [Recent Change In Weight] : ~T recent weight change [Dysphagia] : dysphagia [Loss of Hearing] : loss of hearing [Hearing Disturbances] : hearing disturbances [Cough] : cough [Constipation] : constipation [Confused] : confusion [Negative] : Allergic/Immunologic [Skin Hyperpigmentation: Grade 0] : Skin Hyperpigmentation: Grade 0 [Dermatitis Radiation: Grade 0] : Dermatitis Radiation: Grade 0 [Chills] : no chills [Night Sweats] : no night sweats [Nosebleeds] : no nosebleeds [Shortness Of Breath] : no shortness of breath [Wheezing] : no wheezing [SOB on Exertion] : no shortness of breath during exertion [Abdominal Pain] : no abdominal pain [Vomiting] : no vomiting [Diarrhea] : no diarrhea [Dizziness] : no dizziness [Fainting] : no fainting [Difficulty Walking] : no difficulty walking [FreeTextEntry2] : 10 lb weight loss

## 2021-11-11 NOTE — HISTORY OF PRESENT ILLNESS
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 Completed 70 Gy in 4/2020, with Dr. Knowles. \par \par After RT she saw her surgeon Dr. Doan in 6/19/20. No imaging was done since RT. However, on 10/19/21 presented to Dr. Dara mcfarland tongue pain. 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  \par \par Interval imaging:\par \par CT neck 9/28/21: Enhancing mass/neoplasm within the tongue base and root of tongue on the right as described above.\par \par No cervical adenopathy.\par \par Multiple thyroid nodules for which thyroid sonography is recommended as clinically warranted.\par \par PET/CT 1. Large, FDG-avid mass in right lateral oral tongue/base of tongue, extending into glossotonsillar sulcus, best delineated on recent contrast-enhanced CT, and corresponding to patient's biopsy proven squamous cell carcinoma.\par \par 2. Nonspecific, asymmetrically increased FDG activity in right side of nasopharynx. Please correlate with direct visualization.\par \par 3. Nonspecific, diffuse, mildly increased FDG activity in the esophagus may reflect inflammation. There also is a large: Nonspecific focus of increased FDG activity in the perianal region. Please correlate clinically and with endoscopy/proctoscopy, as indicated.\par \par 4. FDG-avid deformity, proximal right humerus, probably is secondary to fracture/trauma. Please correlate clinically.\par \par \par She now presents for consideration of radiation given the presence of her new third primary, located at the base of tongue, this time. Noticed onset of burning pain in tongue this AM after removing her dentures. Also of note, patient is scheduled for Moh's surgery for bcc of the left nose, with Dr. Tank Collazo in [facility].

## 2021-11-19 NOTE — H&P ADULT - NSHPLABSRESULTS_GEN_ALL_CORE
- CXR preliminary = Clear                        8.3    9.40  )-----------( 306      ( 19 Nov 2021 17:00 )             26.9     11-19    141  |  105  |  53<H>  ----------------------------<  90  4.6   |  24  |  2.17<H>    Ca    10.0      19 Nov 2021 17:00    TPro  6.7  /  Alb  3.6  /  TBili  0.3  /  DBili  x   /  AST  16  /  ALT  16  /  AlkPhos  85  11-19

## 2021-11-19 NOTE — ED PROVIDER NOTE - CLINICAL SUMMARY MEDICAL DECISION MAKING FREE TEXT BOX
Gil - Gil - elderly female presents with NICK from outpatient visit. sent inby PCP for admission. NICK likely prerenal from decreased PO in setting of tongue cancer. pt candidate for palliative radiation. to be admitted. Gil - elderly female presents with NICK from outpatient visit. sent inby PCP for admission. NICK likely prerenal from decreased PO in setting of tongue cancer. pt candidate for palliative radiation. to be admitted.    Tracee BALLARD: weight loss, generalized weakness, pt with decreased PO intake - concern for FTT. Plan for labs, IVF, u/a, admission.

## 2021-11-19 NOTE — ED ADULT NURSE NOTE - CHIEF COMPLAINT QUOTE
Patient brought to ER by EMS from home for c/o generalized weakness, not eating for past two days. Pt has tongue with mets to throat cancer. Being treated FS: 126. No insulin pump.

## 2021-11-19 NOTE — H&P ADULT - NSHPSOCIALHISTORY_GEN_ALL_CORE
.  Lives with the spouse.  Denies Nicotine, ETOH, Illicit/. recreational drug use/ history.  Retired .  Mammogram 2021 Normal.  Colonoscopy 2021: Normal.   Flu Vax Denies  PNA Vax: Denies  Covid Vax: 2021.

## 2021-11-19 NOTE — ED PROVIDER NOTE - ATTENDING CONTRIBUTION TO CARE
Patient is an 82 yo F with history of DM, HTN, high cholesterol, CKD, hx of tongue cancer sent in by pcp for NICK, decerased PO intake.  at bedside states patient saw her pcp yesterday, had lab work done and was called today, told to get to the hospital because her kidney function was worse. Patient has lost 5 pounds recently secondary to not eating or drinking. She states it hurts to eat. Denies fevers, chills, vomiting, urinary symptoms. She took tylenol prior to arrival. Patient states she feels generally weak.     VS noted  Gen. no acute distress, Non toxic, elderly  HEENT: EOMI, mmm  Lungs: CTAB/L no C/ W /R   CVS: RRR   Abd; Soft non tender, non distended   Ext: no edema  Skin: no rash  Neuro: awake, alert, non focal clear speech  a/p: weight loss, generalized weakness, pt with decreased PO intake - concern for FTT. Plan for labs, IVF, u/a, admission.   - Tracee BALLARD

## 2021-11-19 NOTE — H&P ADULT - NSICDXPASTMEDICALHX_GEN_ALL_CORE_FT
PAST MEDICAL HISTORY:  Anxiety     Chronic kidney disease (CKD)     Depression     Diabetes mellitus     Dysphagia     Glaucoma     Hypercholesterolemia     Hypertension     Hyperuricemia     Osteopenia     Tongue cancer tx surgically > 20 years ago

## 2021-11-19 NOTE — ED ADULT TRIAGE NOTE - CHIEF COMPLAINT QUOTE
Patient brought to ER by EMS from home for c/o generalized weakness, not eating for past two days. Pt has throat cancer.  FS: 126 Patient brought to ER by EMS from home for c/o generalized weakness, not eating for past two days. Pt has tongue with mets to throat cancer. Being treated FS: 126 Patient brought to ER by EMS from home for c/o generalized weakness, not eating for past two days. Pt has tongue with mets to throat cancer. Being treated FS: 126. No insulin pump.

## 2021-11-19 NOTE — H&P ADULT - PROBLEM SELECTOR PLAN 8
BS= 90.  The pt and spouse say she takes 15 units of Soliqua insulin every morning.   Soliqua non formulary. Pharmacy advised to replace with Lantus 1 to1 conversion.  Will continue Lantus @ 80&% home dose while the pt is in the hospital.  FS QID  Low dose corrective sliding scale.  F/U A1C BS= 90.  The pt and spouse say she takes 15 units of Soliqua insulin every morning.   Soliqua non formulary. Pharmacy advised to replace with Lantus 1 to1 conversion.  Will continue Lantus @ 7U sub sut Q AM while the pt is in the hospital.  FS QID  Low dose corrective sliding scale.  F/U A1C

## 2021-11-19 NOTE — ED ADULT NURSE NOTE - NSIMPLEMENTINTERV_GEN_ALL_ED
Implemented All Fall Risk Interventions:  [place] to call system. Call bell, personal items and telephone within reach. Instruct patient to call for assistance. Room bathroom lighting operational. Non-slip footwear when patient is off stretcher. Physically safe environment: no spills, clutter or unnecessary equipment. Stretcher in lowest position, wheels locked, appropriate side rails in place. Provide visual cue, wrist band, yellow gown, etc. Monitor gait and stability. Monitor for mental status changes and reorient to person, place, and time. Review medications for side effects contributing to fall risk. Reinforce activity limits and safety measures with patient and family.

## 2021-11-19 NOTE — H&P ADULT - PROBLEM SELECTOR PLAN 1
Pt CKD now CDK 4.  No complaints of dysuria.   BUN/ Creatinine = 53/ 2.17.  Ratio indicative of dehydration, likely due to decreased po intake from loss of appetite.  Will give IVF with LR @ 75ml/ h x 1 L  F/U ProBNP  CXR preliminary clear.  F/U Urine lytes and urine Creatinine.  Avoid nephrotoxic agents and monitor lytes. Pt CKD now CDK 4.  No complaints of dysuria.   BUN/ Creatinine = 53/ 2.17.  Ratio indicative of dehydration, likely due to decreased po intake from loss of appetite.  Will give IVF with LR @ 75ml/ h x 1 L  F/U ProBNP  CXR preliminary clear.  F/U Urine lytes and urine Creatinine.  F/U Renal Duplex.   Avoid nephrotoxic agents and monitor lytes.

## 2021-11-19 NOTE — H&P ADULT - PROBLEM SELECTOR PLAN 2
1990's with surgical treatment, reoccurrence 2020 treated with chemo, L tongue ca and 2021 s/p biopsy 9/2021, to begin treatment at [facility] 11/29/2021. 1990's with surgical treatment, reoccurrence 2020 treated with chemo, L tongue ca and 2021 s/p biopsy 9/2021, to begin treatment at [facility] 11/29/2021.  - F/U Oncology consult, emailed.

## 2021-11-19 NOTE — ED ADULT NURSE REASSESSMENT NOTE - NS ED NURSE REASSESS COMMENT FT1
Patient blood glucose was noted to be 55, Patient is asymptomatic. Given orange juice at this time. Will repeat finger stick in 15 minutes.

## 2021-11-19 NOTE — H&P ADULT - ATTENDING COMMENTS
Attempted to call spouse on phone however did not , although the patient was able to provide some history. This is a 82 y/o F with pmhx of tongue cancer relapsed on 9/2021, DM, presented to the ED for lab abnormality. The patient was previous admitted for NICK secondary to dehydration. The patient was being followed up outpatient and was found to have new onset NICK again and needed admission for work up. The patient also endorsed some decreased Attempted to call spouse on phone however did not , although the patient was able to provide some history. This is a 80 y/o F with pmhx of tongue cancer relapsed on 9/2021, DM, presented to the ED for lab abnormality. The patient was previous admitted for NICK secondary to dehydration. The patient was being followed up outpatient and was found to have new onset NICK again and needed admission for work up. The patient also endorsed some decreased urine output but no dysuria. She also endrosed some pain on her R side of the tongue. The patient also seen by oncologist, recommended palliative radiation therapy for her tongue cancer. She had decreased PO intake because of the tongue pain however was able to eat oatmeal this morning.    Physical exam significant for elderly female, NAD, comfortable at bedside, there is a solid R tongue mass on the base of the tongue, no ulcers present, lungs CTA b/l, cardiac s1s2 RRR no murmurs, abdomen soft nontender to palpation b/l non distended, no LE edema    Labs show anemia of 8.3, NICK on CKD with Cr 2.7, CXR neg for opacities    Admitted for tongue cancer and NICK. NICK likely secondary to dehydration in the setting of poor PO intake. Will give trial of IV fluids and trend Cr. Will obtain urine lytes and kidney ultrasound. Will obtain nephrology consult. In regards to tongue cancer, will call radiation oncologist for evaluation and radiation treatment. The patient had an extensive goals of care last admission as per pain and palliative care consult notes. Molst form had been completed but was not brought. Spouse to bring form tomorrow. Attempted to speak to patient about code status, she is unable to make decision at this time and opted to be full code for now. Apparently does not remember what was on the MOLST form. I attempted to call the spouse but he did not . Attempted to call spouse on phone however did not , although the patient was able to provide some history. This is a 80 y/o F with pmhx of tongue cancer relapsed on 9/2021, DM, presented to the ED for lab abnormality. The patient was previous admitted for NICK secondary to dehydration. The patient was being followed up outpatient and was found to have new onset NICK again and needed admission for work up. The patient also endorsed some decreased urine output but no dysuria. She also endrosed some pain on her R side of the tongue. The patient also seen by oncologist, recommended palliative radiation therapy for her tongue cancer. She had decreased PO intake because of the tongue pain however was able to eat oatmeal this morning.    Physical exam significant for elderly female, NAD, comfortable at bedside, there is a solid R tongue mass on the base of the tongue, no ulcers present, lungs CTA b/l, cardiac s1s2 RRR no murmurs, abdomen soft nontender to palpation b/l non distended, no LE edema    Labs show anemia of 8.3, NICK on CKD with Cr 2.7, CXR neg for opacities    Admitted for tongue cancer and NICK. NICK likely secondary to dehydration in the setting of poor PO intake. Will give trial of IV fluids and trend Cr. Will obtain urine lytes and kidney ultrasound. Will obtain nephrology consult. In regards to tongue cancer, will call radiation oncologist for evaluation and radiation treatment. The patient had an extensive goals of care last admission as per pain and palliative care consult notes. Molst form had been completed but was not brought. Spouse to bring form tomorrow. I also attempted to speak to patient about code status, she is unable to make decision at this time and opted to be full code for now. Apparently does not remember what was on the MOLST form nor remember the conversation she had with the team from the previous admission. She also wants her spouse to be at bedside in order to make these decisions. The spouse was unreachable via phone after multiple attempts. Recommend this to be addressed in the morning.

## 2021-11-19 NOTE — H&P ADULT - PROBLEM SELECTOR PLAN 10
- No MOLST form see in alpha section on [place].   - The pt and spouse say they filled out a DNR/ DNI form in the past. But the patient and spouse can not recall what advance directive were given. The spouse says he will bring in the form 11/20.   - The pt says she will like to be "full code"  for now.

## 2021-11-19 NOTE — H&P ADULT - HISTORY OF PRESENT ILLNESS
81F, ambulates with a cane at times, history of tongue ca 1990's with surgical treatment, reoccurrence 2020 treated with chemo, L tongue ca and 2021 s/p biopsy 9/2021, to begin treatment at [facility] 11/29/2021.  Also history of dysphagia, able to tolerate puree diet, DM2, Glaucoma, Hypertension, Hyperlipidemia, depression, constipation,  R ear pain since 7/2021 felt with chewing, was seen by PCP with no source found. Given Tylenol po which relives the R ear pain, recently discharged from [facility] 11/6/21 after a 1 week stay for NICK with kidney function improved with fluids. The pt went for a follow up visit to her PCP 11/11 and had blood work done. The pt was called by the PCP 11/19 and advised to return to the Ed due to worsening kidney function.  The pt endorses decreased appetite with 5 pound weight loss in 1 week, cough with clear phlegm, nasal congestion, constipation, with last BM 11/14, pt attributes to decreased po intake.  Denies HA, dizziness, recent falls, blurry vision,, SOB, chest pain, palpitation, nausea, vomit, diarrhea, abdominal pain, dysuria, chill, diaphoresis generalized body aches.     Vitals: T Max: 98.2 oral, BP= 138/ 60, HR= 65b/mijn, RR= 18b/ min, SPO2= 100% ra

## 2021-11-19 NOTE — H&P ADULT - ASSESSMENT
81F,history of tongue ca 1990's with reoccurrence 2020 and 2021, dysphagia, DM2, Glaucoma, Hypertension, Hyperlipidemia, depression, constipation admitted for NICK on CKD, likely due to dehydration from decreased po intake.

## 2021-11-19 NOTE — ED PROVIDER NOTE - OBJECTIVE STATEMENT
81F presents from home. PCP is Dr. Grover ([phone])  because of NICK that was seen yesterday.  Patient scheduled to get radiation with Dr. Duenas ([facility]) for primary tongue cancer.  pet scan recently showed no other cancer.  Not on hospice. But PMD would like to start hospice but also get radiation at the same?  Last radiation was April 2020.   Collateral obtained from Sister. 81F presents from home. PCP is Dr. Grover ([phone])  because of NICK that was seen yesterday. pt with decreased Po intake home.   Patient scheduled to get radiation with Dr. Duenas ([facility]) for primary tongue cancer.  pet scan recently showed no other cancer.  Not on hospice. But PMD would like to start hospice but also get radiation at the same?  Last radiation was April 2020.   Collateral obtained from Sister.

## 2021-11-19 NOTE — ED ADULT NURSE NOTE - OBJECTIVE STATEMENT
Pt. is an 81 y.o. female who presented with generalized weakness and jaw pain, unable to eat for 2 days. Pt. has mouth cancer per her . Pt. states she took tylenol at 12pm today which helped with her pain, rates her pain 0/10 currently. . Pt. states this pain began back in July and has been constant ever since. Pt. states she is feeling slightly constipated. Pt.'s  states that she was advised to come to the ED after labs showed abnormal kidney fxn. States this blood work was performed yesterday. Pt. states she has been drinking Ensure. Will continue to monitor. MD at bedside.

## 2021-11-20 NOTE — PROGRESS NOTE ADULT - PROBLEM SELECTOR PLAN 7
Continue eye drop.  [facility]. Pharmacy advised to use Latanoprost.  Continue Alphagan.

## 2021-11-20 NOTE — PATIENT PROFILE ADULT - DO YOU FEEL LIKE HURTING YOURSELF OR OTHERS?
Amoxicillin three times a day for ten days. Zyrtec 2.5mg at bedtime. Increase oral fluids. Return in 1 week for re-evaluation of ears. Non-medication:    Rest, increase fluids, broth-based soups, cool-mist humidifiers.     Medications:    Pain/Fever:
no

## 2021-11-20 NOTE — CONSULT NOTE ADULT - ASSESSMENT
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 Knowles) in 2020, infiltrated Right base of tongue SCC in 2021 who presented with NICK,  constipation (last BMs one and half week ago per patient), and poor oral intake.  Cr. 2.17 on admission.     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 Knowles in 4/2020. After RT she saw her surgeon Dr. Doan in 6/19/20. No imaging was done since RT.   -10/19/21 presented to Dr. Diamond for tongue pain. 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 Pt is not intrerested in surgical interventions.   -9/28/21 CT neck : Enhancing mass/neoplasm within the tongue base and root of tongue on the right; No cervical adenopathy; Multiple thyroid nodules for which thyroid sonography is recommended as clinically warranted.  -10/27/21 PET/CT 1. Large, FDG-avid mass in right lateral oral tongue/base of tongue, extending into glossotonsillar sulcus, best delineated on recent contrast-enhanced CT, and corresponding to patient's biopsy proven squamous cell carcinoma; 2. Nonspecific, asymmetrically increased FDG activity in right side of nasopharynx; 3. Nonspecific, diffuse, mildly increased FDG activity in the esophagus may reflect inflammation. 4. FDG-avid deformity, proximal right humerus, probably is secondary to fracture/trauma.   -11/8/21 followed with radiation oncology Dr. Duenas, evaluated on 11/15/21 for planing of SBRT x 5 fractions.    -Also of note, patient is scheduled for Moh's surgery for bcc of the left nose, with Dr. Tank Collazo in [facility].   -10/29/21 last visit with medical oncologist Dr. Grover; no plan for chemo in view of poor performance status and NICK      Recommendations  -No plan for chemo for now in view of NICK and poor functional status   -May consider Radiation oncology f/u with Dr. Duenas  after discharge and start SBRT as planned before   -f/u daily CBC, BMP, eletrolytes,   -encourage oral intake; prevent aspiration in view of odynophagia ; nutritional/supportive care  -f/u renal for NICK, the rest of care per primary team    Please contact Hematology team if have more questions     Discussed with Attending Dr. 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 Knowles) in 2020, infiltrated Right base of tongue SCC in 2021 who presented with NICK,  constipation (last BMs one and half week ago per patient), and poor oral intake.  Cr. 2.17 on admission.     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 Knowles in 4/2020. After RT she saw her surgeon Dr. Doan in 6/19/20. No imaging was done since RT.   -10/19/21 presented to Dr. Diamond for tongue pain. 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 Pt is not intrerested in surgical interventions.   -9/28/21 CT neck : Enhancing mass/neoplasm within the tongue base and root of tongue on the right; No cervical adenopathy; Multiple thyroid nodules for which thyroid sonography is recommended as clinically warranted.  -10/27/21 PET/CT 1. Large, FDG-avid mass in right lateral oral tongue/base of tongue, extending into glossotonsillar sulcus, best delineated on recent contrast-enhanced CT, and corresponding to patient's biopsy proven squamous cell carcinoma; 2. Nonspecific, asymmetrically increased FDG activity in right side of nasopharynx; 3. Nonspecific, diffuse, mildly increased FDG activity in the esophagus may reflect inflammation. 4. FDG-avid deformity, proximal right humerus, probably is secondary to fracture/trauma.   -11/8/21 followed with radiation oncology Dr. Duenas, evaluated on 11/15/21 for planing of SBRT x 5 fractions.    -Also of note, patient is scheduled for Moh's surgery for bcc of the left nose, with Dr. Tank Collazo in [facility].   -10/29/21 last visit with medical oncologist Dr. Grover; no plan for chemo in view of poor performance status and NICK      Recommendations  -No plan for chemo for now in view of NICK and poor functional status   -May consider Radiation oncology f/u with Dr. Duenas  after discharge and start SBRT as planned before   -f/u daily CBC, BMP, eletrolytes,   -encourage oral intake; prevent aspiration in view of odynophagia ; nutritional/supportive care  -f/u renal for NICK, the rest of care per primary team    Please contact Oncology team if have more questions     Discussed with Attending Dr. Gamal Guillen PGY4   Hematology&Oncology fellow   Pager [phone]   Please contact on call fellow after 5pm or on weekends

## 2021-11-20 NOTE — CONSULT NOTE ADULT - ATTENDING COMMENTS
no planned medical oncology intervention as inpatient  management per primary team   should follow up with Rad Onc as outpatient

## 2021-11-20 NOTE — PROGRESS NOTE ADULT - PROBLEM SELECTOR PLAN 1
11/4/21 GFR 1.32, on admission 2.17  No complaints of dysuria.   BUN/ Creatinine = 53/ 2.17. Ratio indicative of dehydration, likely due to decreased po intake from loss of appetite.  Will give IVF with LR @ 75ml/ h x 1 L  F/U ProBNP  CXR preliminary clear.  F/U Urine lytes and urine Creatinine.  F/U Renal Duplex.   Avoid nephrotoxic agents and monitor lytes.

## 2021-11-20 NOTE — CHART NOTE - NSCHARTNOTEFT_GEN_A_CORE
This AM, attempted to discuss GOC with the patient. She told me that the  makes the decision.  Murali and sister Malina, who are both healthcare proxies, were present at bedside in the afternoon. This AM, attempted to discuss GOC with the patient. She told me that the  makes the decision.  Murali and sister Malina, who are both healthcare proxies, were present at bedside in the afternoon. The  did sign a DNR/DNI This AM, attempted to discuss GOC with the patient. She told me that the  makes the decision.  Murali and sister Malina, who are both healthcare proxies, were present at bedside in the afternoon. The  did sign a DNR/DNI form several weeks ago at a previous admission when his wife was more sick and they thought she would go the hospice route. Now that she is better, This AM, attempted to discuss GOC with the patient. She told me that she wants the  to make the decision.  Murali and sister Malina, who are both healthcare proxies, were present at bedside in the afternoon. The  did sign a MOLST form several weeks ago at a previous admission when they thought she would be getting home hospice after discharge. Today, the  says that since her condition has improved and she could benefit from radiation therapy, they would like to have a family discussion with the patient to re-evaluate their goals of care. For now, they would like the patient to be FULL CODE. They would like to think about it and continue the discussion tomorrow.    On the previous admission, they began the discussion of getting a PEG tube. Since this is the second admission for decreased food intake, they are continuing to discuss the goals of care regarding the nutrition. The patient herself and the  do not want the PEG, but the sister Malina thinks it could be an option for future nutritional needs.    The family would like the patient's renal function to improve so that they could get her started on radiation therapy outpatient.    For now, patient remains FULL CODE

## 2021-11-20 NOTE — ED ADULT NURSE REASSESSMENT NOTE - NS ED NURSE REASSESS COMMENT FT1
Patient repeated finger stick was 74. Hospitalist Tarik was made aware. Orange juice was given to patient as per MD. Finger stick will be repeated in 15 minutes.

## 2021-11-20 NOTE — CHART NOTE - NSCHARTNOTEFT_GEN_A_CORE
I called again early morning the spouse listed in the chart to discuss code status, again they did not . I left a message.

## 2021-11-20 NOTE — PROGRESS NOTE ADULT - PROBLEM SELECTOR PLAN 8
BS= 90.  The pt and spouse say she takes 15 units of Soliqua insulin every morning.   Soliqua non formulary. Pharmacy advised to replace with Lantus 1 to1 conversion.  Will continue Lantus @ 7U sub sut Q AM while the pt is in the hospital.  FS QID  Low dose corrective sliding scale.  F/U A1C

## 2021-11-20 NOTE — PHYSICAL THERAPY INITIAL EVALUATION ADULT - PATIENT PROFILE REVIEW, REHAB EVAL
PT orders received: ambulate with assistance. Consult with KIRBY HAGEN, pt may participate in PT evaluation./yes

## 2021-11-20 NOTE — PROGRESS NOTE ADULT - PROBLEM SELECTOR PLAN 10
- No MOLST form see in alpha section on [place].   - The pt and spouse say they filled out a DNR/ DNI form in the past. But the patient and spouse can not recall what advance directive were given. The spouse says he will bring in the form 11/20.   - The pt says she will like to be "full code"  for now.

## 2021-11-20 NOTE — ED ADULT NURSE REASSESSMENT NOTE - NS ED NURSE REASSESS COMMENT FT1
patient repeated finger stick was 161.  FS greater than 100 x2. No other complaints at this time. NSR on cardiac monitor. Respirations are even and unlabored. Resting comfortably in bed at this time.

## 2021-11-20 NOTE — PHYSICAL THERAPY INITIAL EVALUATION ADULT - ADDITIONAL COMMENTS
Patient reports she lives with her  in a private house, a few steps to enter and 1 flight within. Pt reports she is predominantly independent in ADLs, however  assists as needed. Patient reports she has a rolling walker & cane however doesn't typically use.    Patient was left safely in bed, all lines/tubes intact and call bell within reach, RN aware

## 2021-11-20 NOTE — PROGRESS NOTE ADULT - PROBLEM SELECTOR PLAN 2
1990's with surgical treatment, reoccurrence 2020 treated with chemo, L tongue ca and 2021 s/p biopsy 9/2021, to begin treatment at [facility] 11/29/2021.  - F/U Oncology consult, emailed.

## 2021-11-20 NOTE — PHYSICAL THERAPY INITIAL EVALUATION ADULT - PERTINENT HX OF CURRENT PROBLEM, REHAB EVAL
Patient is an 81 year old female admitted to [facility] sent by PCP for worsening kidney function. PMH: tongue ca 1990's with surgical treatment, reoccurrence 2020 treated with chemo, L tongue ca and 2021 s/p biopsy 9/2021, to begin treatment at [facility] 11/29/2021.  Also history of dysphagia, able to tolerate puree diet, DM2, Glaucoma, Hypertension, Hyperlipidemia, depression, constipation,  R ear pain since 7/2021.

## 2021-11-20 NOTE — CONSULT NOTE ADULT - SUBJECTIVE AND OBJECTIVE BOX
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 Knowles) in 2020, infiltrated Right base of tongue SCC in 2021 who presented with NICK,  constipation (last BMs one and half week ago per patient), and poor oral intake. She was discharged from [facility] on 11/6/21 after a 1 week of hospitalization due to NICK (Cr 3.59).  After Cr improved to 1.32, she was discharged and thereafter she went for a follow up visit to her PCP 11/11 and had blood work done at that time. The pt was called by her PCP 11/19 and advised to return to the Ed due to worsening kidney function Cr .  The pt endorses decreased appetite with 5 pound weight loss in 1 week. Denies HA, dizziness, recent falls, blurry vision, voice changes, SOB, chest pain, palpitation, nausea, vomit, diarrhea, abdominal pain, dysuria, chill, diaphoresis generalized body aches. She admitted odynophagia especially at the base of tongue, and she did not eat or drink much for the past several weeks.  but she is able to swallow fluid/soft food.   Vitals: T Max: 98.2 oral, BP= 138/ 60, HR 65  RR18, SPO2 100% RA. Lab showed Cr. 2.17 on admission.       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 Knowles in 4/2020. After RT she saw her surgeon Dr. Doan in 6/19/20. No imaging was done since RT.   -10/19/21 presented to Dr. Diamond for tongue pain. 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 Pt is not intrerested in surgical interventions.   -9/28/21 CT neck : Enhancing mass/neoplasm within the tongue base and root of tongue on the right; No cervical adenopathy; Multiple thyroid nodules for which thyroid sonography is recommended as clinically warranted.  -10/27/21 PET/CT 1. Large, FDG-avid mass in right lateral oral tongue/base of tongue, extending into glossotonsillar sulcus, best delineated on recent contrast-enhanced CT, and corresponding to patient's biopsy proven squamous cell carcinoma; 2. Nonspecific, asymmetrically increased FDG activity in right side of nasopharynx; 3. Nonspecific, diffuse, mildly increased FDG activity in the esophagus may reflect inflammation. 4. FDG-avid deformity, proximal right humerus, probably is secondary to fracture/trauma.   -11/8/21 followed with radiation oncology Dr. Duenas, evaluated on 11/15/21 for planing of SBRT x 5 fractions.    -Also of note, patient is scheduled for Moh's surgery for bcc of the left nose, with Dr. Tank Collazo in [facility].   -10/29/21 last visit with medical oncologist Dr. Grover; no plan for chemo in view of poor performance status and NICK        PAST MEDICAL & SURGICAL HISTORY:  Tongue cancer surgically 20 years ago  Anxiety  Depression  Hyperuricemia  Hypertesion  Chronic kidney disease (CKD)  Glaucoma  Hypercholesterolemia  Osteopenia  Diabetes mellitus  Dysphagia  H/O local excision of skin lesion  excision of scalp lesion 2019      Allergies    No Known Allergies          MEDICATIONS  (STANDING):  amLODIPine   Tablet 10 milliGRAM(s) Oral daily  atorvastatin 10 milliGRAM(s) Oral at bedtime  brimonidine 0.2% Ophthalmic Solution 1 Drop(s) Right EYE two times a day  carvedilol 25 milliGRAM(s) Oral every 12 hours  dextrose 40% Gel 15 Gram(s) Oral once  dextrose 5%. 1000 milliLiter(s) (50 mL/Hr) IV Continuous <Continuous>  dextrose 5%. 1000 milliLiter(s) (100 mL/Hr) IV Continuous <Continuous>  dextrose 50% Injectable 25 Gram(s) IV Push once  dextrose 50% Injectable 12.5 Gram(s) IV Push once  dextrose 50% Injectable 25 Gram(s) IV Push once  FIRST- Mouthwash  BLM 10 milliLiter(s) Swish and Spit three times a day  fluticasone propionate 50 MICROgram(s)/spray Nasal Spray 1 Spray(s) Both Nostrils two times a day  glucagon  Injectable 1 milliGRAM(s) IntraMuscular once  heparin   Injectable 5000 Unit(s) SubCutaneous every 12 hours  influenza  Vaccine (HIGH DOSE) 0.7 milliLiter(s) IntraMuscular once  insulin glargine Injectable (LANTUS) 7 Unit(s) SubCutaneous every morning  insulin lispro (ADMELOG) corrective regimen sliding scale   SubCutaneous three times a day before meals  insulin lispro (ADMELOG) corrective regimen sliding scale   SubCutaneous at bedtime  lactated ringers. 1000 milliLiter(s) (75 mL/Hr) IV Continuous <Continuous>  latanoprost 0.005% Ophthalmic Solution 1 Drop(s) Right EYE at bedtime  lidocaine 2% Viscous 10 milliLiter(s) Swish and Spit three times a day  mirtazapine 45 milliGRAM(s) Oral at bedtime  senna Syrup 15 milliLiter(s) Oral at bedtime  sertraline 50 milliGRAM(s) Oral daily    MEDICATIONS  (PRN):  acetaminophen     Tablet .. 650 milliGRAM(s) Oral every 6 hours PRN Temp greater or equal to 38C (100.4F), Mild Pain (1 - 3), Moderate Pain (4 - 6)  aluminum hydroxide/magnesium hydroxide/simethicone Suspension 30 milliLiter(s) Oral every 4 hours PRN Dyspepsia  HYDROmorphone   Tablet 1 milliGRAM(s) Oral every 4 hours PRN sever pain  melatonin 3 milliGRAM(s) Oral at bedtime PRN Insomnia  ondansetron Injectable 4 milliGRAM(s) IV Push every 8 hours PRN Nausea and/or Vomiting      FAMILY HISTORY:  Family history of Alzheimer&#x27;s disease  mother    FH: dementia        SOCIAL HISTORY: No EtOH, no tobacco    REVIEW OF SYSTEMS:    CONSTITUTIONAL: No weakness, fevers or chills  EYES/ENT: No visual changes;  No vertigo or throat pain   NECK: No pain or stiffness  RESPIRATORY: No cough, wheezing, hemoptysis; No shortness of breath  CARDIOVASCULAR: No chest pain or palpitations  GASTROINTESTINAL: No abdominal or epigastric pain. No nausea, vomiting, or hematemesis; No diarrhea or constipation. No melena or hematochezia.  GENITOURINARY: No dysuria, frequency or hematuria  NEUROLOGICAL: No numbness or weakness  SKIN: No itching, burning, rashes, or lesions   All other review of systems is negative unless indicated above.    Height (cm): 162.6 (11-20 @ 01:47)  Weight (kg): 39.2 (11-20 @ 01:47)  BMI (kg/m2): 14.8 (11-20 @ 01:47)  BSA (m2): 1.37 (11-20 @ 01:47)    T(F): 97.5 (11-20-21 @ 15:01), Max: 98.2 (11-20-21 @ 01:47)  HR: 62 (11-20-21 @ 15:01)  BP: 121/54 (11-20-21 @ 15:01)  RR: 17 (11-20-21 @ 15:01)  SpO2: 97% (11-20-21 @ 15:01)  Wt(kg): --    GENERAL: NAD, well-developed; AAOX3; thin   HEAD:  Atraumatic, Normocephalic  EYES: EOMI, PERRLA, conjunctiva and sclera clear  Oral: mass seen at right base of tongue; no oral thrush and open lesions  NECK: Supple, No JVD; submandibular LN palpable on the right  CHEST/LUNG: Clear to auscultation bilaterally; No wheeze  HEART: Regular rate and rhythm; No murmurs, rubs, or gallops  ABDOMEN: Soft, Nontender, Nondistended; Bowel sounds present  EXTREMITIES:  2+ Peripheral Pulses, No clubbing, cyanosis, or edema  NEUROLOGY: non-focal  SKIN: No rashes or lesions                          8.7    9.36  )-----------( 335      ( 20 Nov 2021 08:01 )             28.3       11-20    138  |  103  |  50<H>  ----------------------------<  137<H>  4.5   |  26  |  1.98<H>    Ca    9.8      20 Nov 2021 08:01  Phos  3.5     11-20  Mg     2.20     11-20    TPro  6.7  /  Alb  3.6  /  TBili  0.3  /  DBili  x   /  AST  16  /  ALT  16  /  AlkPhos  85  11-19      Phosphorus Level, Serum: 3.5 mg/dL (11-20 @ 08:01)  Magnesium, Serum: 2.20 mg/dL (11-20 @ 08:01)

## 2021-11-20 NOTE — PHYSICAL THERAPY INITIAL EVALUATION ADULT - PHYSICAL ASSIST/NONPHYSICAL ASSIST: GAIT, REHAB EVAL
Has The Growth Been Previously Biopsied?: has been previously biopsied Body Location Override (Optional): Right lateral superior chest verbal cues/1 person assist

## 2021-11-20 NOTE — PROGRESS NOTE ADULT - SUBJECTIVE AND OBJECTIVE BOX
Jennifer Fish MD  Internal Medicine, PGY1  Universal pager: [phone] / [facility]: [phone]      Patient is a 81y old  Female who presents with a chief complaint of Worsening renal function (19 Nov 2021 20:53)    OVERNIGHT EVENTS: NAEON    SUBJECTIVE:  - denies F/C, lightheadedness, HA, CP, SOB, abd pain, N/V/D/C, burning w/ urination, leg swelling    focused ROS as above    MEDICATIONS  (STANDING):  amLODIPine   Tablet 10 milliGRAM(s) Oral daily  atorvastatin 10 milliGRAM(s) Oral at bedtime  brimonidine 0.2% Ophthalmic Solution 1 Drop(s) Right EYE two times a day  carvedilol 25 milliGRAM(s) Oral every 12 hours  dextrose 40% Gel 15 Gram(s) Oral once  dextrose 5%. 1000 milliLiter(s) (50 mL/Hr) IV Continuous <Continuous>  dextrose 5%. 1000 milliLiter(s) (100 mL/Hr) IV Continuous <Continuous>  dextrose 50% Injectable 25 Gram(s) IV Push once  dextrose 50% Injectable 12.5 Gram(s) IV Push once  dextrose 50% Injectable 25 Gram(s) IV Push once  FIRST- Mouthwash  BLM 10 milliLiter(s) Swish and Spit three times a day  fluticasone propionate 50 MICROgram(s)/spray Nasal Spray 1 Spray(s) Both Nostrils two times a day  glucagon  Injectable 1 milliGRAM(s) IntraMuscular once  heparin   Injectable 5000 Unit(s) SubCutaneous every 12 hours  influenza  Vaccine (HIGH DOSE) 0.7 milliLiter(s) IntraMuscular once  insulin glargine Injectable (LANTUS) 7 Unit(s) SubCutaneous every morning  insulin lispro (ADMELOG) corrective regimen sliding scale   SubCutaneous three times a day before meals  insulin lispro (ADMELOG) corrective regimen sliding scale   SubCutaneous at bedtime  lactated ringers. 1000 milliLiter(s) (75 mL/Hr) IV Continuous <Continuous>  latanoprost 0.005% Ophthalmic Solution 1 Drop(s) Right EYE at bedtime  lidocaine 2% Viscous 10 milliLiter(s) Swish and Spit three times a day  mirtazapine 45 milliGRAM(s) Oral at bedtime  senna Syrup 15 milliLiter(s) Oral at bedtime  sertraline 50 milliGRAM(s) Oral daily    MEDICATIONS  (PRN):  acetaminophen     Tablet .. 650 milliGRAM(s) Oral every 6 hours PRN Temp greater or equal to 38C (100.4F), Mild Pain (1 - 3), Moderate Pain (4 - 6)  aluminum hydroxide/magnesium hydroxide/simethicone Suspension 30 milliLiter(s) Oral every 4 hours PRN Dyspepsia  HYDROmorphone   Tablet 1 milliGRAM(s) Oral every 4 hours PRN sever pain  melatonin 3 milliGRAM(s) Oral at bedtime PRN Insomnia  ondansetron Injectable 4 milliGRAM(s) IV Push every 8 hours PRN Nausea and/or Vomiting      OBJECTIVE:  T(C): 36.7 (11-20-21 @ 06:35), Max: 36.8 (11-19-21 @ 14:40)  HR: 60 (11-20-21 @ 06:35) (59 - 65)  BP: 155/56 (11-20-21 @ 06:35) (120/54 - 155/56)  RR: 18 (11-20-21 @ 06:35) (15 - 18)  SpO2: 100% (11-20-21 @ 06:35) (100% - 100%)    PHYSICAL EXAM  GENERAL: NAD   HEAD:  Atraumatic, normocephalic  EYES: EOMI, sclera clear  CHEST/LUNG: Clear to auscultation bilaterally; no wheeze  HEART: RRR; S1, S2; no M/R/G  ABDOMEN: soft, non-distended; non-tender; BS present  EXTREMITIES:  2+ Peripheral Pulses; no edema  NEURO: non-focal, AAOx3  PSYCH: appropriate affect  SKIN: No rashes or lesions    LABS:  CAPILLARY BLOOD GLUCOSE      POCT Blood Glucose.: 133 mg/dL (20 Nov 2021 06:54)  POCT Blood Glucose.: 177 mg/dL (20 Nov 2021 01:54)  POCT Blood Glucose.: 161 mg/dL (20 Nov 2021 00:25)  POCT Blood Glucose.: 106 mg/dL (20 Nov 2021 00:10)  POCT Blood Glucose.: 74 mg/dL (19 Nov 2021 23:36)  POCT Blood Glucose.: 79 mg/dL (19 Nov 2021 23:09)  POCT Blood Glucose.: 54 mg/dL (19 Nov 2021 22:21)    I&O's Summary    19 Nov 2021 07:01  -  20 Nov 2021 07:00  --------------------------------------------------------  IN: 495 mL / OUT: 600 mL / NET: -105 mL                            8.3    9.40  )-----------( 306      ( 19 Nov 2021 17:00 )             26.9     11-19    141  |  105  |  53<H>  ----------------------------<  90  4.6   |  24  |  2.17<H>    Ca    10.0      19 Nov 2021 17:00    TPro  6.7  /  Alb  3.6  /  TBili  0.3  /  DBili  x   /  AST  16  /  ALT  16  /  AlkPhos  85  11-19              Microbiology:      RADIOLOGY & ADDITIONAL TESTS:    Imaging Personally Reviewed:  Consultant(s) Notes Reviewed:    Care Discussed with Consultants/Other Providers:

## 2021-11-21 NOTE — DIETITIAN INITIAL EVALUATION ADULT. - PERTINENT LABORATORY DATA
11-21 Na 140 mmol/L Glu 81 mg/dL K+ 4.1 mmol/L Cr 1.66 mg/dL<H> BUN 37 mg/dL<H> Phos 2.6 mg/dL  11-21 @ 12:07 POCT 76 mg/dL  11-21 @ 08:23 POCT 88 mg/dL  11-20 @ 22:25 POCT 71 mg/dL  11-20 @ 22:22 POCT 59 mg/dL  11-20 @ 17:32 POCT 216 mg/dL  11-20 @ 12:58 POCT 186 mg/dL

## 2021-11-21 NOTE — PROGRESS NOTE ADULT - PROBLEM SELECTOR PLAN 10
- No MOLST form see in alpha section on [place].   - The pt and spouse say they filled out a DNR/ DNI form in the past. But the patient and spouse can not recall what advance directive were given. The spouse says he will bring in the form 11/20.   - The pt says she will like to be "full code"  for now. - No MOLST form see in alpha section on [place].   - Family would like to discuss code status amongst themselves again (see [facility] chart note 11/20/21)  - The pt will be "full code"  for now.

## 2021-11-21 NOTE — DIETITIAN NUTRITION RISK NOTIFICATION - TREATMENT: THE FOLLOWING DIET HAS BEEN RECOMMENDED
Diet, Pureed:   Consistent Carbohydrate {Evening Snack} (CSTCHOSN)  DASH/TLC {Sodium & Cholesterol Restricted} (DASH) (11-19-21 @ 19:30) [Active]

## 2021-11-21 NOTE — PROGRESS NOTE ADULT - SUBJECTIVE AND OBJECTIVE BOX
Jennifer Fish MD  Internal Medicine, PGY1  Universal pager: [phone] / [facility]: [phone]      Patient is a 81y old  Female who presents with a chief complaint of Worsening renal function (19 Nov 2021 20:53)    OVERNIGHT EVENTS: NAEON    SUBJECTIVE:  - denies F/C, lightheadedness, HA, CP, SOB, abd pain, N/V/D/C, burning w/ urination, leg swelling    focused ROS as above    MEDICATIONS  (STANDING):  amLODIPine   Tablet 10 milliGRAM(s) Oral daily  atorvastatin 10 milliGRAM(s) Oral at bedtime  brimonidine 0.2% Ophthalmic Solution 1 Drop(s) Right EYE two times a day  carvedilol 25 milliGRAM(s) Oral every 12 hours  dextrose 40% Gel 15 Gram(s) Oral once  dextrose 5%. 1000 milliLiter(s) (50 mL/Hr) IV Continuous <Continuous>  dextrose 5%. 1000 milliLiter(s) (100 mL/Hr) IV Continuous <Continuous>  dextrose 50% Injectable 25 Gram(s) IV Push once  dextrose 50% Injectable 12.5 Gram(s) IV Push once  dextrose 50% Injectable 25 Gram(s) IV Push once  FIRST- Mouthwash  BLM 10 milliLiter(s) Swish and Spit three times a day  fluticasone propionate 50 MICROgram(s)/spray Nasal Spray 1 Spray(s) Both Nostrils two times a day  glucagon  Injectable 1 milliGRAM(s) IntraMuscular once  heparin   Injectable 5000 Unit(s) SubCutaneous every 12 hours  influenza  Vaccine (HIGH DOSE) 0.7 milliLiter(s) IntraMuscular once  insulin glargine Injectable (LANTUS) 7 Unit(s) SubCutaneous every morning  insulin lispro (ADMELOG) corrective regimen sliding scale   SubCutaneous three times a day before meals  insulin lispro (ADMELOG) corrective regimen sliding scale   SubCutaneous at bedtime  lactated ringers. 1000 milliLiter(s) (75 mL/Hr) IV Continuous <Continuous>  latanoprost 0.005% Ophthalmic Solution 1 Drop(s) Right EYE at bedtime  lidocaine 2% Viscous 10 milliLiter(s) Swish and Spit three times a day  mirtazapine 45 milliGRAM(s) Oral at bedtime  senna Syrup 15 milliLiter(s) Oral at bedtime  sertraline 50 milliGRAM(s) Oral daily    MEDICATIONS  (PRN):  acetaminophen     Tablet .. 650 milliGRAM(s) Oral every 6 hours PRN Temp greater or equal to 38C (100.4F), Mild Pain (1 - 3), Moderate Pain (4 - 6)  aluminum hydroxide/magnesium hydroxide/simethicone Suspension 30 milliLiter(s) Oral every 4 hours PRN Dyspepsia  HYDROmorphone   Tablet 1 milliGRAM(s) Oral every 4 hours PRN sever pain  melatonin 3 milliGRAM(s) Oral at bedtime PRN Insomnia  ondansetron Injectable 4 milliGRAM(s) IV Push every 8 hours PRN Nausea and/or Vomiting      Vital Signs Last 24 Hrs  T(C): 37.2 (20 Nov 2021 21:46), Max: 37.2 (20 Nov 2021 21:46)  T(F): 98.9 (20 Nov 2021 21:46), Max: 98.9 (20 Nov 2021 21:46)  HR: 58 (20 Nov 2021 21:46) (58 - 62)  BP: 126/63 (20 Nov 2021 21:46) (121/54 - 126/63)  BP(mean): --  RR: 16 (20 Nov 2021 21:46) (16 - 17)  SpO2: 100% (20 Nov 2021 21:46) (97% - 100%)    PHYSICAL EXAM  GENERAL: NAD   HEAD:  Atraumatic, normocephalic  EYES: EOMI, sclera clear  CHEST/LUNG: Clear to auscultation bilaterally; no wheeze  HEART: RRR; S1, S2; no M/R/G  ABDOMEN: soft, non-distended; non-tender; BS present  EXTREMITIES:  2+ Peripheral Pulses; no edema  NEURO: non-focal, AAOx3  PSYCH: appropriate affect  SKIN: No rashes or lesions    LABS:                        8.7    9.36  )-----------( 335      ( 20 Nov 2021 08:01 )             28.3     11-20    138  |  103  |  50<H>  ----------------------------<  137<H>  4.5   |  26  |  1.98<H>    Ca    9.8      20 Nov 2021 08:01  Phos  3.5     11-20  Mg     2.20     11-20    TPro  6.7  /  Alb  3.6  /  TBili  0.3  /  DBili  x   /  AST  16  /  ALT  16  /  AlkPhos  85  11-19              Culture - Blood (collected 19 Nov 2021 21:19)  Source: .Blood Blood-Peripheral  Preliminary Report (20 Nov 2021 22:02):    No growth to date.    Culture - Blood (collected 19 Nov 2021 21:16)  Source: .Blood Blood-Peripheral  Preliminary Report (20 Nov 2021 22:02):    No growth to date.     Jennifer Fish MD  Internal Medicine, PGY1  Universal pager: [phone] / [facility]: [phone]      Patient is a 81y old  Female who presents with a chief complaint of Worsening renal function (19 Nov 2021 20:53)    OVERNIGHT EVENTS: Overnight, pt became hypoglycemic (asymtomatic). Denies F/C, lightheadedness, HA, CP, SOB, abd pain, N/V/D/C, burning w/ urination, leg swelling. This AM, pt wanted to go home and was climbing out of bed. She was redirectable, but constant observation was placed as she attempts to climb out of bed.    focused ROS as above    MEDICATIONS  (STANDING):  amLODIPine   Tablet 10 milliGRAM(s) Oral daily  atorvastatin 10 milliGRAM(s) Oral at bedtime  brimonidine 0.2% Ophthalmic Solution 1 Drop(s) Right EYE two times a day  carvedilol 25 milliGRAM(s) Oral every 12 hours  dextrose 40% Gel 15 Gram(s) Oral once  dextrose 5%. 1000 milliLiter(s) (50 mL/Hr) IV Continuous <Continuous>  dextrose 5%. 1000 milliLiter(s) (100 mL/Hr) IV Continuous <Continuous>  dextrose 50% Injectable 25 Gram(s) IV Push once  dextrose 50% Injectable 12.5 Gram(s) IV Push once  dextrose 50% Injectable 25 Gram(s) IV Push once  FIRST- Mouthwash  BLM 10 milliLiter(s) Swish and Spit three times a day  fluticasone propionate 50 MICROgram(s)/spray Nasal Spray 1 Spray(s) Both Nostrils two times a day  glucagon  Injectable 1 milliGRAM(s) IntraMuscular once  heparin   Injectable 5000 Unit(s) SubCutaneous every 12 hours  influenza  Vaccine (HIGH DOSE) 0.7 milliLiter(s) IntraMuscular once  insulin glargine Injectable (LANTUS) 7 Unit(s) SubCutaneous every morning  insulin lispro (ADMELOG) corrective regimen sliding scale   SubCutaneous three times a day before meals  insulin lispro (ADMELOG) corrective regimen sliding scale   SubCutaneous at bedtime  lactated ringers. 1000 milliLiter(s) (75 mL/Hr) IV Continuous <Continuous>  latanoprost 0.005% Ophthalmic Solution 1 Drop(s) Right EYE at bedtime  lidocaine 2% Viscous 10 milliLiter(s) Swish and Spit three times a day  mirtazapine 45 milliGRAM(s) Oral at bedtime  senna Syrup 15 milliLiter(s) Oral at bedtime  sertraline 50 milliGRAM(s) Oral daily    MEDICATIONS  (PRN):  acetaminophen     Tablet .. 650 milliGRAM(s) Oral every 6 hours PRN Temp greater or equal to 38C (100.4F), Mild Pain (1 - 3), Moderate Pain (4 - 6)  aluminum hydroxide/magnesium hydroxide/simethicone Suspension 30 milliLiter(s) Oral every 4 hours PRN Dyspepsia  HYDROmorphone   Tablet 1 milliGRAM(s) Oral every 4 hours PRN sever pain  melatonin 3 milliGRAM(s) Oral at bedtime PRN Insomnia  ondansetron Injectable 4 milliGRAM(s) IV Push every 8 hours PRN Nausea and/or Vomiting      Vital Signs Last 24 Hrs  T(C): 36.4 (21 Nov 2021 06:20), Max: 37.2 (20 Nov 2021 21:46)  T(F): 97.6 (21 Nov 2021 06:20), Max: 98.9 (20 Nov 2021 21:46)  HR: 61 (21 Nov 2021 06:20) (58 - 62)  BP: 138/62 (21 Nov 2021 06:20) (121/54 - 138/62)  BP(mean): --  RR: 17 (21 Nov 2021 06:20) (16 - 17)  SpO2: 100% (21 Nov 2021 06:20) (97% - 100%)    PHYSICAL EXAM  GENERAL: NAD   HEAD:  Atraumatic, normocephalic  LABS:                        9.4    9.84  )-----------( 348      ( 21 Nov 2021 08:06 )             28.7     11-21    140  |  105  |  37<H>  ----------------------------<  81  4.1   |  24  |  1.66<H>    Ca    9.9      21 Nov 2021 08:06  Phos  2.6     11-21  Mg     2.00     11-21    TPro  7.0  /  Alb  3.6  /  TBili  0.4  /  DBili  x   /  AST  26  /  ALT  23  /  AlkPhos  94  11-21              Culture - Blood (collected 19 Nov 2021 21:19)  Source: .Blood Blood-Peripheral  Preliminary Report (20 Nov 2021 22:02):    No growth to date.    Culture - Blood (collected 19 Nov 2021 21:16)  Source: .Blood Blood-Peripheral  Preliminary Report (20 Nov 2021 22:02):    No growth to date.    EYES: EOMI, sclera clear  CHEST/LUNG: Clear to auscultation bilaterally; no wheeze  HEART: RRR; S1, S2; no M/R/G  ABDOMEN: soft, non-distended; non-tender; BS present  EXTREMITIES:  2+ Peripheral Pulses; no edema  NEURO: non-focal, AAOx3  PSYCH: appropriate affect  SKIN: No rashes or lesions

## 2021-11-21 NOTE — DIETITIAN INITIAL EVALUATION ADULT. - OTHER INFO
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 Visited with pt to obtain nutrition hx. Pt said she has had a poor oral intake for the past several weeks PTA. She ate pureed food PTA after she was evaluated by SLP during her prior [facility] admission late 10/21 with recommendation for Pureed consistency. Food preferences taken and menu alternatives discussed. Her oral intake presently is very poor. She denies food allergies, nausea/vomiting/diarrhea/constipation. Nutrition consult was generated for pressure injury, however none have been identified. Questioned pt about weight trend; pt said she weighed 96 lbs a few weeks ago and said she found that she lost 10 lbs with current admit weight of 86 lbs. Pt additionally exhibits severe subcutaneous fat store loss and muscle mass wasting which place her at severe risk for malnutrition. Suggest d/cing dietary restrictions to allow for more menu variety at meals (pt with hx of DM, however Glu lab values and Hb A1C of 5.8% are WALs). Also recommend Ensure Enlive 2x daily (700 cynthia and 40 gm protein) to optimize nutrition and assist with weight stabilization. Encourage and monitor oral intake, especially of nutrition supplement. RDN services to remain available as needed.

## 2021-11-21 NOTE — DIETITIAN INITIAL EVALUATION ADULT. - WEIGHT FOR BMI (LBS)
IR needs consent for procedure per patient wife, Jayro Sy. IR tried to call patient wife at new number provided [phone]; NO ANSWER. IR will try again this afternoon. 86.4

## 2021-11-21 NOTE — PROGRESS NOTE ADULT - PROBLEM SELECTOR PLAN 1
11/4/21 GFR 1.32, on admission 2.17  No complaints of dysuria.   BUN/ Creatinine = 53/ 2.17. Ratio indicative of dehydration, likely due to decreased po intake from loss of appetite.  Will give IVF with LR @ 75ml/ h x 1 L  F/U ProBNP  CXR preliminary clear.  F/U Urine lytes and urine Creatinine.  F/U Renal Duplex.   Avoid nephrotoxic agents and monitor lytes. 11/4/21 Baseline creatinine 1.32, on admission 2.17. Now improved to 1.66 today   No complaints of dysuria.   BUN/ Creatinine = 53/ 2.17. Ratio indicative of dehydration, likely due to decreased po intake from loss of appetite.  Will give IVF with LR @ 75ml/ h x 1 L  BNP 1446, unlikely cardiogenic in origin  CXR preliminary clear.  F/U Urine lytes and urine Creatinine.  Renal Duplex WNL  Avoid nephrotoxic agents and monitor lytes.

## 2021-11-21 NOTE — PROGRESS NOTE ADULT - PROBLEM SELECTOR PLAN 7
[facility] drop.  [facility] use [facility].  Continue Alphagan. Continue eye drop.  [facility] formulary. Pharmacy changed to use Latanoprost.  Continue Alphagan.

## 2021-11-21 NOTE — PROVIDER CONTACT NOTE (HYPOGLYCEMIA EVENT) - NS PROVIDER CONTACT BACKGROUND-HYPO
Age: 81y    Gender: Female    POCT Blood Glucose:  236 mg/dL (11-21-21 @ 23:07)  65 mg/dL (11-21-21 @ 22:39)  58 mg/dL (11-21-21 @ 22:16)  48 mg/dL (11-21-21 @ 22:14)  106 mg/dL (11-21-21 @ 18:15)  76 mg/dL (11-21-21 @ 12:07)  88 mg/dL (11-21-21 @ 08:23)      eMAR:atorvastatin   10 milliGRAM(s) Oral (11-21-21 @ 22:36)    dextrose 50% Injectable   12.5 Gram(s) IV Push (11-21-21 @ 23:00)    insulin glargine Injectable (LANTUS)   7 Unit(s) SubCutaneous (11-21-21 @ 09:15)

## 2021-11-21 NOTE — PROGRESS NOTE ADULT - PROBLEM SELECTOR PLAN 2
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  - F/U Oncology consult, emailed. 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  - oncology recs appreciated, no inpatient intervention  - pt may benefit from radiation therapy as planned after discharge  - pt aware of hospice options

## 2021-11-21 NOTE — DIETITIAN INITIAL EVALUATION ADULT. - NAME AND PHONE
BP entered in nurse visit.   
Call patient to get some of his home blood pressure readings.  He reports his blood pressure reading was 128/84 and 128/88.  He has had other readings that are in the mid 130s over approximately 85. He states that is the highest that it has been.  He did have 1 higher systolic reading in the 150s after he became sick but it return to normal today after.  Patient was asked how old his blood pressure cuff is any states at least 10 years old.  He was advised to get a new cuff because it is may not be accurate.  Once he gets a new copy should be able to document the date that time and the blood pressure readings and give us a call back.    Patient advised me that he was sick starting 10/30/2020.  He had myalgias his knee started to ache but then it moved into his shoulders which is unusual for him.  He thought that he initially he it may have been from the work that he was doing.  Temperature was maximum 99 once.  It has been lower than that.  He has had fatigue.  He did not going to more detail about any upper respiratory or lower respiratory symptoms.  He stated that he did not have time to go get a COVID testing but he is assuming that he is positive for coated.  His daughter had a history of a sore throat and was taken for testing is negative.  He states that he was exposed to a person on [place] and a matter of a few days ago who both were positive for COVID.  Patient was advised that he needs to be quarantined for 10 days he needs to be fever free symptom free and not taking any type of antipyretic for at least 72 hours before he can have contact with people.    He should call if there is any other concerns.      
I called the patient to get some blood pressure readings after dose adjustment was made to his blood pressure medications.    Patient stated that he will call us on Friday with some readings.  
Noted, patient was going to call us with some readings today. We can't have him schedule a blood pressure check here with his daughter's history of sore throat.    NIGHAT  
Patient called to set up appointment for bp today. Patient was upset that we needed to schedule an appointment for a simple bp check and that writer was asking him covid questions. Patient states this simple bp check was made into too big of a deal for him. Patient was angry and said he will call back to schedule next week and hung up.  Patient also stated that his daughter had a slight sore throat.  
Please enter patient's blood pressure reading 128/84 to 128/88 is self-reported into his chart.  
Constanza Miller RDN Pager #[phone]

## 2021-11-21 NOTE — PROGRESS NOTE ADULT - PROBLEM SELECTOR PLAN 8
BS= 90.  The pt and spouse say she takes 15 units of Soliqua insulin every morning.   Soliqua non formulary. Pharmacy advised to replace with Lantus 1 to1 conversion.  Will continue Lantus @ 7U sub sut Q AM while the pt is in the hospital.  FS QID  Low dose corrective sliding scale.  F/U A1C BS= 90.  The pt and spouse say she takes 15 units of Soliqua insulin every morning.   Soliqua non formulary. Pharmacy advised to replace with Lantus 1 to1 conversion.  Will continue Lantus @ 7U sub sut Q AM while the pt is in the hospital.  FS QID  Low dose corrective sliding scale.  A1c 5.7  - decreasing lantus to 5U as pt had hypoglycemia episode yesterday

## 2021-11-21 NOTE — DIETITIAN INITIAL EVALUATION ADULT. - PERTINENT MEDS FT
MEDICATIONS  (STANDING):  amLODIPine   Tablet 10 milliGRAM(s) Oral daily  atorvastatin 10 milliGRAM(s) Oral at bedtime  brimonidine 0.2% Ophthalmic Solution 1 Drop(s) Right EYE two times a day  carvedilol 25 milliGRAM(s) Oral every 12 hours  dextrose 40% Gel 15 Gram(s) Oral once  dextrose 5%. 1000 milliLiter(s) (50 mL/Hr) IV Continuous <Continuous>  dextrose 5%. 1000 milliLiter(s) (100 mL/Hr) IV Continuous <Continuous>  dextrose 50% Injectable 25 Gram(s) IV Push once  dextrose 50% Injectable 12.5 Gram(s) IV Push once  dextrose 50% Injectable 25 Gram(s) IV Push once  FIRST- Mouthwash  BLM 10 milliLiter(s) Swish and Spit three times a day  fluticasone propionate 50 MICROgram(s)/spray Nasal Spray 1 Spray(s) Both Nostrils two times a day  glucagon  Injectable 1 milliGRAM(s) IntraMuscular once  heparin   Injectable 5000 Unit(s) SubCutaneous every 12 hours  influenza  Vaccine (HIGH DOSE) 0.7 milliLiter(s) IntraMuscular once  insulin glargine Injectable (LANTUS) 7 Unit(s) SubCutaneous every morning  insulin lispro (ADMELOG) corrective regimen sliding scale   SubCutaneous three times a day before meals  insulin lispro (ADMELOG) corrective regimen sliding scale   SubCutaneous at bedtime  lactated ringers. 1000 milliLiter(s) (75 mL/Hr) IV Continuous <Continuous>  latanoprost 0.005% Ophthalmic Solution 1 Drop(s) Right EYE at bedtime  lidocaine 2% Viscous 10 milliLiter(s) Swish and Spit three times a day  mirtazapine 45 milliGRAM(s) Oral at bedtime  senna Syrup 15 milliLiter(s) Oral at bedtime  sertraline 50 milliGRAM(s) Oral daily

## 2021-11-22 NOTE — DISCHARGE NOTE NURSING/CASE MANAGEMENT/SOCIAL WORK - NSDCVIVACCINE_GEN_ALL_CORE_FT
Tdap; 25-Aug-2020 13:55; Wolf Hickey (RN); [employer]; [id number] (Exp. Date: 21-Nov-2021); IntraMuscular; Deltoid Left.; 0.5 milliLiter(s); VIS (VIS Published: 09-May-2013, VIS Presented: 25-Aug-2020);

## 2021-11-22 NOTE — PROVIDER CONTACT NOTE (HYPOGLYCEMIA EVENT) - NS PROVIDER CONTACT BACKGROUND-HYPO
Age: 81y    Gender: Female    POCT Blood Glucose:  94 mg/dL (11-22-21 @ 18:19)  83 mg/dL (11-22-21 @ 18:03)  69 mg/dL (11-22-21 @ 17:41)  68 mg/dL (11-22-21 @ 17:19)  64 mg/dL (11-22-21 @ 17:16)  197 mg/dL (11-22-21 @ 12:20)  124 mg/dL (11-22-21 @ 08:22)  236 mg/dL (11-21-21 @ 23:07)      eMAR:atorvastatin   10 milliGRAM(s) Oral (11-21-21 @ 22:36)    dextrose 40% Gel   15 Gram(s) Oral (11-22-21 @ 17:30)    dextrose 40% Gel   15 Gram(s) Oral (11-22-21 @ 18:30)    dextrose 50% Injectable   12.5 Gram(s) IV Push (11-21-21 @ 23:00)    insulin glargine Injectable (LANTUS)   5 Unit(s) SubCutaneous (11-22-21 @ 09:06)    insulin lispro (ADMELOG) corrective regimen sliding scale   1 Unit(s) SubCutaneous (11-22-21 @ 12:28)

## 2021-11-22 NOTE — PROVIDER CONTACT NOTE (HYPOGLYCEMIA EVENT) - NS PROVIDER CONTACT ASSESS-HYPO
Asymptomatic
Pt alert to self, with poor appetite, asymptomatic, VSS: /80, 98.2 F, pulse 70, SpO2 100% room air.

## 2021-11-22 NOTE — DISCHARGE NOTE NURSING/CASE MANAGEMENT/SOCIAL WORK - PATIENT PORTAL LINK FT
You can access the FollowMyHealth Patient Portal offered by [facility] by registering at the following website: [email or web address]. By joining [facility]’s FollowMyHealth portal, you will also be able to view your health information using other applications (apps) compatible with our system.

## 2021-11-22 NOTE — PROGRESS NOTE ADULT - PROBLEM SELECTOR PLAN 8
BS= 90.  The pt and spouse say she takes 15 units of Soliqua insulin every morning.   Soliqua non formulary. Pharmacy advised to replace with Lantus 1 to1 conversion.  Will continue Lantus @ 7U sub sut Q AM while the pt is in the hospital.  FS QID  Low dose corrective sliding scale.  A1c 5.7  - decreasing lantus to 5U as pt had hypoglycemia episode yesterday BS= 90.  The pt and spouse say she takes 15 units of Soliqua insulin every morning.   Soliqua non formulary. Pharmacy advised to replace with Lantus 1 to1 conversion.  Will continue Lantus @ 7U sub sut Q AM while the pt is in the hospital.  FS QID  Low dose corrective sliding scale.  A1c 5.7  - decreasing lantus to 3U tomorrow

## 2021-11-22 NOTE — PROGRESS NOTE ADULT - PROBLEM SELECTOR PLAN 2
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  - oncology recs appreciated, no inpatient intervention  - pt may benefit from radiation therapy as planned after discharge  - pt aware of hospice options

## 2021-11-22 NOTE — PROGRESS NOTE ADULT - PROBLEM SELECTOR PLAN 10
- No MOLST form see in alpha section on [place].   - Family would like to discuss code status amongst themselves again (see [facility] chart note 11/20/21)  - The pt will be "full code"  for now.

## 2021-11-22 NOTE — PROGRESS NOTE ADULT - PROBLEM SELECTOR PLAN 7
Continue eye drop.  [facility] formulary. Pharmacy changed to use Latanoprost.  Continue Alphagan.

## 2021-11-22 NOTE — PROGRESS NOTE ADULT - SUBJECTIVE AND OBJECTIVE BOX
INTERVAL HPI/OVERNIGHT EVENTS:  Patient seen at bedside.      VITAL SIGNS:  T(F): 99.3 (11-22-21 @ 06:30)  HR: 62 (11-22-21 @ 06:30)  BP: 176/57 (11-22-21 @ 06:30)  RR: 18 (11-22-21 @ 06:30)  SpO2: 100% (11-22-21 @ 06:30)  Wt(kg): --    PHYSICAL EXAM:    Constitutional: NAD, resting in bed comfortably  Eyes: EOMI, sclera non-icteric  Neck: supple, no LAP  Respiratory: CTA b/l, good air entry b/l, no wheezing, rhonchi or crackels  Cardiovascular: RRR, normal S1S2, no M/R/G  Gastrointestinal: soft, NTND  Extremities: no edema  Neurological: AAOx3, non focal  Skin: Normal temperature    MEDICATIONS  (STANDING):  amLODIPine   Tablet 10 milliGRAM(s) Oral daily  atorvastatin 10 milliGRAM(s) Oral at bedtime  brimonidine 0.2% Ophthalmic Solution 1 Drop(s) Right EYE two times a day  carvedilol 25 milliGRAM(s) Oral every 12 hours  dextrose 40% Gel 15 Gram(s) Oral once  dextrose 5%. 1000 milliLiter(s) (50 mL/Hr) IV Continuous <Continuous>  dextrose 5%. 1000 milliLiter(s) (100 mL/Hr) IV Continuous <Continuous>  dextrose 50% Injectable 25 Gram(s) IV Push once  dextrose 50% Injectable 12.5 Gram(s) IV Push once  dextrose 50% Injectable 25 Gram(s) IV Push once  FIRST- Mouthwash  BLM 10 milliLiter(s) Swish and Spit three times a day  fluticasone propionate 50 MICROgram(s)/spray Nasal Spray 1 Spray(s) Both Nostrils two times a day  glucagon  Injectable 1 milliGRAM(s) IntraMuscular once  heparin   Injectable 5000 Unit(s) SubCutaneous every 12 hours  influenza  Vaccine (HIGH DOSE) 0.7 milliLiter(s) IntraMuscular once  insulin glargine Injectable (LANTUS) 3 Unit(s) SubCutaneous every morning  insulin lispro (ADMELOG) corrective regimen sliding scale   SubCutaneous three times a day before meals  insulin lispro (ADMELOG) corrective regimen sliding scale   SubCutaneous at bedtime  lactated ringers. 1000 milliLiter(s) (75 mL/Hr) IV Continuous <Continuous>  latanoprost 0.005% Ophthalmic Solution 1 Drop(s) Right EYE at bedtime  lidocaine 2% Viscous 10 milliLiter(s) Swish and Spit three times a day  mirtazapine 45 milliGRAM(s) Oral at bedtime  senna Syrup 15 milliLiter(s) Oral at bedtime  sertraline 50 milliGRAM(s) Oral daily    MEDICATIONS  (PRN):  acetaminophen     Tablet .. 650 milliGRAM(s) Oral every 6 hours PRN Temp greater or equal to 38C (100.4F), Mild Pain (1 - 3), Moderate Pain (4 - 6)  aluminum hydroxide/magnesium hydroxide/simethicone Suspension 30 milliLiter(s) Oral every 4 hours PRN Dyspepsia  HYDROmorphone   Tablet 1 milliGRAM(s) Oral every 4 hours PRN sever pain  melatonin 3 milliGRAM(s) Oral at bedtime PRN Insomnia  ondansetron Injectable 4 milliGRAM(s) IV Push every 8 hours PRN Nausea and/or Vomiting      Allergies    No Known Allergies    Intolerances        LABS:                        9.4    9.84  )-----------( 348      ( 21 Nov 2021 08:06 )             28.7     11-21    140  |  105  |  37<H>  ----------------------------<  81  4.1   |  24  |  1.66<H>    Ca    9.9      21 Nov 2021 08:06  Phos  2.6     11-21  Mg     2.00     11-21    TPro  7.0  /  Alb  3.6  /  TBili  0.4  /  DBili  x   /  AST  26  /  ALT  23  /  AlkPhos  94  11-21          RADIOLOGY & ADDITIONAL TESTS:  Studies reviewed.

## 2021-11-22 NOTE — PROGRESS NOTE ADULT - PROBLEM SELECTOR PLAN 1
11/4/21 Baseline creatinine 1.32, on admission 2.17. Now improved to 1.66 today   No complaints of dysuria.   BUN/ Creatinine = 53/ 2.17. Ratio indicative of dehydration, likely due to decreased po intake from loss of appetite.  Will give IVF with LR @ 75ml/ h x 1 L  BNP 1446, unlikely cardiogenic in origin  CXR preliminary clear.  F/U Urine lytes and urine Creatinine.  Renal Duplex WNL  Avoid nephrotoxic agents and monitor lytes. 11/4/21 Baseline creatinine 1.32, on admission 2.17. Now improved to 1.66.    No complaints of dysuria.   BUN/ Creatinine = 53/ 2.17. Ratio indicative of dehydration, likely due to decreased po intake from loss of appetite.  Will give IVF with LR @ 75ml/ h x 1 L  BNP 1446, unlikely cardiogenic in origin  CXR preliminary clear.  F/U Urine lytes and urine Creatinine.  Renal Duplex WNL  Avoid nephrotoxic agents and monitor lytes.

## 2021-11-22 NOTE — PROGRESS NOTE ADULT - SUBJECTIVE AND OBJECTIVE BOX
Jennifer Fish MD  Internal Medicine, PGY1  Universal pager: [phone] / [facility]: [phone]      Patient is a 81y old  Female who presents with a chief complaint of Worsening renal function (19 Nov 2021 20:53)    OVERNIGHT EVENTS: Overnight, pt became hypoglycemic (asymtomatic). Denies F/C, lightheadedness, HA, CP, SOB, abd pain, N/V/D/C, burning w/ urination, leg swelling. This AM, pt wanted to go home and was climbing out of bed. She was redirectable, but constant observation was placed as she attempts to climb out of bed.    focused ROS as above    MEDICATIONS  (STANDING):  amLODIPine   Tablet 10 milliGRAM(s) Oral daily  atorvastatin 10 milliGRAM(s) Oral at bedtime  brimonidine 0.2% Ophthalmic Solution 1 Drop(s) Right EYE two times a day  carvedilol 25 milliGRAM(s) Oral every 12 hours  dextrose 40% Gel 15 Gram(s) Oral once  dextrose 5%. 1000 milliLiter(s) (50 mL/Hr) IV Continuous <Continuous>  dextrose 5%. 1000 milliLiter(s) (100 mL/Hr) IV Continuous <Continuous>  dextrose 50% Injectable 25 Gram(s) IV Push once  dextrose 50% Injectable 12.5 Gram(s) IV Push once  dextrose 50% Injectable 25 Gram(s) IV Push once  FIRST- Mouthwash  BLM 10 milliLiter(s) Swish and Spit three times a day  fluticasone propionate 50 MICROgram(s)/spray Nasal Spray 1 Spray(s) Both Nostrils two times a day  glucagon  Injectable 1 milliGRAM(s) IntraMuscular once  heparin   Injectable 5000 Unit(s) SubCutaneous every 12 hours  influenza  Vaccine (HIGH DOSE) 0.7 milliLiter(s) IntraMuscular once  insulin glargine Injectable (LANTUS) 7 Unit(s) SubCutaneous every morning  insulin lispro (ADMELOG) corrective regimen sliding scale   SubCutaneous three times a day before meals  insulin lispro (ADMELOG) corrective regimen sliding scale   SubCutaneous at bedtime  lactated ringers. 1000 milliLiter(s) (75 mL/Hr) IV Continuous <Continuous>  latanoprost 0.005% Ophthalmic Solution 1 Drop(s) Right EYE at bedtime  lidocaine 2% Viscous 10 milliLiter(s) Swish and Spit three times a day  mirtazapine 45 milliGRAM(s) Oral at bedtime  senna Syrup 15 milliLiter(s) Oral at bedtime  sertraline 50 milliGRAM(s) Oral daily    MEDICATIONS  (PRN):  acetaminophen     Tablet .. 650 milliGRAM(s) Oral every 6 hours PRN Temp greater or equal to 38C (100.4F), Mild Pain (1 - 3), Moderate Pain (4 - 6)  aluminum hydroxide/magnesium hydroxide/simethicone Suspension 30 milliLiter(s) Oral every 4 hours PRN Dyspepsia  HYDROmorphone   Tablet 1 milliGRAM(s) Oral every 4 hours PRN sever pain  melatonin 3 milliGRAM(s) Oral at bedtime PRN Insomnia  ondansetron Injectable 4 milliGRAM(s) IV Push every 8 hours PRN Nausea and/or Vomiting      Vital Signs Last 24 Hrs  T(C): 37.4 (22 Nov 2021 06:30), Max: 37.4 (22 Nov 2021 06:30)  T(F): 99.3 (22 Nov 2021 06:30), Max: 99.3 (22 Nov 2021 06:30)  HR: 62 (22 Nov 2021 06:30) (62 - 70)  BP: 176/57 (22 Nov 2021 06:30) (146/78 - 176/57)  BP(mean): --  RR: 18 (22 Nov 2021 06:30) (17 - 18)  SpO2: 100% (22 Nov 2021 06:30) (100% - 100%)    PHYSICAL EXAM  GENERAL: NAD   HEAD:  Atraumatic, normocephalic  LABS:               LABS:                        9.4    9.84  )-----------( 348      ( 21 Nov 2021 08:06 )             28.7     11-21    140  |  105  |  37<H>  ----------------------------<  81  4.1   |  24  |  1.66<H>    Ca    9.9      21 Nov 2021 08:06  Phos  2.6     11-21  Mg     2.00     11-21    TPro  7.0  /  Alb  3.6  /  TBili  0.4  /  DBili  x   /  AST  26  /  ALT  23  /  AlkPhos  94  11-21              Culture - Blood (collected 19 Nov 2021 21:19)  Source: .Blood Blood-Peripheral  Preliminary Report (20 Nov 2021 22:02):    No growth to date.    Culture - Blood (collected 19 Nov 2021 21:16)  Source: .Blood Blood-Peripheral  Preliminary Report (20 Nov 2021 22:02):    No growth to date.    EYES: EOMI, sclera clear  CHEST/LUNG: Clear to auscultation bilaterally; no wheeze  HEART: RRR; S1, S2; no M/R/G  ABDOMEN: soft, non-distended; non-tender; BS present  EXTREMITIES:  2+ Peripheral Pulses; no edema  NEURO: non-focal, AAOx3  PSYCH: appropriate affect  SKIN: No rashes or lesions       Jennifer Fish MD  Internal Medicine, PGY1  Universal pager: [phone] / [facility]: [phone]      Patient is a 81y old  Female who presents with a chief complaint of Worsening renal function (19 Nov 2021 20:53)    OVERNIGHT EVENTS: Overnight, pt became hypoglycemic (asymtomatic). Denies F/C, lightheadedness, HA, CP, SOB, abd pain, N/V/D/C, burning w/ urination, leg swelling. This AM, pt wanted to go home.   focused ROS as above    MEDICATIONS  (STANDING):  amLODIPine   Tablet 10 milliGRAM(s) Oral daily  atorvastatin 10 milliGRAM(s) Oral at bedtime  brimonidine 0.2% Ophthalmic Solution 1 Drop(s) Right EYE two times a day  carvedilol 25 milliGRAM(s) Oral every 12 hours  dextrose 40% Gel 15 Gram(s) Oral once  dextrose 5%. 1000 milliLiter(s) (50 mL/Hr) IV Continuous <Continuous>  dextrose 5%. 1000 milliLiter(s) (100 mL/Hr) IV Continuous <Continuous>  dextrose 50% Injectable 25 Gram(s) IV Push once  dextrose 50% Injectable 12.5 Gram(s) IV Push once  dextrose 50% Injectable 25 Gram(s) IV Push once  FIRST- Mouthwash  BLM 10 milliLiter(s) Swish and Spit three times a day  fluticasone propionate 50 MICROgram(s)/spray Nasal Spray 1 Spray(s) Both Nostrils two times a day  glucagon  Injectable 1 milliGRAM(s) IntraMuscular once  heparin   Injectable 5000 Unit(s) SubCutaneous every 12 hours  influenza  Vaccine (HIGH DOSE) 0.7 milliLiter(s) IntraMuscular once  insulin glargine Injectable (LANTUS) 7 Unit(s) SubCutaneous every morning  insulin lispro (ADMELOG) corrective regimen sliding scale   SubCutaneous three times a day before meals  insulin lispro (ADMELOG) corrective regimen sliding scale   SubCutaneous at bedtime  lactated ringers. 1000 milliLiter(s) (75 mL/Hr) IV Continuous <Continuous>  latanoprost 0.005% Ophthalmic Solution 1 Drop(s) Right EYE at bedtime  lidocaine 2% Viscous 10 milliLiter(s) Swish and Spit three times a day  mirtazapine 45 milliGRAM(s) Oral at bedtime  senna Syrup 15 milliLiter(s) Oral at bedtime  sertraline 50 milliGRAM(s) Oral daily    MEDICATIONS  (PRN):  acetaminophen     Tablet .. 650 milliGRAM(s) Oral every 6 hours PRN Temp greater or equal to 38C (100.4F), Mild Pain (1 - 3), Moderate Pain (4 - 6)  aluminum hydroxide/magnesium hydroxide/simethicone Suspension 30 milliLiter(s) Oral every 4 hours PRN Dyspepsia  HYDROmorphone   Tablet 1 milliGRAM(s) Oral every 4 hours PRN sever pain  melatonin 3 milliGRAM(s) Oral at bedtime PRN Insomnia  ondansetron Injectable 4 milliGRAM(s) IV Push every 8 hours PRN Nausea and/or Vomiting      Vital Signs Last 24 Hrs  T(C): 37.4 (22 Nov 2021 06:30), Max: 37.4 (22 Nov 2021 06:30)  T(F): 99.3 (22 Nov 2021 06:30), Max: 99.3 (22 Nov 2021 06:30)  HR: 62 (22 Nov 2021 06:30) (62 - 70)  BP: 176/57 (22 Nov 2021 06:30) (146/78 - 176/57)  BP(mean): --  RR: 18 (22 Nov 2021 06:30) (17 - 18)  SpO2: 100% (22 Nov 2021 06:30) (100% - 100%)    PHYSICAL EXAM  GENERAL: NAD   HEAD:  Atraumatic, normocephalic  LABS:               LABS:                        9.4    9.84  )-----------( 348      ( 21 Nov 2021 08:06 )             28.7     11-21    140  |  105  |  37<H>  ----------------------------<  81  4.1   |  24  |  1.66<H>    Ca    9.9      21 Nov 2021 08:06  Phos  2.6     11-21  Mg     2.00     11-21    TPro  7.0  /  Alb  3.6  /  TBili  0.4  /  DBili  x   /  AST  26  /  ALT  23  /  AlkPhos  94  11-21              Culture - Blood (collected 19 Nov 2021 21:19)  Source: .Blood Blood-Peripheral  Preliminary Report (20 Nov 2021 22:02):    No growth to date.    Culture - Blood (collected 19 Nov 2021 21:16)  Source: .Blood Blood-Peripheral  Preliminary Report (20 Nov 2021 22:02):    No growth to date.    EYES: EOMI, sclera clear  CHEST/LUNG: Clear to auscultation bilaterally; no wheeze  HEART: RRR; S1, S2; no M/R/G  ABDOMEN: soft, non-distended; non-tender; BS present  EXTREMITIES:  2+ Peripheral Pulses; no edema  NEURO: non-focal, AAOx3  PSYCH: appropriate affect  SKIN: No rashes or lesions

## 2021-11-23 NOTE — CONSULT NOTE ADULT - SUBJECTIVE AND OBJECTIVE BOX
[facility] and Palliative [facility]  Gabrielle Butler, Palliative Care Attending  Contact Info: Page [phone] (including Nights/Weekends), message on Microsoft Teams (Gabrielle Butlre), or leave VM at Palliative Office [phone] (non-urgent)     HPI:  81F, ambulates with a cane at times, history of tongue ca  with surgical treatment, reoccurrence  treated with chemo, L tongue ca and  s/p biopsy 2021, to begin treatment at [facility] 2021.  Also history of dysphagia, able to tolerate puree diet, DM2, Glaucoma, Hypertension, Hyperlipidemia, depression, constipation,  R ear pain since 2021 felt with chewing, was seen by PCP with no source found. Given Tylenol po which relives the R ear pain, recently discharged from [facility] 21 after a 1 week stay for NICK with kidney function improved with fluids. The pt went for a follow up visit to her PCP  and had blood work done. The pt was called by the PCP  and advised to return to the Ed due to worsening kidney function.  The pt endorses decreased appetite with 5 pound weight loss in 1 week, cough with clear phlegm, nasal congestion, constipation, with last BM , pt attributes to decreased po intake.  Denies HA, dizziness, recent falls, blurry vision,, SOB, chest pain, palpitation, nausea, vomit, diarrhea, abdominal pain, dysuria, chill, diaphoresis generalized body aches.     Vitals: T Max: 98.2 oral, BP= 138/ 60, HR= 65b/mijn, RR= 18b/ min, SPO2= 100% ra  (2021 20:53)    PERTINENT PM/SXH:   Tongue cancer  Anxiety  Depression  Hyperuricemia  Hypertension  Chronic kidney disease (CKD)  Glaucoma  Hypercholesterolemia  Osteopenia  Diabetes mellitus  Dysphagia  Tongue cancer  H/O local excision of skin lesion    FAMILY HISTORY:  Family history of Alzheimer&#x27;s disease  mother    FH: dementia    ITEMS NOT CHECKED ARE NOT PRESENT    SOCIAL HISTORY:   Significant other/partner[x ]  Children[x- 2 daughters Patricia and Keli ] Rastafarian/Spirituality:  Substance hx:  [ ]   Tobacco hx:  [ ]   Alcohol hx: [ ]   This report was requested by: Gabrielle Butler | Reference #: [id number]  Others' Prescriptions  Patient Name: Torrie Solomon Date: 1940  Address: [address] [place] [id number]: Female  Rx Written	Rx Dispensed	Drug	Quantity	Days Supply	Prescriber Name  2021	hydromorphone 2 mg tablet	32	7	[facility]  10/15/2021	10/16/2021	lorazepam 0.5 mg tablet	60	30	Farhat Santos DO  10/13/2021	10/15/2021	oxycodone-acetaminophen 5-325 mg tablet	28	7	[facility]  10/05/2021	10/06/2021	oxycodone-acetaminophen 5-325 mg tablet	30	10	[facility] Chong  2021	lorazepam 0.5 mg tablet	60	30	Farhat Santos DO  Living Situation: [ ]Home  [ ]Long term care  [ ]Rehab [ ]Other    ADVANCE DIRECTIVES:    MOLST  [ ]  Living Will  [ ]   DECISION MAKER(s):  [x ] Health Care Proxy(s)  [ ] Surrogate(s)  [ ] Guardian           Name(s): Murali Burdick and Malina  (sister)  Emergency contact/ Spouse; Murali Hurd    Emergency concact/ Sister: Malina [phone], home# [phone]      BASELINE (I)ADL(s) (prior to admission): ambulates independently but has memory impairments   [place]: [ ]Total  [x ] Moderate [ ]Dependent    Allergies    No Known Allergies    Intolerances    MEDICATIONS  (STANDING):  amLODIPine   Tablet 10 milliGRAM(s) Oral daily  atorvastatin 10 milliGRAM(s) Oral at bedtime  brimonidine 0.2% Ophthalmic Solution 1 Drop(s) Right EYE two times a day  carvedilol 25 milliGRAM(s) Oral every 12 hours  dextrose 40% Gel 15 Gram(s) Oral once  dextrose 5%. 1000 milliLiter(s) (50 mL/Hr) IV Continuous <Continuous>  dextrose 5%. 1000 milliLiter(s) (100 mL/Hr) IV Continuous <Continuous>  dextrose 50% Injectable 25 Gram(s) IV Push once  dextrose 50% Injectable 12.5 Gram(s) IV Push once  dextrose 50% Injectable 25 Gram(s) IV Push once  FIRST- Mouthwash  BLM 10 milliLiter(s) Swish and Spit three times a day  fluticasone propionate 50 MICROgram(s)/spray Nasal Spray 1 Spray(s) Both Nostrils two times a day  glucagon  Injectable 1 milliGRAM(s) IntraMuscular once  heparin   Injectable 5000 Unit(s) SubCutaneous every 12 hours  HYDROmorphone   Tablet 1 milliGRAM(s) Oral three times a day  influenza  Vaccine (HIGH DOSE) 0.7 milliLiter(s) IntraMuscular once  insulin lispro (ADMELOG) corrective regimen sliding scale   SubCutaneous three times a day before meals  insulin lispro (ADMELOG) corrective regimen sliding scale   SubCutaneous at bedtime  lactated ringers. 1000 milliLiter(s) (75 mL/Hr) IV Continuous <Continuous>  latanoprost 0.005% Ophthalmic Solution 1 Drop(s) Right EYE at bedtime  lidocaine 2% Viscous 10 milliLiter(s) Swish and Spit three times a day  mirtazapine 45 milliGRAM(s) Oral at bedtime  polyethylene glycol 3350 17 Gram(s) Oral daily  senna 2 Tablet(s) Oral at bedtime  senna Syrup 15 milliLiter(s) Oral at bedtime  sertraline 50 milliGRAM(s) Oral daily    MEDICATIONS  (PRN):  acetaminophen     Tablet .. 650 milliGRAM(s) Oral every 6 hours PRN Temp greater or equal to 38C (100.4F), Mild Pain (1 - 3), Moderate Pain (4 - 6)  aluminum hydroxide/magnesium hydroxide/simethicone Suspension 30 milliLiter(s) Oral every 4 hours PRN Dyspepsia  HYDROmorphone   Tablet 1 milliGRAM(s) Oral every 4 hours PRN sever pain  melatonin 3 milliGRAM(s) Oral at bedtime PRN Insomnia  ondansetron Injectable 4 milliGRAM(s) IV Push every 8 hours PRN Nausea and/or Vomiting    PRESENT SYMPTOMS: [ ]Unable to obtain due to poor mentation   Source if other than patient:  [ ]Family   [ ]Team     Pain: [x ]yes [ ]no  QOL impact -   Location -                    Aggravating factors -  Quality -  Radiation -  Timing-  Severity (0-10 scale):  Minimal acceptable level (0-10 scale):     PAIN AD Score:     [email or web address]/painad.pdf (press ctrl +  left click to view)    Dyspnea:                           [ ]Mild [ ]Moderate [ ]Severe  Anxiety:                             [ ]Mild [ ]Moderate [ ]Severe  Fatigue:                             [ ]Mild [ ]Moderate [ ]Severe  Nausea:                             [ ]Mild [ ]Moderate [ ]Severe  Loss of appetite:              [ ]Mild [ ]Moderate [ ]Severe  Constipation:                    [ ]Mild [ ]Moderate [ ]Severe    Other Symptoms:  [ ]All other review of systems negative     Palliative Performance Status Version 2:      60   %    [email or web address]/[email or web address]_performance_[email or web address]_[email or web address].pdf  PHYSICAL EXAM:  Vital Signs Last 24 Hrs  T(C): 37.1 (2021 05:10), Max: 37.3 (2021 14:01)  T(F): 98.7 (2021 05:10), Max: 99.1 (2021 14:01)  HR: 63 (2021 05:10) (60 - 68)  BP: 154/60 (2021 05:10) (125/56 - 154/60)  BP(mean): --  RR: 18 (2021 05:10) (18 - 19)  SpO2: 100% (2021 05:10) (100% - 100%) I&O's Summary    2021 07:01  -  2021 07:00  --------------------------------------------------------  IN: 950 mL / OUT: 0 mL / NET: 950 mL      GENERAL:  [x ]Alert  [ x]Oriented x 2-3?  [ ]Lethargic  [ x]Cachexia  [ ]Unarousable  [x ]Verbal  [ ]Non-Verbal  Behavioral:   [ ] Anxiety  [ ] Delirium [ ] Agitation [ x] Other  HEENT:  right side of tongue scar noted   [ ]Normal   [ ]Dry mouth   [ ]ET Tube/Trach  [ ]Oral lesions  PULMONARY:   [x ]Clear [ ]Tachypnea  [ ]Audible excessive secretions   [ ]Rhonchi        [ ]Right [ ]Left [ ]Bilateral  [ ]Crackles        [ ]Right [ ]Left [ ]Bilateral  [ ]Wheezing     [ ]Right [ ]Left [ ]Bilateral   [ ]Diminished breath sounds [ ]right [ ]left [ ]bilateral  CARDIOVASCULAR:    [x ]Regular [ ]Irregular [ ]Tachy  [ ]Solo [ ]Murmur [ ]Other  GASTROINTESTINAL:  [x ]Soft  [ ]Distended   [ ]+BS  [x ]Non tender [ ]Tender  [ ]PEG [ ]OGT/ NGT  Last BM:   GENITOURINARY:   [ ]Normal [x ] Incontinent   [ ]Oliguria/Anuria   [ ]Hicks  MUSCULOSKELETAL:   [ ]Normal   [x ]Weakness  [ ]Bed/Wheelchair bound [ ]Edema  NEUROLOGIC:   [ ]No focal deficits  [ x]Cognitive impairment  [ ]Dysphagia [ ]Dysarthria [ ]Paresis [ ]Other   SKIN: IAD   [ ]Normal    [ ]Rash  [ ]Pressure ulcer(s)       Present on admission [ ]y [ ]n    CRITICAL CARE:  [ ] Shock Present  [ ]Septic [ ]Cardiogenic [ ]Neurologic [ ]Hypovolemic  [ ]  Vasopressors [ ]  Inotropes   [ ]Respiratory failure present [ ]Mechanical ventilation [ ]Non-invasive ventilatory support [ ]High flow  [ ]Acute  [ ]Chronic [ ]Hypoxic  [ ]Hypercarbic [ ]Other  [ ]Other organ failure     LABS:                        9.3    10.74 )-----------( 295      ( 2021 08:48 )             30.0       142  |  107  |  20  ----------------------------<  104<H>  4.0   |  25  |  1.45<H>    Ca    9.1      2021 08:48  Phos  2.5       Mg     1.90         TPro  6.7  /  Alb  3.3  /  TBili  0.4  /  DBili  x   /  AST  14  /  ALT  18  /  AlkPhos  91        Urinalysis Basic - ( 2021 07:43 )    Color: Light Yellow / Appearance: Clear / S.010 / pH: x  Gluc: x / Ketone: Negative  / Bili: Negative / Urobili: <2 mg/dL   Blood: x / Protein: Trace / Nitrite: Negative   Leuk Esterase: Moderate / RBC: 1 /HPF / WBC 10-15 /HPF   Sq Epi: x / Non Sq Epi: Few / Bacteria: Few      RADIOLOGY & ADDITIONAL STUDIES: < from: US Kidney and Bladder (21 @ 09:46) >    IMPRESSION:    Normal renal ultrasound.    < end of copied text >      PROTEIN CALORIE MALNUTRITION PRESENT: [ ]mild [ ]moderate [ x]severe [ ]underweight [ ]morbid obesity  https://www.andeal.org/vault/2440/web/files/ONC/Table_Clinical%20Characteristics%20to%20Document%20Malnutrition-White%20JV%20et%20al%2020.pdf    Height (cm): 162.6 (21 @ 01:47), 162.5 (21 @ 16:00), 165.1 (10-29-21 @ 17:00)  Weight (kg): 39.2 (21 @ :47), 40.0885 (21 @ 16:00), 43.5 (10-30-21 @ 02:57)  BMI (kg/m2): 14.8 (21 @ :47), 15.2 (21 @ 16:00), 16 (10-30-21 @ 02:57)    [ ]PPSV2 < or = to 30% [ ]significant weight loss  [ ]poor nutritional intake  [ ]anasarca      [ ]Artificial Nutrition      REFERRALS:   [ ]Chaplaincy  [x ]Hospice  [ ]Child Life  [ ]Social Work  [ ]Case management [ ]Holistic Therapy    [facility]  Gabrielle Butler, Palliative Care Attending  Contact Info: Page [phone] (including Nights/Weekends), message on Microsoft Teams (Gabrielle Butler), or leave VM at Palliative Office [phone] (non-urgent)     HPI:  81F, ambulates with a cane at times, history of tongue ca  with surgical treatment, reoccurrence  treated with chemo, L tongue ca and  s/p biopsy 2021, to begin treatment at [facility] 2021.  Also history of dysphagia, able to tolerate puree diet, DM2, Glaucoma, Hypertension, Hyperlipidemia, depression, constipation,  R ear pain since 2021 felt with chewing, was seen by PCP with no source found. Given Tylenol po which relives the R ear pain, recently discharged from [facility] 21 after a 1 week stay for NICK with kidney function improved with fluids. The pt went for a follow up visit to her PCP  and had blood work done. The pt was called by the PCP  and advised to return to the Ed due to worsening kidney function.  The pt endorses decreased appetite with 5 pound weight loss in 1 week, cough with clear phlegm, nasal congestion, constipation, with last BM , pt attributes to decreased po intake.  Denies HA, dizziness, recent falls, blurry vision,, SOB, chest pain, palpitation, nausea, vomit, diarrhea, abdominal pain, dysuria, chill, diaphoresis generalized body aches.     Vitals: T Max: 98.2 oral, BP= 138/ 60, HR= 65b/mijn, RR= 18b/ min, SPO2= 100% ra  (2021 20:53)    INTERVAL/OVERNIGHT EVENTS:  > : Patient known to palliative service from previous hospitalization, called for goc and sx management. Patient seen this morning, no family at bedside. Pt confused and stated she "came to the hospital after a fall". I was able to re-orient her and remind her of how she ended up in hospital. She c/o pain with eating, which I educated patient to take pain medications prior to meals. Patient stated she wanted to go home. She defers to her sister, Malina, for further goc discussions regarding plan of care.     PERTINENT PM/SXH:   Tongue cancer  Anxiety  Depression  Hyperuricemia  Hypertension  Chronic kidney disease (CKD)  Glaucoma  Hypercholesterolemia  Osteopenia  Diabetes mellitus  Dysphagia  Tongue cancer  H/O local excision of skin lesion    FAMILY HISTORY:  Family history of Alzheimer&#x27;s disease  mother    FH: dementia    ITEMS NOT CHECKED ARE NOT PRESENT    SOCIAL HISTORY:   Significant other/partner[x ]  Children[x- 2 daughters Patricia and Keli ] Zoroastrianism/Spirituality: Moravian   Substance hx:  [ ]   Tobacco hx:  [ ]   Alcohol hx: [ ]   This report was requested by: Gabrielle Butler | Reference #: [id number]  Others' Prescriptions  Patient Name: Torrie Solomon Date: 1940  Address: [address] [place], [place] [address]Sex: Female  Rx Written	Rx Dispensed	Drug	Quantity	Days Supply	Prescriber Name  2021	hydromorphone 2 mg tablet	32	7	[facility]  10/15/2021	10/16/2021	lorazepam 0.5 mg tablet	60	30	Farhat Santos DO  10/13/2021	10/15/2021	oxycodone-acetaminophen 5-325 mg tablet	28	7	[facility]  10/05/2021	10/06/2021	oxycodone-acetaminophen 5-325 mg tablet	30	10	[facility]  2021	lorazepam 0.5 mg tablet	60	30	Farhat Santos DO  Living Situation: [ ]Home  [ ]Long term care  [ ]Rehab [ ]Other    ADVANCE DIRECTIVES:    MOLST  [x ]  Living Will  [ ]   DECISION MAKER(s):  [x ] Health Care Proxy(s)  [ ] Surrogate(s)  [ ] Guardian           Name(s): Murali Burdick and Malina  (sister)  Emergency contact/ Spouse; Murali Hurd    Emergency concact/ Sister: Malina [phone], home# [phone]      BASELINE (I)ADL(s) (prior to admission): ambulates independently but has memory impairments   [place]: [ ]Total  [x ] Moderate [ ]Dependent    Allergies    No Known Allergies    Intolerances    MEDICATIONS  (STANDING):  amLODIPine   Tablet 10 milliGRAM(s) Oral daily  atorvastatin 10 milliGRAM(s) Oral at bedtime  brimonidine 0.2% Ophthalmic Solution 1 Drop(s) Right EYE two times a day  carvedilol 25 milliGRAM(s) Oral every 12 hours  dextrose 40% Gel 15 Gram(s) Oral once  dextrose 5%. 1000 milliLiter(s) (50 mL/Hr) IV Continuous <Continuous>  dextrose 5%. 1000 milliLiter(s) (100 mL/Hr) IV Continuous <Continuous>  dextrose 50% Injectable 25 Gram(s) IV Push once  dextrose 50% Injectable 12.5 Gram(s) IV Push once  dextrose 50% Injectable 25 Gram(s) IV Push once  FIRST- Mouthwash  BLM 10 milliLiter(s) Swish and Spit three times a day  fluticasone propionate 50 MICROgram(s)/spray Nasal Spray 1 Spray(s) Both Nostrils two times a day  glucagon  Injectable 1 milliGRAM(s) IntraMuscular once  heparin   Injectable 5000 Unit(s) SubCutaneous every 12 hours  HYDROmorphone   Tablet 1 milliGRAM(s) Oral three times a day  influenza  Vaccine (HIGH DOSE) 0.7 milliLiter(s) IntraMuscular once  insulin lispro (ADMELOG) corrective regimen sliding scale   SubCutaneous three times a day before meals  insulin lispro (ADMELOG) corrective regimen sliding scale   SubCutaneous at bedtime  lactated ringers. 1000 milliLiter(s) (75 mL/Hr) IV Continuous <Continuous>  latanoprost 0.005% Ophthalmic Solution 1 Drop(s) Right EYE at bedtime  lidocaine 2% Viscous 10 milliLiter(s) Swish and Spit three times a day  mirtazapine 45 milliGRAM(s) Oral at bedtime  polyethylene glycol 3350 17 Gram(s) Oral daily  senna 2 Tablet(s) Oral at bedtime  senna Syrup 15 milliLiter(s) Oral at bedtime  sertraline 50 milliGRAM(s) Oral daily    MEDICATIONS  (PRN):  acetaminophen     Tablet .. 650 milliGRAM(s) Oral every 6 hours PRN Temp greater or equal to 38C (100.4F), Mild Pain (1 - 3), Moderate Pain (4 - 6)  aluminum hydroxide/magnesium hydroxide/simethicone Suspension 30 milliLiter(s) Oral every 4 hours PRN Dyspepsia  HYDROmorphone   Tablet 1 milliGRAM(s) Oral every 4 hours PRN sever pain  melatonin 3 milliGRAM(s) Oral at bedtime PRN Insomnia  ondansetron Injectable 4 milliGRAM(s) IV Push every 8 hours PRN Nausea and/or Vomiting    PRESENT SYMPTOMS: [ ]Unable to obtain due to poor mentation   Source if other than patient:  [ ]Family   [ ]Team     Pain: [x ]yes [ ]no  QOL impact -   Location -                    Aggravating factors -  Quality -  Radiation -  Timing-  Severity (0-10 scale):  Minimal acceptable level (0-10 scale):     PAIN AD Score:     [email or web address]/painad.pdf (press ctrl +  left click to view)    Dyspnea:                           [ ]Mild [ ]Moderate [ ]Severe  Anxiety:                             [ ]Mild [ ]Moderate [ ]Severe  Fatigue:                             [ ]Mild [ ]Moderate [ ]Severe  Nausea:                             [ ]Mild [ ]Moderate [ ]Severe  Loss of appetite:              [ ]Mild [ ]Moderate [ ]Severe  Constipation:                    [ ]Mild [ ]Moderate [ ]Severe    Other Symptoms:  [ ]All other review of systems negative     Palliative Performance Status Version 2:      60   %    [email or web address]://[email or web address]/news/[email or web address]_performance_scale_[email or web address].pdf  PHYSICAL EXAM:  Vital Signs Last 24 Hrs  T(C): 37.1 (2021 05:10), Max: 37.3 (2021 14:01)  T(F): 98.7 (2021 05:10), Max: 99.1 (2021 14:01)  HR: 63 (2021 05:10) (60 - 68)  BP: 154/60 (2021 05:10) (125/56 - 154/60)  BP(mean): --  RR: 18 (2021 05:10) (18 - 19)  SpO2: 100% (2021 05:10) (100% - 100%) I&O's Summary    2021 07:01  -  2021 07:00  --------------------------------------------------------  IN: 950 mL / OUT: 0 mL / NET: 950 mL      GENERAL:  [x ]Alert  [ x]Oriented x 2-3?  [ ]Lethargic  [ x]Cachexia  [ ]Unarousable  [x ]Verbal  [ ]Non-Verbal  Behavioral:   [ ] Anxiety  [ ] Delirium [ ] Agitation [ x] Other  HEENT:  right side of tongue scar noted   [ ]Normal   [ ]Dry mouth   [ ]ET Tube/Trach  [ ]Oral lesions  PULMONARY:   [x ]Clear [ ]Tachypnea  [ ]Audible excessive secretions   [ ]Rhonchi        [ ]Right [ ]Left [ ]Bilateral  [ ]Crackles        [ ]Right [ ]Left [ ]Bilateral  [ ]Wheezing     [ ]Right [ ]Left [ ]Bilateral   [ ]Diminished breath sounds [ ]right [ ]left [ ]bilateral  CARDIOVASCULAR:    [x ]Regular [ ]Irregular [ ]Tachy  [ ]Solo [ ]Murmur [ ]Other  GASTROINTESTINAL:  [x ]Soft  [ ]Distended   [ ]+BS  [x ]Non tender [ ]Tender  [ ]PEG [ ]OGT/ NGT  Last BM: unknown   GENITOURINARY:   [ ]Normal [x ] Incontinent   [ ]Oliguria/Anuria   [ ]Hicks  MUSCULOSKELETAL:   [ ]Normal   [x ]Weakness  [ ]Bed/Wheelchair bound [ ]Edema  NEUROLOGIC:   [ ]No focal deficits  [ x]Cognitive impairment  [ ]Dysphagia [ ]Dysarthria [ ]Paresis [ ]Other   SKIN: IAD   [ ]Normal    [ ]Rash  [ ]Pressure ulcer(s)       Present on admission [ ]y [ ]n    CRITICAL CARE:  [ ] Shock Present  [ ]Septic [ ]Cardiogenic [ ]Neurologic [ ]Hypovolemic  [ ]  Vasopressors [ ]  Inotropes   [ ]Respiratory failure present [ ]Mechanical ventilation [ ]Non-invasive ventilatory support [ ]High flow  [ ]Acute  [ ]Chronic [ ]Hypoxic  [ ]Hypercarbic [ ]Other  [ ]Other organ failure     LABS:                        9.3    10.74 )-----------( 295      ( 2021 08:48 )             30.0       142  |  107  |  20  ----------------------------<  104<H>  4.0   |  25  |  1.45<H>    Ca    9.1      2021 08:48  Phos  2.5       Mg     1.90         TPro  6.7  /  Alb  3.3  /  TBili  0.4  /  DBili  x   /  AST  14  /  ALT  18  /  AlkPhos  91        Urinalysis Basic - ( 2021 07:43 )    Color: Light Yellow / Appearance: Clear / S.010 / pH: x  Gluc: x / Ketone: Negative  / Bili: Negative / Urobili: <2 mg/dL   Blood: x / Protein: Trace / Nitrite: Negative   Leuk Esterase: Moderate / RBC: 1 /HPF / WBC 10-15 /HPF   Sq Epi: x / Non Sq Epi: Few / Bacteria: Few      RADIOLOGY & ADDITIONAL STUDIES: < from: US Kidney and Bladder (21 @ 09:46) >    IMPRESSION:    Normal renal ultrasound.    < end of copied text >      PROTEIN CALORIE MALNUTRITION PRESENT: [ ]mild [ ]moderate [ x]severe [ ]underweight [ ]morbid obesity  https://www.andeal.org/vault/2440/web/files/ONC/Table_Clinical%20Characteristics%20to%20Document%20Malnutrition-White%20JV%20et%20al%2020.pdf    Height (cm): 162.6 (21 @ 01:47), 162.5 (21 @ 16:00), 165.1 (10-29-21 @ 17:00)  Weight (kg): 39.2 (21 @ :47), 40.0885 (21 @ 16:00), 43.5 (10-30-21 @ 02:57)  BMI (kg/m2): 14.8 (21 @ 01:47), 15.2 (21 @ 16:00), 16 (10-30-21 @ 02:57)    [ ]PPSV2 < or = to 30% [ ]significant weight loss  [ ]poor nutritional intake  [ ]anasarca      [ ]Artificial Nutrition      REFERRALS:   [ ]Chaplaincy  [x ]Hospice  [ ]Child Life  [ ]Social Work  [ ]Case management [ ]Holistic Therapy    [facility] Geriatrics and Palliative Care  Gabrielle Butler, Palliative Care Attending  Contact Info: Page [phone] (including Nights/Weekends), message on Microsoft Teams (Gabrielle Butler), or leave VM at Palliative Office [phone] (non-urgent)     HPI:  81F, ambulates with a cane at times, history of tongue ca  with surgical treatment, reoccurrence  treated with chemo, L tongue ca and  s/p biopsy 2021, to begin treatment at [facility] 2021.  Also history of dysphagia, able to tolerate puree diet, DM2, Glaucoma, Hypertension, Hyperlipidemia, depression, constipation,  R ear pain since 2021 felt with chewing, was seen by PCP with no source found. Given Tylenol po which relives the R ear pain, recently discharged from [facility] 21 after a 1 week stay for NICK with kidney function improved with fluids. The pt went for a follow up visit to her PCP  and had blood work done. The pt was called by the PCP  and advised to return to the Ed due to worsening kidney function.  The pt endorses decreased appetite with 5 pound weight loss in 1 week, cough with clear phlegm, nasal congestion, constipation, with last BM , pt attributes to decreased po intake.  Denies HA, dizziness, recent falls, blurry vision,, SOB, chest pain, palpitation, nausea, vomit, diarrhea, abdominal pain, dysuria, chill, diaphoresis generalized body aches.     Vitals: T Max: 98.2 oral, BP= 138/ 60, HR= 65b/mijn, RR= 18b/ min, SPO2= 100% ra  (2021 20:53)    INTERVAL/OVERNIGHT EVENTS:  > : Patient known to palliative service from previous hospitalization, called for goc and sx management. Patient seen this morning, no family at bedside. Pt confused and stated she "came to the hospital after a fall". I was able to re-orient her and remind her of how she ended up in hospital. She c/o pain with eating, which I educated patient to take pain medications prior to meals. Patient stated she wanted to go home. She defers to her sister, Malina, for further goc discussions regarding plan of care. Please see goc note written by Palliative SW.     PERTINENT PM/SXH:   Tongue cancer  Anxiety  Depression  Hyperuricemia  Hypertension  Chronic kidney disease (CKD)  Glaucoma  Hypercholesterolemia  Osteopenia  Diabetes mellitus  Dysphagia  Tongue cancer  H/O local excision of skin lesion    FAMILY HISTORY:  Family history of Alzheimer&#x27;s disease  mother    FH: dementia    ITEMS NOT CHECKED ARE NOT PRESENT    SOCIAL HISTORY:   Significant other/partner[x ]  Children[x- 2 daughters Patricia and Keli ] Latter day/Spirituality: Jainism   Substance hx:  [ ]   Tobacco hx:  [ ]   Alcohol hx: [ ]   This report was requested by: Gabrielle Butler | Reference #: [id number]  Others' Prescriptions  Patient Name: Torrie Solomon Date: 1940  Address: [address] [place] [id number]: Female  Rx Written	Rx Dispensed	Drug	Quantity	Days Supply	Prescriber Name  2021	hydromorphone 2 mg tablet	32	7	[facility]  10/15/2021	10/16/2021	lorazepam 0.5 mg tablet	60	30	Farhat Santos DO  10/13/2021	10/15/2021	oxycodone-acetaminophen 5-325 mg tablet	28	7	[facility]  10/05/2021	10/06/2021	oxycodone-acetaminophen 5-325 mg tablet	30	10	[facility]  2021	lorazepam 0.5 mg tablet	60	30	Farhat Santos DO  Living Situation: [ ]Home  [ ]Long term care  [ ]Rehab [ ]Other    ADVANCE DIRECTIVES:    MOLST  [x ]  Living Will  [ ]   DECISION MAKER(s):  [x ] Health Care Proxy(s)  [ ] Surrogate(s)  [ ] Guardian           Name(s): Murali Fowler  (sister)  Emergency contact/ Spouse; Murali Hurd    Emergency concact/ Sister: Malina [phone], home# [phone]      BASELINE (I)ADL(s) (prior to admission): ambulates independently but has memory impairments   [place]: [ ]Total  [x ] Moderate [ ]Dependent    Allergies    No Known Allergies    Intolerances    MEDICATIONS  (STANDING):  amLODIPine   Tablet 10 milliGRAM(s) Oral daily  atorvastatin 10 milliGRAM(s) Oral at bedtime  brimonidine 0.2% Ophthalmic Solution 1 Drop(s) Right EYE two times a day  carvedilol 25 milliGRAM(s) Oral every 12 hours  dextrose 40% Gel 15 Gram(s) Oral once  dextrose 5%. 1000 milliLiter(s) (50 mL/Hr) IV Continuous <Continuous>  dextrose 5%. 1000 milliLiter(s) (100 mL/Hr) IV Continuous <Continuous>  dextrose 50% Injectable 25 Gram(s) IV Push once  dextrose 50% Injectable 12.5 Gram(s) IV Push once  dextrose 50% Injectable 25 Gram(s) IV Push once  FIRST- Mouthwash  BLM 10 milliLiter(s) Swish and Spit three times a day  fluticasone propionate 50 MICROgram(s)/spray Nasal Spray 1 Spray(s) Both Nostrils two times a day  glucagon  Injectable 1 milliGRAM(s) IntraMuscular once  heparin   Injectable 5000 Unit(s) SubCutaneous every 12 hours  HYDROmorphone   Tablet 1 milliGRAM(s) Oral three times a day  influenza  Vaccine (HIGH DOSE) 0.7 milliLiter(s) IntraMuscular once  insulin lispro (ADMELOG) corrective regimen sliding scale   SubCutaneous three times a day before meals  insulin lispro (ADMELOG) corrective regimen sliding scale   SubCutaneous at bedtime  lactated ringers. 1000 milliLiter(s) (75 mL/Hr) IV Continuous <Continuous>  latanoprost 0.005% Ophthalmic Solution 1 Drop(s) Right EYE at bedtime  lidocaine 2% Viscous 10 milliLiter(s) Swish and Spit three times a day  mirtazapine 45 milliGRAM(s) Oral at bedtime  polyethylene glycol 3350 17 Gram(s) Oral daily  senna 2 Tablet(s) Oral at bedtime  senna Syrup 15 milliLiter(s) Oral at bedtime  sertraline 50 milliGRAM(s) Oral daily    MEDICATIONS  (PRN):  acetaminophen     Tablet .. 650 milliGRAM(s) Oral every 6 hours PRN Temp greater or equal to 38C (100.4F), Mild Pain (1 - 3), Moderate Pain (4 - 6)  aluminum hydroxide/magnesium hydroxide/simethicone Suspension 30 milliLiter(s) Oral every 4 hours PRN Dyspepsia  HYDROmorphone   Tablet 1 milliGRAM(s) Oral every 4 hours PRN sever pain  melatonin 3 milliGRAM(s) Oral at bedtime PRN Insomnia  ondansetron Injectable 4 milliGRAM(s) IV Push every 8 hours PRN Nausea and/or Vomiting    PRESENT SYMPTOMS: [ ]Unable to obtain due to poor mentation   Source if other than patient:  [ ]Family   [ ]Team     Pain: [x ]yes [ ]no  QOL impact -   Location -                    Aggravating factors -  Quality -  Radiation -  Timing-  Severity (0-10 scale):  Minimal acceptable level (0-10 scale):     PAIN AD Score:     [email or web address] (press ctrl +  left click to view)    Dyspnea:                           [ ]Mild [ ]Moderate [ ]Severe  Anxiety:                             [ ]Mild [ ]Moderate [ ]Severe  Fatigue:                             [ ]Mild [ ]Moderate [ ]Severe  Nausea:                             [ ]Mild [ ]Moderate [ ]Severe  Loss of appetite:              [ ]Mild [ ]Moderate [ ]Severe  Constipation:                    [ ]Mild [ ]Moderate [ ]Severe    Other Symptoms:  [ ]All other review of systems negative     Palliative Performance Status Version 2:      60   %    http://npcrc.org/files/news/palliative_performance_scale_ppsv2.pdf  PHYSICAL EXAM:  Vital Signs Last 24 Hrs  T(C): 37.1 (2021 05:10), Max: 37.3 (2021 14:01)  T(F): 98.7 (2021 05:10), Max: 99.1 (2021 14:01)  HR: 63 (2021 05:10) (60 - 68)  BP: 154/60 (2021 05:10) (125/56 - 154/60)  BP(mean): --  RR: 18 (2021 05:10) (18 - 19)  SpO2: 100% (2021 05:10) (100% - 100%) I&O's Summary    2021 07:01  -  2021 07:00  --------------------------------------------------------  IN: 950 mL / OUT: 0 mL / NET: 950 mL      GENERAL:  [x ]Alert  [ x]Oriented x 2-3?  [ ]Lethargic  [ x]Cachexia  [ ]Unarousable  [x ]Verbal  [ ]Non-Verbal  Behavioral:   [ ] Anxiety  [ ] Delirium [ ] Agitation [ x] Other  HEENT:  right side of tongue scar noted   [ ]Normal   [ ]Dry mouth   [ ]ET Tube/Trach  [ ]Oral lesions  PULMONARY:   [x ]Clear [ ]Tachypnea  [ ]Audible excessive secretions   [ ]Rhonchi        [ ]Right [ ]Left [ ]Bilateral  [ ]Crackles        [ ]Right [ ]Left [ ]Bilateral  [ ]Wheezing     [ ]Right [ ]Left [ ]Bilateral   [ ]Diminished breath sounds [ ]right [ ]left [ ]bilateral  CARDIOVASCULAR:    [x ]Regular [ ]Irregular [ ]Tachy  [ ]Solo [ ]Murmur [ ]Other  GASTROINTESTINAL:  [x ]Soft  [ ]Distended   [ ]+BS  [x ]Non tender [ ]Tender  [ ]PEG [ ]OGT/ NGT  Last BM: unknown   GENITOURINARY:   [ ]Normal [x ] Incontinent   [ ]Oliguria/Anuria   [ ]Hicks  MUSCULOSKELETAL:   [ ]Normal   [x ]Weakness  [ ]Bed/Wheelchair bound [ ]Edema  NEUROLOGIC:   [ ]No focal deficits  [ x]Cognitive impairment  [ ]Dysphagia [ ]Dysarthria [ ]Paresis [ ]Other   SKIN: IAD   [ ]Normal    [ ]Rash  [ ]Pressure ulcer(s)       Present on admission [ ]y [ ]n    CRITICAL CARE:  [ ] Shock Present  [ ]Septic [ ]Cardiogenic [ ]Neurologic [ ]Hypovolemic  [ ]  Vasopressors [ ]  Inotropes   [ ]Respiratory failure present [ ]Mechanical ventilation [ ]Non-invasive ventilatory support [ ]High flow  [ ]Acute  [ ]Chronic [ ]Hypoxic  [ ]Hypercarbic [ ]Other  [ ]Other organ failure     LABS:                        9.3    10.74 )-----------( 295      ( 2021 08:48 )             30.0       142  |  107  |  20  ----------------------------<  104<H>  4.0   |  25  |  1.45<H>    Ca    9.1      2021 08:48  Phos  2.5       Mg     1.90         TPro  6.7  /  Alb  3.3  /  TBili  0.4  /  DBili  x   /  AST  14  /  ALT  18  /  AlkPhos  91        Urinalysis Basic - ( 2021 07:43 )    Color: Light Yellow / Appearance: Clear / S.010 / pH: x  Gluc: x / Ketone: Negative  / Bili: Negative / Urobili: <2 mg/dL   Blood: x / Protein: Trace / Nitrite: Negative   Leuk Esterase: Moderate / RBC: 1 /HPF / WBC 10-15 /HPF   Sq Epi: x / Non Sq Epi: Few / Bacteria: Few      RADIOLOGY & ADDITIONAL STUDIES: < from: US Kidney and Bladder (21 @ 09:46) >    IMPRESSION:    Normal renal ultrasound.    < end of copied text >      PROTEIN CALORIE MALNUTRITION PRESENT: [ ]mild [ ]moderate [ x]severe [ ]underweight [ ]morbid obesity  https://www.andeal.org/vault/2440/web/files/ONC/Table_Clinical%20Characteristics%20to%20Document%20Malnutrition-White%20JV%20et%20al%2020.pdf    Height (cm): 162.6 (21 @ 01:47), 162.5 (21 @ 16:00), 165.1 (10-29-21 @ 17:00)  Weight (kg): 39.2 (21 @ 01:47), 40.0885 (21 @ 16:00), 43.5 (10-30-21 @ 02:57)  BMI (kg/m2): 14.8 (21 @ :47), 15.2 (21 @ 16:00), 16 (10-30-21 @ 02:57)    [ ]PPSV2 < or = to 30% [ ]significant weight loss  [ ]poor nutritional intake  [ ]anasarca      [ ]Artificial Nutrition      REFERRALS:   [ ]Chaplaincy  [x ]Hospice  [ ]Child Life  [ ]Social Work  [ ]Case management [ ]Holistic Therapy    [facility] Geriatrics and Palliative Care  Gabrielle Butler, Palliative Care Attending  Contact Info: Page [phone] (including Nights/Weekends), message on Microsoft Teams (Gabrielle Butler), or leave VM at Palliative Office [phone] (non-urgent)     HPI:  81F, ambulates with a cane at times, history of tongue ca  with surgical treatment, reoccurrence  treated with chemo, L tongue ca and  s/p biopsy 2021, to begin treatment at [facility] 2021.  Also history of dysphagia, able to tolerate puree diet, DM2, Glaucoma, Hypertension, Hyperlipidemia, depression, constipation,  R ear pain since 2021 felt with chewing, was seen by PCP with no source found. Given Tylenol po which relives the R ear pain, recently discharged from [facility] 21 after a 1 week stay for [facility]ICK with kidney function improved with fluids. The pt went for a follow up visit to her PCP  and had blood work done. The pt was called by the PCP  and advised to return to the Ed due to worsening kidney function.  The pt endorses decreased appetite with 5 pound weight loss in 1 week, cough with clear phlegm, nasal congestion, constipation, with last BM , pt attributes to decreased po intake.  Denies HA, dizziness, recent falls, blurry vision,, SOB, chest pain, palpitation, nausea, vomit, diarrhea, abdominal pain, dysuria, chill, diaphoresis generalized body aches.     Vitals: T Max: 98.2 oral, BP= 138/ 60, HR= 65b/mijn, RR= 18b/ min, SPO2= 100% ra  (2021 20:53)    INTERVAL/OVERNIGHT EVENTS:  > : Patient known to palliative service from previous hospitalization, called for goc and sx management. Patient seen this morning, no family at bedside. Pt confused and stated she "came to the hospital after a fall". I was able to re-orient her and remind her of how she ended up in hospital. She c/o pain with eating, which I educated patient to take pain medications prior to meals. She states she does not like hospital food but she was able to tolerate tea, water and some breakfast this morning. Patient stated she wanted to go home. She defers to her sister, Malina, for further [facility] discussions regarding plan of care. Please see [facility] note written by Palliative SW.     PERTINENT PM/SXH:   Tongue cancer  Anxiety  Depression  Hyperuricemia  Hypertension  Chronic kidney disease (CKD)  Glaucoma  Hypercholesterolemia  Osteopenia  Diabetes mellitus  Dysphagia  Tongue cancer  H/O local excision of skin lesion    FAMILY HISTORY:  Family history of Alzheimer&#x27;s disease  mother    FH: dementia    ITEMS NOT CHECKED ARE NOT PRESENT    SOCIAL HISTORY:   Significant other/partner[x ]  Children[x- 2 daughters Patricia and Keli ] Roman Catholic/Spirituality: Amish   Substance hx:  [ ]   Tobacco hx:  [ ]   Alcohol hx: [ ]   This report was requested by: Gabrielle Butler | Reference #: [id number]  Others' Prescriptions  Patient Name: Torrie Solomon Date: 1940  Address: [address] [place] [id number]: Female  Rx Written	Rx Dispensed	Drug	Quantity	Days Supply	Prescriber Name  2021	hydromorphone 2 mg tablet	32	7	[facility]  10/15/2021	10/16/2021	lorazepam 0.5 mg tablet	60	30	Farhat Santos DO  10/13/2021	10/15/2021	oxycodone-acetaminophen 5-325 mg tablet	28	7	[facility]  10/05/2021	10/06/2021	oxycodone-acetaminophen 5-325 mg tablet	30	10	[facility]  2021	lorazepam 0.5 mg tablet	60	30	Farhat Santos DO  Living Situation: [ ]Home  [ ]Long term care  [ ]Rehab [ ]Other    ADVANCE DIRECTIVES:    MOLST  [x ]  Living Will  [ ]   DECISION MAKER(s):  [x ] Health Care Proxy(s)  [ ] Surrogate(s)  [ ] Guardian           Name(s): Murali Burdick and Malina  (sister)  Emergency contact/ Spouse; Murali Hurd    Emergency concact/ Sister: Malina [phone], home# [phone]      BASELINE (I)ADL(s) (prior to admission): ambulates independently but has memory impairments   [place]: [ ]Total  [x ] Moderate [ ]Dependent    Allergies    No Known Allergies    Intolerances    MEDICATIONS  (STANDING):  amLODIPine   Tablet 10 milliGRAM(s) Oral daily  atorvastatin 10 milliGRAM(s) Oral at bedtime  brimonidine 0.2% Ophthalmic Solution 1 Drop(s) Right EYE two times a day  carvedilol 25 milliGRAM(s) Oral every 12 hours  dextrose 40% Gel 15 Gram(s) Oral once  dextrose 5%. 1000 milliLiter(s) (50 mL/Hr) IV Continuous <Continuous>  dextrose 5%. 1000 milliLiter(s) (100 mL/Hr) IV Continuous <Continuous>  dextrose 50% Injectable 25 Gram(s) IV Push once  dextrose 50% Injectable 12.5 Gram(s) IV Push once  dextrose 50% Injectable 25 Gram(s) IV Push once  FIRST- Mouthwash  BLM 10 milliLiter(s) Swish and Spit three times a day  fluticasone propionate 50 MICROgram(s)/spray Nasal Spray 1 Spray(s) Both Nostrils two times a day  glucagon  Injectable 1 milliGRAM(s) IntraMuscular once  heparin   Injectable 5000 Unit(s) SubCutaneous every 12 hours  HYDROmorphone   Tablet 1 milliGRAM(s) Oral three times a day  influenza  Vaccine (HIGH DOSE) 0.7 milliLiter(s) IntraMuscular once  insulin lispro (ADMELOG) corrective regimen sliding scale   SubCutaneous three times a day before meals  insulin lispro (ADMELOG) corrective regimen sliding scale   SubCutaneous at bedtime  lactated ringers. 1000 milliLiter(s) (75 mL/Hr) IV Continuous <Continuous>  latanoprost 0.005% Ophthalmic Solution 1 Drop(s) Right EYE at bedtime  lidocaine 2% Viscous 10 milliLiter(s) Swish and Spit three times a day  mirtazapine 45 milliGRAM(s) Oral at bedtime  polyethylene glycol 3350 17 Gram(s) Oral daily  senna 2 Tablet(s) Oral at bedtime  senna Syrup 15 milliLiter(s) Oral at bedtime  sertraline 50 milliGRAM(s) Oral daily    MEDICATIONS  (PRN):  acetaminophen     Tablet .. 650 milliGRAM(s) Oral every 6 hours PRN Temp greater or equal to 38C (100.4F), Mild Pain (1 - 3), Moderate Pain (4 - 6)  aluminum hydroxide/magnesium hydroxide/simethicone Suspension 30 milliLiter(s) Oral every 4 hours PRN Dyspepsia  HYDROmorphone   Tablet 1 milliGRAM(s) Oral every 4 hours PRN sever pain  melatonin 3 milliGRAM(s) Oral at bedtime PRN Insomnia  ondansetron Injectable 4 milliGRAM(s) IV Push every 8 hours PRN Nausea and/or Vomiting    PRESENT SYMPTOMS: [ ]Unable to obtain due to poor mentation   Source if other than patient:  [ ]Family   [ ]Team     Pain: [x ]yes [ ]no  QOL impact -   Location -                    Aggravating factors -  Quality -  Radiation -  Timing-  Severity (0-10 scale):  Minimal acceptable level (0-10 scale):     PAIN AD Score:     [email or web address] (press ctrl +  left click to view)    Dyspnea:                           [ ]Mild [ ]Moderate [ ]Severe  Anxiety:                             [ ]Mild [ ]Moderate [ ]Severe  Fatigue:                             [ ]Mild [ ]Moderate [ ]Severe  Nausea:                             [ ]Mild [ ]Moderate [ ]Severe  Loss of appetite:              [ ]Mild [ ]Moderate [ ]Severe  Constipation:                    [ ]Mild [ ]Moderate [ ]Severe    Other Symptoms:  [ ]All other review of systems negative     Palliative Performance Status Version 2:      60   %    http://npcrc.org/files/news/palliative_performance_scale_ppsv2.pdf  PHYSICAL EXAM:  Vital Signs Last 24 Hrs  T(C): 37.1 (2021 05:10), Max: 37.3 (2021 14:01)  T(F): 98.7 (2021 05:10), Max: 99.1 (2021 14:01)  HR: 63 (2021 05:10) (60 - 68)  BP: 154/60 (2021 05:10) (125/56 - 154/60)  BP(mean): --  RR: 18 (2021 05:10) (18 - 19)  SpO2: 100% (2021 05:10) (100% - 100%) I&O's Summary    2021 07:01  -  2021 07:00  --------------------------------------------------------  IN: 950 mL / OUT: 0 mL / NET: 950 mL      GENERAL:  [x ]Alert  [ x]Oriented x 2-3?  [ ]Lethargic  [ x]Cachexia  [ ]Unarousable  [x ]Verbal  [ ]Non-Verbal  Behavioral:   [ ] Anxiety  [ ] Delirium [ ] Agitation [ x] Other  HEENT:  right side of tongue scar noted   [ ]Normal   [ ]Dry mouth   [ ]ET Tube/Trach  [ ]Oral lesions  PULMONARY:   [x ]Clear [ ]Tachypnea  [ ]Audible excessive secretions   [ ]Rhonchi        [ ]Right [ ]Left [ ]Bilateral  [ ]Crackles        [ ]Right [ ]Left [ ]Bilateral  [ ]Wheezing     [ ]Right [ ]Left [ ]Bilateral   [ ]Diminished breath sounds [ ]right [ ]left [ ]bilateral  CARDIOVASCULAR:    [x ]Regular [ ]Irregular [ ]Tachy  [ ]Solo [ ]Murmur [ ]Other  GASTROINTESTINAL:  [x ]Soft  [ ]Distended   [ ]+BS  [x ]Non tender [ ]Tender  [ ]PEG [ ]OGT/ NGT  Last BM: unknown   GENITOURINARY:   [ ]Normal [x ] Incontinent   [ ]Oliguria/Anuria   [ ]Hicks  MUSCULOSKELETAL:   [ ]Normal   [x ]Weakness  [ ]Bed/Wheelchair bound [ ]Edema  NEUROLOGIC:   [ ]No focal deficits  [ x]Cognitive impairment  [ ]Dysphagia [ ]Dysarthria [ ]Paresis [ ]Other   SKIN: IAD   [ ]Normal    [ ]Rash  [ ]Pressure ulcer(s)       Present on admission [ ]y [ ]n    CRITICAL CARE:  [ ] Shock Present  [ ]Septic [ ]Cardiogenic [ ]Neurologic [ ]Hypovolemic  [ ]  Vasopressors [ ]  Inotropes   [ ]Respiratory failure present [ ]Mechanical ventilation [ ]Non-invasive ventilatory support [ ]High flow  [ ]Acute  [ ]Chronic [ ]Hypoxic  [ ]Hypercarbic [ ]Other  [ ]Other organ failure     LABS:                        9.3    10.74 )-----------( 295      ( 2021 08:48 )             30.0       142  |  107  |  20  ----------------------------<  104<H>  4.0   |  25  |  1.45<H>    Ca    9.1      2021 08:48  Phos  2.5       Mg     1.90         TPro  6.7  /  Alb  3.3  /  TBili  0.4  /  DBili  x   /  AST  14  /  ALT  18  /  AlkPhos  91        Urinalysis Basic - ( 2021 07:43 )    Color: Light Yellow / Appearance: Clear / S.010 / pH: x  Gluc: x / Ketone: Negative  / Bili: Negative / Urobili: <2 mg/dL   Blood: x / Protein: Trace / Nitrite: Negative   Leuk Esterase: Moderate / RBC: 1 /HPF / WBC 10-15 /HPF   Sq Epi: x / Non Sq Epi: Few / Bacteria: Few      RADIOLOGY & ADDITIONAL STUDIES: < from: US Kidney and Bladder (21 @ 09:46) >    IMPRESSION:    Normal renal ultrasound.    < end of copied text >      PROTEIN CALORIE MALNUTRITION PRESENT: [ ]mild [ ]moderate [ x]severe [ ]underweight [ ]morbid obesity  https://www.andeal.org/vault/2440/web/files/ONC/Table_Clinical%20Characteristics%20to%20Document%20Malnutrition-White%20JV%20et%20al%191182.pdf    Height (cm): 162.6 (21 @ 01:47), 162.5 (21 @ 16:00), 165.1 (10-29-21 @ 17:00)  Weight (kg): 39.2 (21 @ 01:47), 40.0885 (21 @ 16:00), 43.5 (10-30-21 @ 02:57)  BMI (kg/m2): 14.8 (21 @ 01:47), 15.2 (21 @ 16:00), 16 (10-30-21 @ 02:57)    [ ]PPSV2 < or = to 30% [ ]significant weight loss  [ ]poor nutritional intake  [ ]anasarca      [ ]Artificial Nutrition      REFERRALS:   [ ]Chaplaincy  [x ]Hospice  [ ]Child Life  [ ]Social Work  [ ]Case management [ ]Holistic Therapy

## 2021-11-23 NOTE — PROGRESS NOTE ADULT - PROBLEM SELECTOR PLAN 10
- No MOLST form see in alpha section on [place].   - Family would like to discuss code status amongst themselves again (see [facility] chart note 11/20/21)  - The pt will be "full code"  for now. - Pt now DNR/DNI 11/23  - Pt pending home hospice with 5 fractions radiation

## 2021-11-23 NOTE — PROGRESS NOTE ADULT - PROBLEM SELECTOR PLAN 2
1990's with surgical treatment, reoccurrence 2020 treated with chemo, L tongue ca and 2021 s/p biopsy 9/2021, to begin treatment at [facility] 11/29/2021.  - oncology recs appreciated, no inpatient intervention  - pt may benefit from radiation therapy as planned after discharge  - pt aware of hospice options 1990's with surgical treatment, reoccurrence 2020 treated with chemo, L tongue ca and 2021 s/p biopsy 9/2021, to begin treatment at [facility] 11/29/2021.  - oncology recs appreciated, no inpatient intervention  - pt was scheduled for 5 fractions of TR outpatient  - pt pending home hospice approval with 5 fractions of RT  - starting 1mg PO dilaudid pre-meal TID  - c/w magic mouthwash TID

## 2021-11-23 NOTE — PROGRESS NOTE ADULT - SUBJECTIVE AND OBJECTIVE BOX
Jennifer Fish MD  Internal Medicine, PGY1  Universal pager: [phone] / [facility]: [phone]      Patient is a 81y old  Female who presents with a chief complaint of Worsening renal function (19 Nov 2021 20:53)    OVERNIGHT EVENTS: Overnight, pt became hypoglycemic (asymtomatic). Denies F/C, lightheadedness, HA, CP, SOB, abd pain, N/V/D/C, burning w/ urination, leg swelling. This AM, pt wanted to go home.   focused ROS as above    MEDICATIONS  (STANDING):  amLODIPine   Tablet 10 milliGRAM(s) Oral daily  atorvastatin 10 milliGRAM(s) Oral at bedtime  brimonidine 0.2% Ophthalmic Solution 1 Drop(s) Right EYE two times a day  carvedilol 25 milliGRAM(s) Oral every 12 hours  dextrose 40% Gel 15 Gram(s) Oral once  dextrose 5%. 1000 milliLiter(s) (50 mL/Hr) IV Continuous <Continuous>  dextrose 5%. 1000 milliLiter(s) (100 mL/Hr) IV Continuous <Continuous>  dextrose 50% Injectable 25 Gram(s) IV Push once  dextrose 50% Injectable 12.5 Gram(s) IV Push once  dextrose 50% Injectable 25 Gram(s) IV Push once  FIRST- Mouthwash  BLM 10 milliLiter(s) Swish and Spit three times a day  fluticasone propionate 50 MICROgram(s)/spray Nasal Spray 1 Spray(s) Both Nostrils two times a day  glucagon  Injectable 1 milliGRAM(s) IntraMuscular once  heparin   Injectable 5000 Unit(s) SubCutaneous every 12 hours  influenza  Vaccine (HIGH DOSE) 0.7 milliLiter(s) IntraMuscular once  insulin glargine Injectable (LANTUS) 7 Unit(s) SubCutaneous every morning  insulin lispro (ADMELOG) corrective regimen sliding scale   SubCutaneous three times a day before meals  insulin lispro (ADMELOG) corrective regimen sliding scale   SubCutaneous at bedtime  lactated ringers. 1000 milliLiter(s) (75 mL/Hr) IV Continuous <Continuous>  latanoprost 0.005% Ophthalmic Solution 1 Drop(s) Right EYE at bedtime  lidocaine 2% Viscous 10 milliLiter(s) Swish and Spit three times a day  mirtazapine 45 milliGRAM(s) Oral at bedtime  senna Syrup 15 milliLiter(s) Oral at bedtime  sertraline 50 milliGRAM(s) Oral daily    MEDICATIONS  (PRN):  acetaminophen     Tablet .. 650 milliGRAM(s) Oral every 6 hours PRN Temp greater or equal to 38C (100.4F), Mild Pain (1 - 3), Moderate Pain (4 - 6)  aluminum hydroxide/magnesium hydroxide/simethicone Suspension 30 milliLiter(s) Oral every 4 hours PRN Dyspepsia  HYDROmorphone   Tablet 1 milliGRAM(s) Oral every 4 hours PRN sever pain  melatonin 3 milliGRAM(s) Oral at bedtime PRN Insomnia  ondansetron Injectable 4 milliGRAM(s) IV Push every 8 hours PRN Nausea and/or Vomiting      Vital Signs Last 24 Hrs  T(C): 37.4 (22 Nov 2021 06:30), Max: 37.4 (22 Nov 2021 06:30)  T(F): 99.3 (22 Nov 2021 06:30), Max: 99.3 (22 Nov 2021 06:30)  HR: 62 (22 Nov 2021 06:30) (62 - 70)  BP: 176/57 (22 Nov 2021 06:30) (146/78 - 176/57)  BP(mean): --  RR: 18 (22 Nov 2021 06:30) (17 - 18)  SpO2: 100% (22 Nov 2021 06:30) (100% - 100%)    PHYSICAL EXAM  GENERAL: NAD   HEAD:  Atraumatic, normocephalic  LABS:               LABS:                        9.4    9.84  )-----------( 348      ( 21 Nov 2021 08:06 )             28.7     11-21    140  |  105  |  37<H>  ----------------------------<  81  4.1   |  24  |  1.66<H>    Ca    9.9      21 Nov 2021 08:06  Phos  2.6     11-21  Mg     2.00     11-21    TPro  7.0  /  Alb  3.6  /  TBili  0.4  /  DBili  x   /  AST  26  /  ALT  23  /  AlkPhos  94  11-21              Culture - Blood (collected 19 Nov 2021 21:19)  Source: .Blood Blood-Peripheral  Preliminary Report (20 Nov 2021 22:02):    No growth to date.    Culture - Blood (collected 19 Nov 2021 21:16)  Source: .Blood Blood-Peripheral  Preliminary Report (20 Nov 2021 22:02):    No growth to date.    EYES: EOMI, sclera clear  CHEST/LUNG: Clear to auscultation bilaterally; no wheeze  HEART: RRR; S1, S2; no M/R/G  ABDOMEN: soft, non-distended; non-tender; BS present  EXTREMITIES:  2+ Peripheral Pulses; no edema  NEURO: non-focal, AAOx3  PSYCH: appropriate affect  SKIN: No rashes or lesions       Jennifer Fish MD  Internal Medicine, PGY1  Universal pager: [phone] / [facility]: [phone]      Patient is a 81y old  Female who presents with a chief complaint of Worsening renal function (19 Nov 2021 20:53)    OVERNIGHT EVENTS: Overnight, pt became hypoglycemic (asymtomatic). Denies F/C, lightheadedness, HA, CP, SOB, abd pain, N/V, burning w/ urination, leg swelling. Pt has not had BM since admission but and is not taking much PO. This AM, pt wanted to go home.   focused ROS as above    MEDICATIONS  (STANDING):  amLODIPine   Tablet 10 milliGRAM(s) Oral daily  atorvastatin 10 milliGRAM(s) Oral at bedtime  brimonidine 0.2% Ophthalmic Solution 1 Drop(s) Right EYE two times a day  carvedilol 25 milliGRAM(s) Oral every 12 hours  dextrose 40% Gel 15 Gram(s) Oral once  dextrose 5%. 1000 milliLiter(s) (50 mL/Hr) IV Continuous <Continuous>  dextrose 5%. 1000 milliLiter(s) (100 mL/Hr) IV Continuous <Continuous>  dextrose 50% Injectable 25 Gram(s) IV Push once  dextrose 50% Injectable 12.5 Gram(s) IV Push once  dextrose 50% Injectable 25 Gram(s) IV Push once  FIRST- Mouthwash  BLM 10 milliLiter(s) Swish and Spit three times a day  fluticasone propionate 50 MICROgram(s)/spray Nasal Spray 1 Spray(s) Both Nostrils two times a day  glucagon  Injectable 1 milliGRAM(s) IntraMuscular once  heparin   Injectable 5000 Unit(s) SubCutaneous every 12 hours  influenza  Vaccine (HIGH DOSE) 0.7 milliLiter(s) IntraMuscular once  insulin glargine Injectable (LANTUS) 7 Unit(s) SubCutaneous every morning  insulin lispro (ADMELOG) corrective regimen sliding scale   SubCutaneous three times a day before meals  insulin lispro (ADMELOG) corrective regimen sliding scale   SubCutaneous at bedtime  lactated ringers. 1000 milliLiter(s) (75 mL/Hr) IV Continuous <Continuous>  latanoprost 0.005% Ophthalmic Solution 1 Drop(s) Right EYE at bedtime  lidocaine 2% Viscous 10 milliLiter(s) Swish and Spit three times a day  mirtazapine 45 milliGRAM(s) Oral at bedtime  senna Syrup 15 milliLiter(s) Oral at bedtime  sertraline 50 milliGRAM(s) Oral daily    MEDICATIONS  (PRN):  acetaminophen     Tablet .. 650 milliGRAM(s) Oral every 6 hours PRN Temp greater or equal to 38C (100.4F), Mild Pain (1 - 3), Moderate Pain (4 - 6)  aluminum hydroxide/magnesium hydroxide/simethicone Suspension 30 milliLiter(s) Oral every 4 hours PRN Dyspepsia  HYDROmorphone   Tablet 1 milliGRAM(s) Oral every 4 hours PRN sever pain  melatonin 3 milliGRAM(s) Oral at bedtime PRN Insomnia  ondansetron Injectable 4 milliGRAM(s) IV Push every 8 hours PRN Nausea and/or Vomiting      Vital Signs Last 24 Hrs  T(C): 37.4 (22 Nov 2021 06:30), Max: 37.4 (22 Nov 2021 06:30)  T(F): 99.3 (22 Nov 2021 06:30), Max: 99.3 (22 Nov 2021 06:30)  HR: 62 (22 Nov 2021 06:30) (62 - 70)  BP: 176/57 (22 Nov 2021 06:30) (146/78 - 176/57)  BP(mean): --  RR: 18 (22 Nov 2021 06:30) (17 - 18)  SpO2: 100% (22 Nov 2021 06:30) (100% - 100%)    PHYSICAL EXAM  GENERAL: NAD   HEAD:  Atraumatic, normocephalic  LABS:               LABS:                        9.4    9.84  )-----------( 348      ( 21 Nov 2021 08:06 )             28.7     11-21    140  |  105  |  37<H>  ----------------------------<  81  4.1   |  24  |  1.66<H>    Ca    9.9      21 Nov 2021 08:06  Phos  2.6     11-21  Mg     2.00     11-21    TPro  7.0  /  Alb  3.6  /  TBili  0.4  /  DBili  x   /  AST  26  /  ALT  23  /  AlkPhos  94  11-21              Culture - Blood (collected 19 Nov 2021 21:19)  Source: .Blood Blood-Peripheral  Preliminary Report (20 Nov 2021 22:02):    No growth to date.    Culture - Blood (collected 19 Nov 2021 21:16)  Source: .Blood Blood-Peripheral  Preliminary Report (20 Nov 2021 22:02):    No growth to date.    EYES: EOMI, sclera clear  CHEST/LUNG: Clear to auscultation bilaterally; no wheeze  HEART: RRR; S1, S2; no M/R/G  ABDOMEN: soft, non-distended; non-tender; BS present  EXTREMITIES:  2+ Peripheral Pulses; no edema  NEURO: non-focal, AAOx3  PSYCH: appropriate affect  SKIN: No rashes or lesions

## 2021-11-23 NOTE — DISCHARGE NOTE PROVIDER - DETAILS OF MALNUTRITION DIAGNOSIS/DIAGNOSES
This patient has been assessed with a concern for Malnutrition and was treated during this hospitalization for the following Nutrition diagnosis/diagnoses:     -  11/21/2021: Severe protein-calorie malnutrition   -  11/21/2021: Underweight (BMI < 19)

## 2021-11-23 NOTE — PROGRESS NOTE ADULT - PROBLEM SELECTOR PLAN 11
VTE with Heparin 5000U sub cut BID.  fall, Aspiration, safety precautions.  PT eval. VTE with Heparin 5000U sub cut BID.  fall, Aspiration, safety precautions.  Dispo: home hospice

## 2021-11-23 NOTE — GOALS OF CARE CONVERSATION - ADVANCED CARE PLANNING - CONVERSATION DETAILS
Patient known to palliative care team from prior admission. Reconsulted for GOC.   Spoke to pt's sister who reconfirmed pt's advanced directives. Pt is a DNR/DNI   Reconfirmed that pt does not want artificial nutrition.  Sister aware that hospice is recommended and is in agreement to home hospice. Choice provide and family request referral to hospice care network.   Hospice care network is aware.

## 2021-11-23 NOTE — CONSULT NOTE ADULT - PROBLEM SELECTOR RECOMMENDATION 5
Thank you for allowing us to participate in your patient's care. We will continue to follow with you. Please page [phone] for any q's or c's.     Gabrielle Butler D.O.   Palliative Medicine

## 2021-11-23 NOTE — DISCHARGE NOTE PROVIDER - NSDCMRMEDTOKEN_GEN_ALL_CORE_FT
Alphagan P 0.1% ophthalmic solution: 1 drop(s) in the right eye 2 times a day  amLODIPine 10 mg oral tablet: 1 tab(s) orally once a day  carvedilol 25 mg oral tablet: 1 tab(s) orally every 12 hours  diphenhydramine/lidocaine/aluminum hydroxide/magnesium hydroxide/simethicone mucous membrane suspension: 10 milliliter(s) mucous membrane 3 times a day   Glyxambi 10 mg-5 mg oral tablet: 1 tab(s) orally once a day (in the morning)  HYDROmorphone 2 mg oral tablet: 0.5 tab(s) orally 3 times a day (before meals), And As Needed every 4 hours for severe pain. MDD:MDD: 9mg  lidocaine 2% mucous membrane solution: 10 milliliter(s) mucous membrane 3 times a day   mirtazapine 45 mg oral tablet: 1 tab(s) orally once a day (at bedtime)  pravastatin 20 mg oral tablet: 1 tab(s) orally once a day--pm  Prolia 60 mg/mL subcutaneous solution: subcutaneous every 6 months  Rocklatan 0.02%-0.005% ophthalmic solution: 1 drop(s) in the right eye once a day (at bedtime)  senna 8.8 mg/5 mL oral syrup: 15 milliliter(s) orally once a day (at bedtime)  Zoloft 50 mg oral tablet: 1 tab(s) orally once a day-pm   Alphagan P 0.1% ophthalmic solution: 1 drop(s) in the right eye 2 times a day  amLODIPine 10 mg oral tablet: 1 tab(s) orally once a day  carvedilol 25 mg oral tablet: 1 tab(s) orally every 12 hours  Dilaudid 1 mg/mL oral liquid: 1 milliliter(s) orally 3 times a day MDD:MDD: 3mg  diphenhydramine/lidocaine/aluminum hydroxide/magnesium hydroxide/simethicone mucous membrane suspension: 10 milliliter(s) mucous membrane 3 times a day   Glyxambi 10 mg-5 mg oral tablet: 1 tab(s) orally once a day (in the morning)  lidocaine 2% mucous membrane solution: 10 milliliter(s) mucous membrane 3 times a day   mirtazapine 45 mg oral tablet: 1 tab(s) orally once a day (at bedtime)  polyethylene glycol 3350 oral powder for reconstitution: 17 gram(s) orally every 12 hours  pravastatin 20 mg oral tablet: 1 tab(s) orally once a day--pm  Prolia 60 mg/mL subcutaneous solution: subcutaneous every 6 months  Rocklatan 0.02%-0.005% ophthalmic solution: 1 drop(s) in the right eye once a day (at bedtime)  senna 8.8 mg/5 mL oral syrup: 15 milliliter(s) orally once a day (at bedtime)  Zoloft 50 mg oral tablet: 1 tab(s) orally once a day-pm

## 2021-11-23 NOTE — PROGRESS NOTE ADULT - PROBLEM SELECTOR PLAN 7
Continue eye drop.  [facility] formulary. Pharmacy changed to use Latanoprost.  Continue Alphagan.

## 2021-11-23 NOTE — PROGRESS NOTE ADULT - PROBLEM SELECTOR PLAN 1
11/4/21 Baseline creatinine 1.32, on admission 2.17. Now improved to 1.66.    No complaints of dysuria.   BUN/ Creatinine = 53/ 2.17. Ratio indicative of dehydration, likely due to decreased po intake from loss of appetite.  Will give IVF with LR @ 75ml/ h x 1 L  BNP 1446, unlikely cardiogenic in origin  CXR preliminary clear.  F/U Urine lytes and urine Creatinine.  Renal Duplex WNL  Avoid nephrotoxic agents and monitor lytes. 11/4/21 Baseline creatinine 1.32, on admission 2.17. Now improved to 1.45  No complaints of dysuria.   BUN/ Creatinine = 53/ 2.17. Ratio indicative of dehydration, likely due to decreased po intake from loss of appetite.  Will give IVF with LR @ 75ml/ h   BNP 1446, unlikely cardiogenic in origin  CXR preliminary clear.  Urine lytes WNL  Renal Duplex WNL  Avoid nephrotoxic agents and monitor lytes.

## 2021-11-23 NOTE — CONSULT NOTE ADULT - PROBLEM SELECTOR RECOMMENDATION 3
Onc: Dr. Grover   rad/onc: Dr. Duenas- per outpatient records, patient planned for 5 treatments, 2x per week.

## 2021-11-23 NOTE — DISCHARGE NOTE PROVIDER - HOSPITAL COURSE
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  Also history of dysphagia, able to tolerate puree diet, DM2, Glaucoma, Hypertension, Hyperlipidemia, depression, constipation,  R ear pain since 7/2021 felt with chewing, was seen by PCP with no source found. Given Tylenol po which relives the R ear pain. She was recently discharged from [facility] 11/6/21 after a 1 week stay for NICK with kidney function improved with fluids. The pt went for a follow up visit to her PCP 11/11 and had blood work done. The pt was called by the PCP 11/19 and advised to return to the Ed due to worsening kidney function.  The pt endorses decreased appetite with 5 pound weight loss in 1 week, cough with clear phlegm, nasal congestion, constipation, with last BM 11/14, pt attributes to decreased po intake.     Hospital course:  Pt was found to have an NICK on CKD, likely prerenal. Kidney ultrasound w/o any abnormalities. The NICK was fluid responsive and creatinine returned to baseline. Pt was seen by palliative care her DNR/DNI order was reinstated. To help with PO intake, pt was given magic mouthwash and dilaudid 1mg PO before meals. Pt was hypoglycemic to 40-60s, so her insulin was d/c. After a GOC discussion, pt was discharged with home hospice and will obtain 5 fractions of palliative radiation after discharge.     Upon discharge, pt's condition improved and she was sent home with hospice with proper follow up. 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  Also history of dysphagia, able to tolerate puree diet, DM2, Glaucoma, Hypertension, Hyperlipidemia, depression, constipation,  R ear pain since 7/2021 felt with chewing, was seen by PCP with no source found. Given Tylenol po which relives the R ear pain. She was recently discharged from [facility] 11/6/21 after a 1 week stay for NICK with kidney function improved with fluids. The pt went for a follow up visit to her PCP 11/11 and had blood work done. The pt was called by the PCP 11/19 and advised to return to the Ed due to worsening kidney function.  The pt endorses decreased appetite with 5 pound weight loss in 1 week, cough with clear phlegm, nasal congestion, constipation, with last BM 11/14, pt attributes to decreased po intake.     Hospital course:  Pt was found to have an NICK on CKD, likely prerenal. Kidney ultrasound w/o any abnormalities. The NICK was fluid responsive and creatinine returned to baseline. Pt was seen by palliative care her DNR/DNI order was reinstated. To help with PO intake, pt was given magic mouthwash and dilaudid 1mg PO before meals. Pt was hypoglycemic to 40-60s, so her insulin was d/c. After a GOC discussion, pt was discharged with home hospice and will obtain 5 fractions of palliative radiation after discharge.     Upon discharge, pt's condition improved and she was sent home with hospice with proper follow up.     Pt also has a sacral/gluteal fold with incontinence associated dermatitis presenting with blanching erythema extending to bilateral buttocks. Fissure noted within sacral fold. Unable to be seen in natural anatomical position. Previous site of stage 2 pressure injury. Patient continues to be at high risk for skin breakdown 2/2 poor nutrition, prominent bont prominences, confusion and limited mobility. Cleanse with NS, pat dry. Apply Liquid barrier film to periwound skin (allow to dry). Cover with silicone foam with border. Change every other day. Continue low air loss bed therapy,  heel elevation with offloading boots, turn & reposition q2h with Z-flow fluidized pillow, continue moisture management with barrier creams, continue measures to decrease friction/shear.

## 2021-11-23 NOTE — DISCHARGE NOTE PROVIDER - CARE PROVIDER_API CALL
Margaret Grover)  Internal Medicine; Medical Oncology  [address] [place] [address]  Phone: [phone]  Fax: [phone]  Follow Up Time:

## 2021-11-23 NOTE — PROGRESS NOTE ADULT - PROBLEM SELECTOR PLAN 8
BS= 90.  The pt and spouse say she takes 15 units of Soliqua insulin every morning.   Soliqua non formulary. Pharmacy advised to replace with Lantus 1 to1 conversion.  Will continue Lantus @ 7U sub sut Q AM while the pt is in the hospital.  FS QID  Low dose corrective sliding scale.  A1c 5.7  - decreasing lantus to 3U tomorrow BS= 90.  The pt and spouse say she takes 15 units of Soliqua insulin every morning.   Soliqua non formulary. Pharmacy advised to replace with Lantus 1 to1 conversion.  A1c 5.7  - d/c lantus as pt hypoglycemic in the evenings.   - c/w low dose ISS

## 2021-11-23 NOTE — CONSULT NOTE ADULT - PROBLEM SELECTOR RECOMMENDATION 4
Reaffirmed code status, DNR/DNI, no artificial nutrition.   MOLST re-completed. Will place in chart  Patient has HCP form from previous admission. Please see alpha tab.  Hospice referral

## 2021-11-23 NOTE — DISCHARGE NOTE PROVIDER - NSDCCPCAREPLAN_GEN_ALL_CORE_FT
PRINCIPAL DISCHARGE DIAGNOSIS  Diagnosis: Acute kidney injury superimposed on CKD  Assessment and Plan of Treatment: You were admitted to the hospital because your kidney function was declining. This was caused by decreased intake of fluids. We gave you intravenous fluids for several days, which improved your kidney function back to their baseline.      SECONDARY DISCHARGE DIAGNOSES  Diagnosis: Tongue cancer  Assessment and Plan of Treatment: tx surgically > 20 years ago    Diagnosis: Diabetes mellitus  Assessment and Plan of Treatment:      PRINCIPAL DISCHARGE DIAGNOSIS  Diagnosis: Acute kidney injury superimposed on CKD  Assessment and Plan of Treatment: You were admitted to the hospital because your kidney function was declining. This was caused by decreased intake of fluids. We gave you intravenous fluids for several days, which improved your kidney function back to baseline. To help you eat and drink more fluids, we will control your pain upon swallowing with Magic mouthwash and oral dilaudid medication. PLEASE RINSE YOUR MOUTH WITH MAGIC MOUTHWASH BEFORE EVERY MEAL AND TAKE THE DILAUDID MEDICATION BEFORE BREAKFAST, LUNCH, AND DINNER. You will be discharged home with hospice, and a nurse will come to help you further manage your symptoms.   Please follow up with Dr. Grover for further management of your medical conditions.      SECONDARY DISCHARGE DIAGNOSES  Diagnosis: Tongue cancer  Assessment and Plan of Treatment: You were admitted to the hospital with decreased oral intake of food and water, likely caused by discomfort from from your tongue cancer. After discharge, please follow up with radiation oncology to obtain radiation therapy to the tongue. You will be sent home with medications for pain. PLEASE USE MAGIC MOUTHWASH AND TAKE THE DILAUDID PILL BEFORE EVERY MEAL. You may also take the dilaudid medication for pain when you are not eating.   Please follow up with your oncologist after discharge as needed for further questions about your tongue cancer.    Diagnosis: Diabetes mellitus  Assessment and Plan of Treatment: When you were admitted, you were taking the long acting insulin injection in the morning. We noticed that at times, your blood sugar was very low, which could be dangerous. Because you are not eating as much food as previous, we discontinued your Soliqua insulin injection. After discharge, DO NOT CONTINUE TAKING THE SOLIQUA. You can continue taking your Glyxambi.   Please follow up with your primary care doctor for further management of your diabetes.     PRINCIPAL DISCHARGE DIAGNOSIS  Diagnosis: Acute kidney injury superimposed on CKD  Assessment and Plan of Treatment: You were admitted to the hospital because your kidney function was declining. This was caused by decreased intake of fluids. We gave you intravenous fluids for several days, which improved your kidney function back to baseline. To help you eat and drink more fluids, we will control your pain upon swallowing with Magic mouthwash and oral dilaudid medication. PLEASE RINSE YOUR MOUTH WITH MAGIC MOUTHWASH BEFORE EVERY MEAL AND TAKE THE DILAUDID MEDICATION BEFORE BREAKFAST, LUNCH, AND DINNER. You will be discharged home with hospice, and a nurse will come to help you further manage your symptoms.   Please follow up with Dr. Grover for further management of your medical conditions.      SECONDARY DISCHARGE DIAGNOSES  Diagnosis: Tongue cancer  Assessment and Plan of Treatment: You were admitted to the hospital with decreased oral intake of food and water, likely caused by discomfort from from your tongue cancer. After discharge, please follow up with radiation oncology to obtain radiation therapy to the tongue. You will be sent home with medications for pain. PLEASE USE MAGIC MOUTHWASH AND TAKE THE DILAUDID PILL BEFORE EVERY MEAL. You may also take the dilaudid medication for pain when you are not eating.   Please follow up with your oncologist after discharge as needed for further questions about your tongue cancer.    Diagnosis: Diabetes mellitus  Assessment and Plan of Treatment: When you were admitted, you were taking the long acting insulin injection in the morning. We noticed that at times, your blood sugar was very low, which could be dangerous. Because you are not eating as much food as previous, we discontinued your Soliqua insulin injection. After discharge, DO NOT CONTINUE TAKING THE SOLIQUA. You can continue taking your Glyxambi.   Please follow up with your primary care doctor for further management of your diabetes.    Diagnosis: Pressure ulcer  Assessment and Plan of Treatment: You have a sacral/gluteal fold with incontinence associated skin irritation. You continue to be at high risk for skin breakdown.  You spoke with our wound care practitioner and they instructed you on proper care for the ulcer, which includes keeping the area clean, dry, and have frequent repositioning.

## 2021-11-23 NOTE — CONSULT NOTE ADULT - PROBLEM SELECTOR RECOMMENDATION 9
restart patient's magic mouthwash tid and 1mg PO dilaudid premeal   Can continue with 1mg PO dilaudid q4h prn pain or dyspnea   bowel regimen while on opioids

## 2021-11-24 NOTE — PROGRESS NOTE ADULT - PROBLEM SELECTOR PLAN 4
Reaffirmed code status, DNR/DNI, no artificial nutrition. Please see [facility] note 11/23.   MOLST re-completed and placed in chart.   Patient has HCP form from previous admission. Please see alpha tab.  Hospice referral.

## 2021-11-24 NOTE — PROGRESS NOTE ADULT - PROBLEM SELECTOR PLAN 1
restarted patient's magic mouthwash tid and 1mg PO dilaudid premeal. Patient's pain better controlled and tolerating PO this morning.   Can continue with 1mg PO dilaudid q4h prn pain or dyspnea   bowel regimen while on opioids.  Upon Dispo, please ensure the following is on patient's dc instructions:  > YOU MUST TAKE MAGIC MOUTHWASH AND 1MG DILAUDID BEFORE YOUR MEALS. IF YOU HAVE SEVERE PAIN AFTER TAKING 1MG ORAL DILAUDID, YOU CAN TAKE 2MG ORAL DILAUDID.

## 2021-11-24 NOTE — PROGRESS NOTE ADULT - SUBJECTIVE AND OBJECTIVE BOX
Jennifer Fish MD  Internal Medicine, PGY1  Universal pager: [phone] / [facility]: [phone]      Patient is a 81y old  Female who presents with a chief complaint of Worsening renal function (2021 20:53)    OVERNIGHT EVENTS: Overnight, no acute events. Denies F/C, lightheadedness, HA, CP, SOB, abd pain, N/V, burning w/ urination, leg swelling. Pt has not had BM since admission but and is not taking much PO. This AM, pt wanted to go home.   focused ROS as above    MEDICATIONS  (STANDING):  amLODIPine   Tablet 10 milliGRAM(s) Oral daily  atorvastatin 10 milliGRAM(s) Oral at bedtime  brimonidine 0.2% Ophthalmic Solution 1 Drop(s) Right EYE two times a day  carvedilol 25 milliGRAM(s) Oral every 12 hours  dextrose 40% Gel 15 Gram(s) Oral once  dextrose 5%. 1000 milliLiter(s) (50 mL/Hr) IV Continuous <Continuous>  dextrose 5%. 1000 milliLiter(s) (100 mL/Hr) IV Continuous <Continuous>  dextrose 50% Injectable 25 Gram(s) IV Push once  dextrose 50% Injectable 12.5 Gram(s) IV Push once  dextrose 50% Injectable 25 Gram(s) IV Push once  FIRST- Mouthwash  BLM 10 milliLiter(s) Swish and Spit three times a day  fluticasone propionate 50 MICROgram(s)/spray Nasal Spray 1 Spray(s) Both Nostrils two times a day  glucagon  Injectable 1 milliGRAM(s) IntraMuscular once  heparin   Injectable 5000 Unit(s) SubCutaneous every 12 hours  influenza  Vaccine (HIGH DOSE) 0.7 milliLiter(s) IntraMuscular once  insulin glargine Injectable (LANTUS) 7 Unit(s) SubCutaneous every morning  insulin lispro (ADMELOG) corrective regimen sliding scale   SubCutaneous three times a day before meals  insulin lispro (ADMELOG) corrective regimen sliding scale   SubCutaneous at bedtime  lactated ringers. 1000 milliLiter(s) (75 mL/Hr) IV Continuous <Continuous>  latanoprost 0.005% Ophthalmic Solution 1 Drop(s) Right EYE at bedtime  lidocaine 2% Viscous 10 milliLiter(s) Swish and Spit three times a day  mirtazapine 45 milliGRAM(s) Oral at bedtime  senna Syrup 15 milliLiter(s) Oral at bedtime  sertraline 50 milliGRAM(s) Oral daily    MEDICATIONS  (PRN):  acetaminophen     Tablet .. 650 milliGRAM(s) Oral every 6 hours PRN Temp greater or equal to 38C (100.4F), Mild Pain (1 - 3), Moderate Pain (4 - 6)  aluminum hydroxide/magnesium hydroxide/simethicone Suspension 30 milliLiter(s) Oral every 4 hours PRN Dyspepsia  HYDROmorphone   Tablet 1 milliGRAM(s) Oral every 4 hours PRN sever pain  melatonin 3 milliGRAM(s) Oral at bedtime PRN Insomnia  ondansetron Injectable 4 milliGRAM(s) IV Push every 8 hours PRN Nausea and/or Vomiting      Vital Signs Last 24 Hrs  T(C): 36.9 (2021 06:16), Max: 36.9 (2021 13:21)  T(F): 98.5 (2021 06:16), Max: 98.5 (2021 13:21)  HR: 65 (2021 06:16) (62 - 73)  BP: 182/64 (2021 06:16) (140/60 - 182/64)  BP(mean): --  RR: 18 (2021 06:16) (17 - 18)  SpO2: 100% (2021 06:16) (99% - 100%)    PHYSICAL EXAM  GENERAL: NAD   HEAD:  Atraumatic, normocephalic  LABS:               LABS:                        9.3    10.74 )-----------( 295      ( 2021 08:48 )             30.0         142  |  107  |  20  ----------------------------<  104<H>  4.0   |  25  |  1.45<H>    Ca    9.1      2021 08:48  Phos  2.5       Mg     1.90         TPro  6.7  /  Alb  3.3  /  TBili  0.4  /  DBili  x   /  AST  14  /  ALT  18  /  AlkPhos  91            Urinalysis Basic - ( 2021 07:43 )    Color: Light Yellow / Appearance: Clear / S.010 / pH: x  Gluc: x / Ketone: Negative  / Bili: Negative / Urobili: <2 mg/dL   Blood: x / Protein: Trace / Nitrite: Negative   Leuk Esterase: Moderate / RBC: 1 /HPF / WBC 10-15 /HPF   Sq Epi: x / Non Sq Epi: Few / Bacteria: Few      Culture - Blood (collected 2021 21:19)  Source: .Blood Blood-Peripheral  Preliminary Report (2021 22:02):    No growth to date.    Culture - Blood (collected 2021 21:16)  Source: .Blood Blood-Peripheral  Preliminary Report (2021 22:02):    No growth to date.    EYES: EOMI, sclera clear  CHEST/LUNG: Clear to auscultation bilaterally; no wheeze  HEART: RRR; S1, S2; no M/R/G  ABDOMEN: soft, non-distended; non-tender; BS present  EXTREMITIES:  2+ Peripheral Pulses; no edema  NEURO: non-focal, AAOx2  PSYCH: appropriate affect  SKIN: No rashes or lesions       Jennifer Fish MD  Internal Medicine, PGY1  Universal pager: [phone] / [facility]: [phone]      Patient is a 81y old  Female who presents with a chief complaint of Worsening renal function (2021 20:53)    OVERNIGHT EVENTS: Overnight, no acute events. Denies F/C, lightheadedness, HA, CP, SOB, abd pain, N/V, burning w/ urination, leg swelling. Pt has not had BM since admission but is not taking much PO. This AM, pt wanted to go home.   focused ROS as above    MEDICATIONS  (STANDING):  amLODIPine   Tablet 10 milliGRAM(s) Oral daily  atorvastatin 10 milliGRAM(s) Oral at bedtime  brimonidine 0.2% Ophthalmic Solution 1 Drop(s) Right EYE two times a day  carvedilol 25 milliGRAM(s) Oral every 12 hours  dextrose 40% Gel 15 Gram(s) Oral once  dextrose 5%. 1000 milliLiter(s) (50 mL/Hr) IV Continuous <Continuous>  dextrose 5%. 1000 milliLiter(s) (100 mL/Hr) IV Continuous <Continuous>  dextrose 50% Injectable 25 Gram(s) IV Push once  dextrose 50% Injectable 12.5 Gram(s) IV Push once  dextrose 50% Injectable 25 Gram(s) IV Push once  FIRST- Mouthwash  BLM 10 milliLiter(s) Swish and Spit three times a day  fluticasone propionate 50 MICROgram(s)/spray Nasal Spray 1 Spray(s) Both Nostrils two times a day  glucagon  Injectable 1 milliGRAM(s) IntraMuscular once  heparin   Injectable 5000 Unit(s) SubCutaneous every 12 hours  influenza  Vaccine (HIGH DOSE) 0.7 milliLiter(s) IntraMuscular once  insulin glargine Injectable (LANTUS) 7 Unit(s) SubCutaneous every morning  insulin lispro (ADMELOG) corrective regimen sliding scale   SubCutaneous three times a day before meals  insulin lispro (ADMELOG) corrective regimen sliding scale   SubCutaneous at bedtime  lactated ringers. 1000 milliLiter(s) (75 mL/Hr) IV Continuous <Continuous>  latanoprost 0.005% Ophthalmic Solution 1 Drop(s) Right EYE at bedtime  lidocaine 2% Viscous 10 milliLiter(s) Swish and Spit three times a day  mirtazapine 45 milliGRAM(s) Oral at bedtime  senna Syrup 15 milliLiter(s) Oral at bedtime  sertraline 50 milliGRAM(s) Oral daily    MEDICATIONS  (PRN):  acetaminophen     Tablet .. 650 milliGRAM(s) Oral every 6 hours PRN Temp greater or equal to 38C (100.4F), Mild Pain (1 - 3), Moderate Pain (4 - 6)  aluminum hydroxide/magnesium hydroxide/simethicone Suspension 30 milliLiter(s) Oral every 4 hours PRN Dyspepsia  HYDROmorphone   Tablet 1 milliGRAM(s) Oral every 4 hours PRN sever pain  melatonin 3 milliGRAM(s) Oral at bedtime PRN Insomnia  ondansetron Injectable 4 milliGRAM(s) IV Push every 8 hours PRN Nausea and/or Vomiting    Vital Signs Last 24 Hrs  T(C): 36.9 (2021 06:16), Max: 36.9 (2021 13:21)  T(F): 98.5 (2021 06:16), Max: 98.5 (2021 13:21)  HR: 65 (2021 06:16) (62 - 73)  BP: 182/64 (2021 06:16) (140/60 - 182/64)  BP(mean): --  RR: 18 (2021 06:16) (17 - 18)  SpO2: 100% (2021 06:16) (99% - 100%)    PHYSICAL EXAM  GENERAL: NAD   HEAD:  Atraumatic, normocephalic  LABS:             LABS:                        9.3    10.74 )-----------( 295      ( 2021 08:48 )             30.0     11-24    141  |  105  |  20  ----------------------------<  174<H>  4.8   |  25  |  1.42<H>    Ca    9.3      2021 07:30  Phos  2.4     11-  Mg     1.80         TPro  6.7  /  Alb  3.3  /  TBili  0.4  /  DBili  x   /  AST  14  /  ALT  18  /  AlkPhos  91            Urinalysis Basic - ( 2021 07:43 )    Color: Light Yellow / Appearance: Clear / S.010 / pH: x  Gluc: x / Ketone: Negative  / Bili: Negative / Urobili: <2 mg/dL   Blood: x / Protein: Trace / Nitrite: Negative   Leuk Esterase: Moderate / RBC: 1 /HPF / WBC 10-15 /HPF   Sq Epi: x / Non Sq Epi: Few / Bacteria: Few

## 2021-11-24 NOTE — PROGRESS NOTE ADULT - PROBLEM SELECTOR PLAN 1
11/4/21 Baseline creatinine 1.32, on admission 2.17. Now improved to 1.45  No complaints of dysuria.   BUN/ Creatinine = 53/ 2.17. Ratio indicative of dehydration, likely due to decreased po intake from loss of appetite.  Will give IVF with LR @ 75ml/ h   BNP 1446, unlikely cardiogenic in origin  CXR preliminary clear.  Urine lytes WNL  Renal Duplex WNL  Avoid nephrotoxic agents and monitor lytes. 11/4/21 Baseline creatinine 1.32, on admission 2.17. Now improved to 1.42  No complaints of dysuria.   BUN/ Creatinine = 53/ 2.17. Ratio indicative of dehydration, likely due to decreased po intake from loss of appetite.  Will give IVF with LR @ 75ml/ h   BNP 1446, unlikely cardiogenic in origin  CXR preliminary clear.  Urine lytes WNL  Renal Duplex WNL  Avoid nephrotoxic agents and monitor lytes.

## 2021-11-24 NOTE — PROGRESS NOTE ADULT - PROBLEM SELECTOR PLAN 8
BS= 90.  The pt and spouse say she takes 15 units of Soliqua insulin every morning.   Soliqua non formulary. Pharmacy advised to replace with Lantus 1 to1 conversion.  A1c 5.7  - d/c lantus as pt hypoglycemic in the evenings.   - c/w low dose ISS

## 2021-11-24 NOTE — PROGRESS NOTE ADULT - PROBLEM SELECTOR PLAN 2
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  - oncology recs appreciated, no inpatient intervention  - pt was scheduled for 5 fractions of TR outpatient  - pt pending home hospice approval with 5 fractions of RT  - c/w 1mg PO dilaudid pre-meal TID  - c/w magic mouthwash TID

## 2021-11-24 NOTE — PROGRESS NOTE ADULT - PROBLEM SELECTOR PLAN 7
Continue eye drop.  [facility] formulary. Pharmacy changed to use Latanoprost.  Continue Alphagan.

## 2021-11-24 NOTE — PROGRESS NOTE ADULT - PROBLEM SELECTOR PLAN 1
11/4/21 Baseline creatinine 1.32, on admission 2.17. Now improved to 1.45  No complaints of dysuria.   BUN/ Creatinine = 53/ 2.17. Ratio indicative of dehydration, likely due to decreased po intake from loss of appetite.  Will give IVF with LR @ 75ml/ h   BNP 1446, unlikely cardiogenic in origin  CXR preliminary clear.  Urine lytes WNL  Renal Duplex WNL  Avoid nephrotoxic agents and monitor lytes.

## 2021-11-24 NOTE — PROGRESS NOTE ADULT - SUBJECTIVE AND OBJECTIVE BOX
Jennifer Fish MD  Internal Medicine, PGY1  Universal pager: [phone] / [facility]: [phone]      Patient is a 81y old  Female who presents with a chief complaint of Worsening renal function (2021 20:53)    OVERNIGHT EVENTS: Overnight, no acute events. Denies F/C, lightheadedness, HA, CP, SOB, abd pain, N/V, burning w/ urination, leg swelling. Pt has not had BM since admission but and is not taking much PO. This AM, pt wanted to go home.   focused ROS as above    MEDICATIONS  (STANDING):  amLODIPine   Tablet 10 milliGRAM(s) Oral daily  atorvastatin 10 milliGRAM(s) Oral at bedtime  brimonidine 0.2% Ophthalmic Solution 1 Drop(s) Right EYE two times a day  carvedilol 25 milliGRAM(s) Oral every 12 hours  dextrose 40% Gel 15 Gram(s) Oral once  dextrose 5%. 1000 milliLiter(s) (50 mL/Hr) IV Continuous <Continuous>  dextrose 5%. 1000 milliLiter(s) (100 mL/Hr) IV Continuous <Continuous>  dextrose 50% Injectable 25 Gram(s) IV Push once  dextrose 50% Injectable 12.5 Gram(s) IV Push once  dextrose 50% Injectable 25 Gram(s) IV Push once  FIRST- Mouthwash  BLM 10 milliLiter(s) Swish and Spit three times a day  fluticasone propionate 50 MICROgram(s)/spray Nasal Spray 1 Spray(s) Both Nostrils two times a day  glucagon  Injectable 1 milliGRAM(s) IntraMuscular once  heparin   Injectable 5000 Unit(s) SubCutaneous every 12 hours  influenza  Vaccine (HIGH DOSE) 0.7 milliLiter(s) IntraMuscular once  insulin glargine Injectable (LANTUS) 7 Unit(s) SubCutaneous every morning  insulin lispro (ADMELOG) corrective regimen sliding scale   SubCutaneous three times a day before meals  insulin lispro (ADMELOG) corrective regimen sliding scale   SubCutaneous at bedtime  lactated ringers. 1000 milliLiter(s) (75 mL/Hr) IV Continuous <Continuous>  latanoprost 0.005% Ophthalmic Solution 1 Drop(s) Right EYE at bedtime  lidocaine 2% Viscous 10 milliLiter(s) Swish and Spit three times a day  mirtazapine 45 milliGRAM(s) Oral at bedtime  senna Syrup 15 milliLiter(s) Oral at bedtime  sertraline 50 milliGRAM(s) Oral daily    MEDICATIONS  (PRN):  acetaminophen     Tablet .. 650 milliGRAM(s) Oral every 6 hours PRN Temp greater or equal to 38C (100.4F), Mild Pain (1 - 3), Moderate Pain (4 - 6)  aluminum hydroxide/magnesium hydroxide/simethicone Suspension 30 milliLiter(s) Oral every 4 hours PRN Dyspepsia  HYDROmorphone   Tablet 1 milliGRAM(s) Oral every 4 hours PRN sever pain  melatonin 3 milliGRAM(s) Oral at bedtime PRN Insomnia  ondansetron Injectable 4 milliGRAM(s) IV Push every 8 hours PRN Nausea and/or Vomiting      Vital Signs Last 24 Hrs  T(C): 36.9 (2021 06:16), Max: 36.9 (2021 13:21)  T(F): 98.5 (2021 06:16), Max: 98.5 (2021 13:21)  HR: 65 (2021 06:16) (62 - 73)  BP: 182/64 (2021 06:16) (140/60 - 182/64)  BP(mean): --  RR: 18 (2021 06:16) (17 - 18)  SpO2: 100% (2021 06:16) (99% - 100%)    PHYSICAL EXAM  GENERAL: NAD   HEAD:  Atraumatic, normocephalic  LABS:               LABS:                        9.3    10.74 )-----------( 295      ( 2021 08:48 )             30.0         142  |  107  |  20  ----------------------------<  104<H>  4.0   |  25  |  1.45<H>    Ca    9.1      2021 08:48  Phos  2.5       Mg     1.90         TPro  6.7  /  Alb  3.3  /  TBili  0.4  /  DBili  x   /  AST  14  /  ALT  18  /  AlkPhos  91            Urinalysis Basic - ( 2021 07:43 )    Color: Light Yellow / Appearance: Clear / S.010 / pH: x  Gluc: x / Ketone: Negative  / Bili: Negative / Urobili: <2 mg/dL   Blood: x / Protein: Trace / Nitrite: Negative   Leuk Esterase: Moderate / RBC: 1 /HPF / WBC 10-15 /HPF   Sq Epi: x / Non Sq Epi: Few / Bacteria: Few      Culture - Blood (collected 2021 21:19)  Source: .Blood Blood-Peripheral  Preliminary Report (2021 22:02):    No growth to date.    Culture - Blood (collected 2021 21:16)  Source: .Blood Blood-Peripheral  Preliminary Report (2021 22:02):    No growth to date.    EYES: EOMI, sclera clear  CHEST/LUNG: Clear to auscultation bilaterally; no wheeze  HEART: RRR; S1, S2; no M/R/G  ABDOMEN: soft, non-distended; non-tender; BS present  EXTREMITIES:  2+ Peripheral Pulses; no edema  NEURO: non-focal, AAOx2  PSYCH: appropriate affect  SKIN: No rashes or lesions

## 2021-11-24 NOTE — PROVIDER CONTACT NOTE (OTHER) - REASON
pt maintaining diastolic bp at 48 Creatinine increased slightly.   Lactic acid improving, likely related to epinephrine

## 2021-11-24 NOTE — PROGRESS NOTE ADULT - SUBJECTIVE AND OBJECTIVE BOX
[facility]  Gabrielle Butler Palliative Care Attending  Contact Info: Page [phone] (including Nights/Weekends), message on Microsoft Teams (Gabrielle Butler), or leave  at Palliative Office [phone] (non-urgent)     SUBJECTIVE AND OBJECTIVE: Patient seen this morning, no acute distress. PCA at bedside stated patient was able to tolerate breakfast. Patient anxious to go home.     INTERVAL HPI/OVERNIGHT EVENTS:  > Over the past 24 hours, patient took PRNs of tylenol x3.     DNR on chart:   Allergies    No Known Allergies    Intolerances    MEDICATIONS  (STANDING):  amLODIPine   Tablet 10 milliGRAM(s) Oral daily  atorvastatin 10 milliGRAM(s) Oral at bedtime  brimonidine 0.2% Ophthalmic Solution 1 Drop(s) Right EYE two times a day  carvedilol 25 milliGRAM(s) Oral every 12 hours  dextrose 40% Gel 15 Gram(s) Oral once  dextrose 5%. 1000 milliLiter(s) (50 mL/Hr) IV Continuous <Continuous>  dextrose 5%. 1000 milliLiter(s) (100 mL/Hr) IV Continuous <Continuous>  dextrose 50% Injectable 25 Gram(s) IV Push once  dextrose 50% Injectable 12.5 Gram(s) IV Push once  dextrose 50% Injectable 25 Gram(s) IV Push once  FIRST- Mouthwash  BLM 10 milliLiter(s) Swish and Spit three times a day  fluticasone propionate 50 MICROgram(s)/spray Nasal Spray 1 Spray(s) Both Nostrils two times a day  glucagon  Injectable 1 milliGRAM(s) IntraMuscular once  heparin   Injectable 5000 Unit(s) SubCutaneous every 12 hours  HYDROmorphone   Tablet 1 milliGRAM(s) Oral three times a day  influenza  Vaccine (HIGH DOSE) 0.7 milliLiter(s) IntraMuscular once  insulin lispro (ADMELOG) corrective regimen sliding scale   SubCutaneous three times a day before meals  insulin lispro (ADMELOG) corrective regimen sliding scale   SubCutaneous at bedtime  lactated ringers. 1000 milliLiter(s) (75 mL/Hr) IV Continuous <Continuous>  latanoprost 0.005% Ophthalmic Solution 1 Drop(s) Right EYE at bedtime  lidocaine 2% Viscous 10 milliLiter(s) Swish and Spit three times a day  mirtazapine 45 milliGRAM(s) Oral at bedtime  polyethylene glycol 3350 17 Gram(s) Oral daily  senna 2 Tablet(s) Oral at bedtime  sertraline 50 milliGRAM(s) Oral daily    MEDICATIONS  (PRN):  acetaminophen     Tablet .. 650 milliGRAM(s) Oral every 6 hours PRN Temp greater or equal to 38C (100.4F), Mild Pain (1 - 3), Moderate Pain (4 - 6)  aluminum hydroxide/magnesium hydroxide/simethicone Suspension 30 milliLiter(s) Oral every 4 hours PRN Dyspepsia  HYDROmorphone   Tablet 1 milliGRAM(s) Oral every 4 hours PRN sever pain  melatonin 3 milliGRAM(s) Oral at bedtime PRN Insomnia  ondansetron Injectable 4 milliGRAM(s) IV Push every 8 hours PRN Nausea and/or Vomiting      ITEMS UNCHECKED ARE NOT PRESENT    PRESENT SYMPTOMS: [ ]Unable to obtain due to poor mentation   Source if other than patient:  [ ]Family   [ ]Team     Pain:  [ ]yes [ x]no- controlled with pain medication   QOL impact -   Location -                    Aggravating factors -  Quality -  Radiation -  Timing-  Severity (0-10 scale):  Minimal acceptable level (0-10 scale):     Dyspnea:                           [ ]Mild [ ]Moderate [ ]Severe  Anxiety:                             [ ]Mild [ ]Moderate [ ]Severe  Fatigue:                             [ ]Mild [ ]Moderate [ ]Severe  Nausea:                             [ ]Mild [ ]Moderate [ ]Severe  Loss of appetite:              [ ]Mild [ ]Moderate [ ]Severe  Constipation:                    [ ]Mild [ ]Moderate [ ]Severe    PAIN AD Score:	  [email or web address] (Ctrl + left click to view)    Other Symptoms:  [ ]All other review of systems negative     Palliative Performance Status Version 2:    60     %      http://npcrc.org/files/news/palliative_performance_scale_ppsv2.pdf  PHYSICAL EXAM:  Vital Signs Last 24 Hrs  T(C): 36.9 (2021 06:16), Max: 36.9 (2021 13:21)  T(F): 98.5 (2021 06:16), Max: 98.5 (2021 13:21)  HR: 65 (2021 06:16) (62 - 73)  BP: 182/64 (2021 06:16) (140/60 - 182/64)  BP(mean): --  RR: 18 (2021 06:16) (17 - 18)  SpO2: 100% (2021 06:16) (99% - 100%) I&O's Summary     GENERAL:  [x ]Alert  [ x]Oriented x 2-3?  [ ]Lethargic  [ x]Cachexia  [ ]Unarousable  [x ]Verbal  [ ]Non-Verbal  Behavioral:   [ ] Anxiety  [ ] Delirium [ ] Agitation [ x] Other  HEENT:  right side of tongue scar noted   [ ]Normal   [ ]Dry mouth   [ ]ET Tube/Trach  [ ]Oral lesions  PULMONARY:   [x ]Clear [ ]Tachypnea  [ ]Audible excessive secretions   [ ]Rhonchi        [ ]Right [ ]Left [ ]Bilateral  [ ]Crackles        [ ]Right [ ]Left [ ]Bilateral  [ ]Wheezing     [ ]Right [ ]Left [ ]Bilateral   [ ]Diminished breath sounds [ ]right [ ]left [ ]bilateral  CARDIOVASCULAR:    [x ]Regular [ ]Irregular [ ]Tachy  [ ]Solo [ ]Murmur [ ]Other  GASTROINTESTINAL:  [x ]Soft  [ ]Distended   [ ]+BS  [x ]Non tender [ ]Tender  [ ]PEG [ ]OGT/ NGT  Last BM: unknown   GENITOURINARY:   [ ]Normal [x ] Incontinent   [ ]Oliguria/Anuria   [ ]Hicks  MUSCULOSKELETAL:   [ ]Normal   [x ]Weakness  [ ]Bed/Wheelchair bound [ ]Edema  NEUROLOGIC:   [ ]No focal deficits  [ x]Cognitive impairment  [ ]Dysphagia [ ]Dysarthria [ ]Paresis [ ]Other   SKIN: IAD   [ ]Normal    [ ]Rash  [ ]Pressure ulcer(s)       Present on admission [ ]y [ ]n    CRITICAL CARE:  [ ] Shock Present  [ ]Septic [ ]Cardiogenic [ ]Neurologic [ ]Hypovolemic  [ ]  Vasopressors [ ]  Inotropes   [ ]Respiratory failure present [ ]Mechanical ventilation [ ]Non-invasive ventilatory support [ ]High flow  [ ]Acute  [ ]Chronic [ ]Hypoxic  [ ]Hypercarbic [ ]Other  [ ]Other organ failure   LABS:                        9.3    10.74 )-----------( 295      ( 2021 08:48 )             30.0   11-24    141  |  105  |  20  ----------------------------<  174<H>  4.8   |  25  |  1.42<H>    Ca    9.3      2021 07:30  Phos  2.4     -  Mg     1.80     -    TPro  6.7  /  Alb  3.3  /  TBili  0.4  /  DBili  x   /  AST  14  /  ALT  18  /  AlkPhos  91        Urinalysis Basic - ( 2021 07:43 )    Color: Light Yellow / Appearance: Clear / S.010 / pH: x  Gluc: x / Ketone: Negative  / Bili: Negative / Urobili: <2 mg/dL   Blood: x / Protein: Trace / Nitrite: Negative   Leuk Esterase: Moderate / RBC: 1 /HPF / WBC 10-15 /HPF   Sq Epi: x / Non Sq Epi: Few / Bacteria: Few      RADIOLOGY & ADDITIONAL STUDIES: n/a     Protein Calorie Malnutrition Present: [ ]mild [ ]moderate [x ]severe [ ]underweight [ ]morbid obesity  https://www.andeal.org/vault/2440/web/files/ONC/Table_Clinical%20Characteristics%20to%20Document%20Malnutrition-White%20JV%20et%20al%2020.pdf    Height (cm): 162.6 (21 @ 01:47), 162.5 (21 @ 16:00), 165.1 (10-29-21 @ 17:00)  Weight (kg): 39.2 (21 @ 01:47), 40.0885 (21 @ 16:00), 43.5 (10-30-21 @ 02:57)  BMI (kg/m2): 14.8 (21 @ 01:47), 15.2 (21 @ 16:00), 16 (10-30-21 @ 02:57)    [ ]PPSV2 < or = 30%  [ ]significant weight loss [ ]poor nutritional intake [ ]anasarca    [ ]Artificial Nutrition    REFERRALS:   [ ]Chaplaincy  [x ]Hospice  [ ]Child Life  [ ]Social Work  [ ]Case management [ ]Holistic Therapy

## 2021-11-24 NOTE — PROGRESS NOTE ADULT - PROBLEM SELECTOR PLAN 3
Onc: Dr. Grover   rad/onc: Dr. Duenas- per outpatient records, patient planned for 5 treatments, 2x per week.  Hospice plan of care

## 2021-11-24 NOTE — PROGRESS NOTE ADULT - PROBLEM SELECTOR PLAN 2
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  - oncology recs appreciated, no inpatient intervention  - pt was scheduled for 5 fractions of TR outpatient  - pt pending home hospice approval with 5 fractions of RT  - starting 1mg PO dilaudid pre-meal TID  - c/w magic mouthwash TID

## 2021-11-24 NOTE — PROGRESS NOTE ADULT - PROBLEM SELECTOR PLAN 5
Thank you for allowing us to participate in your patient's care. Palliative team signing off. Please page [phone] for any q's or c's.     Gabrielle Butler D.O.   Palliative Medicine

## 2021-11-24 NOTE — PROGRESS NOTE ADULT - PROBLEM SELECTOR PLAN 7
Continue eye drop.  [facility] formulary. Pharmacy changed to use Latanoprost.  Continue Alphagan.

## 2021-11-25 NOTE — PROGRESS NOTE ADULT - SUBJECTIVE AND OBJECTIVE BOX
Jennifer Fish MD  Internal Medicine, PGY1  Universal pager: [phone] / [facility]: [phone]      Patient is a 81y old  Female who presents with a chief complaint of Worsening renal function (2021 20:53)    OVERNIGHT EVENTS: Overnight, no acute events. Denies F/C, lightheadedness, HA, CP, SOB, abd pain, N/V, burning w/ urination, leg swelling. Pt has not had BM since admission but is not taking much PO. This AM, pt wanted to go home.   focused ROS as above    MEDICATIONS  (STANDING):  amLODIPine   Tablet 10 milliGRAM(s) Oral daily  atorvastatin 10 milliGRAM(s) Oral at bedtime  brimonidine 0.2% Ophthalmic Solution 1 Drop(s) Right EYE two times a day  carvedilol 25 milliGRAM(s) Oral every 12 hours  dextrose 40% Gel 15 Gram(s) Oral once  dextrose 5%. 1000 milliLiter(s) (50 mL/Hr) IV Continuous <Continuous>  dextrose 5%. 1000 milliLiter(s) (100 mL/Hr) IV Continuous <Continuous>  dextrose 50% Injectable 25 Gram(s) IV Push once  dextrose 50% Injectable 12.5 Gram(s) IV Push once  dextrose 50% Injectable 25 Gram(s) IV Push once  FIRST- Mouthwash  BLM 10 milliLiter(s) Swish and Spit three times a day  fluticasone propionate 50 MICROgram(s)/spray Nasal Spray 1 Spray(s) Both Nostrils two times a day  glucagon  Injectable 1 milliGRAM(s) IntraMuscular once  heparin   Injectable 5000 Unit(s) SubCutaneous every 12 hours  influenza  Vaccine (HIGH DOSE) 0.7 milliLiter(s) IntraMuscular once  insulin glargine Injectable (LANTUS) 7 Unit(s) SubCutaneous every morning  insulin lispro (ADMELOG) corrective regimen sliding scale   SubCutaneous three times a day before meals  insulin lispro (ADMELOG) corrective regimen sliding scale   SubCutaneous at bedtime  lactated ringers. 1000 milliLiter(s) (75 mL/Hr) IV Continuous <Continuous>  latanoprost 0.005% Ophthalmic Solution 1 Drop(s) Right EYE at bedtime  lidocaine 2% Viscous 10 milliLiter(s) Swish and Spit three times a day  mirtazapine 45 milliGRAM(s) Oral at bedtime  senna Syrup 15 milliLiter(s) Oral at bedtime  sertraline 50 milliGRAM(s) Oral daily    MEDICATIONS  (PRN):  acetaminophen     Tablet .. 650 milliGRAM(s) Oral every 6 hours PRN Temp greater or equal to 38C (100.4F), Mild Pain (1 - 3), Moderate Pain (4 - 6)  aluminum hydroxide/magnesium hydroxide/simethicone Suspension 30 milliLiter(s) Oral every 4 hours PRN Dyspepsia  HYDROmorphone   Tablet 1 milliGRAM(s) Oral every 4 hours PRN sever pain  melatonin 3 milliGRAM(s) Oral at bedtime PRN Insomnia  ondansetron Injectable 4 milliGRAM(s) IV Push every 8 hours PRN Nausea and/or Vomiting    Vital Signs Last 24 Hrs  T(C): 36.9 (2021 06:16), Max: 36.9 (2021 13:21)  T(F): 98.5 (2021 06:16), Max: 98.5 (2021 13:21)  HR: 65 (2021 06:16) (62 - 73)  BP: 182/64 (2021 06:16) (140/60 - 182/64)  BP(mean): --  RR: 18 (2021 06:16) (17 - 18)  SpO2: 100% (2021 06:16) (99% - 100%)    PHYSICAL EXAM  GENERAL: NAD   HEAD:  Atraumatic, normocephalic  LABS:             LABS:                        9.3    10.74 )-----------( 295      ( 2021 08:48 )             30.0     11-24    141  |  105  |  20  ----------------------------<  174<H>  4.8   |  25  |  1.42<H>    Ca    9.3      2021 07:30  Phos  2.4     11-  Mg     1.80         TPro  6.7  /  Alb  3.3  /  TBili  0.4  /  DBili  x   /  AST  14  /  ALT  18  /  AlkPhos  91            Urinalysis Basic - ( 2021 07:43 )    Color: Light Yellow / Appearance: Clear / S.010 / pH: x  Gluc: x / Ketone: Negative  / Bili: Negative / Urobili: <2 mg/dL   Blood: x / Protein: Trace / Nitrite: Negative   Leuk Esterase: Moderate / RBC: 1 /HPF / WBC 10-15 /HPF   Sq Epi: x / Non Sq Epi: Few / Bacteria: Few             Jennifer Fish MD  Internal Medicine, PGY1  Universal pager: [phone] / [facility]: [phone]      Patient is a 81y old  Female who presents with a chief complaint of Worsening renal function (19 Nov 2021 20:53)    OVERNIGHT EVENTS: Overnight, no acute events. Denies F/C, lightheadedness, HA, CP, SOB, abd pain, N/V, burning w/ urination, leg swelling. Pt has not had BM since admission but is not taking much PO. Pt was moved to 8N this morning. She likes the new room better. She understands why she needs to stay in the hospital for a few days.  focused ROS as above    MEDICATIONS  (STANDING):  amLODIPine   Tablet 10 milliGRAM(s) Oral daily  atorvastatin 10 milliGRAM(s) Oral at bedtime  brimonidine 0.2% Ophthalmic Solution 1 Drop(s) Right EYE two times a day  carvedilol 25 milliGRAM(s) Oral every 12 hours  dextrose 40% Gel 15 Gram(s) Oral once  dextrose 5%. 1000 milliLiter(s) (50 mL/Hr) IV Continuous <Continuous>  dextrose 5%. 1000 milliLiter(s) (100 mL/Hr) IV Continuous <Continuous>  dextrose 50% Injectable 25 Gram(s) IV Push once  dextrose 50% Injectable 12.5 Gram(s) IV Push once  dextrose 50% Injectable 25 Gram(s) IV Push once  FIRST- Mouthwash  BLM 10 milliLiter(s) Swish and Spit three times a day  fluticasone propionate 50 MICROgram(s)/spray Nasal Spray 1 Spray(s) Both Nostrils two times a day  glucagon  Injectable 1 milliGRAM(s) IntraMuscular once  heparin   Injectable 5000 Unit(s) SubCutaneous every 12 hours  influenza  Vaccine (HIGH DOSE) 0.7 milliLiter(s) IntraMuscular once  insulin glargine Injectable (LANTUS) 7 Unit(s) SubCutaneous every morning  insulin lispro (ADMELOG) corrective regimen sliding scale   SubCutaneous three times a day before meals  insulin lispro (ADMELOG) corrective regimen sliding scale   SubCutaneous at bedtime  lactated ringers. 1000 milliLiter(s) (75 mL/Hr) IV Continuous <Continuous>  latanoprost 0.005% Ophthalmic Solution 1 Drop(s) Right EYE at bedtime  lidocaine 2% Viscous 10 milliLiter(s) Swish and Spit three times a day  mirtazapine 45 milliGRAM(s) Oral at bedtime  senna Syrup 15 milliLiter(s) Oral at bedtime  sertraline 50 milliGRAM(s) Oral daily    MEDICATIONS  (PRN):  acetaminophen     Tablet .. 650 milliGRAM(s) Oral every 6 hours PRN Temp greater or equal to 38C (100.4F), Mild Pain (1 - 3), Moderate Pain (4 - 6)  aluminum hydroxide/magnesium hydroxide/simethicone Suspension 30 milliLiter(s) Oral every 4 hours PRN Dyspepsia  HYDROmorphone   Tablet 1 milliGRAM(s) Oral every 4 hours PRN sever pain  melatonin 3 milliGRAM(s) Oral at bedtime PRN Insomnia  ondansetron Injectable 4 milliGRAM(s) IV Push every 8 hours PRN Nausea and/or Vomiting    Vital Signs Last 24 Hrs  T(C): 36.9 (25 Nov 2021 06:25), Max: 37.2 (24 Nov 2021 21:15)  T(F): 98.5 (25 Nov 2021 06:25), Max: 99 (24 Nov 2021 21:15)  HR: 60 (25 Nov 2021 06:25) (60 - 66)  BP: 170/58 (25 Nov 2021 06:25) (116/44 - 170/58)  BP(mean): --  RR: 18 (25 Nov 2021 06:25) (17 - 18)  SpO2: 100% (25 Nov 2021 06:25) (99% - 100%)    EYES: EOMI, sclera clear  CHEST/LUNG: Clear to auscultation bilaterally; no wheeze  HEART: RRR; S1, S2; no M/R/G  ABDOMEN: soft, non-distended; non-tender; BS present  EXTREMITIES:  2+ Peripheral Pulses; no edema  NEURO: non-focal, AAOx3  PSYCH: appropriate affect  SKIN: No rashes or lesions      LABS:    11-25    139  |  104  |  17  ----------------------------<  157<H>  4.7   |  26  |  1.32<H>    Ca    9.4      25 Nov 2021 07:06  Phos  2.1     11-25  Mg     1.60     11-25

## 2021-11-25 NOTE — PROGRESS NOTE ADULT - PROBLEM SELECTOR PLAN 7
Continue eye drop.  [facility] formulary. Pharmacy changed to use Latanoprost.  Continue Alphagan.

## 2021-11-25 NOTE — PROGRESS NOTE ADULT - PROBLEM SELECTOR PLAN 1
11/4/21 Baseline creatinine 1.32, on admission 2.17. Now improved to 1.42  No complaints of dysuria.   BUN/ Creatinine = 53/ 2.17. Ratio indicative of dehydration, likely due to decreased po intake from loss of appetite.  Will give IVF with LR @ 75ml/ h   BNP 1446, unlikely cardiogenic in origin  CXR preliminary clear.  Urine lytes WNL  Renal Duplex WNL  Avoid nephrotoxic agents and monitor lytes. 11/4/21 Baseline creatinine 1.32, on admission 2.17. Now improved to 1.32 today  No complaints of dysuria.   BUN/ Creatinine = 53/ 2.17. Ratio indicative of dehydration, likely due to decreased po intake from loss of appetite.  Will give IVF with LR @ 75ml/ h   BNP 1446, unlikely cardiogenic in origin  CXR preliminary clear.  Urine lytes WNL  Renal Duplex WNL  Avoid nephrotoxic agents and monitor lytes.

## 2021-11-25 NOTE — PROGRESS NOTE ADULT - PROBLEM SELECTOR PLAN 6
BPs 120-140s systolic.   Low salt diet.  Continue BP meds. BPs 120-140s systolic.   Continue BP meds.

## 2021-11-25 NOTE — PROGRESS NOTE ADULT - PROBLEM SELECTOR PLAN 3
Passed bedside dysphagia screen.  Aspiration precautions.   Pt says she can tolerate a puree diet. Passed bedside dysphagia screen.  Aspiration precautions.   Pt says she can tolerate a puree diet, consistent carbohydrate .

## 2021-11-25 NOTE — PROGRESS NOTE ADULT - PROBLEM SELECTOR PLAN 2
1990's with surgical treatment, reoccurrence 2020 treated with chemo, L tongue ca and 2021 s/p biopsy 9/2021, to begin treatment at [facility] 11/29/2021.  - oncology recs appreciated, no inpatient intervention  - pt was scheduled for 5 fractions of TR outpatient  - pt pending home hospice approval with 5 fractions of RT  - c/w 1mg PO dilaudid pre-meal TID  - c/w magic mouthwash TID

## 2021-11-25 NOTE — ADVANCED PRACTICE NURSE CONSULT - RECOMMEDATIONS
Topical recommendations:     Sacrum- Cleanse with NS, pat dry. Apply Liquid barrier film to periwound skin (allow to dry). Cover with silicone foam with border. Change every other day.     Continue low air loss bed therapy,  heel elevation with offloading boots, turn & reposition q2h with Z-flow fluidized pillow, continue moisture management with barrier creams as specified above & single breathable pad, continue measures to decrease friction/shear.   Plan discussed with patient and  at bedside- educated on topical wound therapy to optimize wound healing. Questions answered.     Please contact Wound/Ostomy Care Service Line if we can be of further assistance (ext [phone]).

## 2021-11-25 NOTE — ADVANCED PRACTICE NURSE CONSULT - REASON FOR CONSULT
Patient seen on skin care rounds after wound care referral received for assessment of skin impairment and recommendations of topical management. Patient known to [facility] service line last seen on Nov 2 2021. Chart reviewed: WBC 10.74, H/H 9.3/30.0, Serum albumin 3.3, BMI 14.8, Soto 17 Patient H/O of tongue ca 1990's with reoccurrence 2020 and 2021, dysphagia, DM2, Glaucoma, Hypertension, Hyperlipidemia, depression, constipation admitted for NICK on CKD, likely due to dehydration from decreased po intake. Palliative consulted for assistance with sx management and complex decision making regarding goc. Patient also seen by Hematology/Oncology for tongue SCC.  Patient seen on skin care rounds after wound care referral received for assessment of skin impairment and recommendations of topical management. Patient known to [facility] service line last seen on Nov 2 2021. Chart reviewed: DNR status, WBC 10.74, H/H 9.3/30.0, Serum albumin 3.3, BMI 14.8, Soto 17 Patient H/O of tongue ca 1990's with reoccurrence 2020 and 2021, dysphagia, DM2, Glaucoma, Hypertension, Hyperlipidemia, depression, constipation admitted for NICK on CKD, likely due to dehydration from decreased po intake. Palliative consulted for assistance with sx management and complex decision making regarding goc. Patient also seen by Hematology/Oncology for tongue SCC.

## 2021-11-25 NOTE — ADVANCED PRACTICE NURSE CONSULT - ASSESSMENT
General: A&Ox1-2, confused, turns one assist, bedbound, incontinent of urine and stool at times. Patient frail, thin, prominent bony prominences. Skin warm, dry poor skin turgor. Blanching erythema to bilateral heels.     Sacral/gluteal fold with incontinence associated dermatitis presenting with blanching erythema extending to bilateral buttocks. Within sacral fold. Unable to be seen in natural anatomical position. Previous site of stage 2 pressure injury.     Patient continues to be at high risk for skin breakdown 2/2 poor nutrition, prominent bont prominences, confusion and limited mobility. On assessment patient on a low air loss surface,  Z-flow positioning pillow utilized, moisture management in place with use of one incontinence pad. Turning and positioning as tolerated.  General: A&Ox1-2, confused, turns one assist, bedbound, incontinent of urine and stool at times. Patient frail, thin, prominent bony prominences. Skin warm, dry poor skin turgor. Blanching erythema to bilateral heels.     Sacral/gluteal fold with incontinence associated dermatitis presenting with blanching erythema extending to bilateral buttocks. Fissure noted within sacral fold. Unable to be seen in natural anatomical position. Previous site of stage 2 pressure injury.     Patient continues to be at high risk for skin breakdown 2/2 poor nutrition, prominent bont prominences, confusion and limited mobility. On assessment patient on a low air loss surface,  Z-flow positioning pillow utilized, moisture management in place with use of one incontinence pad. Turning and positioning as tolerated.

## 2021-11-26 NOTE — PROGRESS NOTE ADULT - PROBLEM SELECTOR PLAN 9
Spoke to patient in regards to work status form. Patient states she already has a note to be off of work until surgery from Dr. Cherylene League and has filled out FMLA forms, and does not need a work status form from our office until after surgery.    Verbalized un LDL 62, HDL 28, cholesterol 114  Continue Statin.

## 2021-11-26 NOTE — ASSESSMENT
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  \par She was evaluated by Dr Diamond, she is not interested in surgical options. \par She was seen by Dr Duenas and plan is for SBRT to tongue. \par She was hospitalized 2/2 dehydration and NICK after last clinic visit. Today again, pt appears dehydrated, has not been taking anything PO and is in severe pain.  \par \par Labs checked including CBC and CMP. Pt again with NICK, referrred to the ED.\par Patient with poor performance status and is not be a candidate for systemic treatment. \par Feeding tube placement discussed with patient again but she is not interested.\par Option of supportive care and comfort care also discussed. \par I spoke to patient's sister on the phone and she agrees to hospice with palliative RT. \par Pt to present to the ED for NICK and dehydration, pal care to see pt in house for confirmation of GOC and arranging home hospice with pal RT on DC. Case discussed with pal care. \par \par \par Total time of this visit, including time spent face to face with patient and/or via video/audio, and also in preparing for today's visit for MDM and documentation (Medical Decision Making, including consideration of possible diagnoses, management options, complex medical record review, review of diagnostic tests and information, consideration and discussion of significant complications based on comorbidities, and discussion with providers involved with the care of the patient) 50 minutes.\par

## 2021-11-26 NOTE — PROGRESS NOTE ADULT - PROBLEM SELECTOR PLAN 7
Continue eye drop.  [facility] formulary. Pharmacy changed to use Latanoprost.  Continue Alphagan.

## 2021-11-26 NOTE — HISTORY OF PRESENT ILLNESS
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 Knowles last year. She now presents with an infiltrative third primary R. BOT SCCa.  This is confirmed after EUA and biopsy where frozen section was clearly positive for SCCa.  \par She was evaluated by Dr Diamond, she is not interested in surgical options. \par \par Today, Ms Burdick presents with her  Murali. \par Patient reports that she has not been eating or drinking much for the past few weeks, this is due to oral pain. She has only been able to take about half of a boost and some water today.\par She was prescribed oxycodone but has not been taking it. \par She appears dehydrated and weak. She has lost a significant amount of weight. \par \par Ms Burdick has 2 daughters, both are out of state and do not keep in touch with her often. \par She lives with her  Murali. He sister Malina and her are close but she lives an hour away. [de-identified] : Ms Burdick was hospitalized 2/2 NICK in the setting of poor PO intake and dehydration. \par She has been under tremendous pain at home. Has not been taking anything PO. Has difficulty taking her pills as well. \par She appears dry, more frail than before and is in tears 2/2 pain. \par She has seen rad onc and plan is for RT to oral tongue.

## 2021-11-26 NOTE — PROGRESS NOTE ADULT - PROBLEM SELECTOR PLAN 1
11/4/21 Baseline creatinine 1.32, on admission 2.17. Now improved to 1.32 today  No complaints of dysuria.   BUN/ Creatinine = 53/ 2.17. Ratio indicative of dehydration, likely due to decreased po intake from loss of appetite.  Will give IVF with LR @ 75ml/ h   BNP 1446, unlikely cardiogenic in origin  CXR preliminary clear.  Urine lytes WNL  Renal Duplex WNL  Avoid nephrotoxic agents and monitor lytes. 11/4/21 Baseline creatinine 1.32, on admission 2.17. Now improved to 1.32 today  No complaints of dysuria.   BUN/ Creatinine = 53/ 2.17. Ratio indicative of dehydration, likely due to decreased po intake from loss of appetite.  BNP 1446, unlikely cardiogenic in origin  CXR preliminary clear.  Urine lytes WNL  Renal Duplex WNL  Avoid nephrotoxic agents and monitor lytes.  - will d/c fluids as pt now tolerating more PO with her pain regimen

## 2021-11-26 NOTE — PROGRESS NOTE ADULT - SUBJECTIVE AND OBJECTIVE BOX
INTERVAL HPI/OVERNIGHT EVENTS:  Patient seen at bedside.  Crying in pain.  Reports no BM in more than 2 weeks.    VITAL SIGNS:  T(F): 98.2 (11-26-21 @ 05:25)  HR: 63 (11-26-21 @ 05:25)  BP: 169/53 (11-26-21 @ 05:25)  RR: 17 (11-26-21 @ 05:25)  SpO2: 100% (11-26-21 @ 05:25)  Wt(kg): --    PHYSICAL EXAM:    Constitutional: NAD, resting in bed comfortably  Eyes: EOMI, sclera non-icteric  Neck: supple, no LAP  Respiratory: CTA b/l, good air entry b/l, no wheezing, rhonchi or crackels  Cardiovascular: RRR, normal S1S2, no M/R/G  Gastrointestinal: soft, NTND  Extremities: no edema  Neurological: AAOx3, non focal  Skin: Normal temperature    MEDICATIONS  (STANDING):  amLODIPine   Tablet 10 milliGRAM(s) Oral daily  atorvastatin 10 milliGRAM(s) Oral at bedtime  brimonidine 0.2% Ophthalmic Solution 1 Drop(s) Right EYE two times a day  carvedilol 25 milliGRAM(s) Oral every 12 hours  dextrose 40% Gel 15 Gram(s) Oral once  dextrose 5%. 1000 milliLiter(s) (50 mL/Hr) IV Continuous <Continuous>  dextrose 5%. 1000 milliLiter(s) (100 mL/Hr) IV Continuous <Continuous>  dextrose 50% Injectable 25 Gram(s) IV Push once  dextrose 50% Injectable 12.5 Gram(s) IV Push once  dextrose 50% Injectable 25 Gram(s) IV Push once  FIRST- Mouthwash  BLM 10 milliLiter(s) Swish and Spit three times a day  fluticasone propionate 50 MICROgram(s)/spray Nasal Spray 1 Spray(s) Both Nostrils two times a day  glucagon  Injectable 1 milliGRAM(s) IntraMuscular once  heparin   Injectable 5000 Unit(s) SubCutaneous every 12 hours  HYDROmorphone   Tablet 1 milliGRAM(s) Oral three times a day  influenza  Vaccine (HIGH DOSE) 0.7 milliLiter(s) IntraMuscular once  insulin lispro (ADMELOG) corrective regimen sliding scale   SubCutaneous three times a day before meals  insulin lispro (ADMELOG) corrective regimen sliding scale   SubCutaneous at bedtime  latanoprost 0.005% Ophthalmic Solution 1 Drop(s) Right EYE at bedtime  lidocaine 2% Viscous 10 milliLiter(s) Swish and Spit three times a day  mirtazapine 45 milliGRAM(s) Oral at bedtime  polyethylene glycol 3350 17 Gram(s) Oral daily  senna 2 Tablet(s) Oral at bedtime  sertraline 50 milliGRAM(s) Oral daily    MEDICATIONS  (PRN):  acetaminophen     Tablet .. 650 milliGRAM(s) Oral every 6 hours PRN Temp greater or equal to 38C (100.4F), Mild Pain (1 - 3), Moderate Pain (4 - 6)  aluminum hydroxide/magnesium hydroxide/simethicone Suspension 30 milliLiter(s) Oral every 4 hours PRN Dyspepsia  HYDROmorphone   Tablet 1 milliGRAM(s) Oral every 4 hours PRN sever pain  melatonin 3 milliGRAM(s) Oral at bedtime PRN Insomnia  ondansetron Injectable 4 milliGRAM(s) IV Push every 8 hours PRN Nausea and/or Vomiting      Allergies    No Known Allergies    Intolerances        LABS:    11-26    137  |  103  |  14  ----------------------------<  153<H>  4.8   |  25  |  1.24    Ca    9.0      26 Nov 2021 06:10  Phos  3.5     11-26  Mg     1.50     11-26            RADIOLOGY & ADDITIONAL TESTS:  Studies reviewed.

## 2021-11-26 NOTE — PROGRESS NOTE ADULT - PROBLEM SELECTOR PLAN 3
Passed bedside dysphagia screen.  Aspiration precautions.   Pt says she can tolerate a puree diet, consistent carbohydrate .

## 2021-11-26 NOTE — PROGRESS NOTE ADULT - PROBLEM SELECTOR PLAN 2
1990's with surgical treatment, reoccurrence 2020 treated with chemo, L tongue ca and 2021 s/p biopsy 9/2021, to begin treatment at [facility] 11/29/2021.  - oncology recs appreciated, no inpatient intervention  - pt was scheduled for 5 fractions of TR outpatient  - pt pending home hospice approval with 5 fractions of RT  - c/w 1mg PO dilaudid pre-meal TID  - c/w magic mouthwash TID 1990's with surgical treatment, reoccurrence 2020 treated with chemo, L tongue ca and 2021 s/p biopsy 9/2021, to begin treatment at [facility] 11/29/2021.  - oncology recs appreciated, no inpatient intervention  - pt is scheduled for 5 fractions of TR outpatient  - pt pending home hospice approval with 5 fractions of RT  - c/w 1mg PO dilaudid pre-meal TID  - c/w magic mouthwash TID

## 2021-11-26 NOTE — PHYSICAL EXAM
[Capable of only limited self care, confined to bed or chair more than 50% of waking hours] : Status 3- Capable of only limited self care, confined to bed or chair more than 50% of waking hours [Normal] : affect appropriate [de-identified] :  Postsurgical and radiation changes along the tongue. It deviates to the right. There is a tender submucosal mass along the right posterolateral tongue and BOT

## 2021-11-26 NOTE — REVIEW OF SYSTEMS
[Fatigue] : fatigue [Recent Change In Weight] : ~T recent weight change [Dysphagia] : dysphagia [Odynophagia] : odynophagia [Mucosal Pain] : mucosal pain [Negative] : Allergic/Immunologic

## 2021-11-26 NOTE — PROGRESS NOTE ADULT - SUBJECTIVE AND OBJECTIVE BOX
Jennifer Fish MD  Internal Medicine, PGY1  Universal pager: [phone] / [facility]: [id number]      Patient is a 81y old  Female who presents with a chief complaint of Worsening renal function (19 Nov 2021 20:53)    OVERNIGHT EVENTS: Overnight, no acute events. Denies F/C, lightheadedness, HA, CP, SOB, abd pain, N/V, burning w/ urination, leg swelling. Pt has not had BM since admission but is not taking much PO. Pt was moved to 8N this morning. She likes the new room better. She understands why she needs to stay in the hospital for a few days.  focused ROS as above    MEDICATIONS  (STANDING):  amLODIPine   Tablet 10 milliGRAM(s) Oral daily  atorvastatin 10 milliGRAM(s) Oral at bedtime  brimonidine 0.2% Ophthalmic Solution 1 Drop(s) Right EYE two times a day  carvedilol 25 milliGRAM(s) Oral every 12 hours  dextrose 40% Gel 15 Gram(s) Oral once  dextrose 5%. 1000 milliLiter(s) (50 mL/Hr) IV Continuous <Continuous>  dextrose 5%. 1000 milliLiter(s) (100 mL/Hr) IV Continuous <Continuous>  dextrose 50% Injectable 25 Gram(s) IV Push once  dextrose 50% Injectable 12.5 Gram(s) IV Push once  dextrose 50% Injectable 25 Gram(s) IV Push once  FIRST- Mouthwash  BLM 10 milliLiter(s) Swish and Spit three times a day  fluticasone propionate 50 MICROgram(s)/spray Nasal Spray 1 Spray(s) Both Nostrils two times a day  glucagon  Injectable 1 milliGRAM(s) IntraMuscular once  heparin   Injectable 5000 Unit(s) SubCutaneous every 12 hours  influenza  Vaccine (HIGH DOSE) 0.7 milliLiter(s) IntraMuscular once  insulin glargine Injectable (LANTUS) 7 Unit(s) SubCutaneous every morning  insulin lispro (ADMELOG) corrective regimen sliding scale   SubCutaneous three times a day before meals  insulin lispro (ADMELOG) corrective regimen sliding scale   SubCutaneous at bedtime  lactated ringers. 1000 milliLiter(s) (75 mL/Hr) IV Continuous <Continuous>  latanoprost 0.005% Ophthalmic Solution 1 Drop(s) Right EYE at bedtime  lidocaine 2% Viscous 10 milliLiter(s) Swish and Spit three times a day  mirtazapine 45 milliGRAM(s) Oral at bedtime  senna Syrup 15 milliLiter(s) Oral at bedtime  sertraline 50 milliGRAM(s) Oral daily    MEDICATIONS  (PRN):  acetaminophen     Tablet .. 650 milliGRAM(s) Oral every 6 hours PRN Temp greater or equal to 38C (100.4F), Mild Pain (1 - 3), Moderate Pain (4 - 6)  aluminum hydroxide/magnesium hydroxide/simethicone Suspension 30 milliLiter(s) Oral every 4 hours PRN Dyspepsia  HYDROmorphone   Tablet 1 milliGRAM(s) Oral every 4 hours PRN sever pain  melatonin 3 milliGRAM(s) Oral at bedtime PRN Insomnia  ondansetron Injectable 4 milliGRAM(s) IV Push every 8 hours PRN Nausea and/or Vomiting    Vital Signs Last 24 Hrs  T(C): 36.9 (25 Nov 2021 06:25), Max: 37.2 (24 Nov 2021 21:15)  T(F): 98.5 (25 Nov 2021 06:25), Max: 99 (24 Nov 2021 21:15)  HR: 60 (25 Nov 2021 06:25) (60 - 66)  BP: 170/58 (25 Nov 2021 06:25) (116/44 - 170/58)  BP(mean): --  RR: 18 (25 Nov 2021 06:25) (17 - 18)  SpO2: 100% (25 Nov 2021 06:25) (99% - 100%)    EYES: EOMI, sclera clear  CHEST/LUNG: Clear to auscultation bilaterally; no wheeze  HEART: RRR; S1, S2; no M/R/G  ABDOMEN: soft, non-distended; non-tender; BS present  EXTREMITIES:  2+ Peripheral Pulses; no edema  NEURO: non-focal, AAOx3  PSYCH: appropriate affect  SKIN: No rashes or lesions      LABS:    11-25    139  |  104  |  17  ----------------------------<  157<H>  4.7   |  26  |  1.32<H>    Ca    9.4      25 Nov 2021 07:06  Phos  2.1     11-25  Mg     1.60     11-25                 Jennifer Fish MD  Internal Medicine, PGY1  Universal pager: [phone] / [facility]: [phone]      Patient is a 81y old  Female who presents with a chief complaint of Worsening renal function (19 Nov 2021 20:53)    OVERNIGHT EVENTS: Overnight, no acute events. Denies F/C, lightheadedness, HA, CP, SOB, abd pain, N/V, burning w/ urination, leg swelling. Pt was moved to a different room and says the room is too loud. She understands why she needs to stay in the hospital for a few days.  focused ROS as above    MEDICATIONS  (STANDING):  amLODIPine   Tablet 10 milliGRAM(s) Oral daily  atorvastatin 10 milliGRAM(s) Oral at bedtime  brimonidine 0.2% Ophthalmic Solution 1 Drop(s) Right EYE two times a day  carvedilol 25 milliGRAM(s) Oral every 12 hours  dextrose 40% Gel 15 Gram(s) Oral once  dextrose 5%. 1000 milliLiter(s) (50 mL/Hr) IV Continuous <Continuous>  dextrose 5%. 1000 milliLiter(s) (100 mL/Hr) IV Continuous <Continuous>  dextrose 50% Injectable 25 Gram(s) IV Push once  dextrose 50% Injectable 12.5 Gram(s) IV Push once  dextrose 50% Injectable 25 Gram(s) IV Push once  FIRST- Mouthwash  BLM 10 milliLiter(s) Swish and Spit three times a day  fluticasone propionate 50 MICROgram(s)/spray Nasal Spray 1 Spray(s) Both Nostrils two times a day  glucagon  Injectable 1 milliGRAM(s) IntraMuscular once  heparin   Injectable 5000 Unit(s) SubCutaneous every 12 hours  influenza  Vaccine (HIGH DOSE) 0.7 milliLiter(s) IntraMuscular once  insulin glargine Injectable (LANTUS) 7 Unit(s) SubCutaneous every morning  insulin lispro (ADMELOG) corrective regimen sliding scale   SubCutaneous three times a day before meals  insulin lispro (ADMELOG) corrective regimen sliding scale   SubCutaneous at bedtime  lactated ringers. 1000 milliLiter(s) (75 mL/Hr) IV Continuous <Continuous>  latanoprost 0.005% Ophthalmic Solution 1 Drop(s) Right EYE at bedtime  lidocaine 2% Viscous 10 milliLiter(s) Swish and Spit three times a day  mirtazapine 45 milliGRAM(s) Oral at bedtime  senna Syrup 15 milliLiter(s) Oral at bedtime  sertraline 50 milliGRAM(s) Oral daily    MEDICATIONS  (PRN):  acetaminophen     Tablet .. 650 milliGRAM(s) Oral every 6 hours PRN Temp greater or equal to 38C (100.4F), Mild Pain (1 - 3), Moderate Pain (4 - 6)  aluminum hydroxide/magnesium hydroxide/simethicone Suspension 30 milliLiter(s) Oral every 4 hours PRN Dyspepsia  HYDROmorphone   Tablet 1 milliGRAM(s) Oral every 4 hours PRN sever pain  melatonin 3 milliGRAM(s) Oral at bedtime PRN Insomnia  ondansetron Injectable 4 milliGRAM(s) IV Push every 8 hours PRN Nausea and/or Vomiting    Vital Signs Last 24 Hrs  T(C): 36.8 (26 Nov 2021 05:25), Max: 36.9 (25 Nov 2021 22:06)  T(F): 98.2 (26 Nov 2021 05:25), Max: 98.4 (25 Nov 2021 22:06)  HR: 63 (26 Nov 2021 05:25) (51 - 63)  BP: 169/53 (26 Nov 2021 05:25) (159/49 - 169/53)  BP(mean): --  RR: 17 (26 Nov 2021 05:25) (17 - 18)  SpO2: 100% (26 Nov 2021 05:25) (100% - 100%)    EYES: EOMI, sclera clear  CHEST/LUNG: Clear to auscultation bilaterally; no wheeze  HEART: RRR; S1, S2; no M/R/G  ABDOMEN: soft, non-distended; non-tender; BS present  EXTREMITIES:  2+ Peripheral Pulses; no edema  NEURO: non-focal, AAOx3  PSYCH: appropriate affect  SKIN: No rashes or lesions      LABS:    11-26    137  |  103  |  14  ----------------------------<  153<H>  4.8   |  25  |  1.24    Ca    9.0      26 Nov 2021 06:10  Phos  3.5     11-26  Mg     1.50     11-26

## 2021-11-27 NOTE — PROGRESS NOTE ADULT - PROBLEM SELECTOR PLAN 7
Continue eye drop.  [facility] formulary. Pharmacy changed to use Latanoprost.  Continue Alphagan.

## 2021-11-27 NOTE — PROGRESS NOTE ADULT - SUBJECTIVE AND OBJECTIVE BOX
David Vazquez MD    Internal Medicine Resident (PGY-3)  Pager: [phone] ([facility]) / [phone] ([place])  Please see "resident assignment" column on [facility] for coverage info  ___________________________________________________________________________________________________      PAULA BLANCO 81y Female    Overnight events/subjective:     Vital Signs Last 24 Hrs  T(C): 36.4 (27 Nov 2021 05:25), Max: 36.7 (26 Nov 2021 21:27)  T(F): 97.5 (27 Nov 2021 05:25), Max: 98 (26 Nov 2021 21:27)  HR: 65 (27 Nov 2021 05:25) (53 - 65)  BP: 147/64 (27 Nov 2021 05:25) (130/59 - 147/64)  BP(mean): --  RR: 18 (27 Nov 2021 05:25) (17 - 18)  SpO2: 100% (27 Nov 2021 05:25) (99% - 100%)    PHYSICAL EXAM:      HOSPITAL MEDICATIONS:  MEDICATIONS  (STANDING):  amLODIPine   Tablet 10 milliGRAM(s) Oral daily  atorvastatin 10 milliGRAM(s) Oral at bedtime  brimonidine 0.2% Ophthalmic Solution 1 Drop(s) Right EYE two times a day  carvedilol 25 milliGRAM(s) Oral every 12 hours  dextrose 40% Gel 15 Gram(s) Oral once  dextrose 5%. 1000 milliLiter(s) (50 mL/Hr) IV Continuous <Continuous>  dextrose 5%. 1000 milliLiter(s) (100 mL/Hr) IV Continuous <Continuous>  dextrose 50% Injectable 12.5 Gram(s) IV Push once  dextrose 50% Injectable 25 Gram(s) IV Push once  dextrose 50% Injectable 25 Gram(s) IV Push once  FIRST- Mouthwash  BLM 10 milliLiter(s) Swish and Spit three times a day  fluticasone propionate 50 MICROgram(s)/spray Nasal Spray 1 Spray(s) Both Nostrils two times a day  glucagon  Injectable 1 milliGRAM(s) IntraMuscular once  heparin   Injectable 5000 Unit(s) SubCutaneous every 12 hours  HYDROmorphone   Tablet 1 milliGRAM(s) Oral three times a day  influenza  Vaccine (HIGH DOSE) 0.7 milliLiter(s) IntraMuscular once  insulin lispro (ADMELOG) corrective regimen sliding scale   SubCutaneous three times a day before meals  insulin lispro (ADMELOG) corrective regimen sliding scale   SubCutaneous at bedtime  latanoprost 0.005% Ophthalmic Solution 1 Drop(s) Right EYE at bedtime  lidocaine 2% Viscous 10 milliLiter(s) Swish and Spit three times a day  mirtazapine 45 milliGRAM(s) Oral at bedtime  polyethylene glycol 3350 17 Gram(s) Oral every 12 hours  senna 2 Tablet(s) Oral at bedtime  sertraline 50 milliGRAM(s) Oral daily    MEDICATIONS  (PRN):  acetaminophen     Tablet .. 650 milliGRAM(s) Oral every 6 hours PRN Temp greater or equal to 38C (100.4F), Mild Pain (1 - 3), Moderate Pain (4 - 6)  aluminum hydroxide/magnesium hydroxide/simethicone Suspension 30 milliLiter(s) Oral every 4 hours PRN Dyspepsia  melatonin 3 milliGRAM(s) Oral at bedtime PRN Insomnia  ondansetron Injectable 4 milliGRAM(s) IV Push every 8 hours PRN Nausea and/or Vomiting      LABS:    11-26    137  |  103  |  14  ----------------------------<  153<H>  4.8   |  25  |  1.24    Ca    9.0      26 Nov 2021 06:10  Phos  3.5     11-26  Mg     1.50     11-26             David Vazquez MD    Internal Medicine Resident (PGY-3)  Pager: [phone] ([place]) / [phone] ([place])  Please see "resident assignment" column on [facility] for coverage info  ___________________________________________________________________________________________________      PAULA BLANCO 81y Female    Overnight events/subjective:  No acute overnight events. Patient seen and examined at bedside. No complaints. Denies fever, chills, chest pain, shortness of breath, abdominal pain, nausea, vomiting, changes in bowel habits, or urinary symptoms.    Vital Signs Last 24 Hrs  T(C): 36.4 (27 Nov 2021 05:25), Max: 36.7 (26 Nov 2021 21:27)  T(F): 97.5 (27 Nov 2021 05:25), Max: 98 (26 Nov 2021 21:27)  HR: 65 (27 Nov 2021 05:25) (53 - 65)  BP: 147/64 (27 Nov 2021 05:25) (130/59 - 147/64)  BP(mean): --  RR: 18 (27 Nov 2021 05:25) (17 - 18)  SpO2: 100% (27 Nov 2021 05:25) (99% - 100%)    PHYSICAL EXAM:  EYES: EOMI, sclera clear  CHEST/LUNG: Clear to auscultation bilaterally; no wheeze  HEART: RRR; S1, S2; no M/R/G  ABDOMEN: soft, non-distended; non-tender; BS present  EXTREMITIES:  2+ Peripheral Pulses; no edema  NEURO: non-focal, AAOx3  PSYCH: appropriate affect  SKIN: No rashes or lesions    HOSPITAL MEDICATIONS:  MEDICATIONS  (STANDING):  amLODIPine   Tablet 10 milliGRAM(s) Oral daily  atorvastatin 10 milliGRAM(s) Oral at bedtime  brimonidine 0.2% Ophthalmic Solution 1 Drop(s) Right EYE two times a day  carvedilol 25 milliGRAM(s) Oral every 12 hours  dextrose 40% Gel 15 Gram(s) Oral once  dextrose 5%. 1000 milliLiter(s) (50 mL/Hr) IV Continuous <Continuous>  dextrose 5%. 1000 milliLiter(s) (100 mL/Hr) IV Continuous <Continuous>  dextrose 50% Injectable 12.5 Gram(s) IV Push once  dextrose 50% Injectable 25 Gram(s) IV Push once  dextrose 50% Injectable 25 Gram(s) IV Push once  FIRST- Mouthwash  BLM 10 milliLiter(s) Swish and Spit three times a day  fluticasone propionate 50 MICROgram(s)/spray Nasal Spray 1 Spray(s) Both Nostrils two times a day  glucagon  Injectable 1 milliGRAM(s) IntraMuscular once  heparin   Injectable 5000 Unit(s) SubCutaneous every 12 hours  HYDROmorphone   Tablet 1 milliGRAM(s) Oral three times a day  influenza  Vaccine (HIGH DOSE) 0.7 milliLiter(s) IntraMuscular once  insulin lispro (ADMELOG) corrective regimen sliding scale   SubCutaneous three times a day before meals  insulin lispro (ADMELOG) corrective regimen sliding scale   SubCutaneous at bedtime  latanoprost 0.005% Ophthalmic Solution 1 Drop(s) Right EYE at bedtime  lidocaine 2% Viscous 10 milliLiter(s) Swish and Spit three times a day  mirtazapine 45 milliGRAM(s) Oral at bedtime  polyethylene glycol 3350 17 Gram(s) Oral every 12 hours  senna 2 Tablet(s) Oral at bedtime  sertraline 50 milliGRAM(s) Oral daily    MEDICATIONS  (PRN):  acetaminophen     Tablet .. 650 milliGRAM(s) Oral every 6 hours PRN Temp greater or equal to 38C (100.4F), Mild Pain (1 - 3), Moderate Pain (4 - 6)  aluminum hydroxide/magnesium hydroxide/simethicone Suspension 30 milliLiter(s) Oral every 4 hours PRN Dyspepsia  melatonin 3 milliGRAM(s) Oral at bedtime PRN Insomnia  ondansetron Injectable 4 milliGRAM(s) IV Push every 8 hours PRN Nausea and/or Vomiting      LABS:    11-26    137  |  103  |  14  ----------------------------<  153<H>  4.8   |  25  |  1.24    Ca    9.0      26 Nov 2021 06:10  Phos  3.5     11-26  Mg     1.50     11-26

## 2021-11-27 NOTE — PROGRESS NOTE ADULT - PROBLEM SELECTOR PLAN 1
11/4/21 Baseline creatinine 1.32, on admission 2.17. Now improved to 1.32 today  No complaints of dysuria.   BUN/ Creatinine = 53/ 2.17. Ratio indicative of dehydration, likely due to decreased po intake from loss of appetite.  BNP 1446, unlikely cardiogenic in origin  CXR preliminary clear.  Urine lytes WNL  Renal Duplex WNL  Avoid nephrotoxic agents and monitor lytes.  Off IVF, tolerating diet

## 2021-11-27 NOTE — PROGRESS NOTE ADULT - PROBLEM SELECTOR PLAN 2
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  - oncology recs appreciated, no inpatient intervention  - pt is scheduled for 5 fractions of TR outpatient  - pt pending home hospice approval with 5 fractions of RT  - c/w 1mg PO dilaudid pre-meal TID  - c/w magic mouthwash TID

## 2021-11-28 NOTE — PROGRESS NOTE ADULT - SUBJECTIVE AND OBJECTIVE BOX
David Vazquez MD    Internal Medicine Resident (PGY-3)  Pager: [phone] (NS) / [phone] ([place])  Please see "resident assignment" column on [facility] for coverage info  ___________________________________________________________________________________________________      PAULA BLANCO 81y Female    Overnight events/subjective:  No acute overnight events. Patient seen and examined at bedside. No complaints. Denies fever, chills, chest pain, shortness of breath, abdominal pain, nausea, vomiting, changes in bowel habits, or urinary symptoms.    Vital Signs Last 24 Hrs  T(C): 36.4 (27 Nov 2021 05:25), Max: 36.7 (26 Nov 2021 21:27)  T(F): 97.5 (27 Nov 2021 05:25), Max: 98 (26 Nov 2021 21:27)  HR: 65 (27 Nov 2021 05:25) (53 - 65)  BP: 147/64 (27 Nov 2021 05:25) (130/59 - 147/64)  BP(mean): --  RR: 18 (27 Nov 2021 05:25) (17 - 18)  SpO2: 100% (27 Nov 2021 05:25) (99% - 100%)    PHYSICAL EXAM:  EYES: EOMI, sclera clear  CHEST/LUNG: Clear to auscultation bilaterally; no wheeze  HEART: RRR; S1, S2; no M/R/G  ABDOMEN: soft, non-distended; non-tender; BS present  EXTREMITIES:  2+ Peripheral Pulses; no edema  NEURO: non-focal, AAOx3  PSYCH: appropriate affect  SKIN: No rashes or lesions    HOSPITAL MEDICATIONS:  MEDICATIONS  (STANDING):  amLODIPine   Tablet 10 milliGRAM(s) Oral daily  atorvastatin 10 milliGRAM(s) Oral at bedtime  brimonidine 0.2% Ophthalmic Solution 1 Drop(s) Right EYE two times a day  carvedilol 25 milliGRAM(s) Oral every 12 hours  dextrose 40% Gel 15 Gram(s) Oral once  dextrose 5%. 1000 milliLiter(s) (50 mL/Hr) IV Continuous <Continuous>  dextrose 5%. 1000 milliLiter(s) (100 mL/Hr) IV Continuous <Continuous>  dextrose 50% Injectable 12.5 Gram(s) IV Push once  dextrose 50% Injectable 25 Gram(s) IV Push once  dextrose 50% Injectable 25 Gram(s) IV Push once  FIRST- Mouthwash  BLM 10 milliLiter(s) Swish and Spit three times a day  fluticasone propionate 50 MICROgram(s)/spray Nasal Spray 1 Spray(s) Both Nostrils two times a day  glucagon  Injectable 1 milliGRAM(s) IntraMuscular once  heparin   Injectable 5000 Unit(s) SubCutaneous every 12 hours  HYDROmorphone   Tablet 1 milliGRAM(s) Oral three times a day  influenza  Vaccine (HIGH DOSE) 0.7 milliLiter(s) IntraMuscular once  insulin lispro (ADMELOG) corrective regimen sliding scale   SubCutaneous three times a day before meals  insulin lispro (ADMELOG) corrective regimen sliding scale   SubCutaneous at bedtime  latanoprost 0.005% Ophthalmic Solution 1 Drop(s) Right EYE at bedtime  lidocaine 2% Viscous 10 milliLiter(s) Swish and Spit three times a day  mirtazapine 45 milliGRAM(s) Oral at bedtime  polyethylene glycol 3350 17 Gram(s) Oral every 12 hours  senna 2 Tablet(s) Oral at bedtime  sertraline 50 milliGRAM(s) Oral daily    MEDICATIONS  (PRN):  acetaminophen     Tablet .. 650 milliGRAM(s) Oral every 6 hours PRN Temp greater or equal to 38C (100.4F), Mild Pain (1 - 3), Moderate Pain (4 - 6)  aluminum hydroxide/magnesium hydroxide/simethicone Suspension 30 milliLiter(s) Oral every 4 hours PRN Dyspepsia  melatonin 3 milliGRAM(s) Oral at bedtime PRN Insomnia  ondansetron Injectable 4 milliGRAM(s) IV Push every 8 hours PRN Nausea and/or Vomiting      LABS:    11-26    137  |  103  |  14  ----------------------------<  153<H>  4.8   |  25  |  1.24    Ca    9.0      26 Nov 2021 06:10  Phos  3.5     11-26  Mg     1.50     11-26                 PAULA BLANCO 81y Female    Overnight events/subjective:  No acute overnight events. Patient seen and examined at bedside. No complaints. Denies fever, chills, chest pain, shortness of breath, abdominal pain, nausea, vomiting, changes in bowel habits, or urinary symptoms.    Vital Signs Last 24 Hrs  T(C): 36.4 (27 Nov 2021 05:25), Max: 36.7 (26 Nov 2021 21:27)  T(F): 97.5 (27 Nov 2021 05:25), Max: 98 (26 Nov 2021 21:27)  HR: 65 (27 Nov 2021 05:25) (53 - 65)  BP: 147/64 (27 Nov 2021 05:25) (130/59 - 147/64)  BP(mean): --  RR: 18 (27 Nov 2021 05:25) (17 - 18)  SpO2: 100% (27 Nov 2021 05:25) (99% - 100%)    PHYSICAL EXAM:  EYES: EOMI, sclera clear  CHEST/LUNG: Clear to auscultation bilaterally; no wheeze  HEART: RRR; S1, S2; no M/R/G  ABDOMEN: soft, non-distended; non-tender; BS present  EXTREMITIES:  2+ Peripheral Pulses; no edema  NEURO: non-focal, AAOx3  PSYCH: appropriate affect  SKIN: No rashes or lesions    HOSPITAL MEDICATIONS:  MEDICATIONS  (STANDING):  amLODIPine   Tablet 10 milliGRAM(s) Oral daily  atorvastatin 10 milliGRAM(s) Oral at bedtime  brimonidine 0.2% Ophthalmic Solution 1 Drop(s) Right EYE two times a day  carvedilol 25 milliGRAM(s) Oral every 12 hours  dextrose 40% Gel 15 Gram(s) Oral once  dextrose 5%. 1000 milliLiter(s) (50 mL/Hr) IV Continuous <Continuous>  dextrose 5%. 1000 milliLiter(s) (100 mL/Hr) IV Continuous <Continuous>  dextrose 50% Injectable 12.5 Gram(s) IV Push once  dextrose 50% Injectable 25 Gram(s) IV Push once  dextrose 50% Injectable 25 Gram(s) IV Push once  FIRST- Mouthwash  BLM 10 milliLiter(s) Swish and Spit three times a day  fluticasone propionate 50 MICROgram(s)/spray Nasal Spray 1 Spray(s) Both Nostrils two times a day  glucagon  Injectable 1 milliGRAM(s) IntraMuscular once  heparin   Injectable 5000 Unit(s) SubCutaneous every 12 hours  HYDROmorphone   Tablet 1 milliGRAM(s) Oral three times a day  influenza  Vaccine (HIGH DOSE) 0.7 milliLiter(s) IntraMuscular once  insulin lispro (ADMELOG) corrective regimen sliding scale   SubCutaneous three times a day before meals  insulin lispro (ADMELOG) corrective regimen sliding scale   SubCutaneous at bedtime  latanoprost 0.005% Ophthalmic Solution 1 Drop(s) Right EYE at bedtime  lidocaine 2% Viscous 10 milliLiter(s) Swish and Spit three times a day  mirtazapine 45 milliGRAM(s) Oral at bedtime  polyethylene glycol 3350 17 Gram(s) Oral every 12 hours  senna 2 Tablet(s) Oral at bedtime  sertraline 50 milliGRAM(s) Oral daily    MEDICATIONS  (PRN):  acetaminophen     Tablet .. 650 milliGRAM(s) Oral every 6 hours PRN Temp greater or equal to 38C (100.4F), Mild Pain (1 - 3), Moderate Pain (4 - 6)  aluminum hydroxide/magnesium hydroxide/simethicone Suspension 30 milliLiter(s) Oral every 4 hours PRN Dyspepsia  melatonin 3 milliGRAM(s) Oral at bedtime PRN Insomnia  ondansetron Injectable 4 milliGRAM(s) IV Push every 8 hours PRN Nausea and/or Vomiting      LABS:    11-26    137  |  103  |  14  ----------------------------<  153<H>  4.8   |  25  |  1.24    Ca    9.0      26 Nov 2021 06:10  Phos  3.5     11-26  Mg     1.50     11-26             PAULA BLANCO 81y Female    Overnight events/subjective:  This AM, pt refused medications. Upon re-evaluation later in the day, pt took the medications. This afternoon, she ate food.  Denies fever, chills, chest pain, shortness of breath, abdominal pain, nausea, vomiting, or urinary symptoms. Had a BM yesterday. Spoke to  and family member Roque at bedside today. Roque was not aware that the pt was going home with hospice.  at bedside not understanding that the pt will continue to get worse and is going home with hospice.     Vital Signs Last 24 Hrs  T(C): 36.9 (28 Nov 2021 05:56), Max: 36.9 (28 Nov 2021 05:56)  T(F): 98.4 (28 Nov 2021 05:56), Max: 98.4 (28 Nov 2021 05:56)  HR: 64 (28 Nov 2021 10:55) (55 - 65)  BP: 158/55 (28 Nov 2021 10:55) (114/50 - 158/55)  BP(mean): --  RR: 18 (28 Nov 2021 05:56) (16 - 18)  SpO2: 100% (28 Nov 2021 05:56) (99% - 100%)      PHYSICAL EXAM:  EYES: EOMI, sclera clear  CHEST/LUNG: Clear to auscultation bilaterally; no wheeze  HEART: RRR; S1, S2; no M/R/G  ABDOMEN: soft, non-distended; non-tender; BS present  EXTREMITIES:  2+ Peripheral Pulses; no edema  NEURO: non-focal, AAOx1  PSYCH: appropriate affect  SKIN: No rashes or lesions    HOSPITAL MEDICATIONS:  MEDICATIONS  (STANDING):  amLODIPine   Tablet 10 milliGRAM(s) Oral daily  atorvastatin 10 milliGRAM(s) Oral at bedtime  brimonidine 0.2% Ophthalmic Solution 1 Drop(s) Right EYE two times a day  carvedilol 25 milliGRAM(s) Oral every 12 hours  dextrose 40% Gel 15 Gram(s) Oral once  dextrose 5%. 1000 milliLiter(s) (50 mL/Hr) IV Continuous <Continuous>  dextrose 5%. 1000 milliLiter(s) (100 mL/Hr) IV Continuous <Continuous>  dextrose 50% Injectable 12.5 Gram(s) IV Push once  dextrose 50% Injectable 25 Gram(s) IV Push once  dextrose 50% Injectable 25 Gram(s) IV Push once  FIRST- Mouthwash  BLM 10 milliLiter(s) Swish and Spit three times a day  fluticasone propionate 50 MICROgram(s)/spray Nasal Spray 1 Spray(s) Both Nostrils two times a day  glucagon  Injectable 1 milliGRAM(s) IntraMuscular once  heparin   Injectable 5000 Unit(s) SubCutaneous every 12 hours  HYDROmorphone   Tablet 1 milliGRAM(s) Oral three times a day  influenza  Vaccine (HIGH DOSE) 0.7 milliLiter(s) IntraMuscular once  insulin lispro (ADMELOG) corrective regimen sliding scale   SubCutaneous three times a day before meals  insulin lispro (ADMELOG) corrective regimen sliding scale   SubCutaneous at bedtime  latanoprost 0.005% Ophthalmic Solution 1 Drop(s) Right EYE at bedtime  lidocaine 2% Viscous 10 milliLiter(s) Swish and Spit three times a day  mirtazapine 45 milliGRAM(s) Oral at bedtime  polyethylene glycol 3350 17 Gram(s) Oral every 12 hours  senna 2 Tablet(s) Oral at bedtime  sertraline 50 milliGRAM(s) Oral daily    MEDICATIONS  (PRN):  acetaminophen     Tablet .. 650 milliGRAM(s) Oral every 6 hours PRN Temp greater or equal to 38C (100.4F), Mild Pain (1 - 3), Moderate Pain (4 - 6)  aluminum hydroxide/magnesium hydroxide/simethicone Suspension 30 milliLiter(s) Oral every 4 hours PRN Dyspepsia  melatonin 3 milliGRAM(s) Oral at bedtime PRN Insomnia  ondansetron Injectable 4 milliGRAM(s) IV Push every 8 hours PRN Nausea and/or Vomiting      LABS:    11-26    137  |  103  |  14  ----------------------------<  153<H>  4.8   |  25  |  1.24    Ca    9.0      26 Nov 2021 06:10  Phos  3.5     11-26  Mg     1.50     11-26

## 2021-11-28 NOTE — PROGRESS NOTE ADULT - PROBLEM SELECTOR PLAN 2
1990's with surgical treatment, reoccurrence 2020 treated with chemo, L tongue ca and 2021 s/p biopsy 9/2021, to begin treatment at [facility] 11/29/2021.  - oncology recs appreciated, no inpatient intervention  - pt is scheduled for 5 fractions of TR outpatient  - pt pending home hospice approval with 5 fractions of RT  - c/w 1mg PO dilaudid pre-meal TID  - c/w magic mouthwash TID

## 2021-11-28 NOTE — PROGRESS NOTE ADULT - PROBLEM SELECTOR PLAN 7
Continue eye drop.  [facility] formulary. Pharmacy changed to use Latanoprost.  Continue Alphagan.

## 2021-11-29 NOTE — PROGRESS NOTE ADULT - PROBLEM SELECTOR PROBLEM 3
Dysphagia
Dysphagia
Tongue cancer
Dysphagia

## 2021-11-29 NOTE — PROGRESS NOTE ADULT - PROBLEM SELECTOR PLAN 2
1990's with surgical treatment, reoccurrence 2020 treated with chemo, L tongue ca and 2021 s/p biopsy 9/2021, to begin treatment at [facility] 11/29/2021.  - oncology recs appreciated, no inpatient intervention  - pt is scheduled for 5 fractions of TR outpatient  - pt pending home hospice approval with 5 fractions of RT  - c/w 1mg PO dilaudid pre-meal TID  - c/w magic mouthwash TID

## 2021-11-29 NOTE — PROGRESS NOTE ADULT - REASON FOR ADMISSION
Worsening renal function

## 2021-11-29 NOTE — PROGRESS NOTE ADULT - ATTENDING COMMENTS
NICK on CKD III likely pre-renal, improved w/ IVF, monitor Cr  Recurrent tongue CA, outpatient radiation therapy   Neoplasm related pain, pain control w/ Tylenol and Dilaudid PO   Failure to thrive in setting of tongue CA, pain control, encourage PO intake, diet as tolerated   DM2, Hgb A1c 5.7, hypoglycemia d/t poor PO intake now resolved, discontinued Lantus, c/w ISS, monitor fingersticks  HTN, c/w Coreg and Norvasc, monitor BP  Constipation, c/w bowel regimen, had a BM s/p enema    DC planning to home w/ hospice, private aid to start 11/29
pt tolerating pureed diet - continues to have some pain in mouth.  appreciate onc recs - once NICK improves pt can follow up as outpt with med/rad onc.
pt with small BM last night.  States her mouth/tongue pain is worse than her abd pain.  She is tolerating puree diet today.  Will follow up with med onc.  continue with IVF for prerenal NICK.  clarify MOLST from last admission.
NICK on CKD III likely pre-renal, improved w/ IVF, monitor Cr  Recurrent tongue CA, outpatient radiation therapy   Neoplasm related pain, pain control w/ Tylenol and Dilaudid PO   Failure to thrive in setting of tongue CA, pain control, encourage PO intake, diet as tolerated   DM2, Hgb A1c 5.7, hypoglycemia d/t poor PO intake now resolved, discontinued Lantus, c/w ISS, monitor fingersticks  HTN, c/w Coreg and Norvasc, monitor BP  Constipation, c/w bowel regimen, had a BM s/p enema    DC planning to home w/ hospice, private aid to start 11/29, d/c time 32 minutes
NICK on CKD III likely pre-renal, improved w/ IVF, monitor Cr  Recurrent tongue CA, outpatient radiation therapy   Neoplasm related pain, pain control w/ Tylenol and Dilaudid PO   Failure to thrive in setting of tongue CA, pain control, encourage PO intake, diet as tolerated   DM2, Hgb A1c 5.7, hypoglycemia d/t poor PO intake now resolved, discontinued Lantus, c/w ISS, monitor fingersticks  HTN, c/w Coreg and Norvasc, monitor BP  DC planning to home w/ hospice, private aid to start 11/29
NICK on CKD III likely pre-renal, improved w/ IVF, monitor Cr  Recurrent tongue CA, outpatient radiation therapy   Neoplasm related pain, pain control w/ Tylenol and Dilaudid PO   Failure to thrive in setting of tongue CA, pain control, encourage PO intake, diet as tolerated   DM2, Hgb A1c 5.7, hypoglycemia d/t poor PO intake now resolved, discontinued Lantus, c/w ISS, monitor fingersticks  HTN, c/w Coreg and Norvasc, monitor BP  DC planning to home w/ hospice, private aid to start 11/29
NICK on CKD III likely pre-renal, improved w/ IVF, monitor Cr  Recurrent tongue CA, outpatient radiation therapy   Neoplasm related pain, pain control w/ Tylenol and Dilaudid PO   Failure to thrive in setting of tongue CA, pain control, encourage PO intake, diet as tolerated   DM2, Hgb A1c 5.7, hypoglycemia d/t poor PO intake now resolved, discontinued Lantus, c/w ISS, monitor fingersticks  HTN, c/w Coreg and Norvasc, monitor BP  Hypomagnesemia, supplement Mg   DC planning to home w/ hospice, private aid to start 11/29
Pre-renal NICK, improving w/ IVF, monitor Cr  Recurrent tongue CA, outpatient med-Onc and Rad-Onc f/up   Neoplasm related pain, pain control w/ Tylenol and Dilaudid PO prn   Failure to thrive in setting of tongue CA, encourage PO intake, diet as tolerated   DM2, Hgb A1c 5.7, hypoglycemia d/t poor PO intake, d/c Lantus, c/w ISS, monitor fingersticks  HTN, c/w Coreg and Norvasc, monitor BP
NICK on CKD III likely pre-renal, improved w/ IVF, monitor Cr  Recurrent tongue CA, outpatient radiation therapy   Neoplasm related pain, pain control w/ Tylenol and Dilaudid PO   Failure to thrive in setting of tongue CA, pain control, encourage PO intake, diet as tolerated   DM2, Hgb A1c 5.7, hypoglycemia d/t poor PO intake now resolved, discontinued Lantus, c/w ISS, monitor fingersticks  HTN, c/w Coreg and Norvasc, monitor BP  Constipation, c/w bowel regimen, had a BM s/p enema    DC planning to home w/ hospice and private aid, d/c time 32 minutes
Pre-renal NICK, improving w/ IVF, monitor Cr  Recurrent tongue CA, outpatient med-Onc and Rad-Onc f/up   Neoplasm related pain, pain control w/ Tylenol and Dilaudid 1mg PO TID prior to meals   Failure to thrive in setting of tongue CA, pain control, encourage PO intake, diet as tolerated   DM2, Hgb A1c 5.7, hypoglycemia d/t poor PO intake, discontinued Lantus, c/w ISS, monitor fingersticks  HTN, c/w Coreg and Norvasc, monitor BP

## 2021-11-29 NOTE — PROGRESS NOTE ADULT - ASSESSMENT
81F,history of tongue ca 1990's with reoccurrence 2020 and 2021, dysphagia, DM2, Glaucoma, Hypertension, Hyperlipidemia, depression, constipation admitted for NICK on CKD, likely due to dehydration from decreased po intake. 
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 Knowles last year. ---- She now presents with what seems to be an infiltrative third primary R. BOT SCCa.   This is confirmed after EUA and biopsy where frozen section positive for SCCa.  outpt PET:  Large, FDG-avid mass in right lateral oral tongue/base of tongue, extending into glossotonsillar sulcus, best delineated on recent contrast-enhanced CT, and corresponding to patient's biopsy proven squamous cell carcinoma. Nonspecific, asymmetrically increased FDG activity in right side of nasopharynx. Please correlate with direct visualization. Nonspecific, diffuse, mildly increased FDG activity in the esophagus may reflect inflammation. There also is a large: Nonspecific focus of increased FDG activity in the perianal region. Please correlate clinically and with endoscopy/proctoscopy, as indicated. FDG-avid deformity, proximal right humerus, probably is secondary to fracture/trauma.    Patient lives with her elderly , she has 2 daughters but is not in close contact with them and they live in other [place], she has a sister [place] who is her HCP as per pt and her .   She is not interested in a feeding tube. She understands that to be eligible for any treatment she will need to be able to maintain her nutrition status.   She is frail and has a ECOG Performance status of 2-3 at home.   Best supportive care may be the best option.   This is the second hospitalization in 3 weeks for dehydration and NICK.     -Pal care consult for GOC discussions, would recommend dc to home with hospice. This option was discussed with patient's sister over the phone. She is interested in hearing more about it. Please reach out to discuss.  -No inpt onc interventions  -Care as per primary team  -Supportive care, pain control, Nutrition, PT, DVT ppx  -Outpatient oncology f/u    Will follow. Please do not hesitate to call back with questions.     Margaret Grover MD  Medical Oncology Attending  C: [phone]
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 Knowles last year. She now presents with what seems to be an infiltrative third primary R. BOT SCCa.   outpt PET:  Large, FDG-avid mass in right lateral oral tongue/base of tongue, extending into glossotonsillar sulcus, best delineated on recent contrast-enhanced CT, and corresponding to patient's biopsy proven squamous cell carcinoma. Nonspecific, asymmetrically increased FDG activity in right side of nasopharynx. Please correlate with direct visualization. Nonspecific, diffuse, mildly increased FDG activity in the esophagus may reflect inflammation. There also is a large: Nonspecific focus of increased FDG activity in the perianal region. Please correlate clinically and with endoscopy/proctoscopy, as indicated. FDG-avid deformity, proximal right humerus, probably is secondary to fracture/trauma.  Patient lives with her elderly , she has 2 daughters but is not in close contact with them and they live in other states, she has a sister [place] who is her HCP as per pt and her .   She is not interested in a feeding tube. She understands that to be eligible for any systemic treatment she will need to be able to maintain her nutrition status.   She is frail and has a ECOG Performance status of 3 at home.   Best supportive care may be the best option.   This is the second hospitalization in 3 weeks for dehydration and NICK.     -Severe constipation, no BM is more than 2 weeks, please increase bowel regimen.   -Pal care input apprecaited  -[facility] ongoing, patient is an appropriate candidate for hospice  -No inpt onc interventions  -Care as per primary team  -Supportive care, pain control, Nutrition, PT, DVT ppx  -Outpatient oncology f/u    Will follow. Please do not hesitate to call back with questions.     Margaret Grover MD  Medical Oncology Attending  C: [phone]
81F,history of tongue ca 1990's with reoccurrence 2020 and 2021, dysphagia, DM2, Glaucoma, Hypertension, Hyperlipidemia, depression, constipation admitted for NICK on CKD, likely due to dehydration from decreased po intake. 
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 Palliative consulted for assistance with sx management and complex decision making regarding goc.       
81F,history of tongue ca 1990's with reoccurrence 2020 and 2021, dysphagia, DM2, Glaucoma, Hypertension, Hyperlipidemia, depression, constipation admitted for NICK on CKD, likely due to dehydration from decreased po intake. 

## 2021-11-29 NOTE — PROGRESS NOTE ADULT - PROBLEM SELECTOR PROBLEM 2
Tongue cancer
NICK (acute kidney injury)
Tongue cancer

## 2021-11-29 NOTE — PROGRESS NOTE ADULT - PROBLEM SELECTOR PLAN 3
Passed bedside dysphagia screen.  Aspiration precautions.   Pt says she can tolerate a puree diet, consistent carbohydrate . Passed bedside dysphagia screen.  Aspiration precautions.   Pt says she can tolerate a puree diet, consistent carbohydrate. Adding ensure for supplemental nutrition.

## 2021-11-29 NOTE — PROGRESS NOTE ADULT - PROBLEM SELECTOR PROBLEM 4
Anxiety
Advanced directives, counseling/discussion
Anxiety

## 2021-11-29 NOTE — PROGRESS NOTE ADULT - NUTRITIONAL ASSESSMENT
This patient has been assessed with a concern for Malnutrition and has been determined to have a diagnosis/diagnoses of Severe protein-calorie malnutrition and Underweight (BMI < 19).    This patient is being managed with:   Diet Pureed-  Consistent Carbohydrate {Evening Snack} (CSTCHOSN)  Entered: Nov 22 2021  2:33PM    
This patient has been assessed with a concern for Malnutrition and has been determined to have a diagnosis/diagnoses of Severe protein-calorie malnutrition and Underweight (BMI < 19).    This patient is being managed with:   Diet Pureed-  Entered: Nov 21 2021  1:17PM    
This patient has been assessed with a concern for Malnutrition and has been determined to have a diagnosis/diagnoses of Severe protein-calorie malnutrition and Underweight (BMI < 19).    This patient is being managed with:   Diet Pureed-  Consistent Carbohydrate {Evening Snack} (CSTCHOSN)  Entered: Nov 22 2021  2:33PM    

## 2021-11-29 NOTE — PROGRESS NOTE ADULT - PROBLEM SELECTOR PROBLEM 10
Advanced directives, counseling/discussion

## 2021-11-29 NOTE — PROGRESS NOTE ADULT - SUBJECTIVE AND OBJECTIVE BOX
PAULA BLANCO 81y Female    Overnight events/subjective:  This AM, pt refused medications. Upon re-evaluation later in the day, pt took the medications. This afternoon, she ate food.  Denies fever, chills, chest pain, shortness of breath, abdominal pain, nausea, vomiting, or urinary symptoms. Had a BM yesterday. Spoke to  and family member Roque at bedside today. Roque was not aware that the pt was going home with hospice.  at bedside not understanding that the pt will continue to get worse and is going home with hospice.     Vital Signs Last 24 Hrs  T(C): 36.9 (28 Nov 2021 05:56), Max: 36.9 (28 Nov 2021 05:56)  T(F): 98.4 (28 Nov 2021 05:56), Max: 98.4 (28 Nov 2021 05:56)  HR: 64 (28 Nov 2021 10:55) (55 - 65)  BP: 158/55 (28 Nov 2021 10:55) (114/50 - 158/55)  BP(mean): --  RR: 18 (28 Nov 2021 05:56) (16 - 18)  SpO2: 100% (28 Nov 2021 05:56) (99% - 100%)      PHYSICAL EXAM:  EYES: EOMI, sclera clear  CHEST/LUNG: Clear to auscultation bilaterally; no wheeze  HEART: RRR; S1, S2; no M/R/G  ABDOMEN: soft, non-distended; non-tender; BS present  EXTREMITIES:  2+ Peripheral Pulses; no edema  NEURO: non-focal, AAOx1  PSYCH: appropriate affect  SKIN: No rashes or lesions    HOSPITAL MEDICATIONS:  MEDICATIONS  (STANDING):  amLODIPine   Tablet 10 milliGRAM(s) Oral daily  atorvastatin 10 milliGRAM(s) Oral at bedtime  brimonidine 0.2% Ophthalmic Solution 1 Drop(s) Right EYE two times a day  carvedilol 25 milliGRAM(s) Oral every 12 hours  dextrose 40% Gel 15 Gram(s) Oral once  dextrose 5%. 1000 milliLiter(s) (50 mL/Hr) IV Continuous <Continuous>  dextrose 5%. 1000 milliLiter(s) (100 mL/Hr) IV Continuous <Continuous>  dextrose 50% Injectable 12.5 Gram(s) IV Push once  dextrose 50% Injectable 25 Gram(s) IV Push once  dextrose 50% Injectable 25 Gram(s) IV Push once  FIRST- Mouthwash  BLM 10 milliLiter(s) Swish and Spit three times a day  fluticasone propionate 50 MICROgram(s)/spray Nasal Spray 1 Spray(s) Both Nostrils two times a day  glucagon  Injectable 1 milliGRAM(s) IntraMuscular once  heparin   Injectable 5000 Unit(s) SubCutaneous every 12 hours  HYDROmorphone   Tablet 1 milliGRAM(s) Oral three times a day  influenza  Vaccine (HIGH DOSE) 0.7 milliLiter(s) IntraMuscular once  insulin lispro (ADMELOG) corrective regimen sliding scale   SubCutaneous three times a day before meals  insulin lispro (ADMELOG) corrective regimen sliding scale   SubCutaneous at bedtime  latanoprost 0.005% Ophthalmic Solution 1 Drop(s) Right EYE at bedtime  lidocaine 2% Viscous 10 milliLiter(s) Swish and Spit three times a day  mirtazapine 45 milliGRAM(s) Oral at bedtime  polyethylene glycol 3350 17 Gram(s) Oral every 12 hours  senna 2 Tablet(s) Oral at bedtime  sertraline 50 milliGRAM(s) Oral daily    MEDICATIONS  (PRN):  acetaminophen     Tablet .. 650 milliGRAM(s) Oral every 6 hours PRN Temp greater or equal to 38C (100.4F), Mild Pain (1 - 3), Moderate Pain (4 - 6)  aluminum hydroxide/magnesium hydroxide/simethicone Suspension 30 milliLiter(s) Oral every 4 hours PRN Dyspepsia  melatonin 3 milliGRAM(s) Oral at bedtime PRN Insomnia  ondansetron Injectable 4 milliGRAM(s) IV Push every 8 hours PRN Nausea and/or Vomiting      LABS:    11-26    137  |  103  |  14  ----------------------------<  153<H>  4.8   |  25  |  1.24    Ca    9.0      26 Nov 2021 06:10  Phos  3.5     11-26  Mg     1.50     11-26             MYRON BLANCOMARY 81y Female    Overnight events/subjective: This AM pt feels well. She took her medications.  Denies fever, chills, chest pain, shortness of breath, abdominal pain, nausea, vomiting, or urinary symptoms. Had a BM. Plan is to go home today    Vital Signs Last 24 Hrs  T(C): 36.8 (29 Nov 2021 05:16), Max: 36.8 (29 Nov 2021 05:16)  T(F): 98.2 (29 Nov 2021 05:16), Max: 98.2 (29 Nov 2021 05:16)  HR: 61 (29 Nov 2021 05:16) (59 - 64)  BP: 158/50 (29 Nov 2021 05:16) (139/59 - 158/55)  BP(mean): --  RR: 16 (29 Nov 2021 05:16) (16 - 18)  SpO2: 100% (29 Nov 2021 05:16) (100% - 100%)      PHYSICAL EXAM:  EYES: EOMI, sclera clear  CHEST/LUNG: Clear to auscultation bilaterally; no wheeze  HEART: RRR; S1, S2; no M/R/G  ABDOMEN: soft, non-distended; non-tender; BS present  EXTREMITIES:  2+ Peripheral Pulses; no edema  NEURO: non-focal, AAOx2  PSYCH: appropriate affect  SKIN: No rashes or lesions    HOSPITAL MEDICATIONS:  MEDICATIONS  (STANDING):  amLODIPine   Tablet 10 milliGRAM(s) Oral daily  atorvastatin 10 milliGRAM(s) Oral at bedtime  brimonidine 0.2% Ophthalmic Solution 1 Drop(s) Right EYE two times a day  carvedilol 25 milliGRAM(s) Oral every 12 hours  dextrose 40% Gel 15 Gram(s) Oral once  dextrose 5%. 1000 milliLiter(s) (50 mL/Hr) IV Continuous <Continuous>  dextrose 5%. 1000 milliLiter(s) (100 mL/Hr) IV Continuous <Continuous>  dextrose 50% Injectable 12.5 Gram(s) IV Push once  dextrose 50% Injectable 25 Gram(s) IV Push once  dextrose 50% Injectable 25 Gram(s) IV Push once  FIRST- Mouthwash  BLM 10 milliLiter(s) Swish and Spit three times a day  fluticasone propionate 50 MICROgram(s)/spray Nasal Spray 1 Spray(s) Both Nostrils two times a day  glucagon  Injectable 1 milliGRAM(s) IntraMuscular once  heparin   Injectable 5000 Unit(s) SubCutaneous every 12 hours  HYDROmorphone   Tablet 1 milliGRAM(s) Oral three times a day  influenza  Vaccine (HIGH DOSE) 0.7 milliLiter(s) IntraMuscular once  insulin lispro (ADMELOG) corrective regimen sliding scale   SubCutaneous three times a day before meals  insulin lispro (ADMELOG) corrective regimen sliding scale   SubCutaneous at bedtime  latanoprost 0.005% Ophthalmic Solution 1 Drop(s) Right EYE at bedtime  lidocaine 2% Viscous 10 milliLiter(s) Swish and Spit three times a day  mirtazapine 45 milliGRAM(s) Oral at bedtime  polyethylene glycol 3350 17 Gram(s) Oral every 12 hours  senna 2 Tablet(s) Oral at bedtime  sertraline 50 milliGRAM(s) Oral daily    MEDICATIONS  (PRN):  acetaminophen     Tablet .. 650 milliGRAM(s) Oral every 6 hours PRN Temp greater or equal to 38C (100.4F), Mild Pain (1 - 3), Moderate Pain (4 - 6)  aluminum hydroxide/magnesium hydroxide/simethicone Suspension 30 milliLiter(s) Oral every 4 hours PRN Dyspepsia  melatonin 3 milliGRAM(s) Oral at bedtime PRN Insomnia  ondansetron Injectable 4 milliGRAM(s) IV Push every 8 hours PRN Nausea and/or Vomiting      LABS:    11-29    137  |  103  |  14  ----------------------------<  169<H>  4.9   |  26  |  1.45<H>    Ca    9.3      29 Nov 2021 05:21  Phos  2.2     11-29  Mg     2.10     11-29

## 2021-11-29 NOTE — PROGRESS NOTE ADULT - PROBLEM SELECTOR PROBLEM 7
Glaucoma
27-Dec-2017 19:42
Glaucoma

## 2021-11-29 NOTE — PROGRESS NOTE ADULT - PROBLEM SELECTOR PLAN 1
11/4/21 Baseline creatinine 1.32, on admission 2.17. Now improved to 1.32 today  No complaints of dysuria.   BUN/ Creatinine = 53/ 2.17. Ratio indicative of dehydration, likely due to decreased po intake from loss of appetite.  BNP 1446, unlikely cardiogenic in origin  CXR preliminary clear.  Urine lytes WNL  Renal Duplex WNL  Avoid nephrotoxic agents and monitor lytes.  Off IVF, tolerating diet 11/4/21 Baseline creatinine 1.32, on admission 2.17. Now improved to 1.32 today  No complaints of dysuria.   BUN/ Creatinine = 53/ 2.17. Ratio indicative of dehydration, likely due to decreased po intake from loss of appetite.  BNP 1446, unlikely cardiogenic in origin  CXR preliminary clear.  Urine lytes WNL  Renal Duplex WNL  Avoid nephrotoxic agents and monitor lytes.

## 2021-11-29 NOTE — PROGRESS NOTE ADULT - PROVIDER SPECIALTY LIST ADULT
Heme/Onc
Internal Medicine
Heme/Onc
Internal Medicine
Palliative Care
Internal Medicine

## 2021-11-29 NOTE — PROGRESS NOTE ADULT - PROBLEM SELECTOR PROBLEM 1
Acute kidney injury superimposed on CKD
Odynophagia
Acute kidney injury superimposed on CKD

## 2021-11-29 NOTE — PROGRESS NOTE ADULT - PROBLEM SELECTOR PLAN 8
BS= 90.  The pt and spouse say she takes 15 units of Soliqua insulin every morning.   Soliqua non formulary. Pharmacy advised to replace with Lantus 1 to1 conversion.  A1c 5.7  - d/c lantus as pt hypoglycemic in the evenings.   - c/w low dose ISS The pt and spouse say she took 15 units of Soliqua insulin every morning at home  Soliqua non formulary. Pharmacy advised to replace with Lantus 1 to1 conversion.  A1c 5.7  - d/c lantus as pt hypoglycemic in the evenings.   - c/w low dose ISS

## 2021-11-29 NOTE — PROGRESS NOTE ADULT - PROBLEM SELECTOR PROBLEM 5
Depression
Encounter for palliative care
Depression

## 2021-11-29 NOTE — PROGRESS NOTE ADULT - PROBLEM SELECTOR PLAN 7
Continue eye drop.  [facility] formulary. Pharmacy changed to use Latanoprost.  Continue Alphagan.

## 2021-12-09 NOTE — HISTORY OF PRESENT ILLNESS
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 Completed 70 Gy in 4/2020, with Dr. Knowles. \par \par After RT she saw her surgeon Dr. Doan in 6/19/20. No imaging was done since RT. However, on 10/19/21 presented to Dr. Dara mcfarland tongue pain. 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  \par \par Interval imaging:\par \par CT neck 9/28/21: Enhancing mass/neoplasm within the tongue base and root of tongue on the right as described above.\par \par No cervical adenopathy.\par \par Multiple thyroid nodules for which thyroid sonography is recommended as clinically warranted.\par \par PET/CT 1. Large, FDG-avid mass in right lateral oral tongue/base of tongue, extending into glossotonsillar sulcus, best delineated on recent contrast-enhanced CT, and corresponding to patient's biopsy proven squamous cell carcinoma.\par \par 2. Nonspecific, asymmetrically increased FDG activity in right side of nasopharynx. Please correlate with direct visualization.\par \par 3. Nonspecific, diffuse, mildly increased FDG activity in the esophagus may reflect inflammation. There also is a large: Nonspecific focus of increased FDG activity in the perianal region. Please correlate clinically and with endoscopy/proctoscopy, as indicated.\par \par 4. FDG-avid deformity, proximal right humerus, probably is secondary to fracture/trauma. Please correlate clinically.\par \par 12/9/2021 Presents for OTV. Completed 3/5 fx SBRT to Right tumor/node. Doing well. Denies pain.\par

## 2021-12-09 NOTE — DISEASE MANAGEMENT
[Clinical] : TNM Stage: c [N/A] : Currently not applicable [TTNM] : 2 [NTNM] : 0 [MTNM] : x [de-identified] : 8165 [de-identified] : 1887 [de-identified] : Tumor/node right

## 2021-12-11 PROBLEM — R13.10 DYSPHAGIA, UNSPECIFIED: Chronic | Status: ACTIVE | Noted: 2021-01-01

## 2022-01-01 ENCOUNTER — NON-APPOINTMENT (OUTPATIENT)
Age: 82
End: 2022-01-01

## 2022-01-01 ENCOUNTER — APPOINTMENT (OUTPATIENT)
Dept: HEMATOLOGY ONCOLOGY | Facility: CLINIC | Age: 82
End: 2022-01-01
Payer: MEDICARE

## 2022-01-01 ENCOUNTER — OUTPATIENT (OUTPATIENT)
Dept: OUTPATIENT SERVICES | Facility: HOSPITAL | Age: 82
LOS: 1 days | Discharge: HOME | End: 2022-01-01

## 2022-01-01 ENCOUNTER — OUTPATIENT (OUTPATIENT)
Dept: OUTPATIENT SERVICES | Facility: HOSPITAL | Age: 82
LOS: 1 days | End: 2022-01-01
Payer: MEDICARE

## 2022-01-01 ENCOUNTER — RESULT REVIEW (OUTPATIENT)
Age: 82
End: 2022-01-01

## 2022-01-01 ENCOUNTER — APPOINTMENT (OUTPATIENT)
Dept: NUCLEAR MEDICINE | Facility: IMAGING CENTER | Age: 82
End: 2022-01-01

## 2022-01-01 ENCOUNTER — APPOINTMENT (OUTPATIENT)
Dept: HEMATOLOGY ONCOLOGY | Facility: CLINIC | Age: 82
End: 2022-01-01
Payer: OTHER MISCELLANEOUS

## 2022-01-01 ENCOUNTER — APPOINTMENT (OUTPATIENT)
Dept: INFUSION THERAPY | Facility: HOSPITAL | Age: 82
End: 2022-01-01

## 2022-01-01 ENCOUNTER — APPOINTMENT (OUTPATIENT)
Dept: NUCLEAR MEDICINE | Facility: IMAGING CENTER | Age: 82
End: 2022-01-01
Payer: MEDICARE

## 2022-01-01 ENCOUNTER — APPOINTMENT (OUTPATIENT)
Dept: NUCLEAR MEDICINE | Facility: IMAGING CENTER | Age: 82
End: 2022-01-01
Payer: OTHER MISCELLANEOUS

## 2022-01-01 ENCOUNTER — OUTPATIENT (OUTPATIENT)
Dept: OUTPATIENT SERVICES | Facility: HOSPITAL | Age: 82
LOS: 1 days | End: 2022-01-01
Payer: OTHER MISCELLANEOUS

## 2022-01-01 ENCOUNTER — APPOINTMENT (OUTPATIENT)
Dept: RADIATION ONCOLOGY | Facility: CLINIC | Age: 82
End: 2022-01-01

## 2022-01-01 ENCOUNTER — APPOINTMENT (OUTPATIENT)
Dept: HEMATOLOGY ONCOLOGY | Facility: CLINIC | Age: 82
End: 2022-01-01

## 2022-01-01 ENCOUNTER — APPOINTMENT (OUTPATIENT)
Dept: RADIATION ONCOLOGY | Facility: CLINIC | Age: 82
End: 2022-01-01
Payer: MEDICARE

## 2022-01-01 ENCOUNTER — APPOINTMENT (OUTPATIENT)
Dept: OTOLARYNGOLOGY | Facility: CLINIC | Age: 82
End: 2022-01-01

## 2022-01-01 ENCOUNTER — OUTPATIENT (OUTPATIENT)
Dept: OUTPATIENT SERVICES | Facility: HOSPITAL | Age: 82
LOS: 1 days | Discharge: HOME | End: 2022-01-01
Payer: MEDICARE

## 2022-01-01 ENCOUNTER — EMERGENCY (EMERGENCY)
Facility: HOSPITAL | Age: 82
LOS: 1 days | Discharge: HOME | End: 2022-01-01
Attending: EMERGENCY MEDICINE | Admitting: EMERGENCY MEDICINE

## 2022-01-01 ENCOUNTER — APPOINTMENT (OUTPATIENT)
Dept: RADIATION ONCOLOGY | Facility: CLINIC | Age: 82
End: 2022-01-01
Payer: OTHER MISCELLANEOUS

## 2022-01-01 ENCOUNTER — APPOINTMENT (OUTPATIENT)
Dept: GERIATRICS | Facility: CLINIC | Age: 82
End: 2022-01-01

## 2022-01-01 VITALS
DIASTOLIC BLOOD PRESSURE: 85 MMHG | HEIGHT: 63 IN | WEIGHT: 74.07 LBS | HEART RATE: 61 BPM | BODY MASS INDEX: 13.12 KG/M2 | RESPIRATION RATE: 16 BRPM | TEMPERATURE: 96.26 F | OXYGEN SATURATION: 95 % | SYSTOLIC BLOOD PRESSURE: 162 MMHG

## 2022-01-01 VITALS
OXYGEN SATURATION: 99 % | DIASTOLIC BLOOD PRESSURE: 65 MMHG | TEMPERATURE: 98 F | HEIGHT: 64 IN | RESPIRATION RATE: 18 BRPM | HEART RATE: 53 BPM | SYSTOLIC BLOOD PRESSURE: 164 MMHG

## 2022-01-01 VITALS
HEIGHT: 63.98 IN | RESPIRATION RATE: 16 BRPM | TEMPERATURE: 98.3 F | HEART RATE: 57 BPM | BODY MASS INDEX: 13.55 KG/M2 | SYSTOLIC BLOOD PRESSURE: 161 MMHG | OXYGEN SATURATION: 98 % | DIASTOLIC BLOOD PRESSURE: 70 MMHG | WEIGHT: 79.37 LBS

## 2022-01-01 VITALS
SYSTOLIC BLOOD PRESSURE: 176 MMHG | HEART RATE: 51 BPM | TEMPERATURE: 96 F | OXYGEN SATURATION: 100 % | DIASTOLIC BLOOD PRESSURE: 75 MMHG | RESPIRATION RATE: 18 BRPM

## 2022-01-01 VITALS
RESPIRATION RATE: 16 BRPM | HEART RATE: 55 BPM | DIASTOLIC BLOOD PRESSURE: 56 MMHG | BODY MASS INDEX: 14.68 KG/M2 | SYSTOLIC BLOOD PRESSURE: 134 MMHG | HEIGHT: 64 IN | OXYGEN SATURATION: 98 % | WEIGHT: 85.98 LBS

## 2022-01-01 DIAGNOSIS — Z98.890 OTHER SPECIFIED POSTPROCEDURAL STATES: Chronic | ICD-10-CM

## 2022-01-01 DIAGNOSIS — R11.2 NAUSEA WITH VOMITING, UNSPECIFIED: ICD-10-CM

## 2022-01-01 DIAGNOSIS — C02.9 MALIGNANT NEOPLASM OF TONGUE, UNSPECIFIED: Chronic | ICD-10-CM

## 2022-01-01 DIAGNOSIS — C76.0 MALIGNANT NEOPLASM OF HEAD, FACE AND NECK: ICD-10-CM

## 2022-01-01 DIAGNOSIS — C02.9 MALIGNANT NEOPLASM OF TONGUE, UNSPECIFIED: ICD-10-CM

## 2022-01-01 DIAGNOSIS — Z51.11 ENCOUNTER FOR ANTINEOPLASTIC CHEMOTHERAPY: ICD-10-CM

## 2022-01-01 DIAGNOSIS — C05.1 MALIGNANT NEOPLASM OF SOFT PALATE: ICD-10-CM

## 2022-01-01 DIAGNOSIS — C01 MALIGNANT NEOPLASM OF BASE OF TONGUE: ICD-10-CM

## 2022-01-01 LAB
ALBUMIN SERPL ELPH-MCNC: 2.8 G/DL — LOW (ref 3.3–5)
ALBUMIN SERPL ELPH-MCNC: 3.2 G/DL — LOW (ref 3.3–5)
ALBUMIN SERPL ELPH-MCNC: 3.6 G/DL — SIGNIFICANT CHANGE UP (ref 3.3–5)
ALBUMIN SERPL ELPH-MCNC: 3.6 G/DL — SIGNIFICANT CHANGE UP (ref 3.3–5)
ALBUMIN SERPL ELPH-MCNC: 3.7 G/DL
ALP BLD-CCNC: 73 U/L
ALP SERPL-CCNC: 76 U/L — SIGNIFICANT CHANGE UP (ref 40–120)
ALP SERPL-CCNC: 77 U/L — SIGNIFICANT CHANGE UP (ref 40–120)
ALP SERPL-CCNC: 83 U/L — SIGNIFICANT CHANGE UP (ref 40–120)
ALP SERPL-CCNC: 85 U/L — SIGNIFICANT CHANGE UP (ref 40–120)
ALT FLD-CCNC: 23 U/L — SIGNIFICANT CHANGE UP (ref 10–45)
ALT FLD-CCNC: 25 U/L — SIGNIFICANT CHANGE UP (ref 4–33)
ALT FLD-CCNC: 27 U/L — SIGNIFICANT CHANGE UP (ref 10–45)
ALT FLD-CCNC: 41 U/L — SIGNIFICANT CHANGE UP (ref 10–45)
ALT SERPL-CCNC: 10 U/L
ANION GAP SERPL CALC-SCNC: 10 MMOL/L
ANION GAP SERPL CALC-SCNC: 11 MMOL/L — SIGNIFICANT CHANGE UP (ref 5–17)
ANION GAP SERPL CALC-SCNC: 15 MMOL/L — SIGNIFICANT CHANGE UP (ref 5–17)
ANION GAP SERPL CALC-SCNC: 17 MMOL/L — SIGNIFICANT CHANGE UP (ref 5–17)
ANION GAP SERPL CALC-SCNC: 9 MMOL/L — SIGNIFICANT CHANGE UP (ref 7–14)
APPEARANCE UR: CLEAR — SIGNIFICANT CHANGE UP
AST SERPL-CCNC: 16 U/L
AST SERPL-CCNC: 19 U/L — SIGNIFICANT CHANGE UP (ref 10–40)
AST SERPL-CCNC: 20 U/L — SIGNIFICANT CHANGE UP (ref 10–40)
AST SERPL-CCNC: 23 U/L — SIGNIFICANT CHANGE UP (ref 4–32)
AST SERPL-CCNC: 27 U/L — SIGNIFICANT CHANGE UP (ref 10–40)
BACTERIA # UR AUTO: NEGATIVE — SIGNIFICANT CHANGE UP
BASOPHILS # BLD AUTO: 0.01 K/UL — SIGNIFICANT CHANGE UP (ref 0–0.2)
BASOPHILS # BLD AUTO: 0.02 K/UL — SIGNIFICANT CHANGE UP (ref 0–0.2)
BASOPHILS # BLD AUTO: 0.05 K/UL — SIGNIFICANT CHANGE UP (ref 0–0.2)
BASOPHILS # BLD AUTO: 0.06 K/UL — SIGNIFICANT CHANGE UP (ref 0–0.2)
BASOPHILS # BLD AUTO: 0.07 K/UL — SIGNIFICANT CHANGE UP (ref 0–0.2)
BASOPHILS NFR BLD AUTO: 0.1 % — SIGNIFICANT CHANGE UP (ref 0–2)
BASOPHILS NFR BLD AUTO: 0.3 % — SIGNIFICANT CHANGE UP (ref 0–2)
BASOPHILS NFR BLD AUTO: 0.8 % — SIGNIFICANT CHANGE UP (ref 0–2)
BASOPHILS NFR BLD AUTO: 1.1 % — SIGNIFICANT CHANGE UP (ref 0–2)
BASOPHILS NFR BLD AUTO: 1.3 % — SIGNIFICANT CHANGE UP (ref 0–2)
BILIRUB SERPL-MCNC: 0.2 MG/DL — SIGNIFICANT CHANGE UP (ref 0.2–1.2)
BILIRUB SERPL-MCNC: 0.3 MG/DL
BILIRUB SERPL-MCNC: 0.3 MG/DL — SIGNIFICANT CHANGE UP (ref 0.2–1.2)
BILIRUB SERPL-MCNC: 0.4 MG/DL — SIGNIFICANT CHANGE UP (ref 0.2–1.2)
BILIRUB SERPL-MCNC: 0.4 MG/DL — SIGNIFICANT CHANGE UP (ref 0.2–1.2)
BILIRUB UR-MCNC: NEGATIVE — SIGNIFICANT CHANGE UP
BUN SERPL-MCNC: 25 MG/DL
BUN SERPL-MCNC: 32 MG/DL — HIGH (ref 7–23)
BUN SERPL-MCNC: 35 MG/DL — HIGH (ref 7–23)
BUN SERPL-MCNC: 37 MG/DL — HIGH (ref 7–23)
BUN SERPL-MCNC: 43 MG/DL — HIGH (ref 7–23)
CALCIUM SERPL-MCNC: 10 MG/DL
CALCIUM SERPL-MCNC: 10 MG/DL — SIGNIFICANT CHANGE UP (ref 8.4–10.5)
CALCIUM SERPL-MCNC: 9.6 MG/DL — SIGNIFICANT CHANGE UP (ref 8.4–10.5)
CALCIUM SERPL-MCNC: 9.7 MG/DL — SIGNIFICANT CHANGE UP (ref 8.4–10.5)
CALCIUM SERPL-MCNC: 9.9 MG/DL — SIGNIFICANT CHANGE UP (ref 8.4–10.5)
CHLORIDE SERPL-SCNC: 100 MMOL/L — SIGNIFICANT CHANGE UP (ref 96–108)
CHLORIDE SERPL-SCNC: 102 MMOL/L — SIGNIFICANT CHANGE UP (ref 96–108)
CHLORIDE SERPL-SCNC: 103 MMOL/L — SIGNIFICANT CHANGE UP (ref 98–107)
CHLORIDE SERPL-SCNC: 96 MMOL/L
CHLORIDE SERPL-SCNC: 96 MMOL/L — SIGNIFICANT CHANGE UP (ref 96–108)
CO2 SERPL-SCNC: 22 MMOL/L — SIGNIFICANT CHANGE UP (ref 22–31)
CO2 SERPL-SCNC: 23 MMOL/L — SIGNIFICANT CHANGE UP (ref 22–31)
CO2 SERPL-SCNC: 24 MMOL/L — SIGNIFICANT CHANGE UP (ref 22–31)
CO2 SERPL-SCNC: 26 MMOL/L — SIGNIFICANT CHANGE UP (ref 22–31)
CO2 SERPL-SCNC: 27 MMOL/L
COLOR SPEC: SIGNIFICANT CHANGE UP
CREAT SERPL-MCNC: 1.1 MG/DL — SIGNIFICANT CHANGE UP (ref 0.5–1.3)
CREAT SERPL-MCNC: 1.25 MG/DL — SIGNIFICANT CHANGE UP (ref 0.5–1.3)
CREAT SERPL-MCNC: 1.3 MG/DL — SIGNIFICANT CHANGE UP (ref 0.5–1.3)
CREAT SERPL-MCNC: 1.33 MG/DL
CREAT SERPL-MCNC: 1.37 MG/DL — HIGH (ref 0.5–1.3)
CULTURE RESULTS: SIGNIFICANT CHANGE UP
DIFF PNL FLD: NEGATIVE — SIGNIFICANT CHANGE UP
EGFR: 38 ML/MIN/1.73M2 — LOW
EGFR: 40 ML/MIN/1.73M2
EGFR: 41 ML/MIN/1.73M2 — LOW
EGFR: 43 ML/MIN/1.73M2 — LOW
EGFR: 50 ML/MIN/1.73M2 — LOW
EOSINOPHIL # BLD AUTO: 0.04 K/UL — SIGNIFICANT CHANGE UP (ref 0–0.5)
EOSINOPHIL # BLD AUTO: 0.05 K/UL — SIGNIFICANT CHANGE UP (ref 0–0.5)
EOSINOPHIL # BLD AUTO: 0.06 K/UL — SIGNIFICANT CHANGE UP (ref 0–0.5)
EOSINOPHIL # BLD AUTO: 0.1 K/UL — SIGNIFICANT CHANGE UP (ref 0–0.5)
EOSINOPHIL # BLD AUTO: 0.1 K/UL — SIGNIFICANT CHANGE UP (ref 0–0.5)
EOSINOPHIL NFR BLD AUTO: 0.5 % — SIGNIFICANT CHANGE UP (ref 0–6)
EOSINOPHIL NFR BLD AUTO: 0.8 % — SIGNIFICANT CHANGE UP (ref 0–6)
EOSINOPHIL NFR BLD AUTO: 0.9 % — SIGNIFICANT CHANGE UP (ref 0–6)
EOSINOPHIL NFR BLD AUTO: 1.8 % — SIGNIFICANT CHANGE UP (ref 0–6)
EOSINOPHIL NFR BLD AUTO: 1.8 % — SIGNIFICANT CHANGE UP (ref 0–6)
EPI CELLS # UR: 1 /HPF — SIGNIFICANT CHANGE UP (ref 0–5)
GLUCOSE SERPL-MCNC: 168 MG/DL
GLUCOSE SERPL-MCNC: 170 MG/DL — HIGH (ref 70–99)
GLUCOSE SERPL-MCNC: 192 MG/DL — HIGH (ref 70–99)
GLUCOSE SERPL-MCNC: 203 MG/DL — HIGH (ref 70–99)
GLUCOSE SERPL-MCNC: 210 MG/DL — HIGH (ref 70–99)
GLUCOSE UR QL: ABNORMAL
HBV CORE IGG+IGM SER QL: NONREACTIVE
HBV SURFACE AB SER QL: NONREACTIVE
HBV SURFACE AG SER QL: NONREACTIVE
HCT VFR BLD CALC: 27.1 % — LOW (ref 34.5–45)
HCT VFR BLD CALC: 29.4 % — LOW (ref 34.5–45)
HCT VFR BLD CALC: 29.7 % — LOW (ref 34.5–45)
HCT VFR BLD CALC: 32 % — LOW (ref 34.5–45)
HCT VFR BLD CALC: 32.1 % — LOW (ref 34.5–45)
HCV AB SER QL: NONREACTIVE
HCV S/CO RATIO: 0.1 S/CO
HGB BLD-MCNC: 10 G/DL — LOW (ref 11.5–15.5)
HGB BLD-MCNC: 10.2 G/DL — LOW (ref 11.5–15.5)
HGB BLD-MCNC: 8.8 G/DL — LOW (ref 11.5–15.5)
HGB BLD-MCNC: 9.3 G/DL — LOW (ref 11.5–15.5)
HGB BLD-MCNC: 9.4 G/DL — LOW (ref 11.5–15.5)
HYALINE CASTS # UR AUTO: 1 /LPF — SIGNIFICANT CHANGE UP (ref 0–7)
IANC: 7.22 K/UL — SIGNIFICANT CHANGE UP (ref 1.8–7.4)
IMM GRANULOCYTES NFR BLD AUTO: 0.3 % — SIGNIFICANT CHANGE UP (ref 0–1.5)
IMM GRANULOCYTES NFR BLD AUTO: 0.3 % — SIGNIFICANT CHANGE UP (ref 0–1.5)
IMM GRANULOCYTES NFR BLD AUTO: 0.4 % — SIGNIFICANT CHANGE UP (ref 0–1.5)
IMM GRANULOCYTES NFR BLD AUTO: 0.6 % — SIGNIFICANT CHANGE UP (ref 0–1.5)
IMM GRANULOCYTES NFR BLD AUTO: 0.7 % — SIGNIFICANT CHANGE UP (ref 0–1.5)
KETONES UR-MCNC: NEGATIVE — SIGNIFICANT CHANGE UP
LEUKOCYTE ESTERASE UR-ACNC: ABNORMAL
LYMPHOCYTES # BLD AUTO: 0.45 K/UL — LOW (ref 1–3.3)
LYMPHOCYTES # BLD AUTO: 0.46 K/UL — LOW (ref 1–3.3)
LYMPHOCYTES # BLD AUTO: 0.6 K/UL — LOW (ref 1–3.3)
LYMPHOCYTES # BLD AUTO: 0.63 K/UL — LOW (ref 1–3.3)
LYMPHOCYTES # BLD AUTO: 0.8 K/UL — LOW (ref 1–3.3)
LYMPHOCYTES # BLD AUTO: 14.6 % — SIGNIFICANT CHANGE UP (ref 13–44)
LYMPHOCYTES # BLD AUTO: 5.7 % — LOW (ref 13–44)
LYMPHOCYTES # BLD AUTO: 8 % — LOW (ref 13–44)
LYMPHOCYTES # BLD AUTO: 8.8 % — LOW (ref 13–44)
LYMPHOCYTES # BLD AUTO: 9.8 % — LOW (ref 13–44)
MAGNESIUM SERPL-MCNC: 2.3 MG/DL — SIGNIFICANT CHANGE UP (ref 1.6–2.6)
MCHC RBC-ENTMCNC: 31.2 GM/DL — LOW (ref 32–36)
MCHC RBC-ENTMCNC: 31.3 PG — SIGNIFICANT CHANGE UP (ref 27–34)
MCHC RBC-ENTMCNC: 31.3 PG — SIGNIFICANT CHANGE UP (ref 27–34)
MCHC RBC-ENTMCNC: 31.6 G/DL — LOW (ref 32–36)
MCHC RBC-ENTMCNC: 31.6 G/DL — LOW (ref 32–36)
MCHC RBC-ENTMCNC: 31.9 G/DL — LOW (ref 32–36)
MCHC RBC-ENTMCNC: 32 PG — SIGNIFICANT CHANGE UP (ref 27–34)
MCHC RBC-ENTMCNC: 32.1 PG — SIGNIFICANT CHANGE UP (ref 27–34)
MCHC RBC-ENTMCNC: 32.4 PG — SIGNIFICANT CHANGE UP (ref 27–34)
MCHC RBC-ENTMCNC: 32.5 G/DL — SIGNIFICANT CHANGE UP (ref 32–36)
MCV RBC AUTO: 100.6 FL — HIGH (ref 80–100)
MCV RBC AUTO: 103.9 FL — HIGH (ref 80–100)
MCV RBC AUTO: 98.5 FL — SIGNIFICANT CHANGE UP (ref 80–100)
MCV RBC AUTO: 99 FL — SIGNIFICANT CHANGE UP (ref 80–100)
MCV RBC AUTO: 99 FL — SIGNIFICANT CHANGE UP (ref 80–100)
MONOCYTES # BLD AUTO: 0.36 K/UL — SIGNIFICANT CHANGE UP (ref 0–0.9)
MONOCYTES # BLD AUTO: 0.45 K/UL — SIGNIFICANT CHANGE UP (ref 0–0.9)
MONOCYTES # BLD AUTO: 0.49 K/UL — SIGNIFICANT CHANGE UP (ref 0–0.9)
MONOCYTES # BLD AUTO: 0.58 K/UL — SIGNIFICANT CHANGE UP (ref 0–0.9)
MONOCYTES # BLD AUTO: 0.64 K/UL — SIGNIFICANT CHANGE UP (ref 0–0.9)
MONOCYTES NFR BLD AUTO: 10.3 % — SIGNIFICANT CHANGE UP (ref 2–14)
MONOCYTES NFR BLD AUTO: 11.7 % — SIGNIFICANT CHANGE UP (ref 2–14)
MONOCYTES NFR BLD AUTO: 4.4 % — SIGNIFICANT CHANGE UP (ref 2–14)
MONOCYTES NFR BLD AUTO: 7 % — SIGNIFICANT CHANGE UP (ref 2–14)
MONOCYTES NFR BLD AUTO: 7.2 % — SIGNIFICANT CHANGE UP (ref 2–14)
NEUTROPHILS # BLD AUTO: 3.85 K/UL — SIGNIFICANT CHANGE UP (ref 1.8–7.4)
NEUTROPHILS # BLD AUTO: 4.4 K/UL — SIGNIFICANT CHANGE UP (ref 1.8–7.4)
NEUTROPHILS # BLD AUTO: 5.26 K/UL — SIGNIFICANT CHANGE UP (ref 1.8–7.4)
NEUTROPHILS # BLD AUTO: 5.6 K/UL — SIGNIFICANT CHANGE UP (ref 1.8–7.4)
NEUTROPHILS # BLD AUTO: 7.22 K/UL — SIGNIFICANT CHANGE UP (ref 1.8–7.4)
NEUTROPHILS NFR BLD AUTO: 70.2 % — SIGNIFICANT CHANGE UP (ref 43–77)
NEUTROPHILS NFR BLD AUTO: 78.1 % — HIGH (ref 43–77)
NEUTROPHILS NFR BLD AUTO: 81.3 % — HIGH (ref 43–77)
NEUTROPHILS NFR BLD AUTO: 82.5 % — HIGH (ref 43–77)
NEUTROPHILS NFR BLD AUTO: 88.7 % — HIGH (ref 43–77)
NITRITE UR-MCNC: NEGATIVE — SIGNIFICANT CHANGE UP
NRBC # BLD: 0 /100 WBCS — SIGNIFICANT CHANGE UP
NRBC # BLD: 0 /100 WBCS — SIGNIFICANT CHANGE UP (ref 0–0)
NRBC # FLD: 0 K/UL — SIGNIFICANT CHANGE UP
PH UR: 6 — SIGNIFICANT CHANGE UP (ref 5–8)
PHOSPHATE SERPL-MCNC: 3.3 MG/DL — SIGNIFICANT CHANGE UP (ref 2.5–4.5)
PLATELET # BLD AUTO: 227 K/UL — SIGNIFICANT CHANGE UP (ref 150–400)
PLATELET # BLD AUTO: 233 K/UL — SIGNIFICANT CHANGE UP (ref 150–400)
PLATELET # BLD AUTO: 266 K/UL — SIGNIFICANT CHANGE UP (ref 150–400)
PLATELET # BLD AUTO: 286 K/UL — SIGNIFICANT CHANGE UP (ref 150–400)
PLATELET # BLD AUTO: 321 K/UL — SIGNIFICANT CHANGE UP (ref 150–400)
POTASSIUM SERPL-MCNC: 4.3 MMOL/L — SIGNIFICANT CHANGE UP (ref 3.5–5.3)
POTASSIUM SERPL-MCNC: 4.6 MMOL/L — SIGNIFICANT CHANGE UP (ref 3.5–5.3)
POTASSIUM SERPL-MCNC: 4.9 MMOL/L — SIGNIFICANT CHANGE UP (ref 3.5–5.3)
POTASSIUM SERPL-MCNC: 5.2 MMOL/L — SIGNIFICANT CHANGE UP (ref 3.5–5.3)
POTASSIUM SERPL-SCNC: 4.3 MMOL/L — SIGNIFICANT CHANGE UP (ref 3.5–5.3)
POTASSIUM SERPL-SCNC: 4.6 MMOL/L
POTASSIUM SERPL-SCNC: 4.6 MMOL/L — SIGNIFICANT CHANGE UP (ref 3.5–5.3)
POTASSIUM SERPL-SCNC: 4.9 MMOL/L — SIGNIFICANT CHANGE UP (ref 3.5–5.3)
POTASSIUM SERPL-SCNC: 5.2 MMOL/L — SIGNIFICANT CHANGE UP (ref 3.5–5.3)
PROT SERPL-MCNC: 5.9 G/DL — LOW (ref 6–8.3)
PROT SERPL-MCNC: 6 G/DL — SIGNIFICANT CHANGE UP (ref 6–8.3)
PROT SERPL-MCNC: 6.1 G/DL — SIGNIFICANT CHANGE UP (ref 6–8.3)
PROT SERPL-MCNC: 6.3 G/DL — SIGNIFICANT CHANGE UP (ref 6–8.3)
PROT SERPL-MCNC: 6.6 G/DL
PROT UR-MCNC: ABNORMAL
RBC # BLD: 2.75 M/UL — LOW (ref 3.8–5.2)
RBC # BLD: 2.97 M/UL — LOW (ref 3.8–5.2)
RBC # BLD: 3 M/UL — LOW (ref 3.8–5.2)
RBC # BLD: 3.09 M/UL — LOW (ref 3.8–5.2)
RBC # BLD: 3.18 M/UL — LOW (ref 3.8–5.2)
RBC # FLD: 13.2 % — SIGNIFICANT CHANGE UP (ref 10.3–14.5)
RBC # FLD: 13.4 % — SIGNIFICANT CHANGE UP (ref 10.3–14.5)
RBC # FLD: 13.5 % — SIGNIFICANT CHANGE UP (ref 10.3–14.5)
RBC # FLD: 13.7 % — SIGNIFICANT CHANGE UP (ref 10.3–14.5)
RBC # FLD: 14.1 % — SIGNIFICANT CHANGE UP (ref 10.3–14.5)
RBC CASTS # UR COMP ASSIST: 1 /HPF — SIGNIFICANT CHANGE UP (ref 0–4)
SARS-COV-2 RNA SPEC QL NAA+PROBE: SIGNIFICANT CHANGE UP
SODIUM SERPL-SCNC: 133 MMOL/L
SODIUM SERPL-SCNC: 135 MMOL/L — SIGNIFICANT CHANGE UP (ref 135–145)
SODIUM SERPL-SCNC: 135 MMOL/L — SIGNIFICANT CHANGE UP (ref 135–145)
SODIUM SERPL-SCNC: 138 MMOL/L — SIGNIFICANT CHANGE UP (ref 135–145)
SODIUM SERPL-SCNC: 141 MMOL/L — SIGNIFICANT CHANGE UP (ref 135–145)
SP GR SPEC: 1.02 — SIGNIFICANT CHANGE UP (ref 1–1.05)
SPECIMEN SOURCE: SIGNIFICANT CHANGE UP
T4 FREE+ TSH PNL SERPL: 2 UIU/ML — SIGNIFICANT CHANGE UP (ref 0.27–4.2)
T4 FREE+ TSH PNL SERPL: 2.17 UIU/ML — SIGNIFICANT CHANGE UP (ref 0.27–4.2)
T4 FREE+ TSH PNL SERPL: 2.45 UIU/ML — SIGNIFICANT CHANGE UP (ref 0.27–4.2)
TROPONIN T, HIGH SENSITIVITY RESULT: 49 NG/L — SIGNIFICANT CHANGE UP
TSH SERPL-ACNC: 2.7 UIU/ML
UROBILINOGEN FLD QL: SIGNIFICANT CHANGE UP
WBC # BLD: 5.48 K/UL — SIGNIFICANT CHANGE UP (ref 3.8–10.5)
WBC # BLD: 5.63 K/UL — SIGNIFICANT CHANGE UP (ref 3.8–10.5)
WBC # BLD: 6.46 K/UL — SIGNIFICANT CHANGE UP (ref 3.8–10.5)
WBC # BLD: 6.79 K/UL — SIGNIFICANT CHANGE UP (ref 3.8–10.5)
WBC # BLD: 8.14 K/UL — SIGNIFICANT CHANGE UP (ref 3.8–10.5)
WBC # FLD AUTO: 5.48 K/UL — SIGNIFICANT CHANGE UP (ref 3.8–10.5)
WBC # FLD AUTO: 5.63 K/UL — SIGNIFICANT CHANGE UP (ref 3.8–10.5)
WBC # FLD AUTO: 6.46 K/UL — SIGNIFICANT CHANGE UP (ref 3.8–10.5)
WBC # FLD AUTO: 6.79 K/UL — SIGNIFICANT CHANGE UP (ref 3.8–10.5)
WBC # FLD AUTO: 8.14 K/UL — SIGNIFICANT CHANGE UP (ref 3.8–10.5)
WBC UR QL: 11 /HPF — HIGH (ref 0–5)

## 2022-01-01 PROCEDURE — 78815 PET IMAGE W/CT SKULL-THIGH: CPT

## 2022-01-01 PROCEDURE — 77768 HDR RDNCL SKN SURF BRACHYTX: CPT | Mod: 26

## 2022-01-01 PROCEDURE — 77295 3-D RADIOTHERAPY PLAN: CPT | Mod: 26

## 2022-01-01 PROCEDURE — A9552: CPT

## 2022-01-01 PROCEDURE — 99213 OFFICE O/P EST LOW 20 MIN: CPT | Mod: 25

## 2022-01-01 PROCEDURE — 78815 PET IMAGE W/CT SKULL-THIGH: CPT | Mod: 26,PS,MH

## 2022-01-01 PROCEDURE — 99215 OFFICE O/P EST HI 40 MIN: CPT

## 2022-01-01 PROCEDURE — 77332 RADIATION TREATMENT AID(S): CPT | Mod: 26,59

## 2022-01-01 PROCEDURE — 77290 THER RAD SIMULAJ FIELD CPLX: CPT | Mod: 26

## 2022-01-01 PROCEDURE — 99214 OFFICE O/P EST MOD 30 MIN: CPT

## 2022-01-01 PROCEDURE — 77334 RADIATION TREATMENT AID(S): CPT | Mod: 26

## 2022-01-01 PROCEDURE — 99024 POSTOP FOLLOW-UP VISIT: CPT

## 2022-01-01 PROCEDURE — 99284 EMERGENCY DEPT VISIT MOD MDM: CPT

## 2022-01-01 PROCEDURE — 71045 X-RAY EXAM CHEST 1 VIEW: CPT | Mod: 26

## 2022-01-01 PROCEDURE — 77768 HDR RDNCL SKN SURF BRACHYTX: CPT | Mod: 26,GW

## 2022-01-01 PROCEDURE — 77263 THER RADIOLOGY TX PLNG CPLX: CPT

## 2022-01-01 RX ORDER — SENNA 417.12 MG/237ML
8.8 SYRUP ORAL
Qty: 1 | Refills: 3 | Status: ACTIVE | COMMUNITY
Start: 1900-01-01 | End: 1900-01-01

## 2022-01-01 RX ORDER — DIPHENHYDRAMINE HYDROCHLORIDE AND LIDOCAINE HYDROCHLORIDE AND ALUMINUM HYDROXIDE AND MAGNESIUM HYDRO
KIT
Qty: 1 | Refills: 5 | Status: ACTIVE | COMMUNITY
Start: 2022-01-01 | End: 1900-01-01

## 2022-01-01 RX ORDER — SODIUM CHLORIDE 9 MG/ML
1000 INJECTION INTRAMUSCULAR; INTRAVENOUS; SUBCUTANEOUS ONCE
Refills: 0 | Status: COMPLETED | OUTPATIENT
Start: 2022-01-01 | End: 2022-01-01

## 2022-01-01 RX ORDER — DOCUSATE SODIUM 50MG AND SENNOSIDES 8.6MG 8.6; 5 MG/1; MG/1
8.6-5 TABLET, FILM COATED ORAL
Qty: 60 | Refills: 3 | Status: ACTIVE | COMMUNITY
Start: 2022-01-01 | End: 1900-01-01

## 2022-01-01 RX ORDER — HYDROMORPHONE HYDROCHLORIDE 2 MG/1
2 TABLET ORAL EVERY 6 HOURS
Qty: 75 | Refills: 0 | Status: ACTIVE | COMMUNITY
Start: 2022-01-01 | End: 1900-01-01

## 2022-01-01 RX ADMIN — SODIUM CHLORIDE 1000 MILLILITER(S): 9 INJECTION INTRAMUSCULAR; INTRAVENOUS; SUBCUTANEOUS at 18:00

## 2022-01-01 NOTE — PROGRESS NOTE ADULT - PROBLEM/PLAN-9
DISPLAY PLAN FREE TEXT
no
DISPLAY PLAN FREE TEXT

## 2022-02-02 NOTE — REVIEW OF SYSTEMS
[Dysphagia: Grade 2 - Symptomatic and altered eating/swallowing] : Dysphagia: Grade 2 - Symptomatic and altered eating/swallowing [Fatigue: Grade 1 - Fatigue relieved by rest] : Fatigue: Grade 1 - Fatigue relieved by rest [Xerostomia: Grade 1 - Symptomatic (e.g., dry or thick saliva) without significant dietary alteration; unstimulated saliva flow >0.2 ml/min] : Xerostomia: Grade 1 - Symptomatic (e.g., dry or thick saliva) without significant dietary alteration; unstimulated saliva flow >0.2 ml/min [Oral Pain: Grade 0] : Oral Pain: Grade 0 [Dysgeusia: Grade 1- Altered taste but no change in diet] : Dysgeusia: Grade 1 - Altered taste but no change in diet [Cough: Grade 0] : Cough: Grade 0 [Hoarseness: Grade 0] : Hoarseness: Grade 0 [Skin Hyperpigmentation: Grade 0] : Skin Hyperpigmentation: Grade 0 [Dermatitis Radiation: Grade 0] : Dermatitis Radiation: Grade 0

## 2022-02-02 NOTE — VITALS
[Maximal Pain Intensity: 3/10] : 3/10 [Least Pain Intensity: 0/10] : 0/10 [Pain Duration: ___] : Pain duration: [unfilled] [70: Cares for self; unalbe to carry on normal activity or do active work.] : 70: Cares for self; unable to carry on normal activity or do active work. [ECOG Performance Status: 2 - Ambulatory and capable of all self care but unable to carry out any work activities] : Performance Status: 2 - Ambulatory and capable of all self care but unable to carry out any work activities. Up and about more than 50% of waking hours

## 2022-02-03 NOTE — HISTORY OF PRESENT ILLNESS
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 Completed 70 Gy in 4/2020, with Dr. Knowles. \par After RT she saw her surgeon Dr. Doan in 6/19/20. No imaging was done since RT. However, on 10/19/21 presented to Dr. Diamond with tongue pain. 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  \par \par 12/16/21 completed SBRT to right tumor/node total dose of 4000 cGy i n 5 fx's. \par \par Presents today for post treatment evaluation.

## 2022-04-01 NOTE — REVIEW OF SYSTEMS
[Fatigue] : fatigue [Recent Change In Weight] : ~T recent weight change [Odynophagia] : odynophagia [Mucosal Pain] : mucosal pain [Negative] : Allergic/Immunologic

## 2022-04-01 NOTE — ASSESSMENT
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  \par She was evaluated by Dr Diamond, she is not interested in surgical options. \par She was seen by Dr Duenas is s/p SRS to BOT lesion 1/2022, f/u PET/CT with good local response with concern for POD in neck.\par PDL1 CPS is 100%\par Pt referred back to onc for consideration of immunotherapy.\par Case discussed with Dr Duenas, she is currently getting superficial RT to skin BCC. No contraindications to IO per discussion with Dr Duenas.\par Discussed risks and benefits of immunotherapy in great detail. Pt with no history of autoimmune diseases.\par Patient and family are interested in receiving immunotherapy.\par Patient is currently on hospice, this will likely have to be discontinued, pt and family to discuss with hospice nurse\par \par Discussed the natural course of disease, rationale for treatment and treatment expectation and the incurable  nature of the disease. \par All side effects, risks and benefits were discussed in detail, hand outs detailing the treatment drugs and their side effects were provided, and all questions were answered to the apparent satisfaction of the patient. Consent to start treatment was signed by patient and witnessed by Murali. \par \par -Labs today\par -Schedule immunotherapy, keytruda 200mg q3w\par -Will need pal care follow up if hospice is dced\par -repeat scans after 4 cycles of IO\par -RTC prior to C2\par \par Margaret Grover MD\par Medical Oncology and Hematology\par \par Total time of this visit, including time spent face to face with patient and/or via video/audio, and also in preparing for today's visit for MDM and documentation (Medical Decision Making, including consideration of possible diagnoses, management options, complex medical record review, review of diagnostic tests and information, consideration and discussion of significant complications based on comorbidities, and discussion with providers involved with the care of the patient) 60 minutes.\par \par \par \par \par \par

## 2022-04-01 NOTE — PHYSICAL EXAM
[Capable of only limited self care, confined to bed or chair more than 50% of waking hours] : Status 3- Capable of only limited self care, confined to bed or chair more than 50% of waking hours [Normal] : affect appropriate [de-identified] :  Postsurgical and radiation changes along the tongue. It deviates to the right. There is a tender submucosal mass along the right posterolateral tongue and BOT

## 2022-04-01 NOTE — HISTORY OF PRESENT ILLNESS
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 Knowles last year. She now presents with an infiltrative third primary R. BOT SCCa.  This is confirmed after EUA and biopsy where frozen section was clearly positive for SCCa.  \par She was evaluated by Dr Diamond, she is not interested in surgical options. \par \par She was admitted to [facility] in Dec 2021, she was discharged with plan for palliative RT to BOT lesion and home with hospice services. She has completed SRS to BOT and follow up PET/CT is concerning for POD in neck, but with good local response. \par Dr Duenas has referred pt back for consideration of immunotherapy. PDL1 CPS is 100%. \par She has been on home hospice. Is eating little, has uncontrolled pain in neck and mouth. Has constipation. Is able to walk. \par \par PET/CT 3/18/2022\par 1. Heterogeneous increased FDG activity in the right lateral oral tongue/base of tongue, extending to the midline, is less extensive, and decreased in intensity (SUV 8.1; image 58 of dedicated head and neck series; previous SUV 24.4). The intensity metabolism is concerning for residual disease. Differential diagnosis also includes posttreatment inflammation.\par 2. Nonspecific, asymmetrically increased FDG activity in right side of nasopharynx is decreased in intensity (SUV 3.5; image 42).\par 3. New FDG-avid LEFT level II cervical lymph node is suspicious for metastasis (SUV 7.1; image 59 of dedicated head and neck series). Further evaluation may be performed with ultrasound and percutaneous needle biopsy. New small FDG-avid focus in right level III region (SUV 2.5; image 73) probably corresponds to a small lymph node. There are also is a new small, mildly FDG-avid right level IIb lymph node which measures 0.6 x 0.5 cm, SUV 2.0 (image 68). These foci also may be further evaluated with ultrasound.\par 4. New nonspecific pancolonic hypermetabolism, without corresponding abnormality on CT. Resolution of diffuse esophageal hypermetabolism and perianal hypermetabolism.\par \par Today, Ms Burdick presents with her  Murali and her sister. Has a privately hired nurse. \par Ms Burdick has 2 daughters, both are out of state and do not keep in touch with her often. \par She lives with her  Murali. He sister Malina and her are close but she lives an hour away.

## 2022-04-08 NOTE — PHYSICAL EXAM
[Capable of only limited self care, confined to bed or chair more than 50% of waking hours] : Status 3- Capable of only limited self care, confined to bed or chair more than 50% of waking hours [Normal] : affect appropriate [de-identified] :  Postsurgical and radiation changes along the tongue. It deviates to the right. There is a tender submucosal mass along the right posterolateral tongue and BOT

## 2022-04-08 NOTE — ASSESSMENT
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  \par She was evaluated by Dr Diamond, she is not interested in surgical options. \par She was seen by Dr Duenas is s/p SRS to BOT lesion 1/2022, f/u PET/CT with good local response with concern for POD in neck.\par PDL1 CPS is 100%\par Pt referred back to onc for consideration of immunotherapy.\par Today is for C1 Keytruda\par \par -Keytruda started 4/8/22\par -Labs today\par -Ophtho follow up for red eye and blurry vision on left\par -C/w immunotherapy, keytruda 200mg q3w as long as it is benefiting pt and there are no side effects\par -repeat scans after 4 cycles of IO\par -RTC prior to C2\par \par Margaret Grover MD\par Medical Oncology and Hematology\par \par Total time of this visit, including time spent face to face with patient and/or via video/audio, and also in preparing for today's visit for MDM and documentation (Medical Decision Making, including consideration of possible diagnoses, management options, complex medical record review, review of diagnostic tests and information, consideration and discussion of significant complications based on comorbidities, and discussion with providers involved with the care of the patient) 30 minutes.\par \par \par \par \par \par

## 2022-04-08 NOTE — REVIEW OF SYSTEMS
[Fatigue] : fatigue [Recent Change In Weight] : ~T recent weight change [Red Eyes] : red eyes [Vision Problems] : vision problems [Odynophagia] : odynophagia [Mucosal Pain] : mucosal pain [Negative] : Allergic/Immunologic

## 2022-04-08 NOTE — HISTORY OF PRESENT ILLNESS
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 Knowles last year. She now presents with an infiltrative third primary R. BOT SCCa.  This is confirmed after EUA and biopsy where frozen section was clearly positive for SCCa.  \par She was evaluated by Dr Diamond, she is not interested in surgical options. \par \par She was admitted to [facility] in Dec 2021, she was discharged with plan for palliative RT to BOT lesion and home with hospice services. She has completed SRS to BOT and follow up PET/CT is concerning for POD in neck, but with good local response. \par Dr Duenas has referred pt back for consideration of immunotherapy. PDL1 CPS is 100%. \par She has been on home hospice. Is eating little, has uncontrolled pain in neck and mouth. Has constipation. Is able to walk. \par \par PET/CT 3/18/2022\par 1. Heterogeneous increased FDG activity in the right lateral oral tongue/base of tongue, extending to the midline, is less extensive, and decreased in intensity (SUV 8.1; image 58 of dedicated head and neck series; previous SUV 24.4). The intensity metabolism is concerning for residual disease. Differential diagnosis also includes posttreatment inflammation.\par 2. Nonspecific, asymmetrically increased FDG activity in right side of nasopharynx is decreased in intensity (SUV 3.5; image 42).\par 3. New FDG-avid LEFT level II cervical lymph node is suspicious for metastasis (SUV 7.1; image 59 of dedicated head and neck series). Further evaluation may be performed with ultrasound and percutaneous needle biopsy. New small FDG-avid focus in right level III region (SUV 2.5; image 73) probably corresponds to a small lymph node. There are also is a new small, mildly FDG-avid right level IIb lymph node which measures 0.6 x 0.5 cm, SUV 2.0 (image 68). These foci also may be further evaluated with ultrasound.\par 4. New nonspecific pancolonic hypermetabolism, without corresponding abnormality on CT. Resolution of diffuse esophageal hypermetabolism and perianal hypermetabolism.\par \par Today, Ms Burdick presents with her  Murali and her sister. Has a privately hired nurse. \par Ms Burdick has 2 daughters, both are out of state and do not keep in touch with her often. \par She lives with her  Murali. He sister Malina and her are close but she lives an hour away. [de-identified] : Presents today for C1 Keytruda. \par Has left eye redness and blurriness. \par No new complaints.

## 2022-05-18 NOTE — REVIEW OF SYSTEMS
[Dysphagia: Grade 2 - Symptomatic and altered eating/swallowing] : Dysphagia: Grade 2 - Symptomatic and altered eating/swallowing [Fatigue: Grade 1 - Fatigue relieved by rest] : Fatigue: Grade 1 - Fatigue relieved by rest [Xerostomia: Grade 1 - Symptomatic (e.g., dry or thick saliva) without significant dietary alteration; unstimulated saliva flow >0.2 ml/min] : Xerostomia: Grade 1 - Symptomatic (e.g., dry or thick saliva) without significant dietary alteration; unstimulated saliva flow >0.2 ml/min [Oral Pain: Grade 1 - Mild pain] : Oral Pain: Grade 1 - Mild pain [Dysgeusia: Grade 1- Altered taste but no change in diet] : Dysgeusia: Grade 1 - Altered taste but no change in diet [Cough: Grade 0] : Cough: Grade 0 [Hoarseness: Grade 0] : Hoarseness: Grade 0 [Skin Hyperpigmentation: Grade 0] : Skin Hyperpigmentation: Grade 0 [Dermatitis Radiation: Grade 0] : Dermatitis Radiation: Grade 0

## 2022-05-18 NOTE — HISTORY OF PRESENT ILLNESS
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 Completed 70 Gy in 4/2020, with Dr. Knowles. \par After RT she saw her surgeon Dr. Doan in 6/19/20. No imaging was done since RT. However, on 10/19/21 presented to Dr. Diamond with tongue pain. 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  \par \par 12/16/21 completed SBRT to right tumor/node total dose of 4000 cGy i n 5 fx's. \par \par 3/31/2022 Completed HDR HAM to nose total dose of 4000 cGy in 8 fractions. \par \par Presents today for post treatment evaluation. Continues to c/o pain and difficulty swallowing. Skin to nose with no redness.

## 2022-05-25 PROBLEM — C01 CANCER OF BASE OF TONGUE: Status: ACTIVE | Noted: 2021-01-01

## 2022-05-25 NOTE — HISTORY OF PRESENT ILLNESS
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 Knowles last year. She now presents with an infiltrative third primary R. BOT SCCa.  This is confirmed after EUA and biopsy where frozen section was clearly positive for SCCa.  \par She was evaluated by Dr Diamond, she is not interested in surgical options. \par \par She was admitted to [facility] in Dec 2021, she was discharged with plan for palliative RT to BOT lesion and home with hospice services. She has completed SRS to BOT and follow up PET/CT is concerning for POD in neck, but with good local response. \par Dr Duensa has referred pt back for consideration of immunotherapy. PDL1 CPS is 100%. \par She has been on home hospice. Is eating little, has uncontrolled pain in neck and mouth. Has constipation. Is able to walk. \par \par PET/CT 3/18/2022\par 1. Heterogeneous increased FDG activity in the right lateral oral tongue/base of tongue, extending to the midline, is less extensive, and decreased in intensity (SUV 8.1; image 58 of dedicated head and neck series; previous SUV 24.4). The intensity metabolism is concerning for residual disease. Differential diagnosis also includes posttreatment inflammation.\par 2. Nonspecific, asymmetrically increased FDG activity in right side of nasopharynx is decreased in intensity (SUV 3.5; image 42).\par 3. New FDG-avid LEFT level II cervical lymph node is suspicious for metastasis (SUV 7.1; image 59 of dedicated head and neck series). Further evaluation may be performed with ultrasound and percutaneous needle biopsy. New small FDG-avid focus in right level III region (SUV 2.5; image 73) probably corresponds to a small lymph node. There are also is a new small, mildly FDG-avid right level IIb lymph node which measures 0.6 x 0.5 cm, SUV 2.0 (image 68). These foci also may be further evaluated with ultrasound.\par 4. New nonspecific pancolonic hypermetabolism, without corresponding abnormality on CT. Resolution of diffuse esophageal hypermetabolism and perianal hypermetabolism.\par \par Today, Ms Burdick presents with her  Murali and her sister. Has a privately hired nurse. \par Ms Burdick has 2 daughters, both are out of state and do not keep in touch with her often. \par She lives with her  Murali. He sister Malina and her are close but she lives an hour away. [de-identified] : Reports pain in oral cavity and inability to eat however has been feeling somewhat better. \par No other complaints. Weight is stable.

## 2022-05-25 NOTE — ASSESSMENT
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  \par She was evaluated by Dr Diamond, she is not interested in surgical options. \par She was seen by Dr Duenas is s/p SRS to BOT lesion 1/2022, f/u PET/CT with good local response with concern for POD in neck.\par PDL1 CPS is 100%\par Pt referred back to onc for immunotherapy.\par Today is for C3 Keytruda\par \par -Keytruda started 4/8/22, today is C3\par -Labs today\par -Will order follow up PET/CT to evaluate response to treatment\par -C/w immunotherapy, keytruda 200mg q3w as long as it is benefiting pt and there are no side effects\par -RTC prior to C4\par \par Margaret Grover MD\par Medical Oncology and Hematology\par \par Total time of this visit, including time spent face to face with patient and/or via video/audio, and also in preparing for today's visit for MDM and documentation (Medical Decision Making, including consideration of possible diagnoses, management options, complex medical record review, review of diagnostic tests and information, consideration and discussion of significant complications based on comorbidities, and discussion with providers involved with the care of the patient) 40 minutes.\par \par \par \par \par \par

## 2022-05-25 NOTE — PHYSICAL EXAM
[Capable of only limited self care, confined to bed or chair more than 50% of waking hours] : Status 3- Capable of only limited self care, confined to bed or chair more than 50% of waking hours [Normal] : affect appropriate [de-identified] :  Postsurgical and radiation changes along the tongue. It deviates to the right. There is a tender submucosal mass along the right posterolateral tongue and BOT

## 2022-06-06 NOTE — PATIENT PROFILE ADULT - DIETITIAN.
Date of Service: 06/03/2022    SUPERVISING PHYSICIAN:    Dr. Marty Henderson. CHIEF COMPLAINT:    Follow up for multiple medical issues. HISTORY OF PRESENT ILLNESS:    This is an 78-year-old male who does have some history of tricuspid and mitral valve repair. He has paroxysmal atrial fibrillation, hypertension, hyperlipidemia and a history of hyponatremia, hypokalemia and BPH, as well as skin cancer. His main issue today is his right ankle. He has a history of ankle surgery. He has had a screw in his ankle for over 30 years. A while back he bumped it and ever since that time it feels like it is irritating him more. He complains of both medial and lateral aspect pain. Sometimes it seems like it swells up slightly. There is always some discoloration of the skin. He denies distal swelling of his toes. He does state he is pretty fatigued, stiff, especially when he first gets up. He denies any syncopal episodes, worsening shortness of breath or significant lower extremity edema. ALLERGIES:    Reviewed. MEDICATIONS:    Reviewed. PHYSICAL EXAMINATION:    GENERAL:  The patient is cooperative and pleasant, in no acute distress, answers all questions appropriately. VITAL SIGNS:  As follows:  Blood pressure 100/76, pulse 53, pulse ox 97%, weight 83.9 kilograms. Height 5 feet 8 inches. HEENT:  Pupils are equal and reactive to light. Extraocular movements intact. Sclerae white, anicteric. Bilateral tympanic membranes were impacted with cerumen. NECK:  Supple, no significant lymphadenopathy. HEART:  Bonnie Brought and irregular. RESPIRATORY:  Clear to auscultation. No crackles, no wheezes, no coarse sounds. ABDOMEN:  Soft, nontender. PERIPHERAL:  No significant edema.     ASSESSMENT:    Bradycardia with hypertension, possibly slightly over managed with medication, paroxysmal atrial fibrillation, hyperlipidemia, anemia, history of skin cancer nonmelanoma, benign prostatic hypertrophy, chronic ankle pain with history of ankle fracture and repair. PLAN OF CARE:    1. We discussed reducing his dose of Metoprolol from 25 mg b.i.d. to 12.5 mg b.i.d. Recommend a blood pressure and pulse check in 1 week on this. 2.  X-ray was obtained of his ankle and I recommended topical Voltaren gel 1% apply 4 grams to affected area 4 times a day. 3.  Labs that were ordered, CBC, comprehensive, lipid, magnesium, urinalysis. 4.  COVID booster given. 5.  Recommend a routine medical followup in about 4 months, sooner; however, with worsening symptoms, new symptoms, questions or concerns, and based on pending lab results. The patient is aware that we may have to increase his blood pressure meds again, based on how we are controlling his blood pressure and pulse, but for the time being, would like to do a trial at the lower Metoprolol dose to see if this improves his symptoms and also still controls his heart rate and blood pressure. Followup plans based on patient's symptoms and heart rate and blood pressure goals. Dictated By: Genesis New PA-C  Signing Provider: ESSENCE Garcia/anisha ([id number])   DD: 06/03/2022 3:18:12 PM TD: 06/06/2022 10:37:55 AM dietitian/nutrition services

## 2022-06-09 NOTE — PHYSICAL EXAM
[Capable of only limited self care, confined to bed or chair more than 50% of waking hours] : Status 3- Capable of only limited self care, confined to bed or chair more than 50% of waking hours [Normal] : affect appropriate [de-identified] :  Postsurgical and radiation changes along the tongue. It deviates to the right. There is a tender submucosal mass along the right posterolateral tongue and BOT

## 2022-06-09 NOTE — ASSESSMENT
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  \par She was evaluated by Dr Diamond, she is not interested in surgical options. \par She was seen by Dr Duenas is s/p SRS to BOT lesion 1/2022, f/u PET/CT with good local response with concern for POD in neck.\par PDL1 CPS is 100%\par Pt referred back to onc for immunotherapy.\par Today is for C4 Keytruda\par \par PET/ CT 6/22 reviewed with patient and her  in detail. Has good response to treatment. \par Will continue with keytruda as long as it benefits the patient.\par \par -Keytruda started 4/8/22, today is C4\par -Labs today\par -Pal care referral\par -Magic mouthwash refilled\par -Next scan in 3 months\par -Nutrition follow up\par -C/w immunotherapy, keytruda 200mg q3w as long as it is benefiting pt and there are no side effects\par -RTC prior to C5\par \par Margaret Grover MD\par Medical Oncology and Hematology\par \par Total time of this visit, including time spent face to face with patient and/or via video/audio, and also in preparing for today's visit for MDM and documentation (Medical Decision Making, including consideration of possible diagnoses, management options, complex medical record review, review of diagnostic tests and information, consideration and discussion of significant complications based on comorbidities, and discussion with providers involved with the care of the patient) 40 minutes.\par \par \par \par \par \par

## 2022-06-09 NOTE — HISTORY OF PRESENT ILLNESS
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 Knowles last year. She now presents with an infiltrative third primary R. BOT SCCa.  This is confirmed after EUA and biopsy where frozen section was clearly positive for SCCa.  \par She was evaluated by Dr Diamond, she is not interested in surgical options. \par \par She was admitted to [facility] 2 in Dec 2021, she was discharged with plan for palliative RT to BOT lesion and home with hospice services. She has completed SRS to BOT and follow up PET/CT is concerning for POD in neck, but with good local response. \par Dr Duenas has referred pt back for consideration of immunotherapy. PDL1 CPS is 100%. \par She has been on home hospice. Is eating little, has uncontrolled pain in neck and mouth. Has constipation. Is able to walk. \par \par PET/CT 3/18/2022\par 1. Heterogeneous increased FDG activity in the right lateral oral tongue/base of tongue, extending to the midline, is less extensive, and decreased in intensity (SUV 8.1; image 58 of dedicated head and neck series; previous SUV 24.4). The intensity metabolism is concerning for residual disease. Differential diagnosis also includes posttreatment inflammation.\par 2. Nonspecific, asymmetrically increased FDG activity in right side of nasopharynx is decreased in intensity (SUV 3.5; image 42).\par 3. New FDG-avid LEFT level II cervical lymph node is suspicious for metastasis (SUV 7.1; image 59 of dedicated head and neck series). Further evaluation may be performed with ultrasound and percutaneous needle biopsy. New small FDG-avid focus in right level III region (SUV 2.5; image 73) probably corresponds to a small lymph node. There are also is a new small, mildly FDG-avid right level IIb lymph node which measures 0.6 x 0.5 cm, SUV 2.0 (image 68). These foci also may be further evaluated with ultrasound.\par 4. New nonspecific pancolonic hypermetabolism, without corresponding abnormality on CT. Resolution of diffuse esophageal hypermetabolism and perianal hypermetabolism.\par \par PET/CT 6/2022\par IMPRESSION: Compared to FDG-PET/CT scan dated 3/18/2022:\par 1. Heterogeneous mildly increased FDG activity in the right lateral oral tongue/base of tongue is less extensive and decreased in intensity. Differential diagnosis also includes posttreatment inflammation and residual disease.\par 2. Near total resolution of nonspecific, asymmetrically increased FDG activity in right side of nasopharynx.\par 3. Resolution of FDG-avid LEFT level II cervical lymph node and FDG-avid right level IIB and III cervical lymph nodes.\par 4. Nonspecific mild colonic hypermetabolism, decreased as compared to prior study.\par 5. No new lesion.\par \par Today, Ms Burdick presents with her  Murali. Has a privately hired nurse. \par Ms Burdick has 2 daughters, both are out of state and do not keep in touch with her often. \par She lives with her  Murali. He sister Malina and her are close but she lives an hour away. [de-identified] : Continues to have pain in mouth and with swallowing. \par Unclear if she is taking pain meds as instructed. \par Has poor PO intake, is cachectic.

## 2022-06-30 NOTE — ED PROVIDER NOTE - NSFOLLOWUPINSTRUCTIONS_ED_ALL_ED_FT
You were seen in the Emergency Room today because of weakness. Your lab and imaging results are included in your discharge paperwork.     Please follow-up with your Primary Care Physician within the week.       WHAT YOU NEED TO KNOW   Weakness is a lack of strength. You may feel weak all over your body (generalized), or you may feel weak in one specific part of your body (focal). Common causes of weakness include:  •Infection and immune system disorders.  •Physical exhaustion.  •Internal bleeding or other blood loss that results in a lack of red blood cells (anemia).  •Dehydration.  •An imbalance in mineral (electrolyte) levels, such as potassium.  •Heart disease, circulation problems, or stroke.      Follow these instructions at home:  •Try to get enough sleep. Most adults need 7–8 hours of quality sleep each night. Talk to your health care provider about how much sleep you need each night.  •Do exercises, such as arm curls and leg raises, for 30 minutes at least 2 days a week or as told by your health care provider. This helps build muscle strength.    Contact a health care provider if your weakness:  •Does not improve or gets worse.  •Affects your ability to think clearly.  •Affects your ability to do your normal daily activities.    Please return to the Emergency Room if:   •Develop sudden weakness, especially on one side of your face or body.  •Have chest pain.  •Have trouble breathing or shortness of breath.  •Have problems with your vision.  •Have trouble talking or swallowing.  •Have trouble standing or walking.  •Are light-headed or lose consciousness.

## 2022-06-30 NOTE — ED PROVIDER NOTE - PROGRESS NOTE DETAILS
Axel, PGY1 - Received sign-out on patient. Introduced myself and updated patient on the medical evaluation process. Patient aware and in agreement. Axel, PGY1 - road test passed. Called , no answer, message left. Will try again. Patient stable for discharge. Understands the Emergency Room work-up and discharge precautions. Will follow-up with pcp Axel, PGY1 - road test passed, walks with walker at home. Called , no answer, message left. Will try again. Patient stable for discharge. Understands the Emergency Room work-up and discharge precautions. Will follow-up with pcp Axel, PGY1 - ambulates w assist in ED, walks with walker and assist at baseline at home. Called , no answer, message left. Will try again. Patient stable for discharge. Understands the Emergency Room work-up and discharge precautions. Will follow-up with pcp

## 2022-06-30 NOTE — ED ADULT TRIAGE NOTE - CHIEF COMPLAINT QUOTE
was supposed to receive immuno therapy and did not receive therapy today due to lethargy pt arrives to triage appears to have dry mucus membranes has been more weak last 3 days last normal was approximately 3 days ago as per  f/s in field 278 c/o generalized pain and back pain was supposed to receive immuno therapy and did not receive therapy today due to lethargy pt arrives to triage appears to have dry mucus membranes has been more weak last 3 days last normal was approximately 3 days ago as per  f/s in field 278 c/o generalized pain and back pain noted to have right eye redness

## 2022-06-30 NOTE — ED ADULT NURSE NOTE - NSIMPLEMENTINTERV_GEN_ALL_ED
Implemented All Fall with Harm Risk Interventions:  [place] to call system. Call bell, personal items and telephone within reach. Instruct patient to call for assistance. Room bathroom lighting operational. Non-slip footwear when patient is off stretcher. Physically safe environment: no spills, clutter or unnecessary equipment. Stretcher in lowest position, wheels locked, appropriate side rails in place. Provide visual cue, wrist band, yellow gown, etc. Monitor gait and stability. Monitor for mental status changes and reorient to person, place, and time. Review medications for side effects contributing to fall risk. Reinforce activity limits and safety measures with patient and family. Provide visual clues: red socks.

## 2022-06-30 NOTE — ED PROVIDER NOTE - PATIENT PORTAL LINK FT
You can access the FollowMyHealth Patient Portal offered by [facility] by registering at the following website: [email or web address]. By joining [employer]’s FollowMyHealth portal, you will also be able to view your health information using other applications (apps) compatible with our system.

## 2022-06-30 NOTE — ED ADULT NURSE NOTE - NS PRO AD PATIENT TYPE
complains of pain/discomfort Health Care Proxy (HCP)/Do Not Resuscitate (DNR)/Medical Orders for Life-Sustaining Treatment (MOLST)

## 2022-06-30 NOTE — ED PROVIDER NOTE - OBJECTIVE STATEMENT
Patient is a 81 year-old-female with history of tongue cancer (1990's with reoccurrence 2020 and 2021), dysphagia, DM2, Glaucoma, Hypertension, Hyperlipidemia, depression presents 3-day history of generalized weakness. Patient is a 81 year-old-female with history of tongue cancer (1990's with reoccurrence 2020 and 2021), dysphagia, DM2, Glaucoma, Hypertension, Hyperlipidemia, depression presents 3-day history of generalized weakness. Was supposed to get chemo today but noted to be lethargic and sent in ED to be evaluated. patient reports feeling tired, but denies fever, chills, nausea, vomiting, chest pain, shortness of breath, abdominal pain, dysuria, hematuria, bloody stools.

## 2022-06-30 NOTE — ED PROVIDER NOTE - CLINICAL SUMMARY MEDICAL DECISION MAKING FREE TEXT BOX
Patient is a 81 year-old-female with history of tongue cancer (1990's with reoccurrence 2020 and 2021), dysphagia, DM2, Glaucoma, Hypertension, Hyperlipidemia, depression presents 3-day history of generalized weakness. Patient is a 81 year-old-female with history of tongue cancer (1990's with reoccurrence 2020 and 2021), dysphagia, DM2, Glaucoma, Hypertension, Hyperlipidemia, depression presents 3-day history of generalized weakness. DDx including infectious and metabolic etiologies. Low suspicion for cardiac etiology. Labs, ECG, CXR, UA/UC. Fluids.

## 2022-06-30 NOTE — ED ADULT NURSE NOTE - CHIEF COMPLAINT QUOTE
was supposed to receive immuno therapy and did not receive therapy today due to lethargy pt arrives to triage appears to have dry mucus membranes has been more weak last 3 days last normal was approximately 3 days ago as per  f/s in field 278 c/o generalized pain and back pain noted to have right eye redness

## 2022-06-30 NOTE — ED ADULT NURSE REASSESSMENT NOTE - NS ED NURSE REASSESS COMMENT FT1
Received report from KIRBY Yeung. Pt is baseline mental status. Denies pain or discomfort at this time. NAD. Urine samples sent. Will continue to monitor.

## 2022-06-30 NOTE — ED PROVIDER NOTE - PHYSICAL EXAMINATION
General: appears lethargic and cathetic   HEENT: atraumatic, normocephalic; pupils are equal, round and react to light, extraocular movements intact bilaterally without deficits, conjunctival pallor, mucous membranes dry   Neck: no jugular venous distension, full range of motion  Chest/Lung: clear to auscultation bilaterally, no wheezes/rhonchi/rales  Heart: regular rate and rhythm, no murmur/gallops/rubs  Abdomen: normal bowel sounds, soft, non-tender, non-distended  Extremities: no lower extremity edema, +2 radial pulses bilaterally, +2 dorsalis pedis pulses bilaterally  Musculoskeletal: full range of motion of all 4 extremities  Nervous System: alert and oriented, no motor deficits or sensory deficits; CNII-XII grossly intact; no focal neurologic deficits  Skin: no rashes/lacerations noted

## 2022-06-30 NOTE — ED PROVIDER NOTE - ATTENDING CONTRIBUTION TO CARE
Seen and examined, pt. with chemo tx for tongue cancer, c/o gen. weakness for several d. and today was noted to be weak and tired at clinic appt., sent to ED rather than receive chemo. No hx of neutropenia, denies fever/chills, no N/V/D, no urinary c/o, has had dec. po intake for weeks. Arouses easily to verbal, MM sl. dry, clear lungs, heart reg, abd soft, NT to palp, no CVAT, no edema, good pulses.

## 2022-06-30 NOTE — ED ADULT NURSE NOTE - OBJECTIVE STATEMENT
pt A&ox2 to place and self, awake and alert, came to ED for weakness. at this time pt denies Chest pain and SOB. Sinus bradycardic on telemetry. breathing is spontaneous and unlabored. sating 99% on RA. bilateral pedal and radial pulses palpable and strong. right ac 22g Iv placed by EMS and right 22g IV placed in ED. stage 2 pressure injury to left 2nd toe. skin tear noted to left forearm. blanchable redness to sacrum and BL heels. Labs drawn and sent as per ordered. Bed in lowest position, call bell within reach, all other safety and comfort measures provided. awaiting labs results and further orders. pt A&ox2 to place and self, awake and alert, came to ED for weakness. at this time pt denies Chest pain and SOB. Sinus bradycardic on telemetry. breathing is spontaneous and unlabored. sating 99% on RA. bilateral pedal and radial pulses palpable and strong. right ac 22g Iv placed by EMS and right 22g IV placed in ED. stage 2 pressure injury to right 2nd toe. skin tear noted to left forearm. blanchable redness to sacrum and BL heels. Labs drawn and sent as per ordered. Bed in lowest position, call bell within reach, all other safety and comfort measures provided. awaiting labs results and further orders.

## 2022-07-01 NOTE — PROVIDER CONTACT NOTE (OTHER) - ASSESSMENT
Writer informed by MD Reyna that pt is cleared for discharge. Provider was unable to reach pt's spouse. Pt reported to ambulate with walker and unsafe for taxi transport. Writer contacted and spoke with pt's spouse, Murali, at [phone]. Murali had questions for provider with follow up number given. Pt's spouse does not drive in dark and would not be able to pick pt up till next morning. Spouse informed ambulance transport could be arranged on pt behalf. Spouse informed most likely covered by medicare although could not guarantee. Provider in agreement with arrangement of EMS non-emergent transport. Transportation arranged with [facility] #[phone]. Writer spoke with Paul of [facility] reported to be covered by MEDICARE with trip #103A booked. Spouse aware of pt's return. Writer informed by MD Reyna that pt is cleared for discharge. Provider was unable to reach pt's spouse. Pt reported to ambulate with walker and unsafe for taxi transport. Writer contacted and spoke with pt's spouse, Murali, at [phone]. Murali had questions for provider with follow up number given. Pt's spouse does not drive in dark and would not be able to pick pt up till next morning. Spouse informed ambulance transport could be arranged on pt behalf. Spouse informed most likely covered by medicare although could not guarantee. Provider in agreement with arrangement of EMS non-emergent transport. Transportation arranged with [facility] #[phone]. Writer spoke with Paul of [facility] reported to be covered by MEDICARE with trip #103A booked. Spouse aware of pt's return. Non emergent transport form provided to EMS; pt's chart could not be located.

## 2022-07-05 NOTE — PROGRESS NOTE ADULT - ASSESSMENT
[No Ischemia] : no Ischemia [___] : [unfilled] 79 years old female with history of DM, HTN, oral cancer with fall that caused left ear injury [LVEF ___%] : LVEF [unfilled]% [None] : normal LV function [Enlarged] : enlarged LA size [Mild] : mild mitral regurgitation

## 2022-07-06 NOTE — PHYSICAL EXAM
[Completely disabled. Cannot carry on any self care. Totally confined to bed or chair] : Status 4- Completely disabled. Cannot carry on any self care. Totally confined to bed or chair [Normal] : affect appropriate [de-identified] :  Postsurgical and radiation changes along the tongue. It deviates to the right. There is a tender submucosal mass along the right posterolateral tongue and BOT

## 2022-07-06 NOTE — ASSESSMENT
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  \par She was evaluated by Dr Daimond, she is not interested in surgical options. \par She was seen by Dr Duenas is s/p SRS to BOT lesion 1/2022, f/u PET/CT with good local response with concern for POD in neck.\par PDL1 CPS is 100%\par Pt referred back to onc for immunotherapy.\par Today is for C4 Keytruda\par \par PET/ CT 6/22 reviewed with patient and her  in detail. Has good response to treatment. \par Will continue with keytruda as long as it benefits the patient.\par \par Patient is lethargic, confused, very weak and cachectic. Barely takes any PO. \par Will refer to ED for further evaluation. \par She is an appropriate candidate for hospice services, will discuss goals of care with patient and family. \par \par Margaret Grover MD\par Medical Oncology and Hematology\par \par Total time of this visit, including time spent face to face with patient and/or via video/audio, and also in preparing for today's visit for MDM and documentation (Medical Decision Making, including consideration of possible diagnoses, management options, complex medical record review, review of diagnostic tests and information, consideration and discussion of significant complications based on comorbidities, and discussion with providers involved with the care of the patient) 30 minutes.\par \par \par \par \par \par

## 2022-07-06 NOTE — HISTORY OF PRESENT ILLNESS
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 Knowles last year. She now presents with an infiltrative third primary R. BOT SCCa.  This is confirmed after EUA and biopsy where frozen section was clearly positive for SCCa.  \par She was evaluated by Dr Diamond, she is not interested in surgical options. \par \par She was admitted to [facility] in Dec 2021, she was discharged with plan for palliative RT to BOT lesion and home with hospice services. She has completed SRS to BOT and follow up PET/CT is concerning for POD in neck, but with good local response. \par Dr Duenas has referred pt back for consideration of immunotherapy. PDL1 CPS is 100%. \par She has been on home hospice. Is eating little, has uncontrolled pain in neck and mouth. Has constipation. Is able to walk. \par \par PET/CT 3/18/2022\par 1. Heterogeneous increased FDG activity in the right lateral oral tongue/base of tongue, extending to the midline, is less extensive, and decreased in intensity (SUV 8.1; image 58 of dedicated head and neck series; previous SUV 24.4). The intensity metabolism is concerning for residual disease. Differential diagnosis also includes posttreatment inflammation.\par 2. Nonspecific, asymmetrically increased FDG activity in right side of nasopharynx is decreased in intensity (SUV 3.5; image 42).\par 3. New FDG-avid LEFT level II cervical lymph node is suspicious for metastasis (SUV 7.1; image 59 of dedicated head and neck series). Further evaluation may be performed with ultrasound and percutaneous needle biopsy. New small FDG-avid focus in right level III region (SUV 2.5; image 73) probably corresponds to a small lymph node. There are also is a new small, mildly FDG-avid right level IIb lymph node which measures 0.6 x 0.5 cm, SUV 2.0 (image 68). These foci also may be further evaluated with ultrasound.\par 4. New nonspecific pancolonic hypermetabolism, without corresponding abnormality on CT. Resolution of diffuse esophageal hypermetabolism and perianal hypermetabolism.\par \par PET/CT 6/2022\par IMPRESSION: Compared to FDG-PET/CT scan dated 3/18/2022:\par 1. Heterogeneous mildly increased FDG activity in the right lateral oral tongue/base of tongue is less extensive and decreased in intensity. Differential diagnosis also includes posttreatment inflammation and residual disease.\par 2. Near total resolution of nonspecific, asymmetrically increased FDG activity in right side of nasopharynx.\par 3. Resolution of FDG-avid LEFT level II cervical lymph node and FDG-avid right level IIB and III cervical lymph nodes.\par 4. Nonspecific mild colonic hypermetabolism, decreased as compared to prior study.\par 5. No new lesion.\par \par Today, Ms Burdick presents with her  Murali. Has a privately hired nurse. \par Ms Burdick has 2 daughters, both are out of state and do not keep in touch with her often. \par She lives with her  Murali. He sister Malina and her are close but she lives an hour away. [de-identified] : Today, patient appears lethargic, weak and confused. Will refer pt to ED for evaluation.

## 2022-07-14 ENCOUNTER — APPOINTMENT (OUTPATIENT)
Dept: INFUSION THERAPY | Facility: HOSPITAL | Age: 82
End: 2022-07-14

## 2022-07-14 ENCOUNTER — APPOINTMENT (OUTPATIENT)
Dept: HEMATOLOGY ONCOLOGY | Facility: CLINIC | Age: 82
End: 2022-07-14

## 2022-07-21 ENCOUNTER — APPOINTMENT (OUTPATIENT)
Dept: HEMATOLOGY ONCOLOGY | Facility: CLINIC | Age: 82
End: 2022-07-21

## 2022-08-04 ENCOUNTER — APPOINTMENT (OUTPATIENT)
Dept: HEMATOLOGY ONCOLOGY | Facility: CLINIC | Age: 82
End: 2022-08-04

## 2022-08-04 ENCOUNTER — APPOINTMENT (OUTPATIENT)
Dept: INFUSION THERAPY | Facility: HOSPITAL | Age: 82
End: 2022-08-04

## 2022-08-11 ENCOUNTER — APPOINTMENT (OUTPATIENT)
Dept: HEMATOLOGY ONCOLOGY | Facility: CLINIC | Age: 82
End: 2022-08-11

## 2022-08-25 ENCOUNTER — APPOINTMENT (OUTPATIENT)
Dept: INFUSION THERAPY | Facility: HOSPITAL | Age: 82
End: 2022-08-25

## 2022-08-25 ENCOUNTER — APPOINTMENT (OUTPATIENT)
Dept: HEMATOLOGY ONCOLOGY | Facility: CLINIC | Age: 82
End: 2022-08-25

## 2022-10-25 NOTE — PROGRESS NOTE ADULT - PROBLEM SELECTOR PROBLEM 3
Acidosis, metabolic, with respiratory acidosis Detail Level: Detailed General Sunscreen Counseling: I recommended a broad spectrum sunscreen with a SPF of 30 or higher.  I explained that SPF 30 sunscreens block approximately 97 percent of the sun's harmful rays.  Sunscreens should be applied at least 15 minutes prior to expected sun exposure and then every 2 hours after that as long as sun exposure continues. If swimming or exercising sunscreen should be reapplied every 45 minutes to an hour after getting wet or sweating.  One ounce, or the equivalent of a shot glass full of sunscreen, is adequate to protect the skin not covered by a bathing suit. I also recommended a lip balm with a sunscreen as well. Sun protective clothing can be used in lieu of sunscreen but must be worn the entire time you are exposed to the sun's rays.

## 2023-10-17 NOTE — DISCHARGE NOTE NURSING/CASE MANAGEMENT/SOCIAL WORK - NSDPDISTO_GEN_ALL_CORE
Home Double Island Pedicle Flap Text: The defect edges were debeveled with a #15 scalpel blade.  Given the location of the defect, shape of the defect and the proximity to free margins a double island pedicle advancement flap was deemed most appropriate.  Using a sterile surgical marker, an appropriate advancement flap was drawn incorporating the defect, outlining the appropriate donor tissue and placing the expected incisions within the relaxed skin tension lines where possible.    The area thus outlined was incised deep to adipose tissue with a #15 scalpel blade.  The skin margins were undermined to an appropriate distance in all directions around the primary defect and laterally outward around the island pedicle utilizing iris scissors.  There was minimal undermining beneath the pedicle flap.

## 2024-02-18 NOTE — PROGRESS NOTE ADULT - ASSESSMENT
HPI:  78yo female with pmh HTN, DM, presents with diarrhea, NV, and fall. PT reports she was in USOH and without illness last night. THis am, woke up with multiple diarrhea, NV x 2. Pt feeling very weak and lightheaded and fell. Pt states her legs gave way, and hit hear with laceration. Family came home and found her on ground and states she was really weak when she tried to stand up.- dooes not think she passed out  PT without dizziness at rest. denies headache. + some abd pain. denies fever, recent abx, unusual food, black/bloody BM, cp, sob, palpitaitons. 	No fever/chills, No photophobia/eye pain/changes in vision, No ear pain/sore throat/dysphagia, No chest pain/palpitations, no SOB/cough, no wheeze/stridor, +abdominal pain, +N/V/D, no dysuria/frequency/discharge, No neck/back pain, no rash, + dizzy (25 Aug 2020 16:46)  ------------------- As Above  ----------------------  Patient is a poor historian. Presents with diarrhea. Patient unsure when it started. No N/V or bloody BMs. No fever., abdominal pain. Denies having a colonoscopy.     Colitis - Infectious VS Ischemic - CRP 8.19 --> 15.14 . WBC 16.09 --> 15.40 --> 15.01, afebrile,  C Diff negative  doubt IBD - 1) stool culture / O&P still open Clinically better ( Now non bloody diarrhea )   2) CRP f/u  3) Broad spectrum antibiotic 4) clear liquid diet 5) cardiology consult == Discussed with  HPI:  78yo female with pmh HTN, DM, presents with diarrhea, NV, and fall. PT reports she was in USOH and without illness last night. THis am, woke up with multiple diarrhea, NV x 2. Pt feeling very weak and lightheaded and fell. Pt states her legs gave way, and hit hear with laceration. Family came home and found her on ground and states she was really weak when she tried to stand up.- dooes not think she passed out  PT without dizziness at rest. denies headache. + some abd pain. denies fever, recent abx, unusual food, black/bloody BM, cp, sob, palpitaitons. 	No fever/chills, No photophobia/eye pain/changes in vision, No ear pain/sore throat/dysphagia, No chest pain/palpitations, no SOB/cough, no wheeze/stridor, +abdominal pain, +N/V/D, no dysuria/frequency/discharge, No neck/back pain, no rash, + dizzy (25 Aug 2020 16:46)  ------------------- As Above  ----------------------  Patient is a poor historian. Presents with diarrhea. Patient unsure when it started. No N/V or bloody BMs. No fever., abdominal pain. Denies having a colonoscopy.     Colitis - Infectious VS Ischemic - CRP 8.19 --> 15.14 . WBC 16.09 --> 15.40 --> 15.01, afebrile,  C Diff negative  doubt IBD - 1) stool culture / O&P still open Clinically better ( Now non bloody diarrhea )   2) CRP f/u  3) Broad spectrum antibiotic 4) clear liquid diet 5) cardiology consult == Discussed with  yesterday oral

## 2024-05-14 NOTE — H&P ADULT - PROBLEM SELECTOR PROBLEM 3
"Chief Complaint   Patient presents with    Surgical Followup     Post-op DOS 4/25/2024       Vitals:    05/14/24 1128   BP: 120/79   BP Location: Left arm   Patient Position: Sitting   Cuff Size: Adult Regular   Pulse: 93   Temp: 98.2  F (36.8  C)   TempSrc: Oral   SpO2: 94%   Weight: 83.9 kg (185 lb)   Height: 1.702 m (5' 7\")       Body mass index is 28.98 kg/m .                          Mariel Diggs, EMT    " Dysphagia

## 2024-08-28 NOTE — PATIENT PROFILE ADULT - FALLEN IN THE PAST
Chief Complaint   Patient presents with    Follow-up        Referring Provider  No ref. provider found     HPI   Donna Israel is a 62 y.o. female had concerns including Follow-up.    T2DM.    She reports that she has been doing well.  She denies any changes to her health since last visit.  Denies any overt hyper/hypos. She reports that she ran out of her Jardiance a couple of days ago, but otherwise has been taking her meds regularly without missing doses.      Diabetes:  Diabetes was diagnosed 1990's.  Complications include neuropathy.  Last ophtho exam was due.  Current medications for diabetes include Ozempic 1 mg weekly, Tresiba 20 units QHS, Jardiance 25 mg QD.  Previous meds: Metformin, Januvia, Tresiba  She checks her blood sugar rarely.   Hypos: none    She had labs that showed negative ZARA and islet cell antibodies.    ACE/ARB:yes, Statin: no  Labs:  Lipid Panel:utd  ALVERTO: utd     Thyroid:  She is taking her T4 regularly without missing doses.  She is currently taking T4 150 mcg QD.  She denies any conflicting medication and denies any compressive s/sx's.  She is tolerating her meds well.    The following portions of the patient's history were reviewed and updated as appropriate: allergies, current medications, past family history, past medical history, past social history, past surgical history, and problem list.    Diet: she tries to limit her carbs, she has not been limiting sugars    Past Medical History:   Diagnosis Date    Arthritis     osteo    Edema     GERD (gastroesophageal reflux disease)     Hyperlipidemia     Hypertension     Hypothyroidism     Inj musc/tend the rotator cuff of right shoulder, subs     Lumbago     Memory loss     Obesity     Osteopenia     Pain in right shoulder     Stiffness of right shoulder joint     Tobacco use disorder     Type 2 diabetes mellitus     Vitamin D deficiency      Past Surgical History:   Procedure Laterality Date    ROTATOR CUFF REPAIR Right     TUBAL  ABDOMINAL LIGATION        Family History   Problem Relation Age of Onset    Arthritis Mother     COPD Mother     Hyperlipidemia Mother     Hypertension Mother     Thyroid disease Mother     Alcohol abuse Father     Arthritis Father     Cancer Father     Diabetes Father     Hyperlipidemia Father     Arthritis Sister     COPD Sister     Diabetes Sister     Heart disease Sister     Hyperlipidemia Sister     Hypertension Sister     Thyroid disease Sister     Arthritis Sister     Diabetes Sister     Hyperlipidemia Sister     Thyroid disease Sister     Thyroid disease Sister     Diabetes Brother     Hyperlipidemia Brother     Hypertension Brother     Heart disease Maternal Grandmother     Breast cancer Maternal Aunt     Breast cancer Maternal Aunt       Social History     Socioeconomic History    Marital status:     Number of children: 3    Highest education level: GED or equivalent   Tobacco Use    Smoking status: Every Day     Current packs/day: 0.50     Types: Cigarettes    Smokeless tobacco: Never   Substance and Sexual Activity    Alcohol use: No    Drug use: No    Sexual activity: Not Currently     Partners: Male     Birth control/protection: None      Allergies   Allergen Reactions    Ibuprofen Urinary Retention      Current Outpatient Medications on File Prior to Visit   Medication Sig Dispense Refill    Accu-Chek Softclix Lancets lancets Use as instructed 100 each 5    Blood Glucose Monitoring Suppl (Accu-Chek Guide) w/Device kit Use as directed per Prescriber 1 kit 0    glucose blood test strip Use as directed by provider to check blood sugar twice daily. 100 each 5    levothyroxine (SYNTHROID, LEVOTHROID) 150 MCG tablet Take 1 tablet by mouth Daily.      lisinopril (PRINIVIL,ZESTRIL) 2.5 MG tablet Take 1 tablet by mouth Daily.      omeprazole (priLOSEC) 40 MG capsule Take 1 capsule by mouth once daily (Patient taking differently: 1 capsule As Needed.) 90 capsule 1    [DISCONTINUED] empagliflozin  (Jardiance) 25 MG tablet tablet Take 1 tablet by mouth Daily. 30 tablet 6    [DISCONTINUED] insulin degludec (Tresiba FlexTouch) 100 UNIT/ML solution pen-injector injection Inject 20 Units under the skin into the appropriate area as directed Every Night. 6 mL 5    [DISCONTINUED] Insulin Pen Needle (Pen Needles) 32G X 4 MM misc Use 1 pen needle Every Night as directed by provider to inject insulin. 100 each 5    [DISCONTINUED] Semaglutide, 1 MG/DOSE, (Ozempic, 1 MG/DOSE,) 4 MG/3ML solution pen-injector Inject 1 mg under the skin into the appropriate area as directed 1 (One) Time Per Week. 3 mL 2     No current facility-administered medications on file prior to visit.        Review of Systems   Constitutional:  Positive for diaphoresis, fatigue and unexpected weight loss. Negative for unexpected weight gain.   Eyes:  Positive for blurred vision.   Endocrine: Positive for polydipsia and polyuria. Negative for polyphagia.   Psychiatric/Behavioral:  Positive for sleep disturbance.    All other systems reviewed and are negative.   Symptoms are present but improving.    /74 (BP Location: Right arm, Patient Position: Sitting, Cuff Size: Adult)   Pulse 88   Wt 84.9 kg (187 lb 3.2 oz)   SpO2 99%   BMI 27.64 kg/m²      Physical Exam  Vitals reviewed.   Constitutional:       Appearance: Normal appearance.   Eyes:      Extraocular Movements: Extraocular movements intact.   Cardiovascular:      Rate and Rhythm: Normal rate.   Pulmonary:      Effort: Pulmonary effort is normal.   Skin:     General: Skin is warm.   Neurological:      General: No focal deficit present.      Mental Status: She is alert and oriented to person, place, and time.   Psychiatric:         Mood and Affect: Mood normal.         Behavior: Behavior normal.         Thought Content: Thought content normal.         Judgment: Judgment normal.       CMP:  Lab Results   Component Value Date    BUN 8 12/09/2019    CREATININE 0.69 12/09/2019    EGFRIFNONA 87  12/09/2019    BCR 11.6 12/09/2019     12/09/2019    K 3.8 12/09/2019    CO2 22.1 12/09/2019    CALCIUM 8.9 12/09/2019    ALBUMIN 3.80 12/09/2019    LABIL2 1.4 (L) 03/18/2016    BILITOT 0.4 12/09/2019    ALKPHOS 65 12/09/2019    AST 25 12/09/2019    ALT 20 12/09/2019     Lipid Panel:  Lab Results   Component Value Date    CHOL 172 12/09/2019    TRIG 380 (H) 12/09/2019    HDL 24 (L) 12/09/2019    VLDL 76 (H) 12/09/2019    LDL 72 12/09/2019     HbA1c:  Lab Results   Component Value Date    HGBA1C 6.0 (A) 08/28/2024    HGBA1C 7.2 (A) 01/25/2024   7.4 (03/2024)    Glucose:  Lab Results   Component Value Date    POCGLU 107 08/28/2024     Microalbumin:  Lab Results   Component Value Date    MALBCRERATIO <8 04/30/2024     TSH:  Lab Results   Component Value Date    TSH 1.100 12/09/2019       Assessment and Plan    Diagnoses and all orders for this visit:    1. Type 2 diabetes mellitus with hyperglycemia, without long-term current use of insulin (Primary)  Assessment & Plan:  -Diabetes is improving with A1c 6.0.  -Discussed dietary and exercise guidelines with patient.    -Discussed the importance of yearly eye exams.  -Discussed the importance of checking BG's regularly.    -Continue Ozempic 1 mg weekly.  Patient has no personal history of pancreatitis, no family history of MEN syndrome or medullary thyroid cancer. Possible side effects including nausea, bloating, other GI upset and rarely pancreatitis were discussed. She was advised to call the office with any symptoms or concerns.   -Continue Tresiba 20 units QHS.  -Continue Jardiance 25 mg QD. Discussed the medication's MOA and that it may cause more frequent urination particularly upon starting the medication. Take one tablet in the morning with or without food. Encouraged to drink plenty of water as to not get dehydrated especially in warmer weather or with activity. Also discussed there may be an increased incidence of UTIs or yeast infections and to monitor for  signs/symptoms.  -S/S hypoglycemia reviewed with Rule of 15's advised.  -Follow-up in 3 months.     Orders:  -     POC Glycosylated Hemoglobin (Hb A1C)  -     POC Glucose Fingerstick  -     insulin degludec (Tresiba FlexTouch) 100 UNIT/ML solution pen-injector injection; Inject 20 Units under the skin into the appropriate area as directed Every Night.  Dispense: 6 mL; Refill: 5  -     Insulin Pen Needle (Pen Needles) 32G X 4 MM misc; Use 1 pen needle Every Night as directed by provider to inject insulin.  Dispense: 100 each; Refill: 5    2. Acquired hypothyroidism  Assessment & Plan:  -Clinically euthyroid.  -TFT's today with medication adjustment accordingly.  -Reminded of proper administration including taking 7 pills, once daily, per week on an empty stomach with no missed doses, waiting 30-60 minutes prior to other medications or food.  -Follow-up in 3 months.    Orders:  -     TSH  -     T4, free    Other orders  -     empagliflozin (Jardiance) 25 MG tablet tablet; Take 1 tablet by mouth Daily.  Dispense: 30 tablet; Refill: 6  -     Semaglutide, 1 MG/DOSE, (Ozempic, 1 MG/DOSE,) 4 MG/3ML solution pen-injector; Inject 1 mg under the skin into the appropriate area as directed 1 (One) Time Per Week.  Dispense: 3 mL; Refill: 2           Return in about 3 months (around 11/28/2024) for Follow-up appointment, A1C. The patient was instructed to contact the clinic with any interval questions or concerns.        This document has been electronically signed by BRIAN Zelaya  August 28, 2024 11:38 EDT   Endocrinology    Please note that portions of this document were completed with a voice recognition program. Efforts were made to edit the dictations, but occasionally words are mis-transcribed.    no

## 2024-09-16 NOTE — ED ADULT TRIAGE NOTE - AS HEIGHT TYPE
Karen called for Jeb stating he is passing a kidney stone.  She said he had a fever yesterday of 103, he has blood in his urine, odor and shivering.    Please contact Karen at [phone]       stated
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
## 2025-01-17 NOTE — ED ADULT TRIAGE NOTE - HEIGHT IN FEET
Onset: Ongoing for about 1 week     Location / description: Patient states that he is \"just dying\". Patient states that he can hardly walk, can hardly stand up, and \"is freezing\".   Endorses that he has a cough, sore throat, and has congestion. States that he has been having to use a wheelchair, which he can normally ambulate independently. States that he has had intermittent wheezing for a while now, but audible on phone with triager.      Precipitating Factors: see above     Pain Scale (1-10), 10 highest: n/a    What  improves / worsens symptoms: n/a    Symptom specific medications: n/a    Recent visits (last 3-4 weeks) for same reason or recent surgery:  none     PLAN:  Go to the Emergency Department    Patient/Caller agrees to follow recommendations. and Patient/Caller encouraged to call back if any questions or concerns.  Reason for Disposition   Wheezing can be heard across the room    Additional Information   Negative: Oxygen level (e.g., pulse oximetry) 90% or lower     No home pulse oximeter    Protocols used: Breathing Difficulty-A-OH     5

## 2025-02-04 NOTE — PHYSICAL THERAPY INITIAL EVALUATION ADULT - FUNCTIONAL LIMITATIONS, PT EVAL
Tetracycline Pregnancy And Lactation Text: This medication is Pregnancy Category D and not consider safe during pregnancy. It is also excreted in breast milk. Quinolones Counseling:  I discussed with the patient the risks of fluoroquinolones including but not limited to GI upset, allergic reaction, drug rash, diarrhea, dizziness, photosensitivity, yeast infections, liver function test abnormalities, tendonitis/tendon rupture. Skyrizi Counseling: I discussed with the patient the risks of risankizumab-rzaa including but not limited to immunosuppression, and serious infections.  The patient understands that monitoring is required including a PPD at baseline and must alert us or the primary physician if symptoms of infection or other concerning signs are noted. Rinvoq Counseling: I discussed with the patient the risks of Rinvoq therapy including but not limited to upper respiratory tract infections, shingles, cold sores, bronchitis, nausea, cough, fever, acne, and headache. Live vaccines should be avoided.  This medication has been linked to serious infections; higher rate of mortality; malignancy and lymphoproliferative disorders; major adverse cardiovascular events; thrombosis; thrombocytopenia, anemia, and neutropenia; lipid elevations; liver enzyme elevations; and gastrointestinal perforations. Topical Retinoid Pregnancy And Lactation Text: This medication is Pregnancy Category C. It is unknown if this medication is excreted in breast milk. Calcipotriene Counseling:  I discussed with the patient the risks of calcipotriene including but not limited to erythema, scaling, itching, and irritation. Colchicine Counseling:  Patient counseled regarding adverse effects including but not limited to stomach upset (nausea, vomiting, stomach pain, or diarrhea).  Patient instructed to limit alcohol consumption while taking this medication.  Colchicine may reduce blood counts especially with prolonged use.  The patient understands that monitoring of kidney function and blood counts may be required, especially at baseline. The patient verbalized understanding of the proper use and possible adverse effects of colchicine.  All of the patient's questions and concerns were addressed. Ketoconazole Pregnancy And Lactation Text: This medication is Pregnancy Category C and it isn't know if it is safe during pregnancy. It is also excreted in breast milk and breast feeding isn't recommended. Calcipotriene Pregnancy And Lactation Text: The use of this medication during pregnancy or lactation is not recommended as there is insufficient data. Terbinafine Counseling: Patient counseling regarding adverse effects of terbinafine including but not limited to headache, diarrhea, rash, upset stomach, liver function test abnormalities, itching, taste/smell disturbance, nausea, abdominal pain, and flatulence.  There is a rare possibility of liver failure that can occur when taking terbinafine.  The patient understands that a baseline LFT and kidney function test may be required. The patient verbalized understanding of the proper use and possible adverse effects of terbinafine.  All of the patient's questions and concerns were addressed. Rinvoq Pregnancy And Lactation Text: Based on animal studies, Rinvoq may cause embryo-fetal harm when administered to pregnant women.  The medication should not be used in pregnancy.  Breastfeeding is not recommended during treatment and for 6 days after the last dose. Humira Counseling:  I discussed with the patient the risks of adalimumab including but not limited to myelosuppression, immunosuppression, autoimmune hepatitis, demyelinating diseases, lymphoma, and serious infections.  The patient understands that monitoring is required including a PPD at baseline and must alert us or the primary physician if symptoms of infection or other concerning signs are noted. Prednisone Counseling:  I discussed with the patient the risks of prolonged use of prednisone including but not limited to weight gain, insomnia, osteoporosis, mood changes, diabetes, susceptibility to infection, glaucoma and high blood pressure.  In cases where prednisone use is prolonged, patients should be monitored with blood pressure checks, serum glucose levels and an eye exam.  Additionally, the patient may need to be placed on GI prophylaxis, PCP prophylaxis, and calcium and vitamin D supplementation and/or a bisphosphonate.  The patient verbalized understanding of the proper use and the possible adverse effects of prednisone.  All of the patient's questions and concerns were addressed. Oral Minoxidil Pregnancy And Lactation Text: This medication should only be used when clearly needed if you are pregnant, attempting to become pregnant or breast feeding. Mirvaso Counseling: Mirvaso is a topical medication which can decrease superficial blood flow where applied. Side effects are uncommon and include stinging, redness and allergic reactions. Mirvaso Pregnancy And Lactation Text: This medication has not been assigned a Pregnancy Risk Category. It is unknown if the medication is excreted in breast milk. Prednisone Pregnancy And Lactation Text: This medication is Pregnancy Category C and it isn't know if it is safe during pregnancy. This medication is excreted in breast milk. Skyrizi Pregnancy And Lactation Text: The risk during pregnancy and breastfeeding is uncertain with this medication. Colchicine Pregnancy And Lactation Text: This medication is Pregnancy Category C and isn't considered safe during pregnancy. It is excreted in breast milk. Tazorac Counseling:  Patient advised that medication is irritating and drying.  Patient may need to apply sparingly and wash off after an hour before eventually leaving it on overnight.  The patient verbalized understanding of the proper use and possible adverse effects of tazorac.  All of the patient's questions and concerns were addressed. Quinolones Pregnancy And Lactation Text: This medication is Pregnancy Category C and it isn't know if it is safe during pregnancy. It is also excreted in breast milk. Azithromycin Counseling:  I discussed with the patient the risks of azithromycin including but not limited to GI upset, allergic reaction, drug rash, diarrhea, and yeast infections. Tazorac Pregnancy And Lactation Text: This medication is not safe during pregnancy. It is unknown if this medication is excreted in breast milk. Dapsone Counseling: I discussed with the patient the risks of dapsone including but not limited to hemolytic anemia, agranulocytosis, rashes, methemoglobinemia, kidney failure, peripheral neuropathy, headaches, GI upset, and liver toxicity.  Patients who start dapsone require monitoring including baseline LFTs and weekly CBCs for the first month, then every month thereafter.  The patient verbalized understanding of the proper use and possible adverse effects of dapsone.  All of the patient's questions and concerns were addressed. Humira Pregnancy And Lactation Text: This medication is Pregnancy Category B and is considered safe during pregnancy. It is unknown if this medication is excreted in breast milk. Otezla Counseling: The side effects of Otezla were discussed with the patient, including but not limited to worsening or new depression, weight loss, diarrhea, nausea, upper respiratory tract infection, and headache. Patient instructed to call the office should any adverse effect occur.  The patient verbalized understanding of the proper use and possible adverse effects of Otezla.  All the patient's questions and concerns were addressed. Cantharidin Counseling:  I discussed with the patient the risks of Cantharidin including but not limited to pain, redness, burning, itching, and blistering. Terbinafine Pregnancy And Lactation Text: This medication is Pregnancy Category B and is considered safe during pregnancy. It is also excreted in breast milk and breast feeding isn't recommended. Xeljanz Counseling: I discussed with the patient the risks of Xeljanz therapy including increased risk of infection, liver issues, headache, diarrhea, or cold symptoms. Live vaccines should be avoided. They were instructed to call if they have any problems. Otezla Pregnancy And Lactation Text: This medication is Pregnancy Category C and it isn't known if it is safe during pregnancy. It is unknown if it is excreted in breast milk. Rifampin Counseling: I discussed with the patient the risks of rifampin including but not limited to liver damage, kidney damage, red-orange body fluids, nausea/vomiting and severe allergy. Spevigo Counseling: I discussed with the patient the risks of Spevigo including but not limited to fatigue, nasuea, vomiting, headache, pruritus, urinary tract infection, an infusion related reactions.  The patient understands that monitoring is required including screening for tuberculosis at baseline and yearly screening thereafter while continuing Spevigo therapy. They should contact us if symptoms of infection or other concerning signs are noted. Opzelura Counseling:  I discussed with the patient the risks of Opzelura including but not limited to nasopharngitis, bronchitis, ear infection, eosinophila, hives, diarrhea, folliculitis, tonsillitis, and rhinorrhea.  Taken orally, this medication has been linked to serious infections; higher rate of mortality; malignancy and lymphoproliferative disorders; major adverse cardiovascular events; thrombosis; thrombocytopenia, anemia, and neutropenia; and lipid elevations. Rifampin Pregnancy And Lactation Text: This medication is Pregnancy Category C and it isn't know if it is safe during pregnancy. It is also excreted in breast milk and should not be used if you are breast feeding. Topical Clindamycin Counseling: Patient counseled that this medication may cause skin irritation or allergic reactions.  In the event of skin irritation, the patient was advised to reduce the amount of the drug applied or use it less frequently.   The patient verbalized understanding of the proper use and possible adverse effects of clindamycin.  All of the patient's questions and concerns were addressed. Azithromycin Pregnancy And Lactation Text: This medication is considered safe during pregnancy and is also secreted in breast milk. 5-Fu Counseling: 5-Fluorouracil Counseling:  I discussed with the patient the risks of 5-fluorouracil including but not limited to erythema, scaling, itching, weeping, crusting, and pain. Dapsone Pregnancy And Lactation Text: This medication is Pregnancy Category C and is not considered safe during pregnancy or breast feeding. Xeldalyz Pregnancy And Lactation Text: This medication is Pregnancy Category D and is not considered safe during pregnancy.  The risk during breast feeding is also uncertain. Hyrimoz Counseling:  I discussed with the patient the risks of adalimumab including but not limited to myelosuppression, immunosuppression, autoimmune hepatitis, demyelinating diseases, lymphoma, and serious infections.  The patient understands that monitoring is required including a PPD at baseline and must alert us or the primary physician if symptoms of infection or other concerning signs are noted. Opioid Counseling: I discussed with the patient the potential side effects of opioids including but not limited to addiction, altered mental status, and depression. I stressed avoiding alcohol, benzodiazepines, muscle relaxants and sleep aids unless specifically okayed by a physician. The patient verbalized understanding of the proper use and possible adverse effects of opioids. All of the patient's questions and concerns were addressed. They were instructed to flush the remaining pills down the toilet if they did not need them for pain. community/leisure/home management/self-care Opioid Pregnancy And Lactation Text: These medications can lead to premature delivery and should be avoided during pregnancy. These medications are also present in breast milk in small amounts. Oxybutynin Counseling:  I discussed with the patient the risks of oxybutynin including but not limited to skin rash, drowsiness, dry mouth, difficulty urinating, and blurred vision. Opzelura Pregnancy And Lactation Text: There is insufficient data to evaluate drug-associated risk for major birth defects, miscarriage, or other adverse maternal or fetal outcomes.  There is a pregnancy registry that monitors pregnancy outcomes in pregnant persons exposed to the medication during pregnancy.  It is unknown if this medication is excreted in breast milk.  Do not breastfeed during treatment and for about 4 weeks after the last dose. Spevigo Pregnancy And Lactation Text: The risk during pregnancy and breastfeeding is uncertain with this medication. This medication does cross the placenta. It is unknown if this medication is found in breast milk. Picato Counseling:  I discussed with the patient the risks of Picato including but not limited to erythema, scaling, itching, weeping, crusting, and pain. Bactrim Counseling:  I discussed with the patient the risks of sulfa antibiotics including but not limited to GI upset, allergic reaction, drug rash, diarrhea, dizziness, photosensitivity, and yeast infections.  Rarely, more serious reactions can occur including but not limited to aplastic anemia, agranulocytosis, methemoglobinemia, blood dyscrasias, liver or kidney failure, lung infiltrates or desquamative/blistering drug rashes. Stelara Counseling:  I discussed with the patient the risks of ustekinumab including but not limited to immunosuppression, malignancy, posterior leukoencephalopathy syndrome, and serious infections.  The patient understands that monitoring is required including a PPD at baseline and must alert us or the primary physician if symptoms of infection or other concerning signs are noted. Sarecycline Counseling: Patient advised regarding possible photosensitivity and discoloration of the teeth, skin, lips, tongue and gums.  Patient instructed to avoid sunlight, if possible.  When exposed to sunlight, patients should wear protective clothing, sunglasses, and sunscreen.  The patient was instructed to call the office immediately if the following severe adverse effects occur:  hearing changes, easy bruising/bleeding, severe headache, or vision changes.  The patient verbalized understanding of the proper use and possible adverse effects of sarecycline.  All of the patient's questions and concerns were addressed. Topical Clindamycin Pregnancy And Lactation Text: This medication is Pregnancy Category B and is considered safe during pregnancy. It is unknown if it is excreted in breast milk. 5-Fu Pregnancy And Lactation Text: This medication is Pregnancy Category X and contraindicated in pregnancy and in women who may become pregnant. It is unknown if this medication is excreted in breast milk. Gabapentin Counseling: I discussed with the patient the risks of gabapentin including but not limited to dizziness, somnolence, fatigue and ataxia. Zepbound Counseling: I reviewed the possible side effects including: thyroid tumors, kidney disease, gallbladder disease, abdominal pain, constipation, diarrhea, nausea, vomiting and pancreatitis. Do not take this medication if you have a history or family history of multiple endocrine neoplasia syndrome type 2. Side effects reviewed, pt to contact office should one occur. Cyclophosphamide Pregnancy And Lactation Text: This medication is Pregnancy Category D and it isn't considered safe during pregnancy. This medication is excreted in breast milk. Dupixent Pregnancy And Lactation Text: This medication likely crosses the placenta but the risk for the fetus is uncertain. This medication is excreted in breast milk. Nsaids Counseling: NSAID Counseling: I discussed with the patient that NSAIDs should be taken with food. Prolonged use of NSAIDs can result in the development of stomach ulcers.  Patient advised to stop taking NSAIDs if abdominal pain occurs.  The patient verbalized understanding of the proper use and possible adverse effects of NSAIDs.  All of the patient's questions and concerns were addressed. Birth Control Pills Counseling: Birth Control Pill Counseling: I discussed with the patient the potential side effects of OCPs including but not limited to increased risk of stroke, heart attack, thrombophlebitis, deep venous thrombosis, hepatic adenomas, breast changes, GI upset, headaches, and depression.  The patient verbalized understanding of the proper use and possible adverse effects of OCPs. All of the patient's questions and concerns were addressed. High Dose Vitamin A Pregnancy And Lactation Text: High dose vitamin A therapy is contraindicated during pregnancy and breast feeding. Birth Control Pills Pregnancy And Lactation Text: This medication should be avoided if pregnant and for the first 30 days post-partum. Sotyktu Pregnancy And Lactation Text: There is insufficient data to evaluate whether or not Sotyktu is safe to use during pregnancy.   It is not known if Sotyktu passes into breast milk and whether or not it is safe to use when breastfeeding.   Odomzo Counseling- I discussed with the patient the risks of Odomzo including but not limited to nausea, vomiting, diarrhea, constipation, weight loss, changes in the sense of taste, decreased appetite, muscle spasms, and hair loss.  The patient verbalized understanding of the proper use and possible adverse effects of Odomzo.  All of the patient's questions and concerns were addressed. Doxepin Counseling:  Patient advised that the medication is sedating and not to drive a car after taking this medication. Patient informed of potential adverse effects including but not limited to dry mouth, urinary retention, and blurry vision.  The patient verbalized understanding of the proper use and possible adverse effects of doxepin.  All of the patient's questions and concerns were addressed. Metronidazole Counseling:  I discussed with the patient the risks of metronidazole including but not limited to seizures, nausea/vomiting, a metallic taste in the mouth, nausea/vomiting and severe allergy. Simlandi Counseling:  I discussed with the patient the risks of adalimumab including but not limited to myelosuppression, immunosuppression, autoimmune hepatitis, demyelinating diseases, lymphoma, and serious infections.  The patient understands that monitoring is required including a PPD at baseline and must alert us or the primary physician if symptoms of infection or other concerning signs are noted. Litfulo Counseling: I discussed with the patient the risks of Litfulo therapy including but not limited to upper respiratory tract infections, shingles, cold sores, and nausea. Live vaccines should be avoided.  This medication has been linked to serious infections; higher rate of mortality; malignancy and lymphoproliferative disorders; major adverse cardiovascular events; thrombosis; gastrointestinal perforations; neutropenia; lymphopenia; anemia; liver enzyme elevations; and lipid elevations. Solaraze Pregnancy And Lactation Text: This medication is Pregnancy Category B and is considered safe. There is some data to suggest avoiding during the third trimester. It is unknown if this medication is excreted in breast milk. Arava Counseling:  Patient counseled regarding adverse effects of Arava including but not limited to nausea, vomiting, abnormalities in liver function tests. Patients may develop mouth sores, rash, diarrhea, and abnormalities in blood counts. The patient understands that monitoring is required including LFTs and blood counts.  There is a rare possibility of scarring of the liver and lung problems that can occur when taking methotrexate. Persistent nausea, loss of appetite, pale stools, dark urine, cough, and shortness of breath should be reported immediately. Patient advised to discontinue Arava treatment and consult with a physician prior to attempting conception. The patient will have to undergo a treatment to eliminate Arava from the body prior to conception. Valtrex Pregnancy And Lactation Text: this medication is Pregnancy Category B and is considered safe during pregnancy. This medication is not directly found in breast milk but it's metabolite acyclovir is present. Benzoyl Peroxide Counseling: Patient counseled that medicine may cause skin irritation and bleach clothing.  In the event of skin irritation, the patient was advised to reduce the amount of the drug applied or use it less frequently.   The patient verbalized understanding of the proper use and possible adverse effects of benzoyl peroxide.  All of the patient's questions and concerns were addressed. Griseofulvin Pregnancy And Lactation Text: This medication is Pregnancy Category X and is known to cause serious birth defects. It is unknown if this medication is excreted in breast milk but breast feeding should be avoided. Itraconazole Counseling:  I discussed with the patient the risks of itraconazole including but not limited to liver damage, nausea/vomiting, neuropathy, and severe allergy.  The patient understands that this medication is best absorbed when taken with acidic beverages such as non-diet cola or ginger ale.  The patient understands that monitoring is required including baseline LFTs and repeat LFTs at intervals.  The patient understands that they are to contact us or the primary physician if concerning signs are noted. Litfulo Pregnancy And Lactation Text: Based on animal studies, Lifulo may cause embryo-fetal harm when administered to pregnant women.  The medication should not be used in pregnancy.  Breastfeeding is not recommended during treatment. Ebglyss Counseling: I discussed with the patient the risks of lebrikizumab including but not limited to eye inflammation and irritation, cold sores, injection site reactions, allergic reactions and increased risk of parasitic infection. The patient understands that monitoring is required and they must alert us or the primary physician if symptoms of infection or other concerning signs are noted. Benzoyl Peroxide Pregnancy And Lactation Text: This medication is Pregnancy Category C. It is unknown if benzoyl peroxide is excreted in breast milk. Cyclosporine Counseling:  I discussed with the patient the risks of cyclosporine including but not limited to hypertension, gingival hyperplasia,myelosuppression, immunosuppression, liver damage, kidney damage, neurotoxicity, lymphoma, and serious infections. The patient understands that monitoring is required including baseline blood pressure, CBC, CMP, lipid panel and uric acid, and then 1-2 times monthly CMP and blood pressure. Winlevi Counseling:  I discussed with the patient the risks of topical clascoterone including but not limited to erythema, scaling, itching, and stinging. Patient voiced their understanding. Nsaids Pregnancy And Lactation Text: These medications are considered safe up to 30 weeks gestation. It is excreted in breast milk. Klisyri Counseling:  I discussed with the patient the risks of Klisyri including but not limited to erythema, scaling, itching, weeping, crusting, and pain. Klisyri Pregnancy And Lactation Text: It is unknown if this medication can harm a developing fetus or if it is excreted in breast milk. Spironolactone Counseling: Patient advised regarding risks of diarrhea, abdominal pain, hyperkalemia, birth defects (for female patients), liver toxicity and renal toxicity. The patient may need blood work to monitor liver and kidney function and potassium levels while on therapy. The patient verbalized understanding of the proper use and possible adverse effects of spironolactone.  All of the patient's questions and concerns were addressed. Doxepin Pregnancy And Lactation Text: This medication is Pregnancy Category C and it isn't known if it is safe during pregnancy. It is also excreted in breast milk and breast feeding isn't recommended. Odomzo Pregnancy And Lactation Text: This medication is Pregnancy Category X and is absolutely contraindicated during pregnancy. It is unknown if it is excreted in breast milk. Use Enhanced Medication Counseling?: No Metronidazole Pregnancy And Lactation Text: This medication is Pregnancy Category B and considered safe during pregnancy.  It is also excreted in breast milk. Soolantra Counseling: I discussed with the patients the risks of topial Soolantra. This is a medicine which decreases the number of mites and inflammation in the skin. You experience burning, stinging, eye irritation or allergic reactions.  Please call our office if you develop any problems from using this medication. Carac Counseling:  I discussed with the patient the risks of Carac including but not limited to erythema, scaling, itching, weeping, crusting, and pain. Detail Level: Simple Hydroxyzine Counseling: Patient advised that the medication is sedating and not to drive a car after taking this medication.  Patient informed of potential adverse effects including but not limited to dry mouth, urinary retention, and blurry vision.  The patient verbalized understanding of the proper use and possible adverse effects of hydroxyzine.  All of the patient's questions and concerns were addressed. Olumiant Counseling: I discussed with the patient the risks of Olumiant therapy including but not limited to upper respiratory tract infections, shingles, cold sores, and nausea. Live vaccines should be avoided.  This medication has been linked to serious infections; higher rate of mortality; malignancy and lymphoproliferative disorders; major adverse cardiovascular events; thrombosis; gastrointestinal perforations; neutropenia; lymphopenia; anemia; liver enzyme elevations; and lipid elevations. Ebglyss Pregnancy And Lactation Text: This medication likely crosses the placenta but the risk for the fetus is uncertain. It is unknown if this medication is excreted in breast milk. Olanzapine Counseling- I discussed with the patient the common side effects of olanzapine including but are not limited to: lack of energy, dry mouth, increased appetite, sleepiness, tremor, constipation, dizziness, changes in behavior, or restlessness.  Explained that teenagers are more likely to experience headaches, abdominal pain, pain in the arms or legs, tiredness, and sleepiness.  Serious side effects include but are not limited: increased risk of death in elderly patients who are confused, have memory loss, or dementia-related psychosis; hyperglycemia; increased cholesterol and triglycerides; and weight gain. Winlevi Pregnancy And Lactation Text: This medication is considered safe during pregnancy and breastfeeding. Enbrel Counseling:  I discussed with the patient the risks of etanercept including but not limited to myelosuppression, immunosuppression, autoimmune hepatitis, demyelinating diseases, lymphoma, and infections.  The patient understands that monitoring is required including a PPD at baseline and must alert us or the primary physician if symptoms of infection or other concerning signs are noted. Methotrexate Counseling:  Patient counseled regarding adverse effects of methotrexate including but not limited to nausea, vomiting, abnormalities in liver function tests. Patients may develop mouth sores, rash, diarrhea, and abnormalities in blood counts. The patient understands that monitoring is required including LFT's and blood counts.  There is a rare possibility of scarring of the liver and lung problems that can occur when taking methotrexate. Persistent nausea, loss of appetite, pale stools, dark urine, cough, and shortness of breath should be reported immediately. Patient advised to discontinue methotrexate treatment at least three months before attempting to become pregnant.  I discussed the need for folate supplements while taking methotrexate.  These supplements can decrease side effects during methotrexate treatment. The patient verbalized understanding of the proper use and possible adverse effects of methotrexate.  All of the patient's questions and concerns were addressed. VTAMA Counseling: I discussed with the patient that VTAMA is not for use in the eyes, mouth or mouth. They should call the office if they develop any signs of allergic reactions to VTAMA. The patient verbalized understanding of the proper use and possible adverse effects of VTAMA.  All of the patient's questions and concerns were addressed. Olanzapine Pregnancy And Lactation Text: This medication is pregnancy category C.   There are no adequate and well controlled trials with olanzapine in pregnant females.  Olanzapine should be used during pregnancy only if the potential benefit justifies the potential risk to the fetus.   In a study in lactating healthy women, olanzapine was excreted in breast milk.  It is recommended that women taking olanzapine should not breast feed. Simponi Counseling:  I discussed with the patient the risks of golimumab including but not limited to myelosuppression, immunosuppression, autoimmune hepatitis, demyelinating diseases, lymphoma, and serious infections.  The patient understands that monitoring is required including a PPD at baseline and must alert us or the primary physician if symptoms of infection or other concerning signs are noted. Clofazimine Counseling:  I discussed with the patient the risks of clofazimine including but not limited to skin and eye pigmentation, liver damage, nausea/vomiting, gastrointestinal bleeding and allergy. Minoxidil Counseling: Minoxidil is a topical medication which can increase blood flow where it is applied. It is uncertain how this medication increases hair growth. Side effects are uncommon and include stinging and allergic reactions. Minocycline Counseling: Patient advised regarding possible photosensitivity and discoloration of the teeth, skin, lips, tongue and gums.  Patient instructed to avoid sunlight, if possible.  When exposed to sunlight, patients should wear protective clothing, sunglasses, and sunscreen.  The patient was instructed to call the office immediately if the following severe adverse effects occur:  hearing changes, easy bruising/bleeding, severe headache, or vision changes.  The patient verbalized understanding of the proper use and possible adverse effects of minocycline.  All of the patient's questions and concerns were addressed. Spironolactone Pregnancy And Lactation Text: This medication can cause feminization of the male fetus and should be avoided during pregnancy. The active metabolite is also found in breast milk. Soolantra Pregnancy And Lactation Text: This medication is Pregnancy Category C. This medication is considered safe during breast feeding. Topical Retinoid counseling:  Patient advised to apply a pea-sized amount only at bedtime and wait 30 minutes after washing their face before applying.  If too drying, patient may add a non-comedogenic moisturizer. The patient verbalized understanding of the proper use and possible adverse effects of retinoids.  All of the patient's questions and concerns were addressed. Hydroxyzine Pregnancy And Lactation Text: This medication is not safe during pregnancy and should not be taken. It is also excreted in breast milk and breast feeding isn't recommended. Xolair Counseling:  Patient informed of potential adverse effects including but not limited to fever, muscle aches, rash and allergic reactions.  The patient verbalized understanding of the proper use and possible adverse effects of Xolair.  All of the patient's questions and concerns were addressed. Ketoconazole Counseling:   Patient counseled regarding improving absorption with orange juice.  Adverse effects include but are not limited to breast enlargement, headache, diarrhea, nausea, upset stomach, liver function test abnormalities, taste disturbance, and stomach pain.  There is a rare possibility of liver failure that can occur when taking ketoconazole. The patient understands that monitoring of LFTs may be required, especially at baseline. The patient verbalized understanding of the proper use and possible adverse effects of ketoconazole.  All of the patient's questions and concerns were addressed. Olumiant Pregnancy And Lactation Text: Based on animal studies, Olumiant may cause embryo-fetal harm when administered to pregnant women.  The medication should not be used in pregnancy.  Breastfeeding is not recommended during treatment. Xolair Pregnancy And Lactation Text: This medication is Pregnancy Category B and is considered safe during pregnancy. This medication is excreted in breast milk. Methotrexate Pregnancy And Lactation Text: This medication is Pregnancy Category X and is known to cause fetal harm. This medication is excreted in breast milk. Minoxidil Pregnancy And Lactation Text: This medication has not been assigned a Pregnancy Risk Category but animal studies failed to show danger with the topical medication. It is unknown if the medication is excreted in breast milk. Oral Minoxidil Counseling- I discussed with the patient the risks of oral minoxidil including but not limited to shortness of breath, swelling of the feet or ankles, dizziness, lightheadedness, unwanted hair growth and allergic reaction.  The patient verbalized understanding of the proper use and possible adverse effects of oral minoxidil.  All of the patient's questions and concerns were addressed. Clindamycin Pregnancy And Lactation Text: This medication can be used in pregnancy if certain situations. Clindamycin is also present in breast milk. Nemluvio Pregnancy And Lactation Text: It is not known if Nemluvio causes fetal harm or is present in breast milk. Please proceed with caution if patients who are pregnant or breastfeeding. Qbrexza Counseling:  I discussed with the patient the risks of Qbrexza including but not limited to headache, mydriasis, blurred vision, dry eyes, nasal dryness, dry mouth, dry throat, dry skin, urinary hesitation, and constipation.  Local skin reactions including erythema, burning, stinging, and itching can also occur. Zyclara Counseling:  I discussed with the patient the risks of imiquimod including but not limited to erythema, scaling, itching, weeping, crusting, and pain.  Patient understands that the inflammatory response to imiquimod is variable from person to person and was educated regarded proper titration schedule.  If flu-like symptoms develop, patient knows to discontinue the medication and contact us. Thalidomide Counseling: I discussed with the patient the risks of thalidomide including but not limited to birth defects, anxiety, weakness, chest pain, dizziness, cough and severe allergy. Hydroxychloroquine Pregnancy And Lactation Text: This medication has been shown to cause fetal harm but it isn't assigned a Pregnancy Risk Category. There are small amounts excreted in breast milk. Topical Steroids Counseling: I discussed with the patient that prolonged use of topical steroids can result in the increased appearance of superficial blood vessels (telangiectasias), lightening (hypopigmentation) and thinning of the skin (atrophy).  Patient understands to avoid using high potency steroids in skin folds, the groin or the face.  The patient verbalized understanding of the proper use and possible adverse effects of topical steroids.  All of the patient's questions and concerns were addressed. Saxenda Pregnancy And Lactation Text: The fetal risk of this medication is unknown and taking while pregnant is not recommended. It is unknown if this medication is present in breast milk. Topical Steroids Applications Pregnancy And Lactation Text: Most topical steroids are considered safe to use during pregnancy and lactation.  Any topical steroid applied to the breast or nipple should be washed off before breastfeeding. Azathioprine Pregnancy And Lactation Text: This medication is Pregnancy Category D and isn't considered safe during pregnancy. It is unknown if this medication is excreted in breast milk. Semaglutide Counseling: I reviewed the possible side effects including: thyroid tumors, kidney disease, gallbladder disease, abdominal pain, constipation, diarrhea, nausea, vomiting and pancreatitis. Do not take this medication if you have a history or family history of multiple endocrine neoplasia syndrome type 2. Side effects reviewed, pt to contact office should one occur. Low Dose Naltrexone Counseling- I discussed with the patient the potential risks and side effects of low dose naltrexone including but not limited to: more vivid dreams, headaches, nausea, vomiting, abdominal pain, fatigue, dizziness, and anxiety. Dutasteride Pregnancy And Lactation Text: This medication is absolutely contraindicated in women, especially during pregnancy and breast feeding. Feminization of male fetuses is possible if taking while pregnant. Albendazole Counseling:  I discussed with the patient the risks of albendazole including but not limited to cytopenia, kidney damage, nausea/vomiting and severe allergy.  The patient understands that this medication is being used in an off-label manner. Bexarotene Pregnancy And Lactation Text: This medication is Pregnancy Category X and should not be given to women who are pregnant or may become pregnant. This medication should not be used if you are breast feeding. Erivedge Counseling- I discussed with the patient the risks of Erivedge including but not limited to nausea, vomiting, diarrhea, constipation, weight loss, changes in the sense of taste, decreased appetite, muscle spasms, and hair loss.  The patient verbalized understanding of the proper use and possible adverse effects of Erivedge.  All of the patient's questions and concerns were addressed. Rituxan Counseling:  I discussed with the patient the risks of Rituxan infusions. Side effects can include infusion reactions, severe drug rashes including mucocutaneous reactions, reactivation of latent hepatitis and other infections and rarely progressive multifocal leukoencephalopathy.  All of the patient's questions and concerns were addressed. Qbrexza Pregnancy And Lactation Text: There is no available data on Qbrexza use in pregnant women.  There is no available data on Qbrexza use in lactation. Cimzia Pregnancy And Lactation Text: This medication crosses the placenta but can be considered safe in certain situations. Cimzia may be excreted in breast milk. Aklief counseling:  Patient advised to apply a pea-sized amount only at bedtime and wait 30 minutes after washing their face before applying.  If too drying, patient may add a non-comedogenic moisturizer.  The most commonly reported side effects including irritation, redness, scaling, dryness, stinging, burning, itching, and increased risk of sunburn.  The patient verbalized understanding of the proper use and possible adverse effects of retinoids.  All of the patient's questions and concerns were addressed. Doxycycline Counseling:  Patient counseled regarding possible photosensitivity and increased risk for sunburn.  Patient instructed to avoid sunlight, if possible.  When exposed to sunlight, patients should wear protective clothing, sunglasses, and sunscreen.  The patient was instructed to call the office immediately if the following severe adverse effects occur:  hearing changes, easy bruising/bleeding, severe headache, or vision changes.  The patient verbalized understanding of the proper use and possible adverse effects of doxycycline.  All of the patient's questions and concerns were addressed. Fluconazole Counseling:  Patient counseled regarding adverse effects of fluconazole including but not limited to headache, diarrhea, nausea, upset stomach, liver function test abnormalities, taste disturbance, and stomach pain.  There is a rare possibility of liver failure that can occur when taking fluconazole.  The patient understands that monitoring of LFTs and kidney function test may be required, especially at baseline. The patient verbalized understanding of the proper use and possible adverse effects of fluconazole.  All of the patient's questions and concerns were addressed. Cosentyx Counseling:  I discussed with the patient the risks of Cosentyx including but not limited to worsening of Crohn's disease, immunosuppression, allergic reactions and infections.  The patient understands that monitoring is required including a PPD at baseline and must alert us or the primary physician if symptoms of infection or other concerning signs are noted. Cellcept Counseling:  I discussed with the patient the risks of mycophenolate mofetil including but not limited to infection/immunosuppression, GI upset, hypokalemia, hypercholesterolemia, bone marrow suppression, lymphoproliferative disorders, malignancy, GI ulceration/bleed/perforation, colitis, interstitial lung disease, kidney failure, progressive multifocal leukoencephalopathy, and birth defects.  The patient understands that monitoring is required including a baseline creatinine and regular CBC testing. In addition, patient must alert us immediately if symptoms of infection or other concerning signs are noted. Isotretinoin Counseling: Patient should get monthly blood tests, not donate blood, not drive at night if vision affected, not share medication, and not undergo elective surgery for 6 months after tx completed. Side effects reviewed, pt to contact office should one occur. Finasteride Male Counseling: Finasteride Counseling:  I discussed with the patient the risks of use of finasteride including but not limited to decreased libido, decreased ejaculate volume, gynecomastia, and depression. Women should not handle medication.  All of the patient's questions and concerns were addressed. Topical Sulfur Applications Counseling: Topical Sulfur Counseling: Patient counseled that this medication may cause skin irritation or allergic reactions.  In the event of skin irritation, the patient was advised to reduce the amount of the drug applied or use it less frequently.   The patient verbalized understanding of the proper use and possible adverse effects of topical sulfur application.  All of the patient's questions and concerns were addressed. Low Dose Naltrexone Pregnancy And Lactation Text: Naltrexone is pregnancy category C.  There have been no adequate and well-controlled studies in pregnant women.  It should be used in pregnancy only if the potential benefit justifies the potential risk to the fetus.   Limited data indicates that naltrexone is minimally excreted into breastmilk. Hydroquinone Counseling:  Patient advised that medication may result in skin irritation, lightening (hypopigmentation), dryness, and burning.  In the event of skin irritation, the patient was advised to reduce the amount of the drug applied or use it less frequently.  Rarely, spots that are treated with hydroquinone can become darker (pseudoochronosis).  Should this occur, patient instructed to stop medication and call the office. The patient verbalized understanding of the proper use and possible adverse effects of hydroquinone.  All of the patient's questions and concerns were addressed. Albendazole Pregnancy And Lactation Text: This medication is Pregnancy Category C and it isn't known if it is safe during pregnancy. It is also excreted in breast milk. Ivermectin Counseling:  Patient instructed to take medication on an empty stomach with a full glass of water.  Patient informed of potential adverse effects including but not limited to nausea, diarrhea, dizziness, itching, and swelling of the extremities or lymph nodes.  The patient verbalized understanding of the proper use and possible adverse effects of ivermectin.  All of the patient's questions and concerns were addressed. Finasteride Female Counseling: Finasteride Counseling:  I discussed with the patient the risks of use of finasteride including but not limited to decreased libido and sexual dysfunction. Explained the teratogenic nature of the medication and stressed the importance of not getting pregnant during treatment. All of the patient's questions and concerns were addressed. Rituxan Pregnancy And Lactation Text: This medication is Pregnancy Category C and it isn't know if it is safe during pregnancy. It is unknown if this medication is excreted in breast milk but similar antibodies are known to be excreted. Rhofade Counseling: Rhofade is a topical medication which can decrease superficial blood flow where applied. Side effects are uncommon and include stinging, redness and allergic reactions. Tranexamic Acid Counseling:  Patient advised of the small risk of bleeding problems with tranexamic acid. They were also instructed to call if they developed any nausea, vomiting or diarrhea. All of the patient's questions and concerns were addressed. Doxycycline Pregnancy And Lactation Text: This medication is Pregnancy Category D and not consider safe during pregnancy. It is also excreted in breast milk but is considered safe for shorter treatment courses. Aklief Pregnancy And Lactation Text: It is unknown if this medication is safe to use during pregnancy.  It is unknown if this medication is excreted in breast milk.  Breastfeeding women should use the topical cream on the smallest area of the skin for the shortest time needed while breastfeeding.  Do not apply to nipple and areola. Cantharidin Pregnancy And Lactation Text: This medication has not been proven safe during pregnancy. It is unknown if this medication is excreted in breast milk. Azelaic Acid Counseling: Patient counseled that medicine may cause skin irritation and to avoid applying near the eyes.  In the event of skin irritation, the patient was advised to reduce the amount of the drug applied or use it less frequently.   The patient verbalized understanding of the proper use and possible adverse effects of azelaic acid.  All of the patient's questions and concerns were addressed. Cimetidine Counseling:  I discussed with the patient the risks of Cimetidine including but not limited to gynecomastia, headache, diarrhea, nausea, drowsiness, arrhythmias, pancreatitis, skin rashes, psychosis, bone marrow suppression and kidney toxicity. Libtayo Counseling- I discussed with the patient the risks of Libtayo including but not limited to nausea, vomiting, diarrhea, and bone or muscle pain.  The patient verbalized understanding of the proper use and possible adverse effects of Libtayo.  All of the patient's questions and concerns were addressed. Cibinqo Counseling: I discussed with the patient the risks of Cibinqo therapy including but not limited to common cold, nausea, headache, cold sores, increased blood CPK levels, dizziness, UTIs, fatigue, acne, and vomitting. Live vaccines should be avoided.  This medication has been linked to serious infections; higher rate of mortality; malignancy and lymphoproliferative disorders; major adverse cardiovascular events; thrombosis; thrombocytopenia and lymphopenia; lipid elevations; and retinal detachment. Isotretinoin Pregnancy And Lactation Text: This medication is Pregnancy Category X and is considered extremely dangerous during pregnancy. It is unknown if it is excreted in breast milk. Niacinamide Counseling: I recommended taking niacin or niacinamide, also know as vitamin B3, twice daily. Recent evidence suggests that taking vitamin B3 (500 mg twice daily) can reduce the risk of actinic keratoses and non-melanoma skin cancers. Side effects of vitamin B3 include flushing and headache. Topical Sulfur Applications Pregnancy And Lactation Text: This medication is considered safe during pregnancy and breast feeding secondary to limited systemic absorption. Wegovy Counseling: I reviewed the possible side effects including: thyroid tumors, kidney disease, gallbladder disease, abdominal pain, constipation, diarrhea, nausea, vomiting and pancreatitis. Do not take this medication if you have a history or family history of multiple endocrine neoplasia syndrome type 2. Side effects reviewed, pt to contact office should one occur. Dupixent Counseling: I discussed with the patient the risks of dupilumab including but not limited to eye inflammation and irritation, cold sores, injection site reactions, allergic reactions and increased risk of parasitic infection. The patient understands that monitoring is required and they must alert us or the primary physician if symptoms of infection or other concerning signs are noted. Cyclophosphamide Counseling:  I discussed with the patient the risks of cyclophosphamide including but not limited to hair loss, hormonal abnormalities, decreased fertility, abdominal pain, diarrhea, nausea and vomiting, bone marrow suppression and infection. The patient understands that monitoring is required while taking this medication. Wartpeel Counseling:  I discussed with the patient the risks of Wartpeel including but not limited to erythema, scaling, itching, weeping, crusting, and pain. High Dose Vitamin A Counseling: Side effects reviewed, pt to contact office should one occur. Siliq Counseling:  I discussed with the patient the risks of Siliq including but not limited to new or worsening depression, suicidal thoughts and behavior, immunosuppression, malignancy, posterior leukoencephalopathy syndrome, and serious infections.  The patient understands that monitoring is required including a PPD at baseline and must alert us or the primary physician if symptoms of infection or other concerning signs are noted. There is also a special program designed to monitor depression which is required with Siliq. Tranexamic Acid Pregnancy And Lactation Text: It is unknown if this medication is safe during pregnancy or breast feeding. Imiquimod Counseling:  I discussed with the patient the risks of imiquimod including but not limited to erythema, scaling, itching, weeping, crusting, and pain.  Patient understands that the inflammatory response to imiquimod is variable from person to person and was educated regarded proper titration schedule.  If flu-like symptoms develop, patient knows to discontinue the medication and contact us. Erythromycin Counseling:  I discussed with the patient the risks of erythromycin including but not limited to GI upset, allergic reaction, drug rash, diarrhea, increase in liver enzymes, and yeast infections. Finasteride Pregnancy And Lactation Text: This medication is absolutely contraindicated during pregnancy. It is unknown if it is excreted in breast milk. Erythromycin Pregnancy And Lactation Text: This medication is Pregnancy Category B and is considered safe during pregnancy. It is also excreted in breast milk. Libtayo Pregnancy And Lactation Text: This medication is contraindicated in pregnancy and when breast feeding. Sotyktu Counseling:  I discussed the most common side effects of Sotyktu including: common cold, sore throat, sinus infections, cold sores, canker sores, folliculitis, and acne.  I also discussed more serious side effects of Sotyktu including but not limited to: serious allergic reactions; increased risk for infections such as TB; cancers such as lymphomas; rhabdomyolysis and elevated CPK; and elevated triglycerides and liver enzymes.  Solaraze Counseling:  I discussed with the patient the risks of Solaraze including but not limited to erythema, scaling, itching, weeping, crusting, and pain. Valtrex Counseling: I discussed with the patient the risks of valacyclovir including but not limited to kidney damage, nausea, vomiting and severe allergy.  The patient understands that if the infection seems to be worsening or is not improving, they are to call. Niacinamide Pregnancy And Lactation Text: These medications are considered safe during pregnancy. Azelaic Acid Pregnancy And Lactation Text: This medication is considered safe during pregnancy and breast feeding. Griseofulvin Counseling:  I discussed with the patient the risks of griseofulvin including but not limited to photosensitivity, cytopenia, liver damage, nausea/vomiting and severe allergy.  The patient understands that this medication is best absorbed when taken with a fatty meal (e.g., ice cream or french fries). Cibinqo Pregnancy And Lactation Text: It is unknown if this medication will adversely affect pregnancy or breast feeding.  You should not take this medication if you are currently pregnant or planning a pregnancy or while breastfeeding. Drysol Counseling:  I discussed with the patient the risks of drysol/aluminum chloride including but not limited to skin rash, itching, irritation, burning. Ilumya Counseling: I discussed with the patient the risks of tildrakizumab including but not limited to immunosuppression, malignancy, posterior leukoencephalopathy syndrome, and serious infections.  The patient understands that monitoring is required including a PPD at baseline and must alert us or the primary physician if symptoms of infection or other concerning signs are noted. Adbry Counseling: I discussed with the patient the risks of tralokinumab including but not limited to eye infection and irritation, cold sores, injection site reactions, worsening of asthma, allergic reactions and increased risk of parasitic infection.  Live vaccines should be avoided while taking tralokinumab. The patient understands that monitoring is required and they must alert us or the primary physician if symptoms of infection or other concerning signs are noted. Topical Ketoconazole Counseling: Patient counseled that this medication may cause skin irritation or allergic reactions.  In the event of skin irritation, the patient was advised to reduce the amount of the drug applied or use it less frequently.   The patient verbalized understanding of the proper use and possible adverse effects of ketoconazole.  All of the patient's questions and concerns were addressed. Bactrim Pregnancy And Lactation Text: This medication is Pregnancy Category D and is known to cause fetal risk.  It is also excreted in breast milk. Adbry Pregnancy And Lactation Text: It is unknown if this medication will adversely affect pregnancy or breast feeding. Ozempic Counseling: I reviewed the possible side effects including: thyroid tumors, kidney disease, gallbladder disease, abdominal pain, constipation, diarrhea, nausea, vomiting and pancreatitis. Do not take this medication if you have a history or family history of multiple endocrine neoplasia syndrome type 2. Side effects reviewed, pt to contact office should one occur. Glycopyrrolate Counseling:  I discussed with the patient the risks of glycopyrrolate including but not limited to skin rash, drowsiness, dry mouth, difficulty urinating, and blurred vision. Propranolol Counseling:  I discussed with the patient the risks of propranolol including but not limited to low heart rate, low blood pressure, low blood sugar, restlessness and increased cold sensitivity. They should call the office if they experience any of these side effects. Propranolol Pregnancy And Lactation Text: This medication is Pregnancy Category C and it isn't known if it is safe during pregnancy. It is excreted in breast milk. Tetracycline Counseling: Patient counseled regarding possible photosensitivity and increased risk for sunburn.  Patient instructed to avoid sunlight, if possible.  When exposed to sunlight, patients should wear protective clothing, sunglasses, and sunscreen.  The patient was instructed to call the office immediately if the following severe adverse effects occur:  hearing changes, easy bruising/bleeding, severe headache, or vision changes.  The patient verbalized understanding of the proper use and possible adverse effects of tetracycline.  All of the patient's questions and concerns were addressed. Patient understands to avoid pregnancy while on therapy due to potential birth defects. Vtama Pregnancy And Lactation Text: It is unknown if this medication can cause problems during pregnancy and breastfeeding. Taltz Counseling: I discussed with the patient the risks of ixekizumab including but not limited to immunosuppression, serious infections, worsening of inflammatory bowel disease and drug reactions.  The patient understands that monitoring is required including a PPD at baseline and must alert us or the primary physician if symptoms of infection or other concerning signs are noted. Cephalexin Counseling: I counseled the patient regarding use of cephalexin as an antibiotic for prophylactic and/or therapeutic purposes. Cephalexin (commonly prescribed under brand name Keflex) is a cephalosporin antibiotic which is active against numerous classes of bacteria, including most skin bacteria. Side effects may include nausea, diarrhea, gastrointestinal upset, rash, hives, yeast infections, and in rare cases, hepatitis, kidney disease, seizures, fever, confusion, neurologic symptoms, and others. Patients with severe allergies to penicillin medications are cautioned that there is about a 10% incidence of cross-reactivity with cephalosporins. When possible, patients with penicillin allergies should use alternatives to cephalosporins for antibiotic therapy. Cephalexin Pregnancy And Lactation Text: This medication is Pregnancy Category B and considered safe during pregnancy.  It is also excreted in breast milk but can be used safely for shorter doses. Bimzelx Counseling:  I discussed with the patient the risks of Bimzelx including but not limited to depression, immunosuppression, allergic reactions and infections.  The patient understands that monitoring is required including a PPD at baseline and must alert us or the primary physician if symptoms of infection or other concerning signs are noted. Topical Metronidazole Counseling: Metronidazole is a topical antibiotic medication. You may experience burning, stinging, redness, or allergic reactions.  Please call our office if you develop any problems from using this medication. Acitretin Counseling:  I discussed with the patient the risks of acitretin including but not limited to hair loss, dry lips/skin/eyes, liver damage, hyperlipidemia, depression/suicidal ideation, photosensitivity.  Serious rare side effects can include but are not limited to pancreatitis, pseudotumor cerebri, bony changes, clot formation/stroke/heart attack.  Patient understands that alcohol is contraindicated since it can result in liver toxicity and significantly prolong the elimination of the drug by many years. Infliximab Counseling:  I discussed with the patient the risks of infliximab including but not limited to myelosuppression, immunosuppression, autoimmune hepatitis, demyelinating diseases, lymphoma, and serious infections.  The patient understands that monitoring is required including a PPD at baseline and must alert us or the primary physician if symptoms of infection or other concerning signs are noted. Elidel Counseling: Patient may experience a mild burning sensation during topical application. Elidel is not approved in children less than 2 years of age. There have been case reports of hematologic and skin malignancies in patients using topical calcineurin inhibitors although causality is questionable. Glycopyrrolate Pregnancy And Lactation Text: This medication is Pregnancy Category B and is considered safe during pregnancy. It is unknown if it is excreted breast milk. Dutasteride Male Counseling: Dutasteride Counseling:  I discussed with the patient the risks of use of dutasteride including but not limited to decreased libido, decreased ejaculate volume, and gynecomastia. Women who can become pregnant should not handle medication.  All of the patient's questions and concerns were addressed. Protopic Counseling: Patient may experience a mild burning sensation during topical application. Protopic is not approved in children less than 2 years of age. There have been case reports of hematologic and skin malignancies in patients using topical calcineurin inhibitors although causality is questionable. SSKI Counseling:  I discussed with the patient the risks of SSKI including but not limited to thyroid abnormalities, metallic taste, GI upset, fever, headache, acne, arthralgias, paraesthesias, lymphadenopathy, easy bleeding, arrhythmias, and allergic reaction. Zoryve Counseling:  I discussed with the patient that Zoryve is not for use in the eyes, mouth or vagina. The most commonly reported side effects include diarrhea, headache, insomnia, application site pain, upper respiratory tract infections, and urinary tract infections.  All of the patient's questions and concerns were addressed. Clindamycin Counseling: I counseled the patient regarding use of clindamycin as an antibiotic for prophylactic and/or therapeutic purposes. Clindamycin is active against numerous classes of bacteria, including skin bacteria. Side effects may include nausea, diarrhea, gastrointestinal upset, rash, hives, yeast infections, and in rare cases, colitis. Saxenda Counseling: I reviewed the possible side effects including: thyroid tumors, kidney disease, gallbladder disease, abdominal pain, constipation, diarrhea, nausea, vomiting and pancreatitis. Do not take this medication if you have a history or family history of multiple endocrine neoplasia syndrome type 2. Side effects reviewed, pt to contact office should one occur. Tremfya Counseling: I discussed with the patient the risks of guselkumab including but not limited to immunosuppression, serious infections, and drug reactions.  The patient understands that monitoring is required including a PPD at baseline and must alert us or the primary physician if symptoms of infection or other concerning signs are noted. Bimzelx Pregnancy And Lactation Text: This medication crosses the placenta and the safety is uncertain during pregnancy. It is unknown if this medication is present in breast milk. Acitretin Pregnancy And Lactation Text: This medication is Pregnancy Category X and should not be given to women who are pregnant or may become pregnant in the future. This medication is excreted in breast milk. Hydroxychloroquine Counseling:  I discussed with the patient that a baseline ophthalmologic exam is needed at the start of therapy and every year thereafter while on therapy. A CBC may also be warranted for monitoring.  The side effects of this medication were discussed with the patient, including but not limited to agranulocytosis, aplastic anemia, seizures, rashes, retinopathy, and liver toxicity. Patient instructed to call the office should any adverse effect occur.  The patient verbalized understanding of the proper use and possible adverse effects of Plaquenil.  All the patient's questions and concerns were addressed. Topical Metronidazole Pregnancy And Lactation Text: This medication is Pregnancy Category B and considered safe during pregnancy.  It is also considered safe to use while breastfeeding. Eucrisa Counseling: Patient may experience a mild burning sensation during topical application. Eucrisa is not approved in children less than 3 months of age. Cimzia Counseling:  I discussed with the patient the risks of Cimzia including but not limited to immunosuppression, allergic reactions and infections.  The patient understands that monitoring is required including a PPD at baseline and must alert us or the primary physician if symptoms of infection or other concerning signs are noted. Azathioprine Counseling:  I discussed with the patient the risks of azathioprine including but not limited to myelosuppression, immunosuppression, hepatotoxicity, lymphoma, and infections.  The patient understands that monitoring is required including baseline LFTs, Creatinine, possible TPMP genotyping and weekly CBCs for the first month and then every 2 weeks thereafter.  The patient verbalized understanding of the proper use and possible adverse effects of azathioprine.  All of the patient's questions and concerns were addressed. Bexarotene Counseling:  I discussed with the patient the risks of bexarotene including but not limited to hair loss, dry lips/skin/eyes, liver abnormalities, hyperlipidemia, pancreatitis, depression/suicidal ideation, photosensitivity, drug rash/allergic reactions, hypothyroidism, anemia, leukopenia, infection, cataracts, and teratogenicity.  Patient understands that they will need regular blood tests to check lipid profile, liver function tests, white blood cell count, thyroid function tests and pregnancy test if applicable. Nemluvio Counseling: I discussed with the patient the risks of nemolizumab including but not limited to headache, gastrointestinal complaints, nasopharyngitis, musculoskeletal complaints, injection site reactions, and allergic reactions. The patient understands that monitoring is required and they must alert us or the primary physician if any side effects are noted. Sski Pregnancy And Lactation Text: This medication is Pregnancy Category D and isn't considered safe during pregnancy. It is excreted in breast milk. Protopic Pregnancy And Lactation Text: This medication is Pregnancy Category C. It is unknown if this medication is excreted in breast milk when applied topically. Dutasteride Female Counseling: Dutasteride Counseling:  I discussed with the patient the risks of use of dutasteride including but not limited to decreased libido and sexual dysfunction. Explained the teratogenic nature of the medication and stressed the importance of not getting pregnant during treatment. All of the patient's questions and concerns were addressed.

## 2025-02-13 NOTE — CONSULT NOTE ADULT - ASSESSMENT
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 Palliative consulted for assistance with sx management and complex decision making regarding goc.    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 Palliative consulted for assistance with sx management and complex decision making regarding goc.        [FreeTextEntry1] : Pt is [place], , with 4 children (son-34yo, daughters- 31, 29, 26) domiciled at home with her  and her 28 y/o daughter, with her 30 y/o daughter living upstairs, Moved from [place] 34 years ago

## 2025-05-09 NOTE — ASU PREOP CHECKLIST - TEMPERATURE IN FAHRENHEIT (DEGREES F)
May 9, 2025      Angi MAY  [address] [facility]  [place] [address]  Phone: [phone]  Fax: [phone]       Patient: Karen Orona   YOB: 1987  Date of Visit: 05/09/2025    To Whom It May Concern:    Elly Orona  was at [facility] on 05/09/2025. The patient may return to work/school on 05/09/25 with no restrictions. If you have any questions or concerns, or if I can be of further assistance, please do not hesitate to contact me.    Sincerely,    Nilo Romero MA      97.7